# Patient Record
Sex: FEMALE | Race: WHITE | NOT HISPANIC OR LATINO | Employment: FULL TIME | URBAN - METROPOLITAN AREA
[De-identification: names, ages, dates, MRNs, and addresses within clinical notes are randomized per-mention and may not be internally consistent; named-entity substitution may affect disease eponyms.]

---

## 2017-06-09 ENCOUNTER — TELEPHONE (OUTPATIENT)
Dept: OBSTETRICS AND GYNECOLOGY | Facility: CLINIC | Age: 32
End: 2017-06-09

## 2017-06-09 NOTE — TELEPHONE ENCOUNTER
----- Message from Jimmie Trivedi sent at 6/9/2017  1:20 PM CDT -----  Pt delivery on 05/04 having some abn bleeding  [id number]

## 2018-03-27 ENCOUNTER — HOSPITAL ENCOUNTER (EMERGENCY)
Facility: HOSPITAL | Age: 33
Discharge: HOME | End: 2018-03-27
Attending: EMERGENCY MEDICINE
Payer: COMMERCIAL

## 2018-03-27 VITALS
SYSTOLIC BLOOD PRESSURE: 178 MMHG | HEART RATE: 67 BPM | WEIGHT: 236.31 LBS | RESPIRATION RATE: 18 BRPM | OXYGEN SATURATION: 98 % | BODY MASS INDEX: 37.98 KG/M2 | TEMPERATURE: 98 F | HEIGHT: 66 IN | DIASTOLIC BLOOD PRESSURE: 89 MMHG

## 2018-03-27 DIAGNOSIS — I10 ESSENTIAL HYPERTENSION: ICD-10-CM

## 2018-03-27 DIAGNOSIS — Z91.148 NONCOMPLIANCE WITH MEDICATION REGIMEN: ICD-10-CM

## 2018-03-27 DIAGNOSIS — I63.9 STROKE: ICD-10-CM

## 2018-03-27 DIAGNOSIS — R55 NEAR SYNCOPE: Primary | ICD-10-CM

## 2018-03-27 DIAGNOSIS — E03.9 HYPOTHYROIDISM, UNSPECIFIED TYPE: ICD-10-CM

## 2018-03-27 LAB
ALBUMIN SERPL BCP-MCNC: 3.8 G/DL
ALP SERPL-CCNC: 73 U/L
ALT SERPL W/O P-5'-P-CCNC: 21 U/L
ANION GAP SERPL CALC-SCNC: 7 MMOL/L
AST SERPL-CCNC: 17 U/L
B-HCG UR QL: NEGATIVE
BASOPHILS # BLD AUTO: 0.06 K/UL
BASOPHILS NFR BLD: 0.6 %
BILIRUB SERPL-MCNC: 0.8 MG/DL
BILIRUB UR QL STRIP: NEGATIVE
BUN SERPL-MCNC: 8 MG/DL
CALCIUM SERPL-MCNC: 9.1 MG/DL
CHLORIDE SERPL-SCNC: 108 MMOL/L
CHOLEST SERPL-MCNC: 178 MG/DL
CHOLEST/HDLC SERPL: 5.6 {RATIO}
CLARITY UR: CLEAR
CO2 SERPL-SCNC: 24 MMOL/L
COLOR UR: YELLOW
CREAT SERPL-MCNC: 0.8 MG/DL
DIFFERENTIAL METHOD: ABNORMAL
EOSINOPHIL # BLD AUTO: 0.3 K/UL
EOSINOPHIL NFR BLD: 2.9 %
ERYTHROCYTE [DISTWIDTH] IN BLOOD BY AUTOMATED COUNT: 13 %
EST. GFR  (AFRICAN AMERICAN): >60 ML/MIN/1.73 M^2
EST. GFR  (NON AFRICAN AMERICAN): >60 ML/MIN/1.73 M^2
GLUCOSE SERPL-MCNC: 86 MG/DL
GLUCOSE UR QL STRIP: NEGATIVE
HCT VFR BLD AUTO: 45.2 %
HDLC SERPL-MCNC: 32 MG/DL
HDLC SERPL: 18 %
HGB BLD-MCNC: 15.9 G/DL
HGB UR QL STRIP: ABNORMAL
INR PPP: 1
KETONES UR QL STRIP: NEGATIVE
LDLC SERPL CALC-MCNC: 105.8 MG/DL
LEUKOCYTE ESTERASE UR QL STRIP: NEGATIVE
LYMPHOCYTES # BLD AUTO: 2.7 K/UL
LYMPHOCYTES NFR BLD: 28.8 %
MCH RBC QN AUTO: 31.9 PG
MCHC RBC AUTO-ENTMCNC: 35.2 G/DL
MCV RBC AUTO: 91 FL
MICROSCOPIC COMMENT: NORMAL
MONOCYTES # BLD AUTO: 0.6 K/UL
MONOCYTES NFR BLD: 6 %
NEUTROPHILS # BLD AUTO: 5.8 K/UL
NEUTROPHILS NFR BLD: 61.7 %
NITRITE UR QL STRIP: NEGATIVE
NONHDLC SERPL-MCNC: 146 MG/DL
PH UR STRIP: 6 [PH] (ref 5–8)
PLATELET # BLD AUTO: 298 K/UL
PMV BLD AUTO: 10.6 FL
POCT GLUCOSE: 88 MG/DL (ref 70–110)
POTASSIUM SERPL-SCNC: 3.7 MMOL/L
PROT SERPL-MCNC: 6.9 G/DL
PROT UR QL STRIP: NEGATIVE
PROTHROMBIN TIME: 10 SEC
RBC # BLD AUTO: 4.98 M/UL
RBC #/AREA URNS HPF: 1 /HPF (ref 0–4)
SODIUM SERPL-SCNC: 139 MMOL/L
SP GR UR STRIP: 1.01 (ref 1–1.03)
SQUAMOUS #/AREA URNS HPF: 4 /HPF
T4 FREE SERPL-MCNC: 0.68 NG/DL
TRIGL SERPL-MCNC: 201 MG/DL
TSH SERPL DL<=0.005 MIU/L-ACNC: 9.62 UIU/ML
URN SPEC COLLECT METH UR: ABNORMAL
UROBILINOGEN UR STRIP-ACNC: NEGATIVE EU/DL
WBC # BLD AUTO: 9.39 K/UL
WBC #/AREA URNS HPF: 1 /HPF (ref 0–5)

## 2018-03-27 PROCEDURE — 96360 HYDRATION IV INFUSION INIT: CPT

## 2018-03-27 PROCEDURE — 25000003 PHARM REV CODE 250: Performed by: EMERGENCY MEDICINE

## 2018-03-27 PROCEDURE — 82962 GLUCOSE BLOOD TEST: CPT

## 2018-03-27 PROCEDURE — 93010 ELECTROCARDIOGRAM REPORT: CPT | Mod: ,,, | Performed by: INTERNAL MEDICINE

## 2018-03-27 PROCEDURE — 80053 COMPREHEN METABOLIC PANEL: CPT

## 2018-03-27 PROCEDURE — 93005 ELECTROCARDIOGRAM TRACING: CPT

## 2018-03-27 PROCEDURE — 81000 URINALYSIS NONAUTO W/SCOPE: CPT

## 2018-03-27 PROCEDURE — 85025 COMPLETE CBC W/AUTO DIFF WBC: CPT

## 2018-03-27 PROCEDURE — 84443 ASSAY THYROID STIM HORMONE: CPT

## 2018-03-27 PROCEDURE — 84439 ASSAY OF FREE THYROXINE: CPT

## 2018-03-27 PROCEDURE — 99285 EMERGENCY DEPT VISIT HI MDM: CPT | Mod: 25

## 2018-03-27 PROCEDURE — 81025 URINE PREGNANCY TEST: CPT

## 2018-03-27 PROCEDURE — 85610 PROTHROMBIN TIME: CPT

## 2018-03-27 PROCEDURE — 80061 LIPID PANEL: CPT

## 2018-03-27 RX ORDER — LABETALOL HYDROCHLORIDE 5 MG/ML
20 INJECTION, SOLUTION INTRAVENOUS
Status: DISCONTINUED | OUTPATIENT
Start: 2018-03-27 | End: 2018-03-27 | Stop reason: HOSPADM

## 2018-03-27 RX ORDER — CLONIDINE HYDROCHLORIDE 0.1 MG/1
0.1 TABLET ORAL 2 TIMES DAILY
Qty: 60 TABLET | Refills: 0 | Status: SHIPPED | OUTPATIENT
Start: 2018-03-27 | End: 2018-04-02 | Stop reason: ALTCHOICE

## 2018-03-27 RX ORDER — LEVOTHYROXINE SODIUM 50 UG/1
50 TABLET ORAL
Qty: 30 TABLET | Refills: 0 | Status: SHIPPED | OUTPATIENT
Start: 2018-03-27 | End: 2018-04-16 | Stop reason: SDUPTHER

## 2018-03-27 RX ADMIN — SODIUM CHLORIDE 500 ML: 0.9 INJECTION, SOLUTION INTRAVENOUS at 01:03

## 2018-03-27 NOTE — ED PROVIDER NOTES
"SCRIBE #1 NOTE: I, Corinne Mack, am scribing for, and in the presence of, Kun Krause MD. I have scribed the entire note.      History      Chief Complaint   Patient presents with    Dizziness     since this morning with tingling to left side of mouth; pt also reports frequent urination       Review of patient's allergies indicates:   Allergen Reactions    Sulfa (sulfonamide antibiotics)         HPI   HPI    3/27/2018, 12:44 PM   History obtained from the patient      History of Present Illness: Haydee Pennington is a 33 y.o. female patient with PMHx of HTN and thyroid disease who presents to the Emergency Department for dizziness which onset gradually today while the pt was at work. Pt states that her feet and abd were swollen yesterday and states that she had increased urination today. She woke up this morning "feeling bad". While the pt was at work she began to feel worse and dizzy. Symptoms are constant and moderate in severity. No mitigating or exacerbating factors reported. No associated sxs reported. Patient denies any fever, chills, CP, SOB, N/V, back pain, HA, numbness, and all other sxs at this time. No prior Tx reported. Pt states she is supposed to be medicated for her HTN and thyroid disease, but hasn't taken anything in over a year. No further complaints or concerns at this time. Pt's LNMP was a week ago        Arrival mode: Personal vehicle    PCP: Gumaro Del Real MD       Past Medical History:  Past Medical History:   Diagnosis Date    Hypertension     Thyroid disease        Past Surgical History:  History reviewed. No pertinent surgical history.      Family History:  Family History   Problem Relation Age of Onset    Diabetes Mother     Hypertension Father     Dementia Father        Social History:  Social History     Social History Main Topics    Smoking status: Current Every Day Smoker    Smokeless tobacco: Never Used    Alcohol use No    Drug use: No    Sexual activity: Not on file "       ROS   Review of Systems   Constitutional: Negative for chills and fever.        (+) general malaise   Respiratory: Negative for cough and shortness of breath.    Cardiovascular: Negative for chest pain and leg swelling.   Gastrointestinal: Negative for abdominal pain, diarrhea, nausea and vomiting.   Musculoskeletal: Negative for back pain, neck pain and neck stiffness.   Skin: Negative for rash and wound.   Neurological: Positive for dizziness. Negative for light-headedness, numbness and headaches.   All other systems reviewed and are negative.    Physical Exam      Initial Vitals [03/27/18 1220]   BP Pulse Resp Temp SpO2   (!) 209/113 78 18 99.2 °F (37.3 °C) 95 %      MAP       145          Physical Exam  Nursing Notes and Vital Signs Reviewed.  Constitutional: Patient is in no apparent distress. Well-developed and well-nourished.  Head: Atraumatic. Normocephalic.  Eyes: PERRL. EOM intact. Conjunctivae are not pale. No scleral icterus.  ENT: Mucous membranes are moist. Oropharynx is clear and symmetric.    Neck: Supple. Full ROM. No lymphadenopathy.  Cardiovascular: Regular rate. Regular rhythm. No murmurs, rubs, or gallops. Distal pulses are 2+ and symmetric.  Pulmonary/Chest: No respiratory distress. Clear to auscultation bilaterally. No wheezing or rales.  Abdominal: Soft and non-distended.  There is no tenderness.  No rebound, guarding, or rigidity.   Musculoskeletal: Moves all extremities. No obvious deformities. No edema. No calf tenderness.  Skin: Warm and dry.  Neurological:  Alert, awake, and appropriate.  Normal speech.  No acute focal neurological deficits are appreciated.  Psychiatric: Normal affect. Good eye contact. Appropriate in content.    ED Course    Procedures  ED Vital Signs:  Vitals:    03/27/18 1220 03/27/18 1235 03/27/18 1302 03/27/18 1317   BP: (!) 209/113 (!) 197/107 (!) 165/79 (!) 151/70   Pulse: 78 77 78 74   Resp: 18 18 (!) 21 15   Temp: 99.2 °F (37.3 °C) 98.3 °F (36.8 °C)    "  TempSrc: Oral Oral     SpO2: 95% 98% 96% 98%   Weight: 107.2 kg (236 lb 5.3 oz)      Height: 5' 6" (1.676 m)       03/27/18 1319 03/27/18 1322 03/27/18 1501 03/27/18 1529   BP: (!) 177/94 (!) 166/96 (!) 170/105 (!) 178/89   Pulse: 76 82 65 67   Resp: 20 (!) 23  18   Temp:    98.2 °F (36.8 °C)   TempSrc:    Oral   SpO2: 96% 98% 97% 98%   Weight:       Height:           Abnormal Lab Results:  Labs Reviewed   CBC W/ AUTO DIFFERENTIAL - Abnormal; Notable for the following:        Result Value    MCH 31.9 (*)     All other components within normal limits   COMPREHENSIVE METABOLIC PANEL - Abnormal; Notable for the following:     Anion Gap 7 (*)     All other components within normal limits   TSH - Abnormal; Notable for the following:     TSH 9.622 (*)     All other components within normal limits   LIPID PANEL - Abnormal; Notable for the following:     Triglycerides 201 (*)     HDL 32 (*)     HDL/Chol Ratio 18.0 (*)     Total Cholesterol/HDL Ratio 5.6 (*)     All other components within normal limits   URINALYSIS - Abnormal; Notable for the following:     Occult Blood UA 1+ (*)     All other components within normal limits   T4, FREE - Abnormal; Notable for the following:     Free T4 0.68 (*)     All other components within normal limits   PROTIME-INR   PREGNANCY TEST, URINE RAPID   URINALYSIS MICROSCOPIC   POCT GLUCOSE        All Lab Results:  Results for orders placed or performed during the hospital encounter of 03/27/18   CBC W/ AUTO DIFFERENTIAL   Result Value Ref Range    WBC 9.39 3.90 - 12.70 K/uL    RBC 4.98 4.00 - 5.40 M/uL    Hemoglobin 15.9 12.0 - 16.0 g/dL    Hematocrit 45.2 37.0 - 48.5 %    MCV 91 82 - 98 fL    MCH 31.9 (H) 27.0 - 31.0 pg    MCHC 35.2 32.0 - 36.0 g/dL    RDW 13.0 11.5 - 14.5 %    Platelets 298 150 - 350 K/uL    MPV 10.6 9.2 - 12.9 fL    Gran # (ANC) 5.8 1.8 - 7.7 K/uL    Lymph # 2.7 1.0 - 4.8 K/uL    Mono # 0.6 0.3 - 1.0 K/uL    Eos # 0.3 0.0 - 0.5 K/uL    Baso # 0.06 0.00 - 0.20 K/uL    " Gran% 61.7 38.0 - 73.0 %    Lymph% 28.8 18.0 - 48.0 %    Mono% 6.0 4.0 - 15.0 %    Eosinophil% 2.9 0.0 - 8.0 %    Basophil% 0.6 0.0 - 1.9 %    Differential Method Automated    Comprehensive metabolic panel   Result Value Ref Range    Sodium 139 136 - 145 mmol/L    Potassium 3.7 3.5 - 5.1 mmol/L    Chloride 108 95 - 110 mmol/L    CO2 24 23 - 29 mmol/L    Glucose 86 70 - 110 mg/dL    BUN, Bld 8 6 - 20 mg/dL    Creatinine 0.8 0.5 - 1.4 mg/dL    Calcium 9.1 8.7 - 10.5 mg/dL    Total Protein 6.9 6.0 - 8.4 g/dL    Albumin 3.8 3.5 - 5.2 g/dL    Total Bilirubin 0.8 0.1 - 1.0 mg/dL    Alkaline Phosphatase 73 55 - 135 U/L    AST 17 10 - 40 U/L    ALT 21 10 - 44 U/L    Anion Gap 7 (L) 8 - 16 mmol/L    eGFR if African American >60 >60 mL/min/1.73 m^2    eGFR if non African American >60 >60 mL/min/1.73 m^2   Protime-INR   Result Value Ref Range    Prothrombin Time 10.0 9.0 - 12.5 sec    INR 1.0 0.8 - 1.2   TSH   Result Value Ref Range    TSH 9.622 (H) 0.400 - 4.000 uIU/mL   LDL - Lipid Panel   Result Value Ref Range    Cholesterol 178 120 - 199 mg/dL    Triglycerides 201 (H) 30 - 150 mg/dL    HDL 32 (L) 40 - 75 mg/dL    LDL Cholesterol 105.8 63.0 - 159.0 mg/dL    HDL/Chol Ratio 18.0 (L) 20.0 - 50.0 %    Total Cholesterol/HDL Ratio 5.6 (H) 2.0 - 5.0    Non-HDL Cholesterol 146 mg/dL   Urinalysis   Result Value Ref Range    Specimen UA Urine, Clean Catch     Color, UA Yellow Yellow, Straw, Rosio    Appearance, UA Clear Clear    pH, UA 6.0 5.0 - 8.0    Specific Gravity, UA 1.010 1.005 - 1.030    Protein, UA Negative Negative    Glucose, UA Negative Negative    Ketones, UA Negative Negative    Bilirubin (UA) Negative Negative    Occult Blood UA 1+ (A) Negative    Nitrite, UA Negative Negative    Urobilinogen, UA Negative <2.0 EU/dL    Leukocytes, UA Negative Negative   Rapid Pregnancy, Urine   Result Value Ref Range    Preg Test, Ur Negative    Urinalysis Microscopic   Result Value Ref Range    RBC, UA 1 0 - 4 /hpf    WBC, UA 1 0 - 5  /hpf    Squam Epithel, UA 4 /hpf    Microscopic Comment SEE COMMENT    T4, free   Result Value Ref Range    Free T4 0.68 (L) 0.71 - 1.51 ng/dL   POCT glucose   Result Value Ref Range    POCT Glucose 88 70 - 110 mg/dL       Imaging Results:  Imaging Results          CT Head Without Contrast (Final result)  Result time 03/27/18 13:48:14    Final result by Chano Cisneros MD (03/27/18 13:48:14)                 Impression:       No acute abnormality identified.    All CT scans at this facility use dose modulation, iterative reconstruction and/or weight based dosing when appropriate to reduce radiation dose to as low as reasonably achievable.      Electronically signed by: CHANO CISNEROS MD  Date:     03/27/18  Time:    13:48              Narrative:    Indication: Dizziness.    Axial CT images of the head were obtained without  contrast.    Comparison:  None    Findings:    Ventricles, sulci, and cisterns are symmetric without evidence of mass effect.  Brain volume and white matter are normal for age.  No intra or extraaxial masses, hemorrhages, abnormal fluid collections or abnormal calcifications. The cranium and extracranial structures are unremarkable.  Visualized sinuses and mastoid air cells are clear.                             X-Ray Chest AP Portable (Final result)  Result time 03/27/18 13:24:20    Final result by Kuldip Zuñiga III, MD (03/27/18 13:24:20)                 Impression:     Normal portable chest x-ray        Electronically signed by: KULDIP ZUÑIGA MD  Date:     03/27/18  Time:    13:24              Narrative:    XR CHEST AP PORTABLE    Clinical history: Stroke .      Findings: Cardiomediastinal silhouette is within normal limits for AP technique. The lungs appear clear of active disease. No acute appearing infiltrate, pleural effusion, pneumothorax or other acute disease identified.                               The EKG was ordered, reviewed, and independently interpreted by the ED  provider.  Interpretation time: 1323  Rate: 69 BPM  Rhythm: normal sinus rhythm  Interpretation: No STEMI.           The Emergency Provider reviewed the vital signs and test results, which are outlined above.    ED Discussion     2:41 PM: Reassessed pt at this time. Pt is awake, alert, and in no distress. Discussed with pt all pertinent ED information and results. Discussed pt dx and plan of tx. Gave pt all f/u and return to the ED instructions. All questions and concerns were addressed at this time. Pt expresses understanding of information and instructions, and is comfortable with plan to discharge. Pt is stable for discharge.    I discussed with patient and/or family/caretaker that evaluation in the ED does not suggest any emergent or life threatening medical conditions requiring immediate intervention beyond what was provided in the ED, and I believe patient is safe for discharge.  Regardless, an unremarkable evaluation in the ED does not preclude the development or presence of a serious of life threatening condition. As such, patient was instructed to return immediately for any worsening or change in current symptoms.      ED Medication(s):  Medications   sodium chloride 0.9% bolus 500 mL (0 mLs Intravenous Stopped 3/27/18 1420)       Discharge Medication List as of 3/27/2018  3:19 PM      START taking these medications    Details   cloNIDine (CATAPRES) 0.1 MG tablet Take 1 tablet (0.1 mg total) by mouth 2 (two) times daily., Starting Tue 3/27/2018, Until Thu 4/26/2018, Print      levothyroxine (SYNTHROID) 50 MCG tablet Take 1 tablet (50 mcg total) by mouth before breakfast., Starting Tue 3/27/2018, Until Wed 3/27/2019, Print             Follow-up Information     your primary doctor. Schedule an appointment as soon as possible for a visit in 3 days.    Why:  Follow up with your primary doctor in the next 2-3 days for a re-check.  Call [phone] to arrange afollow up with an [facility] provider.  Return to the  emergency department for any worsening signs or symptoms.                   Medical Decision Making    Medical Decision Making:   Clinical Tests:   Lab Tests: Ordered and Reviewed  Radiological Study: Ordered and Reviewed  Medical Tests: Ordered and Reviewed           Scribe Attestation:   Scribe #1: I performed the above scribed service and the documentation accurately describes the services I performed. I attest to the accuracy of the note.    Attending:   Physician Attestation Statement for Scribe #1: I, Kun Krause MD, personally performed the services described in this documentation, as scribed by Corinne Mack, in my presence, and it is both accurate and complete.          Clinical Impression       ICD-10-CM ICD-9-CM   1. Near syncope R55 780.2   2. Stroke I63.9 434.91   3. Essential hypertension I10 401.9   4. Hypothyroidism, unspecified type E03.9 244.9   5. Noncompliance with medication regimen Z91.14 V15.81     (Clarification of #2 above - No evidence of stroke after evaluation here in the emergency department)      Disposition:   Disposition: Discharged  Condition: Stable           Kun Krause MD  03/31/18 1022

## 2018-04-02 ENCOUNTER — OFFICE VISIT (OUTPATIENT)
Dept: INTERNAL MEDICINE | Facility: CLINIC | Age: 33
End: 2018-04-02
Payer: COMMERCIAL

## 2018-04-02 VITALS
HEIGHT: 66 IN | OXYGEN SATURATION: 94 % | HEART RATE: 82 BPM | TEMPERATURE: 99 F | BODY MASS INDEX: 38.23 KG/M2 | SYSTOLIC BLOOD PRESSURE: 138 MMHG | WEIGHT: 237.88 LBS | DIASTOLIC BLOOD PRESSURE: 90 MMHG

## 2018-04-02 DIAGNOSIS — E66.9 OBESITY, UNSPECIFIED CLASSIFICATION, UNSPECIFIED OBESITY TYPE, UNSPECIFIED WHETHER SERIOUS COMORBIDITY PRESENT: ICD-10-CM

## 2018-04-02 DIAGNOSIS — I10 HYPERTENSION, UNSPECIFIED TYPE: Primary | ICD-10-CM

## 2018-04-02 DIAGNOSIS — F17.200 SMOKES: ICD-10-CM

## 2018-04-02 DIAGNOSIS — E03.9 HYPOTHYROIDISM, UNSPECIFIED TYPE: ICD-10-CM

## 2018-04-02 PROCEDURE — 3080F DIAST BP >= 90 MM HG: CPT | Mod: CPTII,S$GLB,, | Performed by: FAMILY MEDICINE

## 2018-04-02 PROCEDURE — 99999 PR PBB SHADOW E&M-EST. PATIENT-LVL III: CPT | Mod: PBBFAC,,, | Performed by: FAMILY MEDICINE

## 2018-04-02 PROCEDURE — 99213 OFFICE O/P EST LOW 20 MIN: CPT | Mod: S$GLB,,, | Performed by: FAMILY MEDICINE

## 2018-04-02 PROCEDURE — 3075F SYST BP GE 130 - 139MM HG: CPT | Mod: CPTII,S$GLB,, | Performed by: FAMILY MEDICINE

## 2018-04-02 RX ORDER — NIFEDIPINE 30 MG/1
30 TABLET, EXTENDED RELEASE ORAL DAILY
Qty: 30 TABLET | Refills: 0 | Status: SHIPPED | OUTPATIENT
Start: 2018-04-02 | End: 2018-04-16 | Stop reason: SDUPTHER

## 2018-04-02 NOTE — PROGRESS NOTES
"Subjective:       Patient ID: Haydee Pennington is a 33 y.o. female.    Chief Complaint: Follow-up (er follow up) and Sinusitis    HPI     34 yo F    ER visit recently  3/27/18    She is feeling better.  She felt off - "not that bad "   Felt like had TV static inside body.  That has resolved.  CT head -   No acute abnormality identified    BP was very high.    Smokes since she was 13  Does not exercise  Alcohol -  Drugs : none  Sexually active - 1 male partner -       TSH was high.  T4 slighly low.  Formerly on Synthroid. Unsure of dose.  ER refilled the synthroid. ER filled 50 mcg      Review of Systems   Constitutional: Negative for chills and fever.   HENT: Negative for congestion, sinus pressure and voice change.    Eyes: Negative for visual disturbance.   Respiratory: Negative for shortness of breath.    Cardiovascular: Negative for chest pain.   Gastrointestinal: Negative for constipation and diarrhea.   Genitourinary: Negative for difficulty urinating.   Musculoskeletal: Negative for gait problem and myalgias.   Skin: Negative for rash.   Neurological: Negative for dizziness and light-headedness.   Psychiatric/Behavioral: Negative for dysphoric mood.       Objective:       Vitals:    04/02/18 1646   BP: (!) 138/90   Pulse: 82   Temp: 98.9 °F (37.2 °C)       Physical Exam   Constitutional: She is oriented to person, place, and time. She appears well-developed and well-nourished. She does not have a sickly appearance. No distress.   HENT:   Head: Normocephalic and atraumatic.   Right Ear: External ear normal.   Left Ear: External ear normal.   Eyes: Conjunctivae, EOM and lids are normal.   Neck: Trachea normal, normal range of motion and full passive range of motion without pain.   Cardiovascular: Normal rate, regular rhythm and normal heart sounds.    Pulmonary/Chest: Effort normal and breath sounds normal. No stridor. No respiratory distress.   Abdominal: Soft. Normal appearance and bowel sounds are normal. " She exhibits no distension. There is no tenderness. There is no guarding. No hernia.   Musculoskeletal: Normal range of motion.   Lymphadenopathy:     She has no cervical adenopathy.   Neurological: She is alert and oriented to person, place, and time. She is not disoriented.   Skin: Skin is warm, dry and intact. No rash noted. She is not diaphoretic.   Psychiatric: She has a normal mood and affect. Her speech is normal and behavior is normal. Thought content normal.       Assessment:       1. Hypertension, unspecified type    2. Hypothyroidism, unspecified type        Plan:   Hypertension, unspecified type    Changing from clonidine 0.1mg  Nifedipine - to be used on off chance she becomes pregnant.  Will monitor  Will follow up 2 weeks    Hypothyroidism, unspecified type    On 50 mcg   Will continue for now  Assess in 6 weeks    15-20 minutes spent discussing healthy lifestyle.  Need to stop smoking  Weight reduction.  Diet improvement  Physical activity requirements  Patient very pleasant - did seem somewhat overwhelmed by recommendations.     No Follow-up on file.

## 2018-04-16 ENCOUNTER — OFFICE VISIT (OUTPATIENT)
Dept: INTERNAL MEDICINE | Facility: CLINIC | Age: 33
End: 2018-04-16
Payer: COMMERCIAL

## 2018-04-16 VITALS
OXYGEN SATURATION: 99 % | DIASTOLIC BLOOD PRESSURE: 88 MMHG | HEART RATE: 70 BPM | BODY MASS INDEX: 37.69 KG/M2 | WEIGHT: 234.56 LBS | HEIGHT: 66 IN | TEMPERATURE: 98 F | SYSTOLIC BLOOD PRESSURE: 146 MMHG

## 2018-04-16 DIAGNOSIS — J06.9 UPPER RESPIRATORY TRACT INFECTION, UNSPECIFIED TYPE: ICD-10-CM

## 2018-04-16 DIAGNOSIS — I10 HYPERTENSION, UNSPECIFIED TYPE: Primary | ICD-10-CM

## 2018-04-16 DIAGNOSIS — R51.9 NONINTRACTABLE HEADACHE, UNSPECIFIED CHRONICITY PATTERN, UNSPECIFIED HEADACHE TYPE: ICD-10-CM

## 2018-04-16 PROCEDURE — 99213 OFFICE O/P EST LOW 20 MIN: CPT | Mod: S$GLB,,, | Performed by: FAMILY MEDICINE

## 2018-04-16 PROCEDURE — 3079F DIAST BP 80-89 MM HG: CPT | Mod: CPTII,S$GLB,, | Performed by: FAMILY MEDICINE

## 2018-04-16 PROCEDURE — 99999 PR PBB SHADOW E&M-EST. PATIENT-LVL III: CPT | Mod: PBBFAC,,, | Performed by: FAMILY MEDICINE

## 2018-04-16 PROCEDURE — 3077F SYST BP >= 140 MM HG: CPT | Mod: CPTII,S$GLB,, | Performed by: FAMILY MEDICINE

## 2018-04-16 RX ORDER — LEVOTHYROXINE SODIUM 50 UG/1
50 TABLET ORAL
Qty: 30 TABLET | Refills: 0 | Status: SHIPPED | OUTPATIENT
Start: 2018-04-16 | End: 2018-04-30 | Stop reason: SDUPTHER

## 2018-04-16 RX ORDER — NIFEDIPINE 30 MG/1
60 TABLET, EXTENDED RELEASE ORAL DAILY
Qty: 45 TABLET | Refills: 0 | Status: SHIPPED | OUTPATIENT
Start: 2018-04-16 | End: 2018-04-30 | Stop reason: SDUPTHER

## 2018-04-16 NOTE — PROGRESS NOTES
"Subjective:       Patient ID: Haydee Pennington is a 33 y.o. female.    Chief Complaint: No chief complaint on file.    HPI     34 yo F    Presents as follow up    "feels like crap today'    Reports she has a headache.  Has been logging BP    Reports face hurts/ head hurts.  No cough  No sob  Taken 8 ibuprofen.     Consistent running high.     No fevers.  No chills        Review of Systems   Constitutional: Negative for chills and fever.   HENT: Negative for sinus pain, sore throat and trouble swallowing.    Respiratory: Negative for shortness of breath.    Cardiovascular: Positive for palpitations. Negative for chest pain.   Gastrointestinal: Positive for constipation. Negative for diarrhea.   Musculoskeletal: Positive for neck pain.   Neurological: Positive for headaches.       Objective:       Vitals:    04/16/18 1618   BP: (!) 146/88   Pulse: 70   Temp: 98.3 °F (36.8 °C)       Physical Exam   Constitutional: She is oriented to person, place, and time. She appears well-developed and well-nourished. She does not have a sickly appearance. No distress.   HENT:   Head: Normocephalic and atraumatic.   Right Ear: External ear normal.   Left Ear: External ear normal.   Eyes: Conjunctivae, EOM and lids are normal.   Neck: Trachea normal, normal range of motion and full passive range of motion without pain.   Cardiovascular: Normal rate, regular rhythm and normal heart sounds.    Pulmonary/Chest: Effort normal and breath sounds normal. No stridor. No respiratory distress.   Abdominal: Soft. Normal appearance and bowel sounds are normal. She exhibits no distension. There is no tenderness. There is no guarding. No hernia.   Musculoskeletal: Normal range of motion.   Lymphadenopathy:     She has no cervical adenopathy.   Neurological: She is alert and oriented to person, place, and time. She is not disoriented.   Skin: Skin is warm, dry and intact. No rash noted. She is not diaphoretic.   Psychiatric: She has a normal mood " and affect. Her speech is normal and behavior is normal. Thought content normal.       Assessment:       1. Hypertension, unspecified type    2. Nonintractable headache, unspecified chronicity pattern, unspecified headache type    3. Upper respiratory tract infection, unspecified type        Plan:   Hypertension, unspecified type  30 mg - nifedipine -  Plan on increase to 60 mg.  Continue to monitor     Nonintractable headache, unspecified chronicity pattern, unspecified headache type  Suspect 2'2 to URTI.    Viral infection  Suspect URTI  No abx  Monitor  Symptomatic mngt  F/u if fails to improve      Constipation  Stool softner advised. If fails to improve must consider switching from CCB.       No Follow-up on file.

## 2018-04-16 NOTE — LETTER
April 16, 2018      [facility]  [id number] [place] [id number]  Phone: [phone]  Fax: [phone]       Patient: Haydee Pennington   YOB: 1985  Date of Visit: 04/16/2018    To Whom It May Concern:    Carlota Pennington  was at [facility] on 04/16/2018. Please excuse from work today. If you have any questions or concerns, or if I can be of further assistance, please do not hesitate to contact me.    Sincerely,    Gumaro Del Real MD

## 2018-04-30 ENCOUNTER — OFFICE VISIT (OUTPATIENT)
Dept: INTERNAL MEDICINE | Facility: CLINIC | Age: 33
End: 2018-04-30
Payer: COMMERCIAL

## 2018-04-30 VITALS
WEIGHT: 239.19 LBS | DIASTOLIC BLOOD PRESSURE: 96 MMHG | TEMPERATURE: 98 F | SYSTOLIC BLOOD PRESSURE: 132 MMHG | BODY MASS INDEX: 38.61 KG/M2

## 2018-04-30 DIAGNOSIS — I10 HYPERTENSION, UNSPECIFIED TYPE: Primary | ICD-10-CM

## 2018-04-30 DIAGNOSIS — E03.9 HYPOTHYROIDISM, UNSPECIFIED TYPE: ICD-10-CM

## 2018-04-30 PROCEDURE — 3075F SYST BP GE 130 - 139MM HG: CPT | Mod: CPTII,S$GLB,, | Performed by: FAMILY MEDICINE

## 2018-04-30 PROCEDURE — 99999 PR PBB SHADOW E&M-EST. PATIENT-LVL II: CPT | Mod: PBBFAC,,, | Performed by: FAMILY MEDICINE

## 2018-04-30 PROCEDURE — 99213 OFFICE O/P EST LOW 20 MIN: CPT | Mod: S$GLB,,, | Performed by: FAMILY MEDICINE

## 2018-04-30 PROCEDURE — 3080F DIAST BP >= 90 MM HG: CPT | Mod: CPTII,S$GLB,, | Performed by: FAMILY MEDICINE

## 2018-04-30 RX ORDER — NIFEDIPINE 30 MG/1
90 TABLET, EXTENDED RELEASE ORAL DAILY
Qty: 30 TABLET | Refills: 0 | Status: SHIPPED | OUTPATIENT
Start: 2018-04-30 | End: 2018-05-04 | Stop reason: SINTOL

## 2018-04-30 RX ORDER — LEVOTHYROXINE SODIUM 50 UG/1
50 TABLET ORAL
Qty: 30 TABLET | Refills: 0 | Status: SHIPPED | OUTPATIENT
Start: 2018-04-30 | End: 2018-05-31 | Stop reason: SDUPTHER

## 2018-04-30 NOTE — PROGRESS NOTES
Subjective:       Patient ID: Haydee Pennington is a 33 y.o. female.    Chief Complaint: No chief complaint on file.    HPI     34 yo F    At last visit 146/88.    Reports headache lasted for some time..  Improved from last visit.  Report went away after period.    Has been logging BP.   Not yet controlled.    Worries anxiety is a problem - but declines medication.      Review of Systems   Constitutional: Negative for chills and fever.   HENT: Negative for trouble swallowing.    Respiratory: Negative for shortness of breath.    Cardiovascular: Negative for chest pain and palpitations.   Musculoskeletal: Positive for neck pain.   Neurological: Positive for headaches.       Objective:     Vitals:    04/30/18 1649   BP: (!) 132/96   Temp: 98.2 °F (36.8 °C)     108.5 kg.  Physical Exam   Constitutional: She is oriented to person, place, and time. She appears well-developed and well-nourished. She does not have a sickly appearance. No distress.   HENT:   Head: Normocephalic and atraumatic.   Right Ear: External ear normal.   Left Ear: External ear normal.   Eyes: Conjunctivae, EOM and lids are normal.   Neck: Trachea normal, normal range of motion and full passive range of motion without pain.   Cardiovascular: Normal rate, regular rhythm and normal heart sounds.    Pulmonary/Chest: Effort normal and breath sounds normal. No stridor. No respiratory distress.   Abdominal: Soft. Normal appearance and bowel sounds are normal. She exhibits no distension. There is no tenderness. There is no guarding. No hernia.   Musculoskeletal: Normal range of motion.   Lymphadenopathy:     She has no cervical adenopathy.   Neurological: She is alert and oriented to person, place, and time. She is not disoriented.   Skin: Skin is warm, dry and intact. No rash noted. She is not diaphoretic.   Psychiatric: She has a normal mood and affect. Her speech is normal and behavior is normal. Thought content normal.       Assessment:       1.  Hypertension, unspecified type    2. Hypothyroidism, unspecified type        Plan:   Hypertension, unspecified type    Not yet controlled  Will increase dose  Encouraged exercise, weight loss, diet  Nursing visit in 2 weeks.     Hypothyroidism, unspecified type  Refill synthroid  TSH in 2 weeks.    -     NIFEdipine (PROCARDIA-XL) 30 MG (OSM) 24 hr tablet; Take 3 tablets (90 mg total) by mouth once daily.  Dispense: 30 tablet; Refill: 0  -     levothyroxine (SYNTHROID) 50 MCG tablet; Take 1 tablet (50 mcg total) by mouth before breakfast.  Dispense: 30 tablet; Refill: 0        No Follow-up on file.

## 2018-05-04 ENCOUNTER — TELEPHONE (OUTPATIENT)
Dept: INTERNAL MEDICINE | Facility: CLINIC | Age: 33
End: 2018-05-04

## 2018-05-04 RX ORDER — LISINOPRIL 10 MG/1
10 TABLET ORAL DAILY
Qty: 30 TABLET | Refills: 0 | Status: SHIPPED | OUTPATIENT
Start: 2018-05-04 | End: 2018-05-31 | Stop reason: SDUPTHER

## 2018-05-04 NOTE — TELEPHONE ENCOUNTER
Patient saw you on 4/30/18 to f/u on hypertension. Nifedipine was increased to 30mg. Patient states that Wednesday her feet started swelling. Has been elevating her feet and resting since. Woke up with facial swelling and hand swelling which seems better at this time, but feet are still swollen. Her BP today was 127/82. Patient states that she doesn't feel bad but has noticed a palpitation occasionally. Please advise?

## 2018-05-04 NOTE — TELEPHONE ENCOUNTER
----- Message from Kristi Garcia sent at 5/4/2018 10:40 AM CDT -----  Pt is requesting a call from nurse to discuss the swelling in her feet.        Please call pt back at [phone]

## 2018-05-14 ENCOUNTER — LAB VISIT (OUTPATIENT)
Dept: LAB | Facility: HOSPITAL | Age: 33
End: 2018-05-14
Attending: FAMILY MEDICINE
Payer: COMMERCIAL

## 2018-05-14 ENCOUNTER — OFFICE VISIT (OUTPATIENT)
Dept: INTERNAL MEDICINE | Facility: CLINIC | Age: 33
End: 2018-05-14
Payer: COMMERCIAL

## 2018-05-14 VITALS
BODY MASS INDEX: 38.16 KG/M2 | OXYGEN SATURATION: 96 % | HEIGHT: 66 IN | WEIGHT: 237.44 LBS | DIASTOLIC BLOOD PRESSURE: 70 MMHG | HEART RATE: 100 BPM | SYSTOLIC BLOOD PRESSURE: 128 MMHG | TEMPERATURE: 99 F

## 2018-05-14 DIAGNOSIS — R39.198 SUBJECTIVE CHANGE IN URINATION: Primary | ICD-10-CM

## 2018-05-14 DIAGNOSIS — J06.9 UPPER RESPIRATORY TRACT INFECTION, UNSPECIFIED TYPE: ICD-10-CM

## 2018-05-14 DIAGNOSIS — R10.9 FLANK PAIN: ICD-10-CM

## 2018-05-14 LAB
ALBUMIN SERPL BCP-MCNC: 3.6 G/DL
ALP SERPL-CCNC: 82 U/L
ALT SERPL W/O P-5'-P-CCNC: 17 U/L
ANION GAP SERPL CALC-SCNC: 8 MMOL/L
AST SERPL-CCNC: 17 U/L
BASOPHILS # BLD AUTO: 0.04 K/UL
BASOPHILS NFR BLD: 0.4 %
BILIRUB SERPL-MCNC: 0.7 MG/DL
BILIRUB UR QL STRIP: NEGATIVE
BUN SERPL-MCNC: 10 MG/DL
CALCIUM SERPL-MCNC: 9 MG/DL
CHLORIDE SERPL-SCNC: 104 MMOL/L
CLARITY UR: CLEAR
CO2 SERPL-SCNC: 26 MMOL/L
COLOR UR: YELLOW
CREAT SERPL-MCNC: 0.8 MG/DL
DIFFERENTIAL METHOD: ABNORMAL
EOSINOPHIL # BLD AUTO: 0.2 K/UL
EOSINOPHIL NFR BLD: 2.1 %
ERYTHROCYTE [DISTWIDTH] IN BLOOD BY AUTOMATED COUNT: 12.7 %
EST. GFR  (AFRICAN AMERICAN): >60 ML/MIN/1.73 M^2
EST. GFR  (NON AFRICAN AMERICAN): >60 ML/MIN/1.73 M^2
GLUCOSE SERPL-MCNC: 87 MG/DL
GLUCOSE UR QL STRIP: NEGATIVE
HCT VFR BLD AUTO: 43 %
HGB BLD-MCNC: 15.5 G/DL
HGB UR QL STRIP: ABNORMAL
KETONES UR QL STRIP: NEGATIVE
LEUKOCYTE ESTERASE UR QL STRIP: NEGATIVE
LYMPHOCYTES # BLD AUTO: 2.1 K/UL
LYMPHOCYTES NFR BLD: 20.5 %
MCH RBC QN AUTO: 32.8 PG
MCHC RBC AUTO-ENTMCNC: 36 G/DL
MCV RBC AUTO: 91 FL
MICROSCOPIC COMMENT: NORMAL
MICROSCOPIC COMMENT: NORMAL
MONOCYTES # BLD AUTO: 1.1 K/UL
MONOCYTES NFR BLD: 10.3 %
NEUTROPHILS # BLD AUTO: 6.9 K/UL
NEUTROPHILS NFR BLD: 66.7 %
NITRITE UR QL STRIP: NEGATIVE
PH UR STRIP: 6 [PH] (ref 5–8)
PLATELET # BLD AUTO: 275 K/UL
PMV BLD AUTO: 10.5 FL
POTASSIUM SERPL-SCNC: 3.9 MMOL/L
PROCALCITONIN SERPL IA-MCNC: 0.03 NG/ML
PROT SERPL-MCNC: 6.7 G/DL
PROT UR QL STRIP: NEGATIVE
RBC # BLD AUTO: 4.73 M/UL
RBC #/AREA URNS HPF: 1 /HPF (ref 0–4)
RBC #/AREA URNS HPF: 1 /HPF (ref 0–4)
SODIUM SERPL-SCNC: 138 MMOL/L
SP GR UR STRIP: 1.01 (ref 1–1.03)
SQUAMOUS #/AREA URNS HPF: 2 /HPF
SQUAMOUS #/AREA URNS HPF: 2 /HPF
URN SPEC COLLECT METH UR: ABNORMAL
WBC # BLD AUTO: 10.29 K/UL
WBC #/AREA URNS HPF: 1 /HPF (ref 0–5)
WBC #/AREA URNS HPF: 1 /HPF (ref 0–5)
WBC CLUMPS URNS QL MICRO: NORMAL

## 2018-05-14 PROCEDURE — 80053 COMPREHEN METABOLIC PANEL: CPT

## 2018-05-14 PROCEDURE — 85025 COMPLETE CBC W/AUTO DIFF WBC: CPT

## 2018-05-14 PROCEDURE — 3074F SYST BP LT 130 MM HG: CPT | Mod: CPTII,S$GLB,, | Performed by: FAMILY MEDICINE

## 2018-05-14 PROCEDURE — 84145 PROCALCITONIN (PCT): CPT

## 2018-05-14 PROCEDURE — 3078F DIAST BP <80 MM HG: CPT | Mod: CPTII,S$GLB,, | Performed by: FAMILY MEDICINE

## 2018-05-14 PROCEDURE — 99999 PR PBB SHADOW E&M-EST. PATIENT-LVL III: CPT | Mod: PBBFAC,,, | Performed by: FAMILY MEDICINE

## 2018-05-14 PROCEDURE — 3008F BODY MASS INDEX DOCD: CPT | Mod: CPTII,S$GLB,, | Performed by: FAMILY MEDICINE

## 2018-05-14 PROCEDURE — 36415 COLL VENOUS BLD VENIPUNCTURE: CPT

## 2018-05-14 PROCEDURE — 81000 URINALYSIS NONAUTO W/SCOPE: CPT

## 2018-05-14 PROCEDURE — 87086 URINE CULTURE/COLONY COUNT: CPT

## 2018-05-14 PROCEDURE — 99213 OFFICE O/P EST LOW 20 MIN: CPT | Mod: S$GLB,,, | Performed by: FAMILY MEDICINE

## 2018-05-14 NOTE — PROGRESS NOTES
"Subjective:       Patient ID: Haydee Pennington is a 33 y.o. female.    Chief Complaint: Sinus Problem    HPI     34 yo F    Starting feeling poorly with a sore throat Friday evening.  Saturday felt congested.  Sunday and Monday felt unwell.     Reports low grade fever.  Sinus pressure  Pressure in chest.  Chest tightness/ coughing    Reports feeling " huffy/puffy".    Reports change in urination.  Tried to pee earlier.    Reports being sweaty.  No dysuria.  Earlier today had trouble urinating  Did go earlier today.  Feeling urgency.    Is having some flank pain.    Review of Systems   Constitutional: Positive for fever. Negative for chills.   HENT: Positive for sinus pressure. Negative for trouble swallowing.    Respiratory: Positive for cough and chest tightness.    Gastrointestinal: Negative for diarrhea and nausea.   Genitourinary: Positive for difficulty urinating. Negative for dysuria.   Musculoskeletal: Positive for back pain.   Skin: Negative for color change.   Neurological: Positive for dizziness.       Objective:       Vitals:    05/14/18 1445   BP: 128/70   Pulse: 100   Temp: 98.7 °F (37.1 °C)       Physical Exam   Constitutional: She is oriented to person, place, and time. She appears well-developed and well-nourished. She does not have a sickly appearance. No distress.   HENT:   Head: Normocephalic and atraumatic.   Right Ear: External ear normal.   Left Ear: External ear normal.   Eyes: Conjunctivae, EOM and lids are normal.   Neck: Trachea normal, normal range of motion and full passive range of motion without pain.   Cardiovascular: Normal rate, regular rhythm and normal heart sounds.    Pulmonary/Chest: Effort normal and breath sounds normal. No stridor. No respiratory distress.   Abdominal: Soft. Normal appearance and bowel sounds are normal. She exhibits no distension. There is no tenderness. There is no guarding. No hernia.   Musculoskeletal: Normal range of motion.   Lymphadenopathy:     She " has no cervical adenopathy.   Neurological: She is alert and oriented to person, place, and time. She is not disoriented.   Skin: Skin is warm, dry and intact. No rash noted. She is not diaphoretic.   Psychiatric: She has a normal mood and affect. Her speech is normal and behavior is normal. Thought content normal.       Assessment:       Vitals:    05/14/18 1445   BP: 128/70   Pulse: 100   Temp: 98.7 °F (37.1 °C)       1. Subjective change in urination    2. Upper respiratory tract infection, unspecified type    3. Flank pain        Plan:   Subjective change in urination  -     URINALYSIS  -     Urinalysis Microscopic  -     CULTURE, URINE    Upper respiratory tract infection, unspecified type    Flank pain  -     CBC auto differential; Future; Expected date: 05/14/2018  -     Comprehensive metabolic panel; Future; Expected date: 05/14/2018    Plan on stat UA to evaluate for UTI given change in urination and flank pain  CBC / CMP checking WBC and renal function.    If UA is clean will await pro calcitonin - on off chance urti bacterial  Holding antibiotics for now.    Patient does not do well with steroids.        Blood pressure seems to be doing very on lisinopril  If labs ok f/u in 6 months.    Doing TSH and T4 in 2 weeks due to current illness I want most appropriate testing done.       No Follow-up on file.

## 2018-05-14 NOTE — LETTER
May 14, 2018      [facility]  [id number] [place] [address]  Phone: [phone]  Fax: [phone]       Patient: Haydee Pennington   YOB: 1985  Date of Visit: 05/14/2018    To Whom It May Concern:    Carlota Pennington  was at [facility] on 05/14/2018. Please excuse patient from work today and likely tomorrow. If you have any questions or concerns, or if I can be of further assistance, please do not hesitate to contact me.    Sincerely,    Gumaro Del Real MD

## 2018-05-16 LAB — BACTERIA UR CULT: NORMAL

## 2018-05-22 ENCOUNTER — PATIENT MESSAGE (OUTPATIENT)
Dept: INTERNAL MEDICINE | Facility: CLINIC | Age: 33
End: 2018-05-22

## 2018-05-30 ENCOUNTER — PATIENT MESSAGE (OUTPATIENT)
Dept: INTERNAL MEDICINE | Facility: CLINIC | Age: 33
End: 2018-05-30

## 2018-05-31 ENCOUNTER — LAB VISIT (OUTPATIENT)
Dept: LAB | Facility: HOSPITAL | Age: 33
End: 2018-05-31
Attending: FAMILY MEDICINE
Payer: COMMERCIAL

## 2018-05-31 DIAGNOSIS — E03.9 HYPOTHYROIDISM, UNSPECIFIED TYPE: ICD-10-CM

## 2018-05-31 LAB
T4 FREE SERPL-MCNC: 0.94 NG/DL
T4 SERPL-MCNC: 7.3 UG/DL
TSH SERPL DL<=0.005 MIU/L-ACNC: 4.03 UIU/ML

## 2018-05-31 PROCEDURE — 84436 ASSAY OF TOTAL THYROXINE: CPT

## 2018-05-31 PROCEDURE — 84439 ASSAY OF FREE THYROXINE: CPT

## 2018-05-31 PROCEDURE — 36415 COLL VENOUS BLD VENIPUNCTURE: CPT

## 2018-05-31 PROCEDURE — 84443 ASSAY THYROID STIM HORMONE: CPT

## 2018-05-31 RX ORDER — LISINOPRIL 10 MG/1
10 TABLET ORAL DAILY
Qty: 90 TABLET | Refills: 1 | Status: SHIPPED | OUTPATIENT
Start: 2018-05-31 | End: 2019-01-17 | Stop reason: SDUPTHER

## 2018-05-31 RX ORDER — LEVOTHYROXINE SODIUM 50 UG/1
50 TABLET ORAL
Qty: 30 TABLET | Refills: 0 | Status: SHIPPED | OUTPATIENT
Start: 2018-05-31 | End: 2018-06-05 | Stop reason: SDUPTHER

## 2018-06-01 ENCOUNTER — TELEPHONE (OUTPATIENT)
Dept: FAMILY MEDICINE | Facility: CLINIC | Age: 33
End: 2018-06-01

## 2018-06-01 NOTE — TELEPHONE ENCOUNTER
Spoke with pt and gave info regarding labs.  Asked pt to call and schedule a recheck in about 4weeks  This MA couldn't schedule, no  Future order was placed

## 2018-06-01 NOTE — TELEPHONE ENCOUNTER
----- Message from Gumaro Del Real MD sent at 6/1/2018  2:41 PM CDT -----  Please call the patient regarding her labs.  I tried to call and discuss with her myself - no answer.  TSH looks good. Very slightly elevated - but I am comfortable with this dose - and would like a 2nd TSH check prior to any dosage change. Improved as compare to a few months ago.   Please ask her to message me thru [facility] as she has done in past if has questions.  I would like recheck in 4-6 weeks.

## 2018-06-04 ENCOUNTER — TELEPHONE (OUTPATIENT)
Dept: INTERNAL MEDICINE | Facility: CLINIC | Age: 33
End: 2018-06-04

## 2018-06-04 NOTE — TELEPHONE ENCOUNTER
----- Message from Kyra Matthews sent at 6/1/2018  4:54 PM CDT -----  Contact: PT  Calling in regards to lab results and please advise [phone] (home)

## 2018-06-05 RX ORDER — LEVOTHYROXINE SODIUM 50 UG/1
50 TABLET ORAL
Qty: 30 TABLET | Refills: 0 | Status: SHIPPED | OUTPATIENT
Start: 2018-06-05 | End: 2018-06-07 | Stop reason: SDUPTHER

## 2018-06-05 NOTE — TELEPHONE ENCOUNTER
Last office visit 5/14/2018.  Pt is requesting a refill of levothyroxine 50mcg.  Sent to [facility].  VB

## 2018-06-06 ENCOUNTER — PATIENT MESSAGE (OUTPATIENT)
Dept: INTERNAL MEDICINE | Facility: CLINIC | Age: 33
End: 2018-06-06

## 2018-06-06 RX ORDER — LEVOTHYROXINE SODIUM 50 UG/1
50 TABLET ORAL
Qty: 30 TABLET | Refills: 0 | Status: CANCELLED | OUTPATIENT
Start: 2018-06-06 | End: 2019-06-06

## 2018-06-06 NOTE — TELEPHONE ENCOUNTER
The synthroid prescription printed and did not go to the pharmacy. I don't see it anywhere. Would you mind trying to send it again and change from print to normal. Thank you.

## 2018-06-07 RX ORDER — LEVOTHYROXINE SODIUM 50 UG/1
50 TABLET ORAL
Qty: 90 TABLET | Refills: 1 | Status: SHIPPED | OUTPATIENT
Start: 2018-06-07 | End: 2018-09-20 | Stop reason: DRUGHIGH

## 2018-08-21 ENCOUNTER — OFFICE VISIT (OUTPATIENT)
Dept: INTERNAL MEDICINE | Facility: CLINIC | Age: 33
End: 2018-08-21
Payer: COMMERCIAL

## 2018-08-21 VITALS
DIASTOLIC BLOOD PRESSURE: 92 MMHG | HEIGHT: 66 IN | HEART RATE: 60 BPM | TEMPERATURE: 99 F | RESPIRATION RATE: 20 BRPM | BODY MASS INDEX: 38.3 KG/M2 | OXYGEN SATURATION: 98 % | SYSTOLIC BLOOD PRESSURE: 128 MMHG | WEIGHT: 238.31 LBS

## 2018-08-21 DIAGNOSIS — M54.41 LOW BACK PAIN WITH RIGHT-SIDED SCIATICA, UNSPECIFIED BACK PAIN LATERALITY, UNSPECIFIED CHRONICITY: Primary | ICD-10-CM

## 2018-08-21 DIAGNOSIS — G89.29 CHRONIC RIGHT-SIDED LOW BACK PAIN WITH RIGHT-SIDED SCIATICA: ICD-10-CM

## 2018-08-21 DIAGNOSIS — G25.81 RESTLESS LEG: ICD-10-CM

## 2018-08-21 DIAGNOSIS — M54.41 CHRONIC RIGHT-SIDED LOW BACK PAIN WITH RIGHT-SIDED SCIATICA: ICD-10-CM

## 2018-08-21 PROCEDURE — 3008F BODY MASS INDEX DOCD: CPT | Mod: CPTII,S$GLB,, | Performed by: FAMILY MEDICINE

## 2018-08-21 PROCEDURE — 99999 PR PBB SHADOW E&M-EST. PATIENT-LVL IV: CPT | Mod: PBBFAC,,, | Performed by: FAMILY MEDICINE

## 2018-08-21 PROCEDURE — 99213 OFFICE O/P EST LOW 20 MIN: CPT | Mod: S$GLB,,, | Performed by: FAMILY MEDICINE

## 2018-08-21 PROCEDURE — 3080F DIAST BP >= 90 MM HG: CPT | Mod: CPTII,S$GLB,, | Performed by: FAMILY MEDICINE

## 2018-08-21 PROCEDURE — 3074F SYST BP LT 130 MM HG: CPT | Mod: CPTII,S$GLB,, | Performed by: FAMILY MEDICINE

## 2018-08-21 RX ORDER — PREDNISONE 10 MG/1
10 TABLET ORAL DAILY
Qty: 5 TABLET | Refills: 0 | Status: SHIPPED | OUTPATIENT
Start: 2018-08-21 | End: 2018-08-26

## 2018-08-21 RX ORDER — GABAPENTIN 300 MG/1
300 CAPSULE ORAL 3 TIMES DAILY
Qty: 90 CAPSULE | Refills: 0 | Status: SHIPPED | OUTPATIENT
Start: 2018-08-21 | End: 2019-02-04 | Stop reason: SDUPTHER

## 2018-08-21 NOTE — PROGRESS NOTES
Subjective:       Patient ID: Haydee Pennington is a 33 y.o. female.    Chief Complaint: Leg Pain (and groin pain, started yesterday afternoon)    HPI     34 yo M    Presents for some concern of sciatica  Butt pain    Patient reports restless leg syndrome - keeping her up.  Needs to get up  Walk around stretch.  Feels may have hurt herself stretching    Pain in buttock and going down and hurting into her R labia  Hurts/ stings/ burns.    Has noted numbness/ tingling    No back pain  Some tenderness to her touch on back.  Pain in her hip.  Some muscle tightness.    No incontinence of bowel or bladder.  Reports some numbness or tingling.         Review of Systems   Constitutional: Negative for chills and fever.   HENT: Negative for congestion, sinus pressure and voice change.    Eyes: Negative for pain and visual disturbance.   Respiratory: Negative for shortness of breath and wheezing.    Cardiovascular: Negative for chest pain and palpitations.   Gastrointestinal: Negative for constipation and diarrhea.   Genitourinary: Negative for difficulty urinating and dysuria.   Musculoskeletal: Negative for gait problem and myalgias.   Skin: Negative for pallor and rash.   Neurological: Negative for dizziness and light-headedness.   Psychiatric/Behavioral: Negative for confusion and dysphoric mood.       Objective:       Vitals:    08/21/18 1622   BP: (!) 128/92   Pulse: 60   Resp: 20   Temp: 98.7 °F (37.1 °C)       Physical Exam   Constitutional: She is oriented to person, place, and time. She appears well-developed and well-nourished. She does not have a sickly appearance. No distress.   HENT:   Head: Normocephalic and atraumatic.   Right Ear: External ear normal.   Left Ear: External ear normal.   Eyes: Conjunctivae, EOM and lids are normal.   Neck: Trachea normal, normal range of motion and full passive range of motion without pain.   Cardiovascular: Normal rate, regular rhythm and normal heart sounds.    Pulmonary/Chest: Effort normal and breath sounds normal. No stridor. No respiratory distress.   Abdominal: Soft. Normal appearance and bowel sounds are normal. She exhibits no distension. There is no tenderness. There is no guarding. No hernia.   Musculoskeletal: Normal range of motion.   Some tenderness to low back  Able to walk on toes and heels.    Lymphadenopathy:     She has no cervical adenopathy.   Neurological: She is alert and oriented to person, place, and time. She is not disoriented.   Skin: Skin is warm, dry and intact. No rash noted. She is not diaphoretic.   Psychiatric: She has a normal mood and affect. Her speech is normal and behavior is normal. Thought content normal.       Assessment:       1. Low back pain with right-sided sciatica, unspecified back pain laterality, unspecified chronicity    2. Chronic right-sided low back pain with right-sided sciatica    3. Restless leg        Plan:   Low back pain with right-sided sciatica, unspecified back pain laterality, unspecified chronicity    Pain to touch of lower back for 6 weeks.  Plan on lumbar spine x-ray.  Low dose steroid - 10 mg for 5 days.  Fail to improve will consult pain mngt.   Declined PT   Discussed red flag symptoms and when to present .    Restless Leg syndrome  Gabapentin   Plan to use 300 mg  TID - start at once daily.        No Follow-up on file.

## 2018-08-22 ENCOUNTER — HOSPITAL ENCOUNTER (OUTPATIENT)
Dept: RADIOLOGY | Facility: HOSPITAL | Age: 33
Discharge: HOME | End: 2018-08-22
Attending: FAMILY MEDICINE
Payer: COMMERCIAL

## 2018-08-22 DIAGNOSIS — M54.41 CHRONIC RIGHT-SIDED LOW BACK PAIN WITH RIGHT-SIDED SCIATICA: ICD-10-CM

## 2018-08-22 DIAGNOSIS — G89.29 CHRONIC RIGHT-SIDED LOW BACK PAIN WITH RIGHT-SIDED SCIATICA: ICD-10-CM

## 2018-08-22 PROCEDURE — 72110 X-RAY EXAM L-2 SPINE 4/>VWS: CPT | Mod: TC

## 2018-08-22 PROCEDURE — 72110 X-RAY EXAM L-2 SPINE 4/>VWS: CPT | Mod: 26,,, | Performed by: RADIOLOGY

## 2018-08-23 ENCOUNTER — PATIENT MESSAGE (OUTPATIENT)
Dept: INTERNAL MEDICINE | Facility: CLINIC | Age: 33
End: 2018-08-23

## 2018-08-24 ENCOUNTER — TELEPHONE (OUTPATIENT)
Dept: INTERNAL MEDICINE | Facility: CLINIC | Age: 33
End: 2018-08-24

## 2018-08-24 NOTE — TELEPHONE ENCOUNTER
----- Message from Gumaro Del Real MD sent at 8/24/2018  8:23 AM CDT -----  Please call the patient regarding her x-ray  No acute findings  Will discuss further at our f/u appt.

## 2018-09-17 ENCOUNTER — OFFICE VISIT (OUTPATIENT)
Dept: INTERNAL MEDICINE | Facility: CLINIC | Age: 33
End: 2018-09-17
Payer: COMMERCIAL

## 2018-09-17 VITALS
RESPIRATION RATE: 20 BRPM | HEART RATE: 80 BPM | OXYGEN SATURATION: 98 % | DIASTOLIC BLOOD PRESSURE: 80 MMHG | SYSTOLIC BLOOD PRESSURE: 124 MMHG | TEMPERATURE: 98 F | WEIGHT: 242.06 LBS | HEIGHT: 66 IN | BODY MASS INDEX: 38.9 KG/M2

## 2018-09-17 DIAGNOSIS — M25.551 RIGHT HIP PAIN: ICD-10-CM

## 2018-09-17 DIAGNOSIS — E03.9 HYPOTHYROIDISM, UNSPECIFIED TYPE: ICD-10-CM

## 2018-09-17 DIAGNOSIS — M54.50 RIGHT-SIDED LOW BACK PAIN WITHOUT SCIATICA, UNSPECIFIED CHRONICITY: Primary | ICD-10-CM

## 2018-09-17 PROCEDURE — 3074F SYST BP LT 130 MM HG: CPT | Mod: CPTII,S$GLB,, | Performed by: FAMILY MEDICINE

## 2018-09-17 PROCEDURE — 99999 PR PBB SHADOW E&M-EST. PATIENT-LVL IV: CPT | Mod: PBBFAC,,, | Performed by: FAMILY MEDICINE

## 2018-09-17 PROCEDURE — 3079F DIAST BP 80-89 MM HG: CPT | Mod: CPTII,S$GLB,, | Performed by: FAMILY MEDICINE

## 2018-09-17 PROCEDURE — 3008F BODY MASS INDEX DOCD: CPT | Mod: CPTII,S$GLB,, | Performed by: FAMILY MEDICINE

## 2018-09-17 PROCEDURE — 99214 OFFICE O/P EST MOD 30 MIN: CPT | Mod: S$GLB,,, | Performed by: FAMILY MEDICINE

## 2018-09-17 NOTE — PROGRESS NOTES
Subjective:       Patient ID: Haydee Pennington is a 33 y.o. female.    Chief Complaint: Medication Refill and Follow-up    HPI     34 yo F    HTN    Ongoing low back pain.  Pain is still ongoing in buttock  No longer hurting in groin - no pain in labia.    Sitting maker he legs puffy.  Standing up - pains her.    Gaining weight.    Concern gabapentin is making her sad.  Has helped anxiety - but makes her sad / non productive.      Review of Systems   Constitutional: Negative for activity change and unexpected weight change.   HENT: Negative for hearing loss, rhinorrhea and trouble swallowing.    Eyes: Positive for discharge and visual disturbance.   Respiratory: Negative for chest tightness and wheezing.    Cardiovascular: Negative for chest pain and palpitations.   Gastrointestinal: Negative for blood in stool, constipation, diarrhea and vomiting.   Endocrine: Negative for polydipsia and polyuria.   Genitourinary: Negative for difficulty urinating, dysuria, hematuria and menstrual problem.   Musculoskeletal: Negative for arthralgias, joint swelling and neck pain.   Neurological: Positive for headaches. Negative for weakness.   Psychiatric/Behavioral: Positive for dysphoric mood. Negative for confusion.       Objective:       Vitals:    09/17/18 1655   BP: 124/80   Pulse: 80   Resp: 20   Temp: 98.2 °F (36.8 °C)       Physical Exam   Constitutional: She is oriented to person, place, and time. She appears well-developed and well-nourished. She does not have a sickly appearance. No distress.   HENT:   Head: Normocephalic and atraumatic.   Right Ear: External ear normal.   Left Ear: External ear normal.   Eyes: Conjunctivae, EOM and lids are normal.   Neck: Trachea normal, normal range of motion and full passive range of motion without pain.   Cardiovascular: Normal rate, regular rhythm and normal heart sounds.   Pulmonary/Chest: Effort normal and breath sounds normal. No stridor. No respiratory distress.    Abdominal: Soft. Normal appearance and bowel sounds are normal. She exhibits no distension. There is no tenderness. There is no guarding. No hernia.   Musculoskeletal: Normal range of motion.   Lymphadenopathy:     She has no cervical adenopathy.   Neurological: She is alert and oriented to person, place, and time. She is not disoriented.   Skin: Skin is warm, dry and intact. No rash noted. She is not diaphoretic.   Psychiatric: She has a normal mood and affect. Her speech is normal and behavior is normal. Thought content normal.       Assessment:       1. Right-sided low back pain without sciatica, unspecified chronicity    2. Right hip pain    3. Hypothyroidism, unspecified type        Plan:   Right-sided low back pain without sciatica, unspecified chronicity  Right hip pain  Sciatica symptoms improved. Has ongoing pain in low back/ upper buttock/ Right hip area  PT  Pain Mngt Evaluation  No medications to be added    Stop gabapentin if she feels it is cause of low mood    X-ray of hip given lateral and some anterior pain.      HTN  Well controlled  Discussed pregnancy      Hypothyroidism  Check TSH       F/U   PRN   or 3 months.      No Follow-up on file.

## 2018-09-18 ENCOUNTER — TELEPHONE (OUTPATIENT)
Dept: INTERNAL MEDICINE | Facility: CLINIC | Age: 33
End: 2018-09-18

## 2018-09-18 ENCOUNTER — HOSPITAL ENCOUNTER (OUTPATIENT)
Dept: RADIOLOGY | Facility: HOSPITAL | Age: 33
Discharge: HOME | End: 2018-09-18
Attending: FAMILY MEDICINE
Payer: COMMERCIAL

## 2018-09-18 DIAGNOSIS — M25.551 RIGHT HIP PAIN: ICD-10-CM

## 2018-09-18 PROCEDURE — 73502 X-RAY EXAM HIP UNI 2-3 VIEWS: CPT | Mod: 26,RT,, | Performed by: RADIOLOGY

## 2018-09-18 PROCEDURE — 73502 X-RAY EXAM HIP UNI 2-3 VIEWS: CPT | Mod: TC,RT

## 2018-09-18 NOTE — TELEPHONE ENCOUNTER
Called patient to schedule pain management appointment.  Patient phone does not ring.  Will try again later. /sl/

## 2018-09-19 ENCOUNTER — TELEPHONE (OUTPATIENT)
Dept: PAIN MEDICINE | Facility: CLINIC | Age: 33
End: 2018-09-19

## 2018-09-19 NOTE — TELEPHONE ENCOUNTER
Contacted patient to schedule appointment from referral. No answer left message to return call to schedule appointment.

## 2018-09-20 ENCOUNTER — TELEPHONE (OUTPATIENT)
Dept: PAIN MEDICINE | Facility: CLINIC | Age: 33
End: 2018-09-20

## 2018-09-20 ENCOUNTER — TELEPHONE (OUTPATIENT)
Dept: INTERNAL MEDICINE | Facility: CLINIC | Age: 33
End: 2018-09-20

## 2018-09-20 DIAGNOSIS — M25.559 ARTHRALGIA OF HIP, UNSPECIFIED LATERALITY: Primary | ICD-10-CM

## 2018-09-20 RX ORDER — LEVOTHYROXINE SODIUM 75 UG/1
75 TABLET ORAL DAILY
Qty: 30 TABLET | Refills: 11 | Status: SHIPPED | OUTPATIENT
Start: 2018-09-20 | End: 2019-10-16 | Stop reason: SDUPTHER

## 2018-09-20 NOTE — TELEPHONE ENCOUNTER
----- Message from Gumaro Del Real MD sent at 9/20/2018  4:34 PM CDT -----  Please call and set up ortho appt. We can put pain mgnt appt on hold

## 2018-09-20 NOTE — TELEPHONE ENCOUNTER
No answer. Left message to return call.       ----- Message from Tamia Mancini sent at 9/19/2018  4:05 PM CDT -----  Contact: Pt   .Pt is returning missed call ..[phone]

## 2018-09-21 ENCOUNTER — TELEPHONE (OUTPATIENT)
Dept: ORTHOPEDICS | Facility: CLINIC | Age: 33
End: 2018-09-21

## 2018-09-21 NOTE — TELEPHONE ENCOUNTER
Left a message for the patient to give the office a call back.FP        ----- Message from Esperanza Sierra sent at 9/21/2018 10:54 AM CDT -----  Contact: Pt  Pt called and stated she was returning a call to the nurse. She can be reached after 3:30 pm at [phone] (home)     Thanks,  TF

## 2018-09-25 ENCOUNTER — OFFICE VISIT (OUTPATIENT)
Dept: INTERNAL MEDICINE | Facility: CLINIC | Age: 33
End: 2018-09-25
Payer: COMMERCIAL

## 2018-09-25 VITALS
OXYGEN SATURATION: 98 % | TEMPERATURE: 99 F | HEART RATE: 72 BPM | SYSTOLIC BLOOD PRESSURE: 128 MMHG | HEIGHT: 66 IN | DIASTOLIC BLOOD PRESSURE: 86 MMHG | RESPIRATION RATE: 20 BRPM | WEIGHT: 237.19 LBS | BODY MASS INDEX: 38.12 KG/M2

## 2018-09-25 DIAGNOSIS — J06.9 UPPER RESPIRATORY TRACT INFECTION, UNSPECIFIED TYPE: Primary | ICD-10-CM

## 2018-09-25 PROCEDURE — 3079F DIAST BP 80-89 MM HG: CPT | Mod: CPTII,S$GLB,, | Performed by: FAMILY MEDICINE

## 2018-09-25 PROCEDURE — 99999 PR PBB SHADOW E&M-EST. PATIENT-LVL III: CPT | Mod: PBBFAC,,, | Performed by: FAMILY MEDICINE

## 2018-09-25 PROCEDURE — 3074F SYST BP LT 130 MM HG: CPT | Mod: CPTII,S$GLB,, | Performed by: FAMILY MEDICINE

## 2018-09-25 PROCEDURE — 99213 OFFICE O/P EST LOW 20 MIN: CPT | Mod: S$GLB,,, | Performed by: FAMILY MEDICINE

## 2018-09-25 PROCEDURE — 3008F BODY MASS INDEX DOCD: CPT | Mod: CPTII,S$GLB,, | Performed by: FAMILY MEDICINE

## 2018-09-25 NOTE — PROGRESS NOTES
"Subjective:       Patient ID: Haydee Pennington is a 33 y.o. female.    Chief Complaint: Sinusitis (congestion and headache)    HPI       33 -    F     Nasal congestion  Can't blow nose  Some pressure in sinuses  "teeth feel weird"    Some shortness of breath.  Whenever she coughs " taste" off.    Productive cough  No blood    Feels like she has to pee - but only a little pee comes out  No Burning  No stinky cloudy urine.  No fevers  No chills  No nausea  No vomiting          Review of Systems   Constitutional: Negative for chills and fever.   HENT: Positive for postnasal drip and rhinorrhea. Negative for ear pain and sore throat.    Respiratory: Positive for cough. Negative for shortness of breath and wheezing.    Cardiovascular: Negative for chest pain.   Gastrointestinal: Negative for constipation and diarrhea.   Genitourinary: Negative for difficulty urinating and dysuria.   Musculoskeletal: Negative for myalgias.   Skin: Negative for rash.   Neurological: Negative for headaches.       Objective:       Vitals:    09/25/18 1313   BP: 128/86   Pulse: 72   Resp: 20   Temp: 98.5 °F (36.9 °C)       Physical Exam   Constitutional: She is oriented to person, place, and time. She appears well-developed and well-nourished. She does not have a sickly appearance. No distress.   HENT:   Head: Normocephalic and atraumatic.   Right Ear: External ear normal.   Left Ear: External ear normal.   Eyes: Conjunctivae, EOM and lids are normal.   Neck: Trachea normal, normal range of motion and full passive range of motion without pain.   Cardiovascular: Normal rate, regular rhythm and normal heart sounds.   Pulmonary/Chest: Effort normal and breath sounds normal. No stridor. No respiratory distress.   Abdominal: Soft. Normal appearance and bowel sounds are normal. She exhibits no distension. There is no tenderness. There is no guarding. No hernia.   Musculoskeletal: Normal range of motion.   Lymphadenopathy:     She has no cervical " adenopathy.   Neurological: She is alert and oriented to person, place, and time. She is not disoriented.   Skin: Skin is warm, dry and intact. No rash noted. She is not diaphoretic.   Psychiatric: She has a normal mood and affect. Her speech is normal and behavior is normal. Thought content normal.       Assessment:       1. Upper respiratory tract infection, unspecified type        Plan:   Upper respiratory tract infection, unspecified type  Viral  No abx  Work excuse given  Rest hydrate  Declines nasal spray      No pain appt set up yet  Will arrange for appt.  Re-doing consult.    No Follow-up on file.

## 2018-09-25 NOTE — LETTER
September 25, 2018      [facility]  [id number] [place] [address]  Phone: [phone]  Fax: [phone]       Patient: Haydee Pennington   YOB: 1985  Date of Visit: 09/25/2018    To Whom It May Concern:    Carlota Pennington  was at [facility] on 09/25/2018. Please excuse patient from work today and tomorrow. If you have any questions or concerns, or if I can be of further assistance, please do not hesitate to contact me.    Sincerely,    Gumaro Del Real MD

## 2018-09-29 ENCOUNTER — OFFICE VISIT (OUTPATIENT)
Dept: URGENT CARE | Facility: CLINIC | Age: 33
End: 2018-09-29
Payer: COMMERCIAL

## 2018-09-29 ENCOUNTER — NURSE TRIAGE (OUTPATIENT)
Dept: ADMINISTRATIVE | Facility: CLINIC | Age: 33
End: 2018-09-29

## 2018-09-29 VITALS
TEMPERATURE: 98 F | WEIGHT: 235.88 LBS | OXYGEN SATURATION: 98 % | BODY MASS INDEX: 37.91 KG/M2 | DIASTOLIC BLOOD PRESSURE: 80 MMHG | HEIGHT: 66 IN | HEART RATE: 91 BPM | SYSTOLIC BLOOD PRESSURE: 126 MMHG

## 2018-09-29 DIAGNOSIS — J06.9 URTI (ACUTE UPPER RESPIRATORY INFECTION): Primary | ICD-10-CM

## 2018-09-29 PROCEDURE — 3079F DIAST BP 80-89 MM HG: CPT | Mod: CPTII,S$GLB,, | Performed by: FAMILY MEDICINE

## 2018-09-29 PROCEDURE — 3074F SYST BP LT 130 MM HG: CPT | Mod: CPTII,S$GLB,, | Performed by: FAMILY MEDICINE

## 2018-09-29 PROCEDURE — 99213 OFFICE O/P EST LOW 20 MIN: CPT | Mod: S$GLB,,, | Performed by: FAMILY MEDICINE

## 2018-09-29 PROCEDURE — 3008F BODY MASS INDEX DOCD: CPT | Mod: CPTII,S$GLB,, | Performed by: FAMILY MEDICINE

## 2018-09-29 PROCEDURE — 99999 PR PBB SHADOW E&M-EST. PATIENT-LVL III: CPT | Mod: PBBFAC,,, | Performed by: FAMILY MEDICINE

## 2018-09-29 RX ORDER — PREDNISONE 10 MG/1
10 TABLET ORAL DAILY
Qty: 5 TABLET | Refills: 0 | Status: SHIPPED | OUTPATIENT
Start: 2018-09-29 | End: 2018-10-04

## 2018-09-29 RX ORDER — AZITHROMYCIN 250 MG/1
TABLET, FILM COATED ORAL
Qty: 6 TABLET | Refills: 0 | Status: SHIPPED | OUTPATIENT
Start: 2018-09-29 | End: 2018-10-04

## 2018-09-29 NOTE — PROGRESS NOTES
"Subjective:       Patient ID: Haydee Pennington is a 33 y.o. female.    Chief Complaint: Cough    HPI       34 yo    Seen by myself in clinic this week.  4 days ago  Suspect Viral infection.    Woke this Morning  Coughing increased.  Shortness of breath  Noted some blood in coughing.    Doesn't necessarily feel gross or "ichy"  Slight headache  No sinus pressure  Congestion improved.      Swallowing problematic for 2 days.  No issues with Fluids.      Review of Systems   HENT: Positive for trouble swallowing and voice change.    Respiratory: Positive for cough. Negative for shortness of breath.    Cardiovascular: Negative for chest pain and palpitations.   Gastrointestinal: Negative for diarrhea and nausea.       Objective:       Vitals:    09/29/18 0830   BP: 126/80   Pulse: 91   Temp: 97.6 °F (36.4 °C)       Physical Exam   Constitutional: She is oriented to person, place, and time. She appears well-developed and well-nourished. She does not have a sickly appearance. No distress.   HENT:   Head: Normocephalic and atraumatic.   Right Ear: External ear normal.   Left Ear: External ear normal.   Eyes: Conjunctivae, EOM and lids are normal.   Neck: Trachea normal, normal range of motion and full passive range of motion without pain.   Cardiovascular: Normal rate, regular rhythm and normal heart sounds.   Pulmonary/Chest: Effort normal and breath sounds normal. No stridor. No respiratory distress.   Abdominal: Soft. Normal appearance and bowel sounds are normal. She exhibits no distension. There is no tenderness. There is no guarding. No hernia.   Musculoskeletal: Normal range of motion.   Lymphadenopathy:     She has no cervical adenopathy.   Neurological: She is alert and oriented to person, place, and time. She is not disoriented.   Skin: Skin is warm, dry and intact. No rash noted. She is not diaphoretic.   Psychiatric: She has a normal mood and affect. Her speech is normal and behavior is normal. Thought content " normal.       Assessment:       1. URTI (acute upper respiratory infection)        Plan:   URTI (acute upper respiratory infection)    Other orders  -     azithromycin (Z-RADHA) 250 MG tablet; Take 2 tablets by mouth on day 1; Take 1 tablet by mouth on days 2-5  Dispense: 6 tablet; Refill: 0  -     predniSONE (DELTASONE) 10 MG tablet; Take 1 tablet (10 mg total) by mouth once daily. for 5 days  Dispense: 5 tablet; Refill: 0        URTI:    Seen earlier this week.    Suspected viral infection initially - improved but declined again.  Given that will prescribe - antibiotics  Also short course of low dose steroid 10 mg - for relief.    Fails to improve I want her to follow up  Monitor for any futher blood in cough.  suspect this 2'2 to coughing.  Especially given report of dysphagia symptoms.  If need be will consult GI for scope.     No Follow-up on file.

## 2018-09-29 NOTE — TELEPHONE ENCOUNTER
"    Reason for Disposition   [1] Coughed up blood AND [2] > 1 tablespoon (15 ml) (Exception: blood-tinged sputum)    Answer Assessment - Initial Assessment Questions  1. ONSET: "When did you start coughing up blood?"      This am  2. SEVERITY: "How many times?" "How much blood?" (e.g., flecks, streaks, tablespoons, etc)      Several teaspoon  3. COUGHING SPASMS: "Did the blood appear after a coughing spell?"        no  4. RESPIRATORY DISTRESS: "Describe your breathing."       wheezing  5. FEVER: "Do you have a fever?" If so, ask: "What is your temperature, how was it measured, and when did it start?"      no  6. SPUTUM: "Describe the color of your sputum" (clear, white, yellow, green), "Has there been any change recently?"      White   7. CARDIAC HISTORY: "Do you have any history of heart disease?" (e.g., heart attack, congestive heart failure)       htn  8. LUNG HISTORY: "Do you have any history of lung disease?"  (e.g., pulmonary embolus, asthma, emphysema)      no  9. PE RISK FACTORS: "Do you have a history of blood clots?" (or: recent major surgery, recent prolonged travel, bedridden )      no  10. OTHER SYMPTOMS: "Do you have any other symptoms?" (e.g., nosebleed, chest pain, abdominal pain, vomiting)        no  11. PREGNANCY: "Is there any chance you are pregnant?" "When was your last menstrual period?"        No,8/28  12. TRAVEL: "Have you traveled out of the country in the last month?" (e.g., travel history, exposures)        no    Protocols used: ST COUGHING UP BLOOD-A-AH      "

## 2018-10-07 ENCOUNTER — PATIENT MESSAGE (OUTPATIENT)
Dept: INTERNAL MEDICINE | Facility: CLINIC | Age: 33
End: 2018-10-07

## 2018-10-09 ENCOUNTER — OFFICE VISIT (OUTPATIENT)
Dept: ORTHOPEDICS | Facility: CLINIC | Age: 33
End: 2018-10-09
Payer: COMMERCIAL

## 2018-10-09 VITALS
BODY MASS INDEX: 37.91 KG/M2 | HEART RATE: 73 BPM | SYSTOLIC BLOOD PRESSURE: 123 MMHG | WEIGHT: 235.88 LBS | DIASTOLIC BLOOD PRESSURE: 82 MMHG | HEIGHT: 66 IN

## 2018-10-09 DIAGNOSIS — M54.16 LUMBAR RADICULOPATHY, RIGHT: ICD-10-CM

## 2018-10-09 DIAGNOSIS — M25.551 CHRONIC RIGHT HIP PAIN: Primary | ICD-10-CM

## 2018-10-09 DIAGNOSIS — G89.29 CHRONIC RIGHT HIP PAIN: Primary | ICD-10-CM

## 2018-10-09 PROCEDURE — 3074F SYST BP LT 130 MM HG: CPT | Mod: CPTII,S$GLB,, | Performed by: PHYSICIAN ASSISTANT

## 2018-10-09 PROCEDURE — 99204 OFFICE O/P NEW MOD 45 MIN: CPT | Mod: S$GLB,,, | Performed by: PHYSICIAN ASSISTANT

## 2018-10-09 PROCEDURE — 99999 PR PBB SHADOW E&M-EST. PATIENT-LVL III: CPT | Mod: PBBFAC,,, | Performed by: PHYSICIAN ASSISTANT

## 2018-10-09 PROCEDURE — 3008F BODY MASS INDEX DOCD: CPT | Mod: CPTII,S$GLB,, | Performed by: PHYSICIAN ASSISTANT

## 2018-10-09 PROCEDURE — 3079F DIAST BP 80-89 MM HG: CPT | Mod: CPTII,S$GLB,, | Performed by: PHYSICIAN ASSISTANT

## 2018-10-09 NOTE — LETTER
October 10, 2018      Gumaro Del Real MD  [address]  [place] [address]           [facility]  [address]  [place] [address]  Phone: [phone]  Fax: [phone]          Patient: Haydee Pennington   MR Number: [id number]   YOB: 1985   Date of Visit: 10/9/2018       Dear Dr. Gumaro Del Real:    Thank you for referring Haydee Pennington to me for evaluation. Attached you will find relevant portions of my assessment and plan of care.    If you have questions, please do not hesitate to call me. I look forward to following Haydee Pennington along with you.    Sincerely,    Felix Hernández PA-C    Enclosure  CC:  No Recipients    If you would like to receive this communication electronically, please contact [email or web address] or [phone] to request more information on [employer] access.    For providers and/or their staff who would like to refer a patient to [facility], please contact us through our one-stop-shop provider referral line, Rehan Liang, at [phone].    If you feel you have received this communication in error or would no longer like to receive these types of communications, please e-mail [email or web address]

## 2018-10-10 NOTE — PROGRESS NOTES
CC:  33-year-old female right hip pain    HPI:  Right hip pain for greater than 3 years off and on.  Pain increases with bending and certain motions.  Denies any trauma to the area. Pain radiates from her lower lumbar to the lateral right hip.  Does not go into the groin.  Pain level today is a 3 on a 10 scale.    PMH:    Past Medical History:   Diagnosis Date    Hypertension     Thyroid disease        PSH:  History reviewed. No pertinent surgical history.    Family Hx:    Family History   Problem Relation Age of Onset    Diabetes Mother     Hypertension Father     Dementia Father        Allergy:    Review of patient's allergies indicates:   Allergen Reactions    Sulfa (sulfonamide antibiotics)        Medication:    Current Outpatient Medications:     levothyroxine (SYNTHROID) 75 MCG tablet, Take 1 tablet (75 mcg total) by mouth once daily., Disp: 30 tablet, Rfl: 11    lisinopril 10 MG tablet, Take 1 tablet (10 mg total) by mouth once daily., Disp: 90 tablet, Rfl: 1    gabapentin (NEURONTIN) 300 MG capsule, Take 1 capsule (300 mg total) by mouth 3 (three) times daily., Disp: 90 capsule, Rfl: 0    Social History:    Social History     Socioeconomic History    Marital status: Single     Spouse name: Not on file    Number of children: Not on file    Years of education: Not on file    Highest education level: Not on file   Social Needs    Financial resource strain: Not on file    Food insecurity - worry: Not on file    Food insecurity - inability: Not on file    Transportation needs - medical: Not on file    Transportation needs - non-medical: Not on file   Occupational History    Not on file   Tobacco Use    Smoking status: Current Every Day Smoker     Packs/day: 0.50    Smokeless tobacco: Never Used   Substance and Sexual Activity    Alcohol use: No    Drug use: No    Sexual activity: Not on file   Other Topics Concern    Not on file   Social History Narrative    Not on file       Vitals:    "/82 (BP Location: Left arm, Patient Position: Sitting, BP Method: Large (Automatic))   Pulse 73   Ht 5' 5.5" (1.664 m)   Wt 107 kg (235 lb 14.3 oz)   BMI 38.66 kg/m²      ROS:  GENERAL: No fever, chills, fatigability or weight loss.  SKIN: No rashes, itching or changes in color or texture of skin.  HEAD: No headaches or recent head trauma.  EYES: Visual acuity fine. No photophobia, ocular pain or diplopia.  EARS: Denies ear pain, discharge or vertigo.  NOSE: No loss of smell, no epistaxis or postnasal drip.  MOUTH & THROAT: No hoarseness or change in voice. No excessive gum bleeding.  NODES: Denies swollen glands.  CHEST: Denies GIBBONS, cyanosis, wheezing, cough and sputum production.  CARDIOVASCULAR: Denies chest pain, PND, orthopnea or reduced exercise tolerance.  ABDOMEN: Appetite fine. No weight loss. Denies diarrhea, abdominal pain, hematemesis or blood in stool.  URINARY: No flank pain, dysuria or hematuria.  PERIPHERAL VASCULAR: No claudication or cyanosis.  NEUROLOGIC: No history of seizures, paralysis, alteration of gait or coordination.  MUSCULOSKELETAL: See HPI    PE:  APPEARANCE: Well nourished, well developed, in no acute distress.   HEAD: Normocephalic, atraumatic.  NEUROLOGIC: Cranial Nerves: II-XII grossly intact, also see MUSCULOSKELETAL  MUSCULOSKELETAL:   Right lower lumbar and right hip, 2 plus distal pulses, light touch intact Right  lower lumbar and right hip extremity.  All digits are warm. No erythema, no warmth, no drainage, No swelling, very mild tenderness to the right greater trochanter area. Pain radiates from the low bar to the right hip. There is no increased pain with internal external rotation of the hip.    Assessment:           Diagnosis:              1.  Right hip pain               2.  Right lumbar radiculopathy    Diagnostic Studies  MRI-yes to evaluate lumbar radiculopathy  X-Ray-Yes  EMG/NCV-No  Arthrogram-No  Bone Scan-No  CT Scan-No  Doppler-No  ESR-No  CRP-No  CBC " with Diff-No   Rheumatoid/Arthritis Panel-No      Plan:                                                 1. PT-no                                                 2.OT-no                                          3.NSAID-no                                        4. Narcotics-no                                     5. Wound care-N/A                                 6. Rest-no                                           7. Surgery-no                                         8. VERO Hose-no                                    9. Anticoagulation therapy-no               10. Elevation-no                                     11. Crutches-no                                    12. Walker-no             13. Cane no                        14. Referral-no                                     15.Injection-no                            16. Splint   /    Cast   /   Cast Shoe-No              17. RICE            18. Follow up-  after MRI

## 2018-10-16 ENCOUNTER — TELEPHONE (OUTPATIENT)
Dept: RADIOLOGY | Facility: HOSPITAL | Age: 33
End: 2018-10-16

## 2018-11-14 ENCOUNTER — PATIENT OUTREACH (OUTPATIENT)
Dept: ADMINISTRATIVE | Facility: HOSPITAL | Age: 33
End: 2018-11-14

## 2019-01-17 RX ORDER — LISINOPRIL 10 MG/1
10 TABLET ORAL DAILY
Qty: 90 TABLET | Refills: 1 | Status: SHIPPED | OUTPATIENT
Start: 2019-01-17 | End: 2019-10-16 | Stop reason: SDUPTHER

## 2019-02-03 ENCOUNTER — PATIENT MESSAGE (OUTPATIENT)
Dept: INTERNAL MEDICINE | Facility: CLINIC | Age: 34
End: 2019-02-03

## 2019-02-04 ENCOUNTER — PATIENT MESSAGE (OUTPATIENT)
Dept: INTERNAL MEDICINE | Facility: CLINIC | Age: 34
End: 2019-02-04

## 2019-02-04 RX ORDER — GABAPENTIN 300 MG/1
300 CAPSULE ORAL 3 TIMES DAILY
Qty: 90 CAPSULE | Refills: 0 | Status: SHIPPED | OUTPATIENT
Start: 2019-02-04 | End: 2020-05-14

## 2019-05-10 ENCOUNTER — PATIENT MESSAGE (OUTPATIENT)
Dept: ADMINISTRATIVE | Facility: HOSPITAL | Age: 34
End: 2019-05-10

## 2019-05-23 ENCOUNTER — OFFICE VISIT (OUTPATIENT)
Dept: OBSTETRICS AND GYNECOLOGY | Facility: CLINIC | Age: 34
End: 2019-05-23
Payer: COMMERCIAL

## 2019-05-23 VITALS
DIASTOLIC BLOOD PRESSURE: 88 MMHG | BODY MASS INDEX: 37.95 KG/M2 | SYSTOLIC BLOOD PRESSURE: 138 MMHG | WEIGHT: 236.13 LBS | HEIGHT: 66 IN

## 2019-05-23 DIAGNOSIS — N94.3 PMS (PREMENSTRUAL SYNDROME): ICD-10-CM

## 2019-05-23 DIAGNOSIS — Z01.419 WELL WOMAN EXAM WITH ROUTINE GYNECOLOGICAL EXAM: Primary | ICD-10-CM

## 2019-05-23 PROCEDURE — 3075F SYST BP GE 130 - 139MM HG: CPT | Mod: CPTII,S$GLB,, | Performed by: OBSTETRICS & GYNECOLOGY

## 2019-05-23 PROCEDURE — 99999 PR PBB SHADOW E&M-EST. PATIENT-LVL III: CPT | Mod: PBBFAC,,, | Performed by: OBSTETRICS & GYNECOLOGY

## 2019-05-23 PROCEDURE — 88175 CYTOPATH C/V AUTO FLUID REDO: CPT

## 2019-05-23 PROCEDURE — 99385 PR PREVENTIVE VISIT,NEW,18-39: ICD-10-PCS | Mod: S$GLB,,, | Performed by: OBSTETRICS & GYNECOLOGY

## 2019-05-23 PROCEDURE — 3075F PR MOST RECENT SYSTOLIC BLOOD PRESS GE 130-139MM HG: ICD-10-PCS | Mod: CPTII,S$GLB,, | Performed by: OBSTETRICS & GYNECOLOGY

## 2019-05-23 PROCEDURE — 99999 PR PBB SHADOW E&M-EST. PATIENT-LVL III: ICD-10-PCS | Mod: PBBFAC,,, | Performed by: OBSTETRICS & GYNECOLOGY

## 2019-05-23 PROCEDURE — 3079F PR MOST RECENT DIASTOLIC BLOOD PRESSURE 80-89 MM HG: ICD-10-PCS | Mod: CPTII,S$GLB,, | Performed by: OBSTETRICS & GYNECOLOGY

## 2019-05-23 PROCEDURE — 3079F DIAST BP 80-89 MM HG: CPT | Mod: CPTII,S$GLB,, | Performed by: OBSTETRICS & GYNECOLOGY

## 2019-05-23 PROCEDURE — 99385 PREV VISIT NEW AGE 18-39: CPT | Mod: S$GLB,,, | Performed by: OBSTETRICS & GYNECOLOGY

## 2019-05-23 RX ORDER — ACETAMINOPHEN AND CODEINE PHOSPHATE 120; 12 MG/5ML; MG/5ML
1 SOLUTION ORAL DAILY
Qty: 28 TABLET | Refills: 11 | Status: SHIPPED | OUTPATIENT
Start: 2019-05-23 | End: 2020-05-12

## 2019-05-23 NOTE — PROGRESS NOTES
Subjective:       Patient ID: Haydee Pennington is a 34 y.o. female.    Chief Complaint:  Annual Exam      History of Present Illness  HPI  Annual Exam-Premenopausal  Patient presents for annual exam. The patient complains of excessive moodiness and headaches within 1 weeks of each menses.  Menses are regular (24 d) and with periods lasting 4-5 days.  Denies excessive bleeding or cramping. The patient is sexually active (coitus interruptus).  GYN screening history: last pap: approximate date  and was normal. The patient wears seatbelts: yes. The patient participates in regular exercise: no. Has the patient ever been transfused or tattooed?: yes. The patient reports that there is not domestic violence in her life.  History of LEEP in .  Normal paps since.      GYN & OB History  Patient's last menstrual period was 2019 (within weeks).   Date of Last Pap: No result found    OB History    Para Term  AB Living   4 2 2   2 2   SAB TAB Ectopic Multiple Live Births     2     2      # Outcome Date GA Lbr Junior/2nd Weight Sex Delivery Anes PTL Lv   4 Term 2006    M Vag-Spont   DEBBY   3 TAB 2004           2 TAB 2003           1 Term 2001    F Vag-Spont   DEBBY       Review of Systems  Review of Systems   Constitutional: Negative for activity change, appetite change, chills, fatigue, fever and unexpected weight change.   Respiratory: Negative for shortness of breath.    Cardiovascular: Negative for chest pain, palpitations and leg swelling.   Gastrointestinal: Positive for constipation. Negative for abdominal pain, bloating, blood in stool, diarrhea, nausea and vomiting.   Genitourinary: Positive for dysmenorrhea and menstrual problem. Negative for dyspareunia, dysuria, flank pain, frequency, genital sores, hematuria, menorrhagia, pelvic pain, urgency, vaginal bleeding, vaginal discharge, vaginal pain, urinary incontinence, postcoital bleeding, vaginal dryness and vaginal odor.   Musculoskeletal:  Negative for back pain.   Integumentary:  Negative for breast mass, nipple discharge, breast skin changes and breast tenderness.   Neurological: Positive for headaches (with menses). Negative for syncope.   Breast: Negative for asymmetry, lump, mass, mastodynia, nipple discharge, skin changes and tenderness          Objective:    Physical Exam:   Constitutional: She is oriented to person, place, and time. She appears well-developed and well-nourished. No distress.    HENT:   Head: Normocephalic and atraumatic.    Eyes: Pupils are equal, round, and reactive to light. EOM are normal.    Neck: Normal range of motion.    Cardiovascular: Normal rate, regular rhythm and normal heart sounds.     Pulmonary/Chest: Effort normal and breath sounds normal. Right breast exhibits no inverted nipple, no mass, no nipple discharge, no skin change, no tenderness, no bleeding and no swelling. Left breast exhibits no inverted nipple, no mass, no nipple discharge, no skin change, no tenderness, no bleeding and no swelling. Breasts are symmetrical.        Abdominal: Soft. Bowel sounds are normal. She exhibits no distension. There is no tenderness.     Genitourinary: Vagina normal and uterus normal. Pelvic exam was performed with patient supine. There is no rash, tenderness, lesion or injury on the right labia. There is no rash, tenderness, lesion or injury on the left labia. Uterus is not deviated, not enlarged and not tender. Cervix is normal. Right adnexum displays no mass, no tenderness and no fullness. Left adnexum displays no mass, no tenderness and no fullness. No erythema, tenderness or bleeding in the vagina. No foreign body in the vagina. No signs of injury around the vagina. No vaginal discharge found. Cervix exhibits no motion tenderness, no discharge and no friability. Additional cervical findings: pap smear done          Musculoskeletal: Normal range of motion and moves all extremeties. She exhibits no edema or tenderness.        Neurological: She is alert and oriented to person, place, and time.    Skin: Skin is warm and dry.    Psychiatric: She has a normal mood and affect. Her behavior is normal. Thought content normal.          Assessment:        1. Well woman exam with routine gynecological exam    2. PMS (premenstrual syndrome)                Plan:      Well woman exam with routine gynecological exam  -     Liquid-based pap smear, screening  -     Pt was counseled on cervical/vaginal screening guidelines and recommendations.  Last pap NILM on 2016.  If today's pap smear result is negative, next pap smear will be due in 2022.  -     Pt was advised on current breast cancer screening recommendations.  Pt desires to proceed with breast exam today.  -     Follow up with PCP for routine health maintenance needs.    PMS (premenstrual syndrome)  -     norethindrone (ORTHO MICRONOR) 0.35 mg tablet; Take 1 tablet (0.35 mg total) by mouth once daily.  Dispense: 28 tablet; Refill: 11  -     Pt was counseled on contraception options, including associated risks and benefits of each.  Pt voiced understanding and desires to proceed with Micronor.  Medication dosing, side-effects, risks, benefits, and alternatives were discussed.  Medical history was reviewed and pt is a candidate for Micronor use.      Follow up in about 1 year (around 5/23/2020).

## 2019-10-16 RX ORDER — LISINOPRIL 10 MG/1
10 TABLET ORAL DAILY
Qty: 30 TABLET | Refills: 1 | Status: SHIPPED | OUTPATIENT
Start: 2019-10-16 | End: 2020-05-14 | Stop reason: ALTCHOICE

## 2019-10-16 RX ORDER — LEVOTHYROXINE SODIUM 75 UG/1
75 TABLET ORAL DAILY
Qty: 30 TABLET | Refills: 11 | Status: SHIPPED | OUTPATIENT
Start: 2019-10-16 | End: 2019-11-13 | Stop reason: DRUGHIGH

## 2019-11-08 ENCOUNTER — OFFICE VISIT (OUTPATIENT)
Dept: INTERNAL MEDICINE | Facility: CLINIC | Age: 34
End: 2019-11-08
Payer: COMMERCIAL

## 2019-11-08 ENCOUNTER — LAB VISIT (OUTPATIENT)
Dept: LAB | Facility: HOSPITAL | Age: 34
End: 2019-11-08
Attending: FAMILY MEDICINE
Payer: COMMERCIAL

## 2019-11-08 VITALS
WEIGHT: 235 LBS | BODY MASS INDEX: 38.51 KG/M2 | RESPIRATION RATE: 18 BRPM | TEMPERATURE: 98 F | DIASTOLIC BLOOD PRESSURE: 84 MMHG | OXYGEN SATURATION: 97 % | SYSTOLIC BLOOD PRESSURE: 112 MMHG | HEART RATE: 83 BPM

## 2019-11-08 DIAGNOSIS — Z00.00 ANNUAL PHYSICAL EXAM: ICD-10-CM

## 2019-11-08 DIAGNOSIS — Z00.00 ANNUAL PHYSICAL EXAM: Primary | ICD-10-CM

## 2019-11-08 LAB
25(OH)D3+25(OH)D2 SERPL-MCNC: 23 NG/ML (ref 30–96)
ALBUMIN SERPL BCP-MCNC: 3.8 G/DL (ref 3.5–5.2)
ALP SERPL-CCNC: 69 U/L (ref 55–135)
ALT SERPL W/O P-5'-P-CCNC: 18 U/L (ref 10–44)
ANION GAP SERPL CALC-SCNC: 7 MMOL/L (ref 8–16)
AST SERPL-CCNC: 18 U/L (ref 10–40)
BASOPHILS # BLD AUTO: 0.06 K/UL (ref 0–0.2)
BASOPHILS NFR BLD: 0.7 % (ref 0–1.9)
BILIRUB SERPL-MCNC: 0.8 MG/DL (ref 0.1–1)
BUN SERPL-MCNC: 14 MG/DL (ref 6–20)
CALCIUM SERPL-MCNC: 8.9 MG/DL (ref 8.7–10.5)
CHLORIDE SERPL-SCNC: 106 MMOL/L (ref 95–110)
CHOLEST SERPL-MCNC: 198 MG/DL (ref 120–199)
CHOLEST/HDLC SERPL: 5.7 {RATIO} (ref 2–5)
CO2 SERPL-SCNC: 26 MMOL/L (ref 23–29)
CREAT SERPL-MCNC: 0.9 MG/DL (ref 0.5–1.4)
DIFFERENTIAL METHOD: ABNORMAL
EOSINOPHIL # BLD AUTO: 0.3 K/UL (ref 0–0.5)
EOSINOPHIL NFR BLD: 3 % (ref 0–8)
ERYTHROCYTE [DISTWIDTH] IN BLOOD BY AUTOMATED COUNT: 12.9 % (ref 11.5–14.5)
EST. GFR  (AFRICAN AMERICAN): >60 ML/MIN/1.73 M^2
EST. GFR  (NON AFRICAN AMERICAN): >60 ML/MIN/1.73 M^2
ESTIMATED AVG GLUCOSE: 94 MG/DL (ref 68–131)
GLUCOSE SERPL-MCNC: 78 MG/DL (ref 70–110)
HBA1C MFR BLD HPLC: 4.9 % (ref 4–5.6)
HCT VFR BLD AUTO: 47.8 % (ref 37–48.5)
HDLC SERPL-MCNC: 35 MG/DL (ref 40–75)
HDLC SERPL: 17.7 % (ref 20–50)
HGB BLD-MCNC: 15.9 G/DL (ref 12–16)
IMM GRANULOCYTES # BLD AUTO: 0.02 K/UL (ref 0–0.04)
IMM GRANULOCYTES NFR BLD AUTO: 0.2 % (ref 0–0.5)
LDLC SERPL CALC-MCNC: 132.6 MG/DL (ref 63–159)
LYMPHOCYTES # BLD AUTO: 2.3 K/UL (ref 1–4.8)
LYMPHOCYTES NFR BLD: 26.9 % (ref 18–48)
MCH RBC QN AUTO: 31.4 PG (ref 27–31)
MCHC RBC AUTO-ENTMCNC: 33.3 G/DL (ref 32–36)
MCV RBC AUTO: 95 FL (ref 82–98)
MONOCYTES # BLD AUTO: 0.7 K/UL (ref 0.3–1)
MONOCYTES NFR BLD: 8.3 % (ref 4–15)
NEUTROPHILS # BLD AUTO: 5.2 K/UL (ref 1.8–7.7)
NEUTROPHILS NFR BLD: 60.9 % (ref 38–73)
NONHDLC SERPL-MCNC: 163 MG/DL
NRBC BLD-RTO: 0 /100 WBC
PLATELET # BLD AUTO: 321 K/UL (ref 150–350)
PMV BLD AUTO: 11.3 FL (ref 9.2–12.9)
POTASSIUM SERPL-SCNC: 4.1 MMOL/L (ref 3.5–5.1)
PROT SERPL-MCNC: 6.7 G/DL (ref 6–8.4)
RBC # BLD AUTO: 5.06 M/UL (ref 4–5.4)
SODIUM SERPL-SCNC: 139 MMOL/L (ref 136–145)
T4 FREE SERPL-MCNC: 0.78 NG/DL (ref 0.71–1.51)
TRIGL SERPL-MCNC: 152 MG/DL (ref 30–150)
TSH SERPL DL<=0.005 MIU/L-ACNC: 9.18 UIU/ML (ref 0.4–4)
WBC # BLD AUTO: 8.58 K/UL (ref 3.9–12.7)

## 2019-11-08 PROCEDURE — 3074F PR MOST RECENT SYSTOLIC BLOOD PRESSURE < 130 MM HG: ICD-10-PCS | Mod: CPTII,S$GLB,, | Performed by: FAMILY MEDICINE

## 2019-11-08 PROCEDURE — 36415 COLL VENOUS BLD VENIPUNCTURE: CPT

## 2019-11-08 PROCEDURE — 80061 LIPID PANEL: CPT

## 2019-11-08 PROCEDURE — 80053 COMPREHEN METABOLIC PANEL: CPT

## 2019-11-08 PROCEDURE — 84439 ASSAY OF FREE THYROXINE: CPT

## 2019-11-08 PROCEDURE — 99999 PR PBB SHADOW E&M-EST. PATIENT-LVL III: CPT | Mod: PBBFAC,,, | Performed by: FAMILY MEDICINE

## 2019-11-08 PROCEDURE — 82306 VITAMIN D 25 HYDROXY: CPT

## 2019-11-08 PROCEDURE — 3079F PR MOST RECENT DIASTOLIC BLOOD PRESSURE 80-89 MM HG: ICD-10-PCS | Mod: CPTII,S$GLB,, | Performed by: FAMILY MEDICINE

## 2019-11-08 PROCEDURE — 3074F SYST BP LT 130 MM HG: CPT | Mod: CPTII,S$GLB,, | Performed by: FAMILY MEDICINE

## 2019-11-08 PROCEDURE — 84443 ASSAY THYROID STIM HORMONE: CPT

## 2019-11-08 PROCEDURE — 3079F DIAST BP 80-89 MM HG: CPT | Mod: CPTII,S$GLB,, | Performed by: FAMILY MEDICINE

## 2019-11-08 PROCEDURE — 99395 PR PREVENTIVE VISIT,EST,18-39: ICD-10-PCS | Mod: S$GLB,,, | Performed by: FAMILY MEDICINE

## 2019-11-08 PROCEDURE — 99395 PREV VISIT EST AGE 18-39: CPT | Mod: S$GLB,,, | Performed by: FAMILY MEDICINE

## 2019-11-08 PROCEDURE — 99999 PR PBB SHADOW E&M-EST. PATIENT-LVL III: ICD-10-PCS | Mod: PBBFAC,,, | Performed by: FAMILY MEDICINE

## 2019-11-08 PROCEDURE — 85025 COMPLETE CBC W/AUTO DIFF WBC: CPT

## 2019-11-08 PROCEDURE — 83036 HEMOGLOBIN GLYCOSYLATED A1C: CPT

## 2019-11-08 NOTE — PROGRESS NOTES
Subjective:       Patient ID: Haydee Pennington is a 34 y.o. female.    Chief Complaint: Annual Exam     There are no active problems to display for this patient.    HPI     35 yo F    PMH  HTN  Hypothyroidism  Chronic pain - back / sciatic nerve  Restless leg syndrome    Family History   Problem Relation Age of Onset    Diabetes Mother     Hypertension Father     Dementia Father        Social History     Tobacco Use   Smoking Status Current Every Day Smoker    Packs/day: 0.50   Smokeless Tobacco Never Used     Has been considering quitting  Reducing intake  Bought a house and smoking less - avoid smoking inside.      Having issues with urination - sometimes has hard time starting.  Chronic issue - but feels worsening.    No numbness or tingling in saddle region - anus / genitalia    Some palpitations  Feels 2'2 anxiety   No chest pain    Feels anxiety ongoing issue -   Review of Systems   Constitutional: Negative for activity change and unexpected weight change.   HENT: Negative for hearing loss, rhinorrhea and trouble swallowing.    Eyes: Positive for visual disturbance. Negative for discharge.   Respiratory: Negative for chest tightness and wheezing.    Cardiovascular: Positive for palpitations. Negative for chest pain.   Gastrointestinal: Negative for blood in stool, constipation, diarrhea and vomiting.   Endocrine: Negative for polydipsia and polyuria.   Genitourinary: Positive for difficulty urinating. Negative for dysuria, hematuria and menstrual problem.   Musculoskeletal: Negative for arthralgias, joint swelling and neck pain.   Neurological: Negative for weakness and headaches.   Psychiatric/Behavioral: Negative for confusion and dysphoric mood.       Objective:     Vitals:    11/08/19 0929   BP: 112/84   Pulse: 83   Resp: 18   Temp: 97.5 °F (36.4 °C)        Physical Exam   Constitutional: She is oriented to person, place, and time. She appears well-developed.   HENT:   Head: Normocephalic.   Eyes:  Conjunctivae are normal.   Neck: Normal range of motion.   Cardiovascular: Normal rate.   Pulmonary/Chest: Effort normal and breath sounds normal.   Abdominal: Soft. Bowel sounds are normal.   Musculoskeletal: She exhibits no edema.   Neurological: She is alert and oriented to person, place, and time.   Skin: No pallor.   Psychiatric: She has a normal mood and affect. Her behavior is normal.       Assessment:       1. Annual physical exam        Plan:       Annual physical exam    Hypertension  Lisinopril 10 mg    She is aware of my concern of potential pregnancy and knows risk of ACE-I , and pregnancy - confident she will not become pregnant. Encouraged birth control if she decides to become sexually active.      Consider d/c as it is a low dose -   Taking a 7 day break  She will log  She will update me with said log  If pressures consistently controlled - less than 140/90 - we can take a break / D/C Med    Smokes  Emphasized cessation    Chronic back pain /sciatica  Has gabapentin but not taking it    Hypothyroidism  synthroid    Obesity -  Has not lost weight    Declines immunizations    6 months

## 2019-11-13 DIAGNOSIS — E03.9 HYPOTHYROIDISM, UNSPECIFIED TYPE: Primary | ICD-10-CM

## 2019-11-13 RX ORDER — LEVOTHYROXINE SODIUM 88 UG/1
88 TABLET ORAL DAILY
Qty: 90 TABLET | Refills: 1 | Status: SHIPPED | OUTPATIENT
Start: 2019-11-13 | End: 2020-01-20 | Stop reason: SDUPTHER

## 2019-11-15 ENCOUNTER — TELEPHONE (OUTPATIENT)
Dept: INTERNAL MEDICINE | Facility: CLINIC | Age: 34
End: 2019-11-15

## 2019-11-15 NOTE — TELEPHONE ENCOUNTER
----- Message from Gumaro Del Real MD sent at 11/13/2019 12:21 PM CST -----  Please call the patient regarding her labs  Stable findings  TSH elevated  I am refilling synthroid at a higher dose  Repeat in 6 weeks  Will continue to monitor  Otherwise no acute or concerning labs  Advise daily vit D use

## 2020-01-12 ENCOUNTER — HOSPITAL ENCOUNTER (EMERGENCY)
Facility: HOSPITAL | Age: 35
Discharge: HOME | End: 2020-01-12
Attending: EMERGENCY MEDICINE
Payer: COMMERCIAL

## 2020-01-12 VITALS
DIASTOLIC BLOOD PRESSURE: 90 MMHG | HEART RATE: 83 BPM | TEMPERATURE: 99 F | WEIGHT: 240.31 LBS | RESPIRATION RATE: 16 BRPM | SYSTOLIC BLOOD PRESSURE: 170 MMHG | BODY MASS INDEX: 40.04 KG/M2 | HEIGHT: 65 IN | OXYGEN SATURATION: 96 %

## 2020-01-12 DIAGNOSIS — S93.401A MILD ANKLE SPRAIN, RIGHT, INITIAL ENCOUNTER: ICD-10-CM

## 2020-01-12 DIAGNOSIS — T14.8XXA INFECTED WOUND: ICD-10-CM

## 2020-01-12 DIAGNOSIS — L03.115 CELLULITIS OF FOOT WITHOUT TOES, RIGHT: Primary | ICD-10-CM

## 2020-01-12 DIAGNOSIS — L08.9 INFECTED WOUND: ICD-10-CM
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
## 2020-01-12 DIAGNOSIS — M25.571 ACUTE RIGHT ANKLE PAIN: ICD-10-CM

## 2020-01-12 PROBLEM — F41.9 ANXIETY: Status: ACTIVE | Noted: 2020-01-12

## 2020-01-12 PROBLEM — F32.A DEPRESSION: Status: ACTIVE | Noted: 2020-01-12

## 2020-01-12 PROCEDURE — 12002 RPR S/N/AX/GEN/TRNK2.6-7.5CM: CPT

## 2020-01-12 PROCEDURE — 99284 EMERGENCY DEPT VISIT MOD MDM: CPT | Mod: 25

## 2020-01-12 PROCEDURE — 25000003 PHARM REV CODE 250: Performed by: NURSE PRACTITIONER

## 2020-01-12 RX ORDER — NITROFURANTOIN 25; 75 MG/1; MG/1
100 CAPSULE ORAL
COMMUNITY
End: 2020-05-12 | Stop reason: ALTCHOICE

## 2020-01-12 RX ORDER — MUPIROCIN 20 MG/G
OINTMENT TOPICAL 2 TIMES DAILY
Qty: 22 G | Refills: 0 | Status: SHIPPED | OUTPATIENT
Start: 2020-01-12 | End: 2020-01-19

## 2020-01-12 RX ORDER — PHENAZOPYRIDINE HYDROCHLORIDE 200 MG/1
200 TABLET, FILM COATED ORAL 3 TIMES DAILY PRN
COMMUNITY
End: 2020-05-12 | Stop reason: ALTCHOICE

## 2020-01-12 RX ORDER — NAPROXEN 500 MG/1
500 TABLET ORAL 2 TIMES DAILY WITH MEALS
Qty: 60 TABLET | Refills: 0 | Status: SHIPPED | OUTPATIENT
Start: 2020-01-12 | End: 2020-05-14

## 2020-01-12 RX ORDER — IBUPROFEN 600 MG/1
600 TABLET ORAL
Status: COMPLETED | OUTPATIENT
Start: 2020-01-12 | End: 2020-01-12

## 2020-01-12 RX ORDER — CLINDAMYCIN HYDROCHLORIDE 300 MG/1
300 CAPSULE ORAL EVERY 6 HOURS
Qty: 40 CAPSULE | Refills: 0 | Status: SHIPPED | OUTPATIENT
Start: 2020-01-12 | End: 2020-01-22

## 2020-01-12 RX ADMIN — BACITRACIN ZINC, POLYMYXIN B SULFATE, NEOMYCIN SULFATE 1 EACH: 400; 5000; 3.5 OINTMENT TOPICAL at 07:01

## 2020-01-12 RX ADMIN — IBUPROFEN 600 MG: 600 TABLET, FILM COATED ORAL at 07:01

## 2020-01-13 NOTE — ED PROVIDER NOTES
SCRIBE #1 NOTE: I, Priya Christian, am scribing for, and in the presence of, Belkis Covarrubias NP. I have scribed the entire note.      History      Chief Complaint   Patient presents with    Foot Injury     slipped and fell on muddy sidewalk; abrasion, pain and swelling to right foot       Review of patient's allergies indicates:   Allergen Reactions    Sulfa (sulfonamide antibiotics)         HPI   HPI    1/12/2020, 7:41 PM   History obtained from the patient      History of Present Illness: Haydee Pennington is a 34 y.o. female patient, with a history of anxiety, hypothyroidism, and  hypertension,  who presents to the Emergency Department for an evaluation of right foot pain which onset gradually two days ago when the patient states that she slipped and fell while walking down a sidewalk. The patient states that during the fall she twisted her right ankle in a puddle of green, slimy mud. She presents now to the ED complaining of diffuse, achy right foot pain. Symptoms are constant and moderate in severity. The pain is exacerbated with weightbearing and ambulation. Associated sxs include an abrasion to the top of the right foot,  and a one day history of foot and ankle swelling and subjective fever since last night. Patient denies any chills, numbness, HA, head trauma, dizziness, LOC, back pain, neck pain, knee pain, hip pain, abd pain, CP, SOB and all other sxs at this time. Prior Tx includes Ibuprofen and neosporin. The patient states that she is currently taking Macrobid and Pyridium for a bladder infection. No further complaints or concerns at this time.     LMP: Dec. 31, 2019      Arrival mode: Personal vehicle      PCP: Gumaro Del Real MD       Past Medical History:  Past Medical History:   Diagnosis Date    Hypertension     Thyroid disease        Past Surgical History:  Past Surgical History:   Procedure Laterality Date    CERVICAL BIOPSY  W/ LOOP ELECTRODE EXCISION  2005         Family  History:  Family History   Problem Relation Age of Onset    Diabetes Mother     Hypertension Father     Dementia Father        Social History:  Social History     Tobacco Use    Smoking status: Current Every Day Smoker     Packs/day: 0.50     Types: Cigarettes    Smokeless tobacco: Never Used   Substance and Sexual Activity    Alcohol use: No     Frequency: Never     Drinks per session: Patient refused     Binge frequency: Patient refused    Drug use: Never    Sexual activity: Yes     Partners: Male     Birth control/protection: None       ROS   Review of Systems   Constitutional: Positive for fever (subjective). Negative for chills.        Negative for head trauma.  Negative for LOC.   HENT: Negative for sore throat.    Respiratory: Negative for shortness of breath.    Cardiovascular: Negative for chest pain.   Gastrointestinal: Negative for abdominal pain and nausea.   Genitourinary: Negative for dysuria.   Musculoskeletal: Positive for arthralgias (right foot and ankle) and joint swelling. Negative for back pain and neck pain.        Negative for knee pain.  Negative for hip pain.   Skin: Positive for wound (right foot). Negative for rash.   Neurological: Negative for dizziness, weakness, numbness and headaches.   Hematological: Does not bruise/bleed easily.   All other systems reviewed and are negative.      Physical Exam      Initial Vitals [01/12/20 1856]   BP Pulse Resp Temp SpO2   (!) 191/91 90 17 99.3 °F (37.4 °C) 96 %      MAP       --          Physical Exam  Nursing Notes and Vital Signs Reviewed.  Constitutional: Patient is in no acute distress. Well-developed and well-nourished.  Head: Atraumatic. Normocephalic.  Eyes: PERRL. EOM intact. Conjunctivae are not pale. No scleral icterus.  ENT: Mucous membranes are moist. Oropharynx is clear and symmetric.    Neck: Supple. Full ROM. No lymphadenopathy.  Cardiovascular: Regular rate. Regular rhythm. No murmurs, rubs, or gallops. Distal pulses are 2+  and symmetric.  Pulmonary/Chest: No respiratory distress. Clear to auscultation bilaterally. No wheezing or rales.  Abdominal: Soft and non-distended.  There is no tenderness.  No rebound, guarding, or rigidity. Good bowel sounds.  Genitourinary: No CVA tenderness  Musculoskeletal: No joint laxity in RLE. No bony tenderness in RLE or foot. Moves all extremities. No obvious deformities. No edema. No calf tenderness.  Skin: 2x3 cm wound to the dorsum of the right foot. Yellowish eschar with 10 x 8 cm area of surrounding erythema. Mild tenderness to palpitation. Warm.  Neurological:  Alert, awake, and appropriate.  Normal speech.  No acute focal neurological deficits are appreciated.  Psychiatric: Normal affect. Good eye contact. Appropriate in content.    ED Course    Lac Repair  Date/Time: 2020 8:07 PM  Performed by: Belkis Covarrubias NP  Authorized by: Belkis Covarrubias NP   Consent Done: Yes  Consent: Verbal consent obtained.  Risks and benefits: risks, benefits and alternatives were discussed  Consent given by: patient  Patient understanding: patient states understanding of the procedure being performed  Patient identity confirmed: , name and verbally with patient  Body area: lower extremity  Location details: right foot  Laceration length: 3 cm  Foreign bodies: no foreign bodies  Tendon involvement: none  Nerve involvement: none  Vascular damage: no  Patient sedated: no  Preparation: Patient was prepped and draped in the usual sterile fashion.  Irrigation solution: saline (betadine)  Irrigation method: tap  Amount of cleaning: standard  Debridement: minimal  Degree of undermining: none  Dressing: antibiotic ointment and non-stick sterile dressing (ace wrap)  Patient tolerance: Patient tolerated the procedure well with no immediate complications        ED Vital Signs:  Vitals:    20 1856 20   BP: (!) 191/91 (!) 170/90   Pulse: 90 83   Resp: 17 16   Temp: 99.3 °F (37.4 °C) 98.6 °F (37 °C)  "  TempSrc: Oral Oral   SpO2: 96% 96%   Weight: 109 kg (240 lb 4.8 oz)    Height: 5' 5" (1.651 m)        Abnormal Lab Results:  Labs Reviewed - No data to display     All Lab Results: None    Imaging Results:  Imaging Results          X-Ray Ankle Complete Right (Final result)  Result time 01/12/20 20:23:08    Final result by Morris Tuttle MD (01/12/20 20:23:08)                 Impression:      Negative for acute fracture dislocation.  Mild lateral soft tissue swelling.      Electronically signed by: Morris Tuttle  Date:    01/12/2020  Time:    20:23             Narrative:    EXAMINATION:  XR ANKLE COMPLETE 3 VIEW RIGHT    CLINICAL HISTORY:  Unspecified fall, initial encounter. Right ankle pain.    COMPARISON:  None    FINDINGS:  No fracture or dislocation is identified.  Small bony excrescence (0.3 cm) or osteophyte extends off of the anterior aspect of the tibial plafond.  Joint spaces are all well preserved.  Mild lateral soft tissue swelling.                                        The Emergency Provider reviewed the vital signs and test results, which are outlined above.    ED Discussion     8:07 PM: Provider is at the bedside to preform the debridement.  Answered all questions. Pt expresses understanding at this time.      8:36 PM: Reassessed pt at this time. Discussed with pt all pertinent ED information and results. Discussed pt dx and plan of tx. Gave pt all f/u and return to the ED instructions. All questions and concerns were addressed at this time. Pt expresses understanding of information and instructions, and is comfortable with plan to discharge. Pt is stable for discharge.    I discussed wound care precautions with patient and/or family/caretaker; specifically that all wounds have risk of infection despite efforts to cleanse and debride the wound; and there is a risk of an occult foreign body (and thus increased risk of infection) despite a negative examination.  I discussed with patient " need to return for any signs of infection, specifically redness, increased pain, fever, drainage of pus, or any concern, immediately.    I discussed with patient and/or family/caretaker that negative X-ray does not rule out occult fracture or other soft tissue injury.  Persistent pain greater than 7-10 days or increased pain requires follow up, specifically with orthopedics.     I discussed with patient and/or family/caretaker that evaluation in the ED does not suggest any emergent or life threatening medical conditions requiring immediate intervention beyond what was provided in the ED, and I believe patient is safe for discharge.  Regardless, an unremarkable evaluation in the ED does not preclude the development or presence of a serious of life threatening condition. As such, patient was instructed to return immediately for any worsening or change in current symptoms.           ED Medication(s):  Medications   neomycin-bacitracnZn-polymyxnB packet 1 each (1 each Topical (Top) Given 1/12/20 1958)   ibuprofen tablet 600 mg (600 mg Oral Given 1/12/20 1958)     New Prescriptions    CLINDAMYCIN (CLEOCIN) 300 MG CAPSULE    Take 1 capsule (300 mg total) by mouth every 6 (six) hours. for 10 days    MUPIROCIN (BACTROBAN) 2 % OINTMENT    Apply topically 2 (two) times daily. for 7 days    NAPROXEN (NAPROSYN) 500 MG TABLET    Take 1 tablet (500 mg total) by mouth 2 (two) times daily with meals.         Follow-up Information     Gumaro Del Real MD In 3 days.    Specialty:  Family Medicine  Why:  If symptoms worsen  Contact information:  [address] [facility] [address] [phone]                     Medical Decision Making    Medical Decision Making:   Clinical Tests:   Radiological Study: Ordered and Reviewed           Scribe Attestation:   Scribe #1: I performed the above scribed service and the documentation accurately describes the services I performed. I attest to the accuracy of the  note.    Attending:   Physician Attestation Statement for Scribe #1: I, Belkis Covarrubias, personally performed the services described in this documentation, as scribed by Priya Christian, in my presence, and it is both accurate and complete.          Clinical Impression       ICD-10-CM ICD-9-CM   1. Cellulitis of foot without toes, right L03.115 682.7   2. Fall W19.XXXA E888.9   3. Acute right ankle pain M25.571 719.47     338.19   4. Infected wound T14.8XXA 958.3    L08.9    5. Mild ankle sprain, right, initial encounter S93.401A 845.00       Disposition:   Disposition: Discharged  Condition: Stable         Belkis Covarrubias, MARJAN  01/12/20 2041

## 2020-01-16 DIAGNOSIS — E03.9 HYPOTHYROIDISM, UNSPECIFIED TYPE: ICD-10-CM

## 2020-01-16 RX ORDER — LEVOTHYROXINE SODIUM 88 UG/1
88 TABLET ORAL DAILY
Qty: 90 TABLET | Refills: 1 | Status: CANCELLED | OUTPATIENT
Start: 2020-01-16 | End: 2021-01-15

## 2020-01-20 DIAGNOSIS — E03.9 HYPOTHYROIDISM, UNSPECIFIED TYPE: ICD-10-CM

## 2020-01-20 RX ORDER — LEVOTHYROXINE SODIUM 88 UG/1
88 TABLET ORAL DAILY
Qty: 90 TABLET | Refills: 1 | Status: SHIPPED | OUTPATIENT
Start: 2020-01-20 | End: 2020-05-17

## 2020-02-27 ENCOUNTER — PATIENT MESSAGE (OUTPATIENT)
Dept: INTERNAL MEDICINE | Facility: CLINIC | Age: 35
End: 2020-02-27

## 2020-04-30 ENCOUNTER — TELEPHONE (OUTPATIENT)
Dept: INTERNAL MEDICINE | Facility: CLINIC | Age: 35
End: 2020-04-30

## 2020-05-12 ENCOUNTER — OFFICE VISIT (OUTPATIENT)
Dept: INTERNAL MEDICINE | Facility: CLINIC | Age: 35
End: 2020-05-12
Payer: COMMERCIAL

## 2020-05-12 ENCOUNTER — HOSPITAL ENCOUNTER (OUTPATIENT)
Dept: CARDIOLOGY | Facility: HOSPITAL | Age: 35
Discharge: HOME | End: 2020-05-12
Payer: COMMERCIAL

## 2020-05-12 ENCOUNTER — PATIENT MESSAGE (OUTPATIENT)
Dept: INTERNAL MEDICINE | Facility: CLINIC | Age: 35
End: 2020-05-12

## 2020-05-12 ENCOUNTER — LAB VISIT (OUTPATIENT)
Dept: INTERNAL MEDICINE | Facility: CLINIC | Age: 35
End: 2020-05-12
Payer: COMMERCIAL

## 2020-05-12 DIAGNOSIS — Z86.79 HISTORY OF HYPERTENSION: ICD-10-CM

## 2020-05-12 DIAGNOSIS — E03.9 HYPOTHYROIDISM, UNSPECIFIED TYPE: ICD-10-CM

## 2020-05-12 DIAGNOSIS — R20.2 NUMBNESS AND TINGLING IN LEFT ARM: ICD-10-CM

## 2020-05-12 DIAGNOSIS — R53.83 FATIGUE, UNSPECIFIED TYPE: ICD-10-CM

## 2020-05-12 DIAGNOSIS — R20.0 NUMBNESS AND TINGLING IN LEFT ARM: ICD-10-CM

## 2020-05-12 DIAGNOSIS — R53.83 FATIGUE, UNSPECIFIED TYPE: Primary | ICD-10-CM

## 2020-05-12 DIAGNOSIS — F41.9 ANXIETY: ICD-10-CM

## 2020-05-12 PROCEDURE — U0002 COVID-19 LAB TEST NON-CDC: HCPCS

## 2020-05-12 PROCEDURE — 99214 OFFICE O/P EST MOD 30 MIN: CPT | Mod: 95,,, | Performed by: NURSE PRACTITIONER

## 2020-05-12 PROCEDURE — 99214 PR OFFICE/OUTPT VISIT, EST, LEVL IV, 30-39 MIN: ICD-10-PCS | Mod: 95,,, | Performed by: NURSE PRACTITIONER

## 2020-05-12 PROCEDURE — 93010 ELECTROCARDIOGRAM REPORT: CPT | Mod: 95,,, | Performed by: INTERNAL MEDICINE

## 2020-05-12 PROCEDURE — 93010 EKG 12-LEAD: ICD-10-PCS | Mod: 95,,, | Performed by: INTERNAL MEDICINE

## 2020-05-12 PROCEDURE — 93005 ELECTROCARDIOGRAM TRACING: CPT | Mod: S$GLB,,, | Performed by: NURSE PRACTITIONER

## 2020-05-12 PROCEDURE — 93005 EKG 12-LEAD: ICD-10-PCS | Mod: S$GLB,,, | Performed by: NURSE PRACTITIONER

## 2020-05-12 NOTE — PROGRESS NOTES
Haydee Pennington 35 y.o. female     History of Present Illness:  The patient location is: home, [place]   The chief complaint leading to consultation is: dizzy, sweats, nausea   Visit type: Virtual visit with synchronous audio and video  Total time spent with patient: 15 min   Each patient to whom he or she provides medical services by telemedicine is:  (1) informed of the relationship between the physician and patient and the respective role of any other health care provider with respect to management of the patient; and (2) notified that he or she may decline to receive medical services by telemedicine and may withdraw from such care at any time.    Pt is new to provider, but established in practice and c/o:      dizziness, sweats, fatigue & nausea   Started yesterday   Sudden onset    Since feels OK, but requiring extra focus to speak   Fo fever, no vomiting   No sudden loss of taste/smell   No cough//CP/SOB   No sick contacts     No urinary complaints   On cycle - heavier than usual   HA for a few days (normal prior to cycle)   Does not have a way to check BP   Stopped OCP - did take full month   Episode last week of left arm tnigling, improved with movement   Hx anxiety   Hx HTN - recently no longer on meds   Does not have BP cuff  - will get and message reading     Exam:  Review of Systems   Constitutional: Positive for diaphoresis and fatigue. Negative for activity change, chills, fever and unexpected weight change.   HENT: Negative for hearing loss, rhinorrhea and trouble swallowing.    Eyes: Negative for discharge.   Respiratory: Negative for cough, chest tightness, shortness of breath and wheezing.    Cardiovascular: Positive for palpitations (chronic ). Negative for chest pain.   Gastrointestinal: Positive for nausea. Negative for abdominal pain, blood in stool, constipation, diarrhea and vomiting.   Endocrine: Negative for polydipsia.   Genitourinary: Negative for difficulty urinating, dysuria and  hematuria.   Musculoskeletal: Negative for arthralgias and joint swelling.   Neurological: Positive for dizziness, numbness and headaches. Negative for facial asymmetry and weakness (left arm, positional ).   Psychiatric/Behavioral: Negative for confusion and dysphoric mood. The patient is nervous/anxious.      Physical Exam   Constitutional: She is oriented to person, place, and time. She appears well-developed and well-nourished.  Non-toxic appearance. She does not have a sickly appearance. She does not appear ill. No distress.   HENT:   Head: Normocephalic and atraumatic.   Right Ear: External ear normal.   Left Ear: External ear normal.   Eyes: Pupils are equal, round, and reactive to light. Conjunctivae, EOM and lids are normal. Right eye exhibits no discharge. Left eye exhibits no discharge. No scleral icterus.   Neck: Normal range of motion. No tracheal deviation present.   Pulmonary/Chest: Effort normal. No respiratory distress.   Speaks in full sentences without distress    Neurological: She is alert and oriented to person, place, and time.   Skin: She is not diaphoretic.   Psychiatric: Her speech is normal and behavior is normal. Judgment and thought content normal. Her mood appears anxious. Cognition and memory are normal.       Most Recent Laboratory Results Reviewed ({Yes)  Lab Results   Component Value Date    WBC 8.58 11/08/2019    HGB 15.9 11/08/2019    HCT 47.8 11/08/2019     11/08/2019    CHOL 198 11/08/2019    TRIG 152 (H) 11/08/2019    HDL 35 (L) 11/08/2019    ALT 18 11/08/2019    AST 18 11/08/2019     11/08/2019    K 4.1 11/08/2019     11/08/2019    CREATININE 0.9 11/08/2019    BUN 14 11/08/2019    CO2 26 11/08/2019    TSH 9.177 (H) 11/08/2019    INR 1.0 03/27/2018    HGBA1C 4.9 11/08/2019       Assessment     ICD-10-CM ICD-9-CM   1. Fatigue, unspecified type R53.83 780.79   2. Numbness and tingling in left arm R20.0 782.0    R20.2    3. History of hypertension Z86.79 V12.59    4. Anxiety F41.9 300.00   5. Hypothyroidism, unspecified type E03.9 244.9        Plan   Fatigue, unspecified type  -     COVID-19 Routine Screening- negative     Numbness and tingling in left arm  -     IN OFFICE EKG 12-LEAD (to Muse) - NSR   - states positional and worse with panic attacks     History of hypertension  - instructed to check BP and contact provider   - previously on lisinopril     Anxiety  Noted, Contributing to chronic conditions      Hypothyroidism, unspecified type  Needs TSH recheck, TSH >9 in November   Will address at next apt     Follow up for COVID testing at [facility].  Future Appointments     Date Provider Specialty Appt Notes    5/14/2020 Eboni Calhoun NP Internal Medicine Er fu    7/9/2020 Gumaro Del Real MD Internal Medicine 6 month f/u

## 2020-05-12 NOTE — LETTER
[address] ? [place], [address] ? Phone [phone] ? Fax [phone] ? [email or web address]          Return to Work/School    Patient: Haydee Pennington  YOB: 1985   Date: 05/12/2020      To Whom It May Concern:     Haydee Pennington was in contact with/seen in my office on 05/12/2020. COVID-19 is present in our communities across the state. There is limited testing for COVID at this time, so not all patients can be tested. In this situation, your employee meets the following criteria:     Haydee Pennington has met the criteria for COVID-19 testing based upon symptoms, travel, and/or potential exposure. The test has been completed and is pending results at this time. During this time the employee is not able to work and should be quarantined per the Centers for Disease Control timelines.      If you have any questions or concerns, or if I can be of further assistance, please do not hesitate to contact me.     Sincerely,    Eboni Calhoun NP

## 2020-05-13 ENCOUNTER — NURSE TRIAGE (OUTPATIENT)
Dept: ADMINISTRATIVE | Facility: CLINIC | Age: 35
End: 2020-05-13

## 2020-05-13 ENCOUNTER — PATIENT MESSAGE (OUTPATIENT)
Dept: INTERNAL MEDICINE | Facility: CLINIC | Age: 35
End: 2020-05-13

## 2020-05-13 ENCOUNTER — HOSPITAL ENCOUNTER (EMERGENCY)
Facility: HOSPITAL | Age: 35
Discharge: HOME | End: 2020-05-13
Attending: EMERGENCY MEDICINE
Payer: COMMERCIAL

## 2020-05-13 VITALS
WEIGHT: 238.63 LBS | TEMPERATURE: 99 F | HEART RATE: 68 BPM | OXYGEN SATURATION: 97 % | DIASTOLIC BLOOD PRESSURE: 90 MMHG | RESPIRATION RATE: 20 BRPM | BODY MASS INDEX: 39.71 KG/M2 | SYSTOLIC BLOOD PRESSURE: 142 MMHG

## 2020-05-13 DIAGNOSIS — R51.9 NONINTRACTABLE HEADACHE, UNSPECIFIED CHRONICITY PATTERN, UNSPECIFIED HEADACHE TYPE: Primary | ICD-10-CM

## 2020-05-13 LAB
B-HCG UR QL: NEGATIVE
SARS-COV-2 RNA RESP QL NAA+PROBE: NOT DETECTED

## 2020-05-13 PROCEDURE — 63600175 PHARM REV CODE 636 W HCPCS: Performed by: EMERGENCY MEDICINE

## 2020-05-13 PROCEDURE — 81025 URINE PREGNANCY TEST: CPT

## 2020-05-13 PROCEDURE — 99284 EMERGENCY DEPT VISIT MOD MDM: CPT | Mod: 25

## 2020-05-13 PROCEDURE — 96361 HYDRATE IV INFUSION ADD-ON: CPT

## 2020-05-13 PROCEDURE — 25000003 PHARM REV CODE 250: Performed by: EMERGENCY MEDICINE

## 2020-05-13 PROCEDURE — 96375 TX/PRO/DX INJ NEW DRUG ADDON: CPT

## 2020-05-13 PROCEDURE — 96374 THER/PROPH/DIAG INJ IV PUSH: CPT

## 2020-05-13 RX ORDER — DEXAMETHASONE SODIUM PHOSPHATE 4 MG/ML
10 INJECTION, SOLUTION INTRA-ARTICULAR; INTRALESIONAL; INTRAMUSCULAR; INTRAVENOUS; SOFT TISSUE
Status: COMPLETED | OUTPATIENT
Start: 2020-05-13 | End: 2020-05-13

## 2020-05-13 RX ORDER — METOCLOPRAMIDE HYDROCHLORIDE 5 MG/ML
10 INJECTION INTRAMUSCULAR; INTRAVENOUS
Status: COMPLETED | OUTPATIENT
Start: 2020-05-13 | End: 2020-05-13

## 2020-05-13 RX ORDER — DIPHENHYDRAMINE HYDROCHLORIDE 50 MG/ML
12.5 INJECTION INTRAMUSCULAR; INTRAVENOUS
Status: COMPLETED | OUTPATIENT
Start: 2020-05-13 | End: 2020-05-13

## 2020-05-13 RX ADMIN — METOCLOPRAMIDE 10 MG: 5 INJECTION, SOLUTION INTRAMUSCULAR; INTRAVENOUS at 07:05

## 2020-05-13 RX ADMIN — SODIUM CHLORIDE 1000 ML: 0.9 INJECTION, SOLUTION INTRAVENOUS at 07:05

## 2020-05-13 RX ADMIN — DEXAMETHASONE SODIUM PHOSPHATE 10 MG: 4 INJECTION, SOLUTION INTRAMUSCULAR; INTRAVENOUS at 09:05

## 2020-05-13 RX ADMIN — DIPHENHYDRAMINE HYDROCHLORIDE 12.5 MG: 50 INJECTION INTRAMUSCULAR; INTRAVENOUS at 07:05

## 2020-05-13 NOTE — TELEPHONE ENCOUNTER
Patient states she has had elevated B/P all day and a headache with tingling in her fingers. States she has taken Tylenol, BC Powder and Ibuprofen with no relief. Patient also restarted Lisinopril today at 2:30 pm B/P at 157/107 @ 5pm. Patient advised per protocol, and encouraged to seek ER services if B/P > 160/100 with associated symptoms. Understanding verbalized.    Reason for Disposition   Systolic BP  >= 160 OR Diastolic >= 100    Additional Information   Negative: [1] Systolic BP  >= 200 OR Diastolic >= 120  AND [2] having NO cardiac or neurologic symptoms   Negative: [1] Postpartum < 6 weeks AND [2] BP Systolic BP  >= 140 OR Diastolic >= 90   Negative: [1] Systolic BP  >= 180 OR Diastolic >= 110 AND [2] missed most recent dose of blood pressure medication   Negative: Systolic BP  >= 180 OR Diastolic >= 110   Negative: Ran out of BP medications   Negative: [1] Systolic BP  >= 160 OR Diastolic >= 100 AND [2] cardiac or neurologic symptoms (e.g., chest pain, difficulty breathing, unsteady gait, blurred vision)   Negative: [1] Pregnant > 20 weeks AND [2] new hand or face swelling   Negative: [1] Pregnant > 20 weeks AND [2] BP Systolic BP  >= 140 OR Diastolic >= 90    Protocols used: HIGH BLOOD PRESSURE-A-AH

## 2020-05-13 NOTE — PROVIDER PROGRESS NOTES - EMERGENCY DEPT.
" Emergency Department TeleTRIAGE Encounter Note      CHIEF COMPLAINT    Chief Complaint   Patient presents with    Headache     Pt states HA with nausea and "high blood pressure." since Tuesday morning.  Not currently on BP meds.       VITAL SIGNS   Initial Vitals [05/13/20 1841]   BP Pulse Resp Temp SpO2   (!) 141/97 79 16 97.9 °F (36.6 °C) 97 %      MAP       --            ALLERGIES    Review of patient's allergies indicates:   Allergen Reactions    Sulfa (sulfonamide antibiotics)        PROVIDER TRIAGE NOTE  Patient with past medical history hypertension and thyroid disease presents for evaluation of headache and high blood pressure.  Patient states she woke up with a headache yesterday morning.  She has had some nausea but denies vomiting.  She reports photophobia.  Reports it feels similar to previous headaches however it has not been resolved with Tylenol, ibuprofen, or BC Powder.  Denies thunderclap.  She denies neck pain, fever, numbness, tingling, weakness.      ORDERS  Labs Reviewed   HIV 1 / 2 ANTIBODY       ED Orders (720h ago, onward)    Start Ordered     Status Ordering Provider    05/13/20 1849 05/13/20 1849  Pregnancy, urine rapid  STAT      Ordered TOMASZ HARGROVE    05/13/20 1842 05/13/20 1841  HIV 1/2 Ag/Ab (4th Gen)  STAT  Collect    Ordered NATHALIE LUTZ            Virtual Visit Note: The provider triage portion of this emergency department evaluation and documentation was performed via [email or web address], a HIPAA-compliant telemedicine application, in concert with a tele-presenter in the room. A face to face patient evaluation with one of my colleagues will occur once the patient is placed in an emergency department room.      DISCLAIMER: This note was prepared with [employer] voice recognition transcription software. Garbled syntax, mangled pronouns, and other bizarre constructions may be attributed to that software system.  "

## 2020-05-14 ENCOUNTER — OFFICE VISIT (OUTPATIENT)
Dept: INTERNAL MEDICINE | Facility: CLINIC | Age: 35
End: 2020-05-14
Payer: COMMERCIAL

## 2020-05-14 ENCOUNTER — TELEPHONE (OUTPATIENT)
Dept: INTERNAL MEDICINE | Facility: CLINIC | Age: 35
End: 2020-05-14

## 2020-05-14 VITALS — DIASTOLIC BLOOD PRESSURE: 96 MMHG | SYSTOLIC BLOOD PRESSURE: 150 MMHG

## 2020-05-14 DIAGNOSIS — I10 ESSENTIAL HYPERTENSION: ICD-10-CM

## 2020-05-14 DIAGNOSIS — E66.01 SEVERE OBESITY (BMI 35.0-39.9) WITH COMORBIDITY: ICD-10-CM

## 2020-05-14 DIAGNOSIS — E03.9 HYPOTHYROIDISM, UNSPECIFIED TYPE: ICD-10-CM

## 2020-05-14 DIAGNOSIS — R51.9 ACUTE NONINTRACTABLE HEADACHE, UNSPECIFIED HEADACHE TYPE: ICD-10-CM

## 2020-05-14 DIAGNOSIS — Z09 HOSPITAL DISCHARGE FOLLOW-UP: Primary | ICD-10-CM

## 2020-05-14 DIAGNOSIS — Z11.4 SCREENING FOR HIV (HUMAN IMMUNODEFICIENCY VIRUS): ICD-10-CM

## 2020-05-14 PROBLEM — Z86.79 HISTORY OF HYPERTENSION: Status: ACTIVE | Noted: 2020-05-14

## 2020-05-14 PROCEDURE — 99211 OFF/OP EST MAY X REQ PHY/QHP: CPT | Mod: 95,,, | Performed by: NURSE PRACTITIONER

## 2020-05-14 PROCEDURE — 99211 PR OFFICE/OUTPT VISIT, EST, LEVL I: ICD-10-PCS | Mod: 95,,, | Performed by: NURSE PRACTITIONER

## 2020-05-14 RX ORDER — AMLODIPINE BESYLATE 5 MG/1
5 TABLET ORAL DAILY
Qty: 30 TABLET | Refills: 0 | Status: SHIPPED | OUTPATIENT
Start: 2020-05-14 | End: 2020-05-22

## 2020-05-14 RX ORDER — IBUPROFEN 200 MG
200 TABLET ORAL EVERY 6 HOURS PRN
COMMUNITY
End: 2022-03-15

## 2020-05-14 NOTE — PATIENT INSTRUCTIONS
STOP lisinopril   START norvasc (amlodipine)     Check your BP daily and keep a log   Bring it (and your cuff) to your next appointment for provider to review

## 2020-05-14 NOTE — ED PROVIDER NOTES
"SCRIBE #1 NOTE: I, Mary Alanis, am scribing for, and in the presence of, Wenceslao Guerin MD. I have scribed the entire note.       History     Chief Complaint   Patient presents with    Headache     Pt states HA with nausea and "high blood pressure." since Tuesday morning.  Not currently on BP meds.     Review of patient's allergies indicates:   Allergen Reactions    Sulfa (sulfonamide antibiotics)          History of Present Illness     HPI    5/13/2020, 7:00 PM  History obtained from the patient      History of Present Illness: Haydee Pennington is a 35 y.o. female patient with a PMHx of HTN and thyroid disease who presents to the Emergency Department for evaluation of frontal HA which onset gradually yesterday. Pain was initially R sided but moved to the front. Symptoms are constant and moderate in severity. No mitigating or exacerbating factors reported. Associated sxs include tingling sensation in both hands. States her BP was slightly elevated earlier today. Patient denies any fever, chills, neck pain/stiffness, visual disturbance/photophobia, dizziness, head injury/trauma, and all other sxs at this time. Prior Tx includes Tylenol, ibuprofen, and BC powder with no improvement of pain. Patient smokes 0.5 ppd. No further complaints or concerns at this time.       Arrival mode: Personal vehicle    PCP: Gumaro Del Real MD        Past Medical History:  Past Medical History:   Diagnosis Date    Hypertension     Thyroid disease        Past Surgical History:  Past Surgical History:   Procedure Laterality Date    CERVICAL BIOPSY  W/ LOOP ELECTRODE EXCISION  2005         Family History:  Family History   Problem Relation Age of Onset    Diabetes Mother     Hypertension Father     Dementia Father        Social History:  Social History     Tobacco Use    Smoking status: Current Every Day Smoker     Packs/day: 0.50     Types: Cigarettes    Smokeless tobacco: Never Used   Substance and Sexual Activity    " Alcohol use: No     Frequency: Never     Drinks per session: Patient refused     Binge frequency: Patient refused    Drug use: Never    Sexual activity: Yes     Partners: Male     Birth control/protection: None        Review of Systems     Review of Systems   Constitutional: Negative for activity change, appetite change, chills, diaphoresis, fatigue and fever.   HENT: Negative for congestion, ear pain, nosebleeds, rhinorrhea, sinus pain, sore throat and trouble swallowing.    Eyes: Negative for photophobia, pain, discharge and visual disturbance.   Respiratory: Negative for cough, chest tightness, shortness of breath, wheezing and stridor.    Cardiovascular: Negative for chest pain, palpitations and leg swelling.   Gastrointestinal: Negative for abdominal distention, abdominal pain, blood in stool, constipation, diarrhea, nausea and vomiting.   Genitourinary: Negative for difficulty urinating, dysuria, flank pain, frequency, hematuria and urgency.   Musculoskeletal: Negative for arthralgias, back pain, myalgias, neck pain and neck stiffness.   Skin: Negative for pallor, rash and wound.   Neurological: Positive for headaches. Negative for dizziness, syncope, weakness, light-headedness and numbness.        (+) tingling sensation in hands  (-) head injury/trauma   Hematological: Does not bruise/bleed easily.   Psychiatric/Behavioral: Negative for confusion and self-injury.   All other systems reviewed and are negative.     Physical Exam     Initial Vitals [05/13/20 1841]   BP Pulse Resp Temp SpO2   (!) 141/97 79 16 97.9 °F (36.6 °C) 97 %      MAP       --          Physical Exam  Nursing Notes and Vital Signs Reviewed.  Constitutional: Patient is in no acute distress. Well-developed and well-nourished.  Head: Atraumatic. Normocephalic.  Eyes: PERRL. EOM intact. Conjunctivae are not pale. No scleral icterus.  ENT: Mucous membranes are moist. Oropharynx is clear and symmetric.    Neck: Supple. Full ROM. No  lymphadenopathy.  Cardiovascular: Regular rate. Regular rhythm. No murmurs, rubs, or gallops. Distal pulses are 2+ and symmetric.  Pulmonary/Chest: No respiratory distress. Clear to auscultation bilaterally. No wheezing or rales.  Abdominal: Soft and non-distended. There is no tenderness.  No rebound, guarding, or rigidity. Good bowel sounds.  Genitourinary: No CVA tenderness  Musculoskeletal: Moves all extremities. No obvious deformities. No edema. No calf tenderness.  Skin: Warm and dry.  Neurological:  Alert, awake, and appropriate. Normal speech. No acute focal neurological deficits are appreciated.  Psychiatric: Normal affect. Good eye contact. Appropriate in content.     ED Course   Procedures  ED Vital Signs:  Vitals:    05/13/20 1841 05/13/20 2041 05/13/20 2112   BP: (!) 141/97 129/75 (!) 142/90   Pulse: 79 73 68   Resp: 16 18 20   Temp: 97.9 °F (36.6 °C)  98.6 °F (37 °C)   TempSrc: Oral  Oral   SpO2: 97% 98% 97%   Weight: 108.3 kg (238 lb 10.4 oz)         Abnormal Lab Results:  Labs Reviewed   PREGNANCY TEST, URINE RAPID        All Lab Results:  Results for orders placed or performed during the hospital encounter of 05/13/20   Pregnancy, urine rapid   Result Value Ref Range    Preg Test, Ur Negative          Imaging Results:  Imaging Results          CT Head Without Contrast (Final result)  Result time 05/13/20 20:54:34    Final result by William Hope MD (05/13/20 20:54:34)                 Impression:      Normal head CT.    All CT scans at this facility are performed  using dose modulation techniques as appropriate to performed exam including the following:  automated exposure control; adjustment of mA and/or kV according to the patients size (this includes techniques or standardized protocols for targeted exams where dose is matched to indication/reason for exam: i.e. extremities or head);  iterative reconstruction technique.      Electronically signed by: William Hope  MD  Date:    05/13/2020  Time:    20:54             Narrative:    EXAMINATION:  CT HEAD WITHOUT CONTRAST    CLINICAL HISTORY:  Headache, acute, norm neuro exam;    TECHNIQUE:  Routine noncontrast head CT.    COMPARISON:  03/27/2018.    FINDINGS:  There is no acute intracranial hemorrhage or abnormal extra-axial fluid collection.  There is no abnormal increased or decreased density within the brain parenchyma.  Gray-white differentiation preserved.  Normal ventricles.  There is no intracranial mass or mass effect.  The calvarium is intact.  Visualized paranasal sinuses and mastoids are well aerated.  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                                        The Emergency Provider reviewed the vital signs and test results, which are outlined above.     ED Discussion     8:59 PM: Reassessed pt at this time.  Pt states her condition has improved at this time. Discussed with pt all pertinent ED information and results. Discussed pt dx and plan of tx. Gave pt all f/u and return to the ED instructions. All questions and concerns were addressed at this time. Pt expresses understanding of information and instructions, and is comfortable with plan to discharge. Pt is stable for discharge.    I discussed with patient that evaluation in the ED does not suggest any emergent or life threatening medical conditions requiring immediate intervention beyond what was provided in the ED, and I believe patient is safe for discharge.  Regardless, an unremarkable evaluation in the ED does not preclude the development or presence of a serious of life threatening condition. As such, patient was instructed to return immediately for any worsening or change in current symptoms.        ED Course as of May 13 2254   Wed May 13, 2020   1856 COVID negative 1 day ago    [BA]   2254 Neuro intact on discharge.     [BA]      ED Course User Index  [BA] Wenceslao Guerin MD     Medical Decision  Making:   Clinical Tests:   Lab Tests: Ordered and Reviewed  Radiological Study: Ordered and Reviewed           ED Medication(s):  Medications   metoclopramide HCl injection 10 mg (10 mg Intravenous Given 5/13/20 1947)   diphenhydrAMINE injection 12.5 mg (12.5 mg Intravenous Given 5/13/20 1947)   sodium chloride 0.9% bolus 1,000 mL (0 mLs Intravenous Stopped 5/13/20 2114)   dexamethasone injection 10 mg (10 mg Intravenous Given 5/13/20 2105)       New Prescriptions    No medications       Follow-up Information     Gumaro Del Real MD. Call in 2 days.    Specialty:  Family Medicine  Why:  For re-evaluation and further treatment  Contact information:  [address] [facility] [address] [phone]             [facility] - . Go today.    Specialty:  Emergency Medicine  Why:  If symptoms worsen, For re-evaluation and further treatment, As needed  Contact information:  [address] [facility] [address] [phone]                     Scribe Attestation:   Scribe #1: I performed the above scribed service and the documentation accurately describes the services I performed. I attest to the accuracy of the note.     Attending:   Physician Attestation Statement for Scribe #1: I, Wenceslao Guerin MD, personally performed the services described in this documentation, as scribed by Mary Alanis, in my presence, and it is both accurate and complete.           Clinical Impression       ICD-10-CM ICD-9-CM   1. Nonintractable headache, unspecified chronicity pattern, unspecified headache type R51 784.0       Disposition:   Disposition: Discharged  Condition: Stable         Wenceslao Guerin MD  05/13/20 9518

## 2020-05-14 NOTE — PROGRESS NOTES
Haydee Pennington 35 y.o. female     History of Present Illness:  The patient location is: home, [place]   The chief complaint leading to consultation is: ER f/u   Visit type: Virtual visit with synchronous audio and video  Total time spent with patient: 12 minutes   Each patient to whom he or she provides medical services by telemedicine is:  (1) informed of the relationship between the physician and patient and the respective role of any other health care provider with respect to management of the patient; and (2) notified that he or she may decline to receive medical services by telemedicine and may withdraw from such care at any time.      Pt present for hospital f/u   Seen in ED yesterday due to continued HA & associated HTN   Self resumed her lisinopril (off several months) when noted readings high after buying cuff     BP today 180/108, 151/108 150/96   Has taken her lisinopril   No more nausea/dizziness   HA persisting     Has taken propanolol in the past     Transitional Care Note    Family and/or Caretaker present at visit?  No.  Diagnostic tests reviewed/disposition: I have reviewed all completed as well as pending diagnostic tests at the time of discharge.  Disease/illness education: HTN, hypothyroidism   Home health/community services discussion/referrals: Patient does not have home health established from hospital visit.  They do not need home health.  If needed, we will set up home health for the patient.   Establishment or re-establishment of referral orders for community resources: No other necessary community resources.   Discussion with other health care providers: No discussion with other health care providers necessary.     Exam:  Review of Systems   Constitutional: Negative for activity change and unexpected weight change.   HENT: Negative for hearing loss, rhinorrhea and trouble swallowing.    Eyes: Negative for discharge.   Respiratory: Negative for chest tightness and wheezing.     Cardiovascular: Negative for chest pain and leg swelling.   Gastrointestinal: Negative for blood in stool, constipation, diarrhea, nausea and vomiting.   Endocrine: Negative for polydipsia and polyuria.   Genitourinary: Negative for difficulty urinating, dysuria, hematuria and menstrual problem.   Musculoskeletal: Negative for arthralgias, joint swelling and neck pain.   Neurological: Positive for headaches. Negative for dizziness and weakness.   Psychiatric/Behavioral: Negative for confusion and dysphoric mood.     Physical Exam   Constitutional: She is oriented to person, place, and time. She appears well-developed and well-nourished.  Non-toxic appearance. She does not have a sickly appearance. She does not appear ill. No distress.   HENT:   Head: Normocephalic and atraumatic.   Right Ear: External ear normal.   Left Ear: External ear normal.   Eyes: Pupils are equal, round, and reactive to light. Conjunctivae, EOM and lids are normal. Right eye exhibits no discharge. Left eye exhibits no discharge. No scleral icterus.   Neck: Normal range of motion. No tracheal deviation present.   Pulmonary/Chest: Effort normal. No respiratory distress.   Speaks in full sentences without distress    Neurological: She is alert and oriented to person, place, and time.   Skin: She is not diaphoretic.   Psychiatric: She has a normal mood and affect. Her speech is normal and behavior is normal. Judgment and thought content normal. Cognition and memory are normal.       Most Recent Laboratory Results Reviewed ({Yes)  Lab Results   Component Value Date    WBC 8.58 11/08/2019    HGB 15.9 11/08/2019    HCT 47.8 11/08/2019     11/08/2019    CHOL 198 11/08/2019    TRIG 152 (H) 11/08/2019    HDL 35 (L) 11/08/2019    ALT 18 11/08/2019    AST 18 11/08/2019     11/08/2019    K 4.1 11/08/2019     11/08/2019    CREATININE 0.9 11/08/2019    BUN 14 11/08/2019    CO2 26 11/08/2019    TSH 9.177 (H) 11/08/2019    INR 1.0  03/27/2018    HGBA1C 4.9 11/08/2019       Assessment     ICD-10-CM ICD-9-CM   1. Hospital discharge follow-up Z09 V67.59   2. Essential hypertension I10 401.9   3. Acute nonintractable headache, unspecified headache type R51 784.0   4. Hypothyroidism, unspecified type E03.9 244.9   5. Screening for HIV (human immunodeficiency virus) Z11.4 V73.89   6. Severe obesity (BMI 35.0-39.9) with comorbidity E66.01 278.01        Plan   Hospital discharge follow-up    Essential hypertension  - STOP lisinopril   -Check BP daily and keep a log   - Bring log and BP cuff to next apt for comparison   -     amLODIPine (NORVASC) 5 MG tablet; Take 1 tablet (5 mg total) by mouth once daily.  Dispense: 30 tablet; Refill: 0  -     Comprehensive metabolic panel; Future; Expected date: 05/14/2020    Acute nonintractable headache, unspecified headache type  - prefer tylenol over NSAIDs due to HTN   -     Magnesium; Future; Expected date: 05/14/2020    Hypothyroidism, unspecified type  -     T4, free; Future; Expected date: 05/14/2020  -     TSH; Future; Expected date: 05/14/2020    Screening for HIV (human immunodeficiency virus)  -     HIV 1/2 Ag/Ab (4th Gen); Future; Expected date: 05/14/2020    BMI > 39   Contributing to chronic conditions        Follow up in about 1 week (around 5/21/2020), or if symptoms worsen or fail to improve, for BP recheck, in person visit for BP cuff comparison .  Future Appointments     Date Provider Specialty Appt Notes    7/9/2020 Gumaro Del Real MD Internal Medicine 6 month f/u

## 2020-05-15 ENCOUNTER — LAB VISIT (OUTPATIENT)
Dept: LAB | Facility: HOSPITAL | Age: 35
End: 2020-05-15
Attending: FAMILY MEDICINE
Payer: COMMERCIAL

## 2020-05-15 DIAGNOSIS — Z11.4 SCREENING FOR HIV (HUMAN IMMUNODEFICIENCY VIRUS): ICD-10-CM

## 2020-05-15 DIAGNOSIS — I10 ESSENTIAL HYPERTENSION: ICD-10-CM

## 2020-05-15 DIAGNOSIS — E03.9 HYPOTHYROIDISM, UNSPECIFIED TYPE: ICD-10-CM

## 2020-05-15 LAB
ALBUMIN SERPL BCP-MCNC: 3.9 G/DL (ref 3.5–5.2)
ALP SERPL-CCNC: 68 U/L (ref 55–135)
ALT SERPL W/O P-5'-P-CCNC: 20 U/L (ref 10–44)
ANION GAP SERPL CALC-SCNC: 10 MMOL/L (ref 8–16)
AST SERPL-CCNC: 17 U/L (ref 10–40)
BILIRUB SERPL-MCNC: 0.4 MG/DL (ref 0.1–1)
BUN SERPL-MCNC: 14 MG/DL (ref 6–20)
CALCIUM SERPL-MCNC: 8.6 MG/DL (ref 8.7–10.5)
CHLORIDE SERPL-SCNC: 105 MMOL/L (ref 95–110)
CO2 SERPL-SCNC: 24 MMOL/L (ref 23–29)
CREAT SERPL-MCNC: 0.9 MG/DL (ref 0.5–1.4)
EST. GFR  (AFRICAN AMERICAN): >60 ML/MIN/1.73 M^2
EST. GFR  (NON AFRICAN AMERICAN): >60 ML/MIN/1.73 M^2
GLUCOSE SERPL-MCNC: 76 MG/DL (ref 70–110)
MAGNESIUM SERPL-MCNC: 2 MG/DL (ref 1.6–2.6)
POTASSIUM SERPL-SCNC: 3.8 MMOL/L (ref 3.5–5.1)
PROT SERPL-MCNC: 7 G/DL (ref 6–8.4)
SODIUM SERPL-SCNC: 139 MMOL/L (ref 136–145)
T4 FREE SERPL-MCNC: 0.87 NG/DL (ref 0.71–1.51)
TSH SERPL DL<=0.005 MIU/L-ACNC: 13.8 UIU/ML (ref 0.4–4)
TSH SERPL DL<=0.005 MIU/L-ACNC: 13.8 UIU/ML (ref 0.4–4)

## 2020-05-15 PROCEDURE — 83735 ASSAY OF MAGNESIUM: CPT

## 2020-05-15 PROCEDURE — 86703 HIV-1/HIV-2 1 RESULT ANTBDY: CPT

## 2020-05-15 PROCEDURE — 80053 COMPREHEN METABOLIC PANEL: CPT

## 2020-05-15 PROCEDURE — 84443 ASSAY THYROID STIM HORMONE: CPT

## 2020-05-15 PROCEDURE — 36415 COLL VENOUS BLD VENIPUNCTURE: CPT

## 2020-05-15 PROCEDURE — 84439 ASSAY OF FREE THYROXINE: CPT

## 2020-05-17 DIAGNOSIS — E03.9 HYPOTHYROIDISM, UNSPECIFIED TYPE: ICD-10-CM

## 2020-05-17 RX ORDER — LEVOTHYROXINE SODIUM 100 UG/1
100 TABLET ORAL DAILY
Qty: 60 TABLET | Refills: 0 | Status: SHIPPED | OUTPATIENT
Start: 2020-05-17 | End: 2020-07-28 | Stop reason: SDUPTHER

## 2020-05-18 LAB — HIV 1+2 AB+HIV1 P24 AG SERPL QL IA: NEGATIVE

## 2020-05-20 ENCOUNTER — PATIENT MESSAGE (OUTPATIENT)
Dept: INTERNAL MEDICINE | Facility: CLINIC | Age: 35
End: 2020-05-20

## 2020-05-22 ENCOUNTER — OFFICE VISIT (OUTPATIENT)
Dept: INTERNAL MEDICINE | Facility: CLINIC | Age: 35
End: 2020-05-22
Payer: COMMERCIAL

## 2020-05-22 VITALS
DIASTOLIC BLOOD PRESSURE: 92 MMHG | TEMPERATURE: 100 F | WEIGHT: 238.31 LBS | RESPIRATION RATE: 16 BRPM | SYSTOLIC BLOOD PRESSURE: 138 MMHG | OXYGEN SATURATION: 97 % | HEART RATE: 105 BPM | BODY MASS INDEX: 39.71 KG/M2 | HEIGHT: 65 IN

## 2020-05-22 DIAGNOSIS — E55.9 VITAMIN D DEFICIENCY: ICD-10-CM

## 2020-05-22 DIAGNOSIS — I10 ESSENTIAL HYPERTENSION: Primary | ICD-10-CM

## 2020-05-22 DIAGNOSIS — E03.9 HYPOTHYROIDISM, UNSPECIFIED TYPE: ICD-10-CM

## 2020-05-22 DIAGNOSIS — E66.01 SEVERE OBESITY (BMI 35.0-39.9) WITH COMORBIDITY: ICD-10-CM

## 2020-05-22 DIAGNOSIS — F41.9 ANXIETY: ICD-10-CM

## 2020-05-22 DIAGNOSIS — F45.41 STRESS HEADACHES: ICD-10-CM

## 2020-05-22 PROCEDURE — 3008F PR BODY MASS INDEX (BMI) DOCUMENTED: ICD-10-PCS | Mod: CPTII,S$GLB,, | Performed by: NURSE PRACTITIONER

## 2020-05-22 PROCEDURE — 99999 PR PBB SHADOW E&M-EST. PATIENT-LVL IV: CPT | Mod: PBBFAC,,, | Performed by: NURSE PRACTITIONER

## 2020-05-22 PROCEDURE — 3075F PR MOST RECENT SYSTOLIC BLOOD PRESS GE 130-139MM HG: ICD-10-PCS | Mod: CPTII,S$GLB,, | Performed by: NURSE PRACTITIONER

## 2020-05-22 PROCEDURE — 3075F SYST BP GE 130 - 139MM HG: CPT | Mod: CPTII,S$GLB,, | Performed by: NURSE PRACTITIONER

## 2020-05-22 PROCEDURE — 99214 OFFICE O/P EST MOD 30 MIN: CPT | Mod: S$GLB,,, | Performed by: NURSE PRACTITIONER

## 2020-05-22 PROCEDURE — 3080F DIAST BP >= 90 MM HG: CPT | Mod: CPTII,S$GLB,, | Performed by: NURSE PRACTITIONER

## 2020-05-22 PROCEDURE — 3080F PR MOST RECENT DIASTOLIC BLOOD PRESSURE >= 90 MM HG: ICD-10-PCS | Mod: CPTII,S$GLB,, | Performed by: NURSE PRACTITIONER

## 2020-05-22 PROCEDURE — 99214 PR OFFICE/OUTPT VISIT, EST, LEVL IV, 30-39 MIN: ICD-10-PCS | Mod: S$GLB,,, | Performed by: NURSE PRACTITIONER

## 2020-05-22 PROCEDURE — 99999 PR PBB SHADOW E&M-EST. PATIENT-LVL IV: ICD-10-PCS | Mod: PBBFAC,,, | Performed by: NURSE PRACTITIONER

## 2020-05-22 PROCEDURE — 3008F BODY MASS INDEX DOCD: CPT | Mod: CPTII,S$GLB,, | Performed by: NURSE PRACTITIONER

## 2020-05-22 RX ORDER — BUSPIRONE HYDROCHLORIDE 5 MG/1
5 TABLET ORAL 2 TIMES DAILY
Qty: 60 TABLET | Refills: 0 | Status: SHIPPED | OUTPATIENT
Start: 2020-05-22 | End: 2020-07-02

## 2020-05-22 RX ORDER — AMLODIPINE BESYLATE 10 MG/1
10 TABLET ORAL DAILY
Qty: 30 TABLET | Refills: 0 | Status: SHIPPED | OUTPATIENT
Start: 2020-05-22 | End: 2020-07-09 | Stop reason: SDUPTHER

## 2020-05-22 NOTE — PATIENT INSTRUCTIONS
""It Starts with Food" by Terri & Rikki Valenzuela      Look into a magnesuim supplement 200-400mg 1-2x daily   Should help with sleep, bowels and anxiety   decrease if you get diarrhea     Start a Vit D3 supplement 1000 units daily     I have sent an anxiety med to your pharmacy as well, you can hold off on this if you would like, but it it there if you decide to give it a try   "

## 2020-05-22 NOTE — PROGRESS NOTES
Haydee Pennington 35 y.o. female     Chief Complaint:  Chief Complaint   Patient presents with    Hypertension       History of Present Illness:    Pt is known to provider and presents for:       BP recheck  LOV 5/14 (virtual)   stopped lisinopril, started norvasc   SEs- none   Home BPS - 150-160/100s (wrist cuff) needs new cuff   Today in office - 138/92      persisitent HA  Constant daily - not improving, but not debilitating     Hypothyroid   Recent lasbs showed elevated TSH >13   Increased synthroid from 88 to 100mcg   palpitaions stable, no worse     Anxiety   bitting nails   Has tried zoloft, Celexa, cymbalta, effexor, vistaril, wellbutrin & others   None worked   Has done counseling in the past without much help   Will to try new provider     Increased Exam:  Review of Systems   Constitutional: Negative for unexpected weight change.   HENT: Negative for trouble swallowing.    Eyes: Negative for discharge and visual disturbance.   Respiratory: Negative for shortness of breath.    Cardiovascular: Positive for palpitations (chronic and stable ). Negative for chest pain and leg swelling.   Gastrointestinal: Negative for diarrhea, nausea and vomiting.   Genitourinary: Negative for difficulty urinating.   Musculoskeletal: Positive for neck pain. Negative for gait problem.   Skin: Negative for wound.   Neurological: Positive for headaches (chronic ). Negative for speech difficulty.   Psychiatric/Behavioral: Negative for confusion.     Physical Exam   Constitutional: She is oriented to person, place, and time. She appears well-developed and well-nourished. She is cooperative.  Non-toxic appearance. She does not have a sickly appearance. She does not appear ill. No distress.   HENT:   Head: Normocephalic and atraumatic.   Right Ear: External ear normal.   Left Ear: External ear normal.   Eyes: Pupils are equal, round, and reactive to light. Conjunctivae and EOM are normal. Right eye exhibits no discharge. Left eye  exhibits no discharge. No scleral icterus.   Neck: Normal range of motion. No tracheal deviation present.   Cardiovascular: Normal rate and regular rhythm.   Pulmonary/Chest: Effort normal and breath sounds normal. No stridor. No respiratory distress. She has no wheezes. She has no rales. She exhibits no tenderness.   Abdominal: Soft. Bowel sounds are normal. She exhibits no distension. There is no tenderness.   Musculoskeletal: Normal range of motion. She exhibits no edema.   Neurological: She is alert and oriented to person, place, and time.   Skin: Skin is warm, dry and intact. No rash noted. She is not diaphoretic. No erythema. No pallor.   Psychiatric: She has a normal mood and affect. Her speech is normal and behavior is normal. Judgment and thought content normal. Cognition and memory are normal.   Nursing note and vitals reviewed.      Most Recent Laboratory Results Reviewed ({Yes)  Lab Results   Component Value Date    WBC 8.58 11/08/2019    HGB 15.9 11/08/2019    HCT 47.8 11/08/2019     11/08/2019    CHOL 198 11/08/2019    TRIG 152 (H) 11/08/2019    HDL 35 (L) 11/08/2019    ALT 20 05/15/2020    AST 17 05/15/2020     05/15/2020    K 3.8 05/15/2020     05/15/2020    CREATININE 0.9 05/15/2020    BUN 14 05/15/2020    CO2 24 05/15/2020    TSH 13.802 (H) 05/15/2020    TSH 13.802 (H) 05/15/2020    INR 1.0 03/27/2018    HGBA1C 4.9 11/08/2019       Assessment     ICD-10-CM ICD-9-CM   1. Essential hypertension I10 401.9   2. Hypothyroidism, unspecified type E03.9 244.9   3. Anxiety F41.9 300.00   4. Vitamin D deficiency E55.9 268.9   5. Stress headaches F45.41 307.81   6. Severe obesity (BMI 35.0-39.9) with comorbidity E66.01 278.01        Plan   Essential hypertension  -     amLODIPine (NORVASC) 10 MG tablet; Take 1 tablet (10 mg total) by mouth once daily.  Dispense: 30 tablet; Refill: 0    Hypothyroidism, unspecified type  Recently increased dose   Instructed on reportable hyperthyroid symptoms  "     Anxiety  -     busPIRone (BUSPAR) 5 MG Tab; Take 1 tablet (5 mg total) by mouth 2 (two) times daily.  Dispense: 60 tablet; Refill: 0  -     Ambulatory referral/consult to Psychiatry; Future; Expected date: 05/29/2020    Stress headaches  Dicussed magnesium supplements  HA diary encouraged   diet/envirnmental  trigger identification     Vitamin D deficiency   instructed on supplementation 1000 units daily     Severe obesity (BMI 35.0-39.9) with comorbidity  Contributing to chronic conditions    Suggested reading "It Starts with Food" by Jesica Valenzuela         Follow up in about 1 month (around 6/22/2020), or if symptoms worsen or fail to improve, for BP recheck.  Future Appointments     Date Provider Specialty Appt Notes    7/1/2020  Lab Hypothyroidism, unspecified type    7/2/2020 Gumaro Del Real MD Internal Medicine Follow up        "

## 2020-06-01 PROBLEM — E55.9 VITAMIN D DEFICIENCY: Status: ACTIVE | Noted: 2020-06-01

## 2020-07-01 ENCOUNTER — LAB VISIT (OUTPATIENT)
Dept: LAB | Facility: HOSPITAL | Age: 35
End: 2020-07-01
Attending: FAMILY MEDICINE
Payer: COMMERCIAL

## 2020-07-01 DIAGNOSIS — E03.9 HYPOTHYROIDISM, UNSPECIFIED TYPE: ICD-10-CM

## 2020-07-01 LAB — TSH SERPL DL<=0.005 MIU/L-ACNC: 3.23 UIU/ML (ref 0.4–4)

## 2020-07-01 PROCEDURE — 84443 ASSAY THYROID STIM HORMONE: CPT

## 2020-07-01 PROCEDURE — 36415 COLL VENOUS BLD VENIPUNCTURE: CPT

## 2020-07-02 ENCOUNTER — OFFICE VISIT (OUTPATIENT)
Dept: INTERNAL MEDICINE | Facility: CLINIC | Age: 35
End: 2020-07-02
Payer: COMMERCIAL

## 2020-07-02 VITALS
SYSTOLIC BLOOD PRESSURE: 132 MMHG | TEMPERATURE: 99 F | WEIGHT: 241.88 LBS | BODY MASS INDEX: 40.25 KG/M2 | RESPIRATION RATE: 18 BRPM | HEART RATE: 86 BPM | OXYGEN SATURATION: 98 % | DIASTOLIC BLOOD PRESSURE: 88 MMHG

## 2020-07-02 DIAGNOSIS — I10 ESSENTIAL HYPERTENSION: ICD-10-CM

## 2020-07-02 DIAGNOSIS — E03.9 HYPOTHYROIDISM, UNSPECIFIED TYPE: ICD-10-CM

## 2020-07-02 DIAGNOSIS — F41.9 ANXIETY: ICD-10-CM

## 2020-07-02 DIAGNOSIS — E66.01 MORBID OBESITY: Primary | ICD-10-CM

## 2020-07-02 PROCEDURE — 3008F BODY MASS INDEX DOCD: CPT | Mod: CPTII,S$GLB,, | Performed by: FAMILY MEDICINE

## 2020-07-02 PROCEDURE — 99999 PR PBB SHADOW E&M-EST. PATIENT-LVL III: ICD-10-PCS | Mod: PBBFAC,,, | Performed by: FAMILY MEDICINE

## 2020-07-02 PROCEDURE — 99214 OFFICE O/P EST MOD 30 MIN: CPT | Mod: S$GLB,,, | Performed by: FAMILY MEDICINE

## 2020-07-02 PROCEDURE — 3075F SYST BP GE 130 - 139MM HG: CPT | Mod: CPTII,S$GLB,, | Performed by: FAMILY MEDICINE

## 2020-07-02 PROCEDURE — 3079F PR MOST RECENT DIASTOLIC BLOOD PRESSURE 80-89 MM HG: ICD-10-PCS | Mod: CPTII,S$GLB,, | Performed by: FAMILY MEDICINE

## 2020-07-02 PROCEDURE — 3075F PR MOST RECENT SYSTOLIC BLOOD PRESS GE 130-139MM HG: ICD-10-PCS | Mod: CPTII,S$GLB,, | Performed by: FAMILY MEDICINE

## 2020-07-02 PROCEDURE — 99999 PR PBB SHADOW E&M-EST. PATIENT-LVL III: CPT | Mod: PBBFAC,,, | Performed by: FAMILY MEDICINE

## 2020-07-02 PROCEDURE — 99214 PR OFFICE/OUTPT VISIT, EST, LEVL IV, 30-39 MIN: ICD-10-PCS | Mod: S$GLB,,, | Performed by: FAMILY MEDICINE

## 2020-07-02 PROCEDURE — 3079F DIAST BP 80-89 MM HG: CPT | Mod: CPTII,S$GLB,, | Performed by: FAMILY MEDICINE

## 2020-07-02 PROCEDURE — 3008F PR BODY MASS INDEX (BMI) DOCUMENTED: ICD-10-PCS | Mod: CPTII,S$GLB,, | Performed by: FAMILY MEDICINE

## 2020-07-02 RX ORDER — METOPROLOL SUCCINATE 25 MG/1
25 TABLET, EXTENDED RELEASE ORAL DAILY
Qty: 90 TABLET | Refills: 1 | Status: SHIPPED | OUTPATIENT
Start: 2020-07-02 | End: 2020-09-30 | Stop reason: SDUPTHER

## 2020-07-02 NOTE — PROGRESS NOTES
Subjective:       Patient ID: Haydee Pennington is a 35 y.o. female.    Chief Complaint: Follow-up    HPI     34 yo     Past Medical History:   Diagnosis Date    Hypertension     Thyroid disease      Past Surgical History:   Procedure Laterality Date    CERVICAL BIOPSY  W/ LOOP ELECTRODE EXCISION  2005     Social History     Tobacco Use   Smoking Status Current Every Day Smoker    Packs/day: 0.50    Types: Cigarettes   Smokeless Tobacco Never Used     Tried Buspar.  Did not help  Kept her awake    Started on amlodipine  Not checking at home.      Stress headaches -   Saw NP  Recommended mg    Working - very hard.    Concerned has R. Ear infection.  Ongoing few week  Sometimes - feels a change / sensory.     Having palpitations  Daily  Getting dizzy with them.  Feels daily  Feels stress related.    Wt Readings from Last 10 Encounters:   07/02/20 109.7 kg (241 lb 13.5 oz)   05/22/20 108.1 kg (238 lb 5.1 oz)   05/13/20 108.3 kg (238 lb 10.4 oz)   01/12/20 109 kg (240 lb 4.8 oz)   11/08/19 106.6 kg (235 lb 0.2 oz)   05/23/19 107.1 kg (236 lb 1.8 oz)   10/09/18 107 kg (235 lb 14.3 oz)   09/29/18 107 kg (235 lb 14.3 oz)   09/25/18 107.6 kg (237 lb 3.4 oz)   09/17/18 109.8 kg (242 lb 1 oz)           Review of Systems   Constitutional: Negative for fever.   HENT: Negative for trouble swallowing.    Respiratory: Negative for shortness of breath.    Cardiovascular: Positive for palpitations.   Gastrointestinal: Negative for constipation.   Genitourinary: Negative for difficulty urinating.   Musculoskeletal: Positive for neck pain.   Skin: Negative for color change.   Neurological: Positive for headaches.   Psychiatric/Behavioral: Negative for confusion. The patient is nervous/anxious.        Objective:       Vitals:    07/02/20 1038   BP: 132/88   Pulse:    Resp:    Temp:      Vitals:    07/02/20 1038   BP: 132/88   Pulse:    Resp:    Temp:            Physical Exam  Constitutional:       General: She is not in acute  distress.     Appearance: Normal appearance. She is well-developed. She is obese.   HENT:      Head: Normocephalic and atraumatic.   Eyes:      General: Lids are normal.      Conjunctiva/sclera: Conjunctivae normal.   Neck:      Musculoskeletal: Full passive range of motion without pain and normal range of motion.   Cardiovascular:      Rate and Rhythm: Normal rate and regular rhythm.      Heart sounds: Normal heart sounds.   Pulmonary:      Effort: Pulmonary effort is normal. No respiratory distress.      Breath sounds: Normal breath sounds.   Abdominal:      General: There is no distension.      Palpations: Abdomen is soft.   Musculoskeletal: Normal range of motion.   Lymphadenopathy:      Cervical: No cervical adenopathy.   Skin:     Coloration: Skin is not pale.   Neurological:      Mental Status: She is alert and oriented to person, place, and time. She is not disoriented.   Psychiatric:         Speech: Speech normal.         Behavior: Behavior normal.         Thought Content: Thought content normal.         Assessment:       1. Morbid obesity    2. Essential hypertension    3. Anxiety    4. Hypothyroidism, unspecified type        Plan:   Morbid obesity  Discussed weight loss    Essential hypertension  Palpitations  Trial BB    Anxiety  Has tried benzos, ssris, gabapentin, hydroxzyine, buspar, SNRI, seroquel  Psychology consult  Patient wants to avoid medications as she has tried MANY and has had no succuss/ benefit  Exercise.    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

## 2020-07-16 ENCOUNTER — OFFICE VISIT (OUTPATIENT)
Dept: PSYCHIATRY | Facility: CLINIC | Age: 35
End: 2020-07-16
Payer: COMMERCIAL

## 2020-07-16 DIAGNOSIS — F43.10 PTSD (POST-TRAUMATIC STRESS DISORDER): ICD-10-CM

## 2020-07-16 DIAGNOSIS — F32.A CHRONIC DEPRESSION: Primary | ICD-10-CM

## 2020-07-16 PROCEDURE — 90791 PSYCH DIAGNOSTIC EVALUATION: CPT | Mod: S$GLB,,, | Performed by: SOCIAL WORKER

## 2020-07-16 PROCEDURE — 90791 PR PSYCHIATRIC DIAGNOSTIC EVALUATION: ICD-10-PCS | Mod: S$GLB,,, | Performed by: SOCIAL WORKER

## 2020-07-16 PROCEDURE — 99999 PR PBB SHADOW E&M-EST. PATIENT-LVL II: ICD-10-PCS | Mod: PBBFAC,,, | Performed by: SOCIAL WORKER

## 2020-07-16 PROCEDURE — 99999 PR PBB SHADOW E&M-EST. PATIENT-LVL II: CPT | Mod: PBBFAC,,, | Performed by: SOCIAL WORKER

## 2020-07-28 DIAGNOSIS — E03.9 HYPOTHYROIDISM, UNSPECIFIED TYPE: ICD-10-CM

## 2020-07-28 RX ORDER — LEVOTHYROXINE SODIUM 100 UG/1
100 TABLET ORAL DAILY
Qty: 90 TABLET | Refills: 1 | Status: SHIPPED | OUTPATIENT
Start: 2020-07-28 | End: 2020-08-19 | Stop reason: SDUPTHER

## 2020-07-28 NOTE — TELEPHONE ENCOUNTER
No new care gaps identified.  Powered by [employer]. Reference number: [id number]. 7/28/2020 10:45:33 AM   URIT

## 2020-08-19 DIAGNOSIS — E03.9 HYPOTHYROIDISM, UNSPECIFIED TYPE: ICD-10-CM

## 2020-08-19 RX ORDER — LEVOTHYROXINE SODIUM 100 UG/1
100 TABLET ORAL DAILY
Qty: 90 TABLET | Refills: 1 | Status: SHIPPED | OUTPATIENT
Start: 2020-08-19 | End: 2020-09-30 | Stop reason: SDUPTHER

## 2020-09-30 ENCOUNTER — PATIENT MESSAGE (OUTPATIENT)
Dept: INTERNAL MEDICINE | Facility: CLINIC | Age: 35
End: 2020-09-30

## 2020-09-30 DIAGNOSIS — E03.9 HYPOTHYROIDISM, UNSPECIFIED TYPE: ICD-10-CM

## 2020-09-30 DIAGNOSIS — I10 ESSENTIAL HYPERTENSION: ICD-10-CM

## 2020-09-30 RX ORDER — LEVOTHYROXINE SODIUM 100 UG/1
100 TABLET ORAL DAILY
Qty: 90 TABLET | Refills: 1 | Status: SHIPPED | OUTPATIENT
Start: 2020-09-30 | End: 2020-10-27 | Stop reason: DRUGHIGH

## 2020-09-30 RX ORDER — METOPROLOL SUCCINATE 25 MG/1
25 TABLET, EXTENDED RELEASE ORAL DAILY
Qty: 90 TABLET | Refills: 1 | Status: SHIPPED | OUTPATIENT
Start: 2020-09-30 | End: 2020-10-26 | Stop reason: SDUPTHER

## 2020-09-30 RX ORDER — AMLODIPINE BESYLATE 10 MG/1
10 TABLET ORAL DAILY
Qty: 90 TABLET | Refills: 0 | Status: SHIPPED | OUTPATIENT
Start: 2020-09-30 | End: 2020-10-26 | Stop reason: SDUPTHER

## 2020-09-30 NOTE — TELEPHONE ENCOUNTER
No new care gaps identified.  Powered by [employer]. Reference number: [id number]. 9/30/2020 1:58:54 PM CDT

## 2020-10-16 NOTE — PROGRESS NOTES
Psychiatry Initial Visit (PhD/LCSW)  Diagnostic Interview - CPT 23072    Date: 7/16/2020    Site: [place]    Referral source:  Gumaro Del Real MD    Clinical status of patient: Outpatient    Haydee Pennington, a 35 y.o. female, for initial evaluation visit.  Met with patient.    Chief complaint/reason for encounter: depression and anxiety    History of present illness:   35 year old  female presented referred from primary [facility]  Reported chief complaint of anxiety and post-traumatic stress reactions, as well as chronic depression.  Reported history of past counseling around 2010 most recently.  First treatment back to age 13, and a couple of inpatient admissions.  Endorsed generalized anxiety, some mild aversion to social situations, and some obsessive compulsive traights.  Said she feels stressed and irritated by challenges surrounding the Covid-19 pandemic.  Work has been online sales; she is a  for a [employer] company.  Handles customer complaints.  Patient reported feeling anxious at ringing telephones.  Works from home and struggles with  work from home life, which puts stress on her long-term relationship.  Patient stated she loves and cares about her boyfriend but is finding it increasingly difficult to respect him intellectually, seeing him as simple-minded and not very perceptive.  She is also mother to two teens, a daughter 18 years old and son 15 years old.  Reports issues of stubborn, sarcastic, oppositional traits with her son that is exhausting for her. Patient denied any si/hi, psychosis, mood swings, cognitive deficit, or substance abuse.  Identified therapeutic goals of reducing anxiety and depression, improving personal relationship dynamics and enhancing coping skills.      Pain: noncontributory    Symptoms:   · Mood: depressed mood, fatigue and social isolation  · Anxiety: excessive anxiety/worry, restlessness/keyed up, irritability, social phobia and  obsessions  · Substance abuse: denied  · Cognitive functioning: denied  · Health behaviors: noncontributory    Psychiatric history: prior inpatient treatment, psychotropic management by PCP and has participated in counseling/psychotherapy on an outpatient basis in the past    Medical history: noncontributory    Family history of psychiatric illness: none    Social history (marriage, employment, etc.):  Never , mother of 2 teens, a girl and a boy.  Patient education: obtained her GED at age 15.  Independent, has worked all her life.  In a 17 year domestic relationship with 45 year old boyfriend.  Patient smokes a half a pack of cigarettes daily; smoker since age 13.  No alcohol.  Loves coffee and drinks about 4 cups a day.     Substance use:   Alcohol: none   Drugs: none   Tobacco: half ppd smoker, 11 pack year history.   Caffeine: yes, 4 cups of coffee daily pattern    Current medications and drug reactions (include OTC, herbal): see medication list      Strengths and liabilities: Strength: Patient accepts guidance/feedback, Strength: Patient is expressive/articulate., Strength: Patient is motivated for change., Strength: Patient is physically healthy., Strength: Patient has reasonable judgment.    Current Evaluation:     Mental Status Exam:  General Appearance:  unremarkable, age appropriate, normal weight, casually dressed   Speech: normal tone, normal rate, normal pitch, normal volume      Level of Cooperation: cooperative      Thought Processes: normal and logical, goal-directed   Mood: anxious, depressed, irritable      Thought Content: normal, no suicidality, no homicidality, delusions, or paranoia   Affect: congruent and appropriate   Orientation: Oriented x3   Memory: recent and remote memory intact   Attention Span & Concentration: intact   Fund of General Knowledge: intact and appropriate to age and level of education   Abstract Reasoning: not formally assessed   Judgment & Insight: fair      Language  intact     Diagnostic Impression - Plan:       ICD-10-CM ICD-9-CM   1. Chronic depression  F32.9 311   2. PTSD (post-traumatic stress disorder)  F43.10 309.81       Plan:individual psychotherapy and medication management by physician     Return to Clinic: scheduled for 8/7/20. (has since cancelled)    Length of Service (minutes): 45

## 2020-10-26 ENCOUNTER — LAB VISIT (OUTPATIENT)
Dept: LAB | Facility: HOSPITAL | Age: 35
End: 2020-10-26
Attending: FAMILY MEDICINE
Payer: COMMERCIAL

## 2020-10-26 ENCOUNTER — OFFICE VISIT (OUTPATIENT)
Dept: INTERNAL MEDICINE | Facility: CLINIC | Age: 35
End: 2020-10-26
Payer: COMMERCIAL

## 2020-10-26 VITALS
OXYGEN SATURATION: 98 % | SYSTOLIC BLOOD PRESSURE: 134 MMHG | RESPIRATION RATE: 18 BRPM | TEMPERATURE: 98 F | HEART RATE: 84 BPM | WEIGHT: 246.5 LBS | DIASTOLIC BLOOD PRESSURE: 88 MMHG | BODY MASS INDEX: 41.02 KG/M2

## 2020-10-26 DIAGNOSIS — R53.83 FATIGUE, UNSPECIFIED TYPE: ICD-10-CM

## 2020-10-26 DIAGNOSIS — E66.9 OBESITY, UNSPECIFIED CLASSIFICATION, UNSPECIFIED OBESITY TYPE, UNSPECIFIED WHETHER SERIOUS COMORBIDITY PRESENT: ICD-10-CM

## 2020-10-26 DIAGNOSIS — Z79.899 ENCOUNTER FOR LONG-TERM CURRENT USE OF MEDICATION: ICD-10-CM

## 2020-10-26 DIAGNOSIS — E03.9 HYPOTHYROIDISM, UNSPECIFIED TYPE: Primary | ICD-10-CM

## 2020-10-26 DIAGNOSIS — Z11.59 NEED FOR HEPATITIS C SCREENING TEST: ICD-10-CM

## 2020-10-26 DIAGNOSIS — I10 ESSENTIAL HYPERTENSION: ICD-10-CM

## 2020-10-26 DIAGNOSIS — F41.9 ANXIETY: ICD-10-CM

## 2020-10-26 DIAGNOSIS — L29.9 ITCHING: ICD-10-CM

## 2020-10-26 DIAGNOSIS — R39.198 SUBJECTIVE CHANGE IN URINATION: ICD-10-CM

## 2020-10-26 LAB
BASOPHILS # BLD AUTO: 0.09 K/UL (ref 0–0.2)
BASOPHILS NFR BLD: 0.8 % (ref 0–1.9)
DIFFERENTIAL METHOD: ABNORMAL
EOSINOPHIL # BLD AUTO: 0.2 K/UL (ref 0–0.5)
EOSINOPHIL NFR BLD: 2.1 % (ref 0–8)
ERYTHROCYTE [DISTWIDTH] IN BLOOD BY AUTOMATED COUNT: 12.9 % (ref 11.5–14.5)
HCT VFR BLD AUTO: 47.8 % (ref 37–48.5)
HGB BLD-MCNC: 15.9 G/DL (ref 12–16)
IMM GRANULOCYTES # BLD AUTO: 0.02 K/UL (ref 0–0.04)
IMM GRANULOCYTES NFR BLD AUTO: 0.2 % (ref 0–0.5)
LYMPHOCYTES # BLD AUTO: 2.5 K/UL (ref 1–4.8)
LYMPHOCYTES NFR BLD: 22.7 % (ref 18–48)
MCH RBC QN AUTO: 31.7 PG (ref 27–31)
MCHC RBC AUTO-ENTMCNC: 33.3 G/DL (ref 32–36)
MCV RBC AUTO: 95 FL (ref 82–98)
MONOCYTES # BLD AUTO: 0.8 K/UL (ref 0.3–1)
MONOCYTES NFR BLD: 7.5 % (ref 4–15)
NEUTROPHILS # BLD AUTO: 7.3 K/UL (ref 1.8–7.7)
NEUTROPHILS NFR BLD: 66.7 % (ref 38–73)
NRBC BLD-RTO: 0 /100 WBC
PLATELET # BLD AUTO: 307 K/UL (ref 150–350)
PMV BLD AUTO: 11.5 FL (ref 9.2–12.9)
RBC # BLD AUTO: 5.02 M/UL (ref 4–5.4)
WBC # BLD AUTO: 10.9 K/UL (ref 3.9–12.7)

## 2020-10-26 PROCEDURE — 86803 HEPATITIS C AB TEST: CPT

## 2020-10-26 PROCEDURE — 3079F DIAST BP 80-89 MM HG: CPT | Mod: CPTII,S$GLB,, | Performed by: FAMILY MEDICINE

## 2020-10-26 PROCEDURE — 99999 PR PBB SHADOW E&M-EST. PATIENT-LVL III: ICD-10-PCS | Mod: PBBFAC,,, | Performed by: FAMILY MEDICINE

## 2020-10-26 PROCEDURE — 3079F PR MOST RECENT DIASTOLIC BLOOD PRESSURE 80-89 MM HG: ICD-10-PCS | Mod: CPTII,S$GLB,, | Performed by: FAMILY MEDICINE

## 2020-10-26 PROCEDURE — 36415 COLL VENOUS BLD VENIPUNCTURE: CPT

## 2020-10-26 PROCEDURE — 80053 COMPREHEN METABOLIC PANEL: CPT

## 2020-10-26 PROCEDURE — 3008F BODY MASS INDEX DOCD: CPT | Mod: CPTII,S$GLB,, | Performed by: FAMILY MEDICINE

## 2020-10-26 PROCEDURE — 85025 COMPLETE CBC W/AUTO DIFF WBC: CPT

## 2020-10-26 PROCEDURE — 84439 ASSAY OF FREE THYROXINE: CPT

## 2020-10-26 PROCEDURE — 80061 LIPID PANEL: CPT

## 2020-10-26 PROCEDURE — 99214 PR OFFICE/OUTPT VISIT, EST, LEVL IV, 30-39 MIN: ICD-10-PCS | Mod: S$GLB,,, | Performed by: FAMILY MEDICINE

## 2020-10-26 PROCEDURE — 3008F PR BODY MASS INDEX (BMI) DOCUMENTED: ICD-10-PCS | Mod: CPTII,S$GLB,, | Performed by: FAMILY MEDICINE

## 2020-10-26 PROCEDURE — 99999 PR PBB SHADOW E&M-EST. PATIENT-LVL III: CPT | Mod: PBBFAC,,, | Performed by: FAMILY MEDICINE

## 2020-10-26 PROCEDURE — 3075F PR MOST RECENT SYSTOLIC BLOOD PRESS GE 130-139MM HG: ICD-10-PCS | Mod: CPTII,S$GLB,, | Performed by: FAMILY MEDICINE

## 2020-10-26 PROCEDURE — 3075F SYST BP GE 130 - 139MM HG: CPT | Mod: CPTII,S$GLB,, | Performed by: FAMILY MEDICINE

## 2020-10-26 PROCEDURE — 83036 HEMOGLOBIN GLYCOSYLATED A1C: CPT

## 2020-10-26 PROCEDURE — 99214 OFFICE O/P EST MOD 30 MIN: CPT | Mod: S$GLB,,, | Performed by: FAMILY MEDICINE

## 2020-10-26 PROCEDURE — 84443 ASSAY THYROID STIM HORMONE: CPT

## 2020-10-26 RX ORDER — AMLODIPINE BESYLATE 10 MG/1
10 TABLET ORAL DAILY
Qty: 90 TABLET | Refills: 2 | Status: SHIPPED | OUTPATIENT
Start: 2020-10-26 | End: 2021-04-05 | Stop reason: SDUPTHER

## 2020-10-26 RX ORDER — HYDROXYZINE HYDROCHLORIDE 25 MG/1
25 TABLET, FILM COATED ORAL 3 TIMES DAILY
Qty: 90 TABLET | Refills: 0 | Status: SHIPPED | OUTPATIENT
Start: 2020-10-26 | End: 2021-04-26

## 2020-10-26 RX ORDER — METOPROLOL SUCCINATE 25 MG/1
25 TABLET, EXTENDED RELEASE ORAL DAILY
Qty: 90 TABLET | Refills: 2 | Status: SHIPPED | OUTPATIENT
Start: 2020-10-26 | End: 2020-12-17 | Stop reason: DRUGHIGH

## 2020-10-26 NOTE — PROGRESS NOTES
Subjective:       Patient ID: Haydee Pennington is a 35 y.o. female.    Chief Complaint: Fatigue    HPI     35    Past Medical History:   Diagnosis Date    Hypertension     Thyroid disease      Past Surgical History:   Procedure Laterality Date    CERVICAL BIOPSY  W/ LOOP ELECTRODE EXCISION  2005     Social History     Tobacco Use   Smoking Status Current Every Day Smoker    Packs/day: 0.50    Types: Cigarettes   Smokeless Tobacco Never Used     Family History   Problem Relation Age of Onset    Diabetes Mother     Hypertension Father     Dementia Father      Wt Readings from Last 5 Encounters:   10/26/20 111.8 kg (246 lb 7.6 oz)   07/02/20 109.7 kg (241 lb 13.5 oz)   05/22/20 108.1 kg (238 lb 5.1 oz)   05/13/20 108.3 kg (238 lb 10.4 oz)   01/12/20 109 kg (240 lb 4.8 oz)       Doing somewhat better since our last visit  She is working less    Some back pain    Some anxiety  Does not want meds.    Diffuse body itching. Recent development. Uncertain of cause.    She is concerned about UTI  Increased frequency  No color, no odor.  No pain with urination  But sometimes hard to urinate    Review of Systems   Constitutional: Positive for activity change. Negative for unexpected weight change.   HENT: Negative for hearing loss, rhinorrhea and trouble swallowing.    Eyes: Negative for discharge and visual disturbance.   Respiratory: Negative for chest tightness and wheezing.    Cardiovascular: Negative for chest pain and palpitations.   Gastrointestinal: Negative for blood in stool, constipation, diarrhea and vomiting.   Endocrine: Negative for polydipsia and polyuria.   Genitourinary: Negative for difficulty urinating, dysuria, hematuria and menstrual problem.   Musculoskeletal: Positive for neck pain. Negative for arthralgias and joint swelling.   Neurological: Positive for headaches. Negative for weakness.   Psychiatric/Behavioral: Negative for confusion and dysphoric mood.       Objective:       Vitals:     10/26/20 0708   BP: 134/88   Pulse: 84   Resp: 18   Temp: 98.2 °F (36.8 °C)       Physical Exam  Constitutional:       General: She is not in acute distress.     Appearance: Normal appearance. She is well-developed.   HENT:      Head: Normocephalic and atraumatic.   Eyes:      General: Lids are normal.      Conjunctiva/sclera: Conjunctivae normal.   Neck:      Musculoskeletal: Full passive range of motion without pain and normal range of motion.   Cardiovascular:      Rate and Rhythm: Normal rate and regular rhythm.      Heart sounds: Normal heart sounds.   Pulmonary:      Effort: Pulmonary effort is normal. No respiratory distress.      Breath sounds: Normal breath sounds.   Abdominal:      General: There is no distension.      Palpations: Abdomen is soft.   Musculoskeletal: Normal range of motion.   Lymphadenopathy:      Cervical: No cervical adenopathy.   Skin:     Coloration: Skin is not pale.   Neurological:      Mental Status: She is alert and oriented to person, place, and time. She is not disoriented.   Psychiatric:         Speech: Speech normal.         Behavior: Behavior normal.         Thought Content: Thought content normal.         Assessment:       1. Hypothyroidism, unspecified type    2. Essential hypertension    3. Subjective change in urination    4. Encounter for long-term current use of medication    5. Need for hepatitis C screening test    6. Fatigue, unspecified type    7. Anxiety    8. Itching    9. Obesity, unspecified classification, unspecified obesity type, unspecified whether serious comorbidity present        Plan:       HTN  Controlled  BB  CCB    Hypothyroidism  TSH normal  3 month ago  Continue current labs    UTI?  Urine studies    Anxiety  Itching  Trial cathy  Does not want to try other medications at present. Someone she knows has been dealing with med changes and she is not interested in this.    Obesity  Lifestyle conversation held again  Smoking cessation  discussed.    Screen for hepatitis C    6 mo f/u

## 2020-10-27 LAB
ALBUMIN SERPL BCP-MCNC: 3.9 G/DL (ref 3.5–5.2)
ALP SERPL-CCNC: 73 U/L (ref 55–135)
ALT SERPL W/O P-5'-P-CCNC: 24 U/L (ref 10–44)
ANION GAP SERPL CALC-SCNC: 8 MMOL/L (ref 8–16)
AST SERPL-CCNC: 21 U/L (ref 10–40)
BILIRUB SERPL-MCNC: 0.8 MG/DL (ref 0.1–1)
BUN SERPL-MCNC: 12 MG/DL (ref 6–20)
CALCIUM SERPL-MCNC: 9.2 MG/DL (ref 8.7–10.5)
CHLORIDE SERPL-SCNC: 103 MMOL/L (ref 95–110)
CHOLEST SERPL-MCNC: 196 MG/DL (ref 120–199)
CHOLEST/HDLC SERPL: 5.3 {RATIO} (ref 2–5)
CO2 SERPL-SCNC: 27 MMOL/L (ref 23–29)
CREAT SERPL-MCNC: 0.9 MG/DL (ref 0.5–1.4)
EST. GFR  (AFRICAN AMERICAN): >60 ML/MIN/1.73 M^2
EST. GFR  (NON AFRICAN AMERICAN): >60 ML/MIN/1.73 M^2
ESTIMATED AVG GLUCOSE: 94 MG/DL (ref 68–131)
GLUCOSE SERPL-MCNC: 77 MG/DL (ref 70–110)
HBA1C MFR BLD HPLC: 4.9 % (ref 4–5.6)
HCV AB SERPL QL IA: NEGATIVE
HDLC SERPL-MCNC: 37 MG/DL (ref 40–75)
HDLC SERPL: 18.9 % (ref 20–50)
LDLC SERPL CALC-MCNC: 122.6 MG/DL (ref 63–159)
NONHDLC SERPL-MCNC: 159 MG/DL
POTASSIUM SERPL-SCNC: 4 MMOL/L (ref 3.5–5.1)
PROT SERPL-MCNC: 6.8 G/DL (ref 6–8.4)
SODIUM SERPL-SCNC: 138 MMOL/L (ref 136–145)
T4 FREE SERPL-MCNC: 0.85 NG/DL (ref 0.71–1.51)
TRIGL SERPL-MCNC: 182 MG/DL (ref 30–150)
TSH SERPL DL<=0.005 MIU/L-ACNC: 6.68 UIU/ML (ref 0.4–4)

## 2020-10-27 RX ORDER — LEVOTHYROXINE SODIUM 112 UG/1
112 TABLET ORAL
Qty: 90 TABLET | Refills: 1 | Status: SHIPPED | OUTPATIENT
Start: 2020-10-27 | End: 2021-06-09 | Stop reason: SDUPTHER

## 2020-12-10 ENCOUNTER — PATIENT MESSAGE (OUTPATIENT)
Dept: INTERNAL MEDICINE | Facility: CLINIC | Age: 35
End: 2020-12-10

## 2020-12-10 ENCOUNTER — HOSPITAL ENCOUNTER (EMERGENCY)
Facility: HOSPITAL | Age: 35
Discharge: HOME | End: 2020-12-10
Attending: EMERGENCY MEDICINE
Payer: COMMERCIAL

## 2020-12-10 ENCOUNTER — OFFICE VISIT (OUTPATIENT)
Dept: INTERNAL MEDICINE | Facility: CLINIC | Age: 35
End: 2020-12-10
Payer: COMMERCIAL

## 2020-12-10 VITALS
SYSTOLIC BLOOD PRESSURE: 185 MMHG | OXYGEN SATURATION: 96 % | TEMPERATURE: 99 F | RESPIRATION RATE: 20 BRPM | WEIGHT: 244.19 LBS | BODY MASS INDEX: 40.63 KG/M2 | HEART RATE: 87 BPM | DIASTOLIC BLOOD PRESSURE: 107 MMHG

## 2020-12-10 DIAGNOSIS — R42 DIZZY: Primary | ICD-10-CM

## 2020-12-10 DIAGNOSIS — R00.2 PALPITATIONS: ICD-10-CM

## 2020-12-10 DIAGNOSIS — R07.9 CHEST PAIN: ICD-10-CM

## 2020-12-10 DIAGNOSIS — I10 ESSENTIAL HYPERTENSION: ICD-10-CM

## 2020-12-10 DIAGNOSIS — R20.2 TINGLING SENSATION: ICD-10-CM

## 2020-12-10 DIAGNOSIS — L74.9 SWEATING ABNORMALITY: ICD-10-CM

## 2020-12-10 DIAGNOSIS — R07.9 CHEST PAIN, UNSPECIFIED TYPE: Primary | ICD-10-CM

## 2020-12-10 LAB
ALBUMIN SERPL BCP-MCNC: 3.8 G/DL (ref 3.5–5.2)
ALP SERPL-CCNC: 76 U/L (ref 55–135)
ALT SERPL W/O P-5'-P-CCNC: 24 U/L (ref 10–44)
ANION GAP SERPL CALC-SCNC: 13 MMOL/L (ref 8–16)
AST SERPL-CCNC: 16 U/L (ref 10–40)
BASOPHILS # BLD AUTO: 0.11 K/UL (ref 0–0.2)
BASOPHILS NFR BLD: 1.2 % (ref 0–1.9)
BILIRUB SERPL-MCNC: 0.5 MG/DL (ref 0.1–1)
BUN SERPL-MCNC: 11 MG/DL (ref 6–20)
CALCIUM SERPL-MCNC: 9.2 MG/DL (ref 8.7–10.5)
CHLORIDE SERPL-SCNC: 104 MMOL/L (ref 95–110)
CK SERPL-CCNC: 74 U/L (ref 20–180)
CO2 SERPL-SCNC: 22 MMOL/L (ref 23–29)
CREAT SERPL-MCNC: 0.8 MG/DL (ref 0.5–1.4)
DIFFERENTIAL METHOD: ABNORMAL
EOSINOPHIL # BLD AUTO: 0.3 K/UL (ref 0–0.5)
EOSINOPHIL NFR BLD: 3.1 % (ref 0–8)
ERYTHROCYTE [DISTWIDTH] IN BLOOD BY AUTOMATED COUNT: 12 % (ref 11.5–14.5)
EST. GFR  (AFRICAN AMERICAN): >60 ML/MIN/1.73 M^2
EST. GFR  (NON AFRICAN AMERICAN): >60 ML/MIN/1.73 M^2
GLUCOSE SERPL-MCNC: 97 MG/DL (ref 70–110)
HCT VFR BLD AUTO: 49.3 % (ref 37–48.5)
HGB BLD-MCNC: 16.5 G/DL (ref 12–16)
IMM GRANULOCYTES # BLD AUTO: 0.03 K/UL (ref 0–0.04)
IMM GRANULOCYTES NFR BLD AUTO: 0.3 % (ref 0–0.5)
LYMPHOCYTES # BLD AUTO: 2.5 K/UL (ref 1–4.8)
LYMPHOCYTES NFR BLD: 27.1 % (ref 18–48)
MCH RBC QN AUTO: 30.3 PG (ref 27–31)
MCHC RBC AUTO-ENTMCNC: 33.5 G/DL (ref 32–36)
MCV RBC AUTO: 91 FL (ref 82–98)
MONOCYTES # BLD AUTO: 0.7 K/UL (ref 0.3–1)
MONOCYTES NFR BLD: 7.3 % (ref 4–15)
NEUTROPHILS # BLD AUTO: 5.5 K/UL (ref 1.8–7.7)
NEUTROPHILS NFR BLD: 61 % (ref 38–73)
NRBC BLD-RTO: 0 /100 WBC
PLATELET # BLD AUTO: 374 K/UL (ref 150–350)
PMV BLD AUTO: 10.8 FL (ref 9.2–12.9)
POTASSIUM SERPL-SCNC: 3.9 MMOL/L (ref 3.5–5.1)
PROT SERPL-MCNC: 7.3 G/DL (ref 6–8.4)
RBC # BLD AUTO: 5.44 M/UL (ref 4–5.4)
SODIUM SERPL-SCNC: 139 MMOL/L (ref 136–145)
TROPONIN I SERPL DL<=0.01 NG/ML-MCNC: <0.006 NG/ML (ref 0–0.03)
TSH SERPL DL<=0.005 MIU/L-ACNC: 1.51 UIU/ML (ref 0.4–4)
WBC # BLD AUTO: 9.05 K/UL (ref 3.9–12.7)

## 2020-12-10 PROCEDURE — 93010 EKG 12-LEAD: ICD-10-PCS | Mod: ,,, | Performed by: INTERNAL MEDICINE

## 2020-12-10 PROCEDURE — 99285 EMERGENCY DEPT VISIT HI MDM: CPT | Mod: 25

## 2020-12-10 PROCEDURE — 82550 ASSAY OF CK (CPK): CPT

## 2020-12-10 PROCEDURE — 84443 ASSAY THYROID STIM HORMONE: CPT

## 2020-12-10 PROCEDURE — 85025 COMPLETE CBC W/AUTO DIFF WBC: CPT

## 2020-12-10 PROCEDURE — 36415 COLL VENOUS BLD VENIPUNCTURE: CPT

## 2020-12-10 PROCEDURE — 93005 ELECTROCARDIOGRAM TRACING: CPT

## 2020-12-10 PROCEDURE — 80053 COMPREHEN METABOLIC PANEL: CPT

## 2020-12-10 PROCEDURE — 99213 OFFICE O/P EST LOW 20 MIN: CPT | Mod: 95,,, | Performed by: FAMILY MEDICINE

## 2020-12-10 PROCEDURE — 93010 ELECTROCARDIOGRAM REPORT: CPT | Mod: ,,, | Performed by: INTERNAL MEDICINE

## 2020-12-10 PROCEDURE — 84484 ASSAY OF TROPONIN QUANT: CPT

## 2020-12-10 PROCEDURE — 99213 PR OFFICE/OUTPT VISIT, EST, LEVL III, 20-29 MIN: ICD-10-PCS | Mod: 95,,, | Performed by: FAMILY MEDICINE

## 2020-12-10 NOTE — PROGRESS NOTES
"Subjective:       Patient ID: Haydee Pennington is a 35 y.o. female.    Chief Complaint: No chief complaint on file.    HPI     The patient location is: home  The chief complaint leading to consultation is: dizzy/ chest sensation    Visit type: audiovisual    Face to Face time with patient: 10   minutes of total time spent on the encounter, which includes face to face time and non-face to face time preparing to see the patient (eg, review of tests), Obtaining and/or reviewing separately obtained history, Documenting clinical information in the electronic or other health record, Independently interpreting results (not separately reported) and communicating results to the patient/family/caregiver, or Care coordination (not separately reported).         Each patient to whom he or she provides medical services by telemedicine is:  (1) informed of the relationship between the physician and patient and the respective role of any other health care provider with respect to management of the patient; and (2) notified that he or she may decline to receive medical services by telemedicine and may withdraw from such care at any time.    Notes:       35 F    Past Medical History:   Diagnosis Date    Hypertension     Thyroid disease      Past Surgical History:   Procedure Laterality Date    CERVICAL BIOPSY  W/ LOOP ELECTRODE EXCISION  2005     Social History     Tobacco Use   Smoking Status Current Every Day Smoker    Packs/day: 0.50    Types: Cigarettes   Smokeless Tobacco Never Used     Family History   Problem Relation Age of Onset    Diabetes Mother     Hypertension Father     Dementia Father            Estimates 1 week of new symptoms    Didn't work yesterday  Has been having what she calls " incidents"  1st incident was Wednesday  Thought it was due to her period.  Incident described as :  Woke up - had coffee , had cigarette - normal day  And then got dizzy.  She feels got a clammy weird feeling  Nauseated  No " "vomiting  Passed after a few minutes    Occurred again Sat.  Vomited then    She started her period that day and thought was related.    Then on Tuesday another incident  Reports feels like anxiety  Had palpitations  Chest felt weird  Took a vistaril thinking it was anxiety  Symptoms continued for hours -  Currently still feeling the same.    Reports icy / tingling in her chest and shoulder  Face and chest feel " tingling weird"  No pain.  She is sweating more than usual  She is not having nausea    Only thing she noted that has changed is gluten free diet.      Heart feels racing but pulse feels normal when palpated.  She checked her HR with cuff and HR 80s    Symptoms do not seem to change with exertion - but she doesn't feel she has been exerting herself.        Review of Systems   Constitutional: Negative for activity change and unexpected weight change.   HENT: Negative for hearing loss, rhinorrhea and trouble swallowing.    Eyes: Negative for discharge and visual disturbance.   Respiratory: Negative for chest tightness and wheezing.    Cardiovascular: Negative for chest pain and palpitations.   Gastrointestinal: Negative for blood in stool, constipation, diarrhea and vomiting.   Endocrine: Negative for polydipsia and polyuria.   Genitourinary: Negative for difficulty urinating, dysuria, hematuria and menstrual problem.   Musculoskeletal: Negative for arthralgias, joint swelling and neck pain.   Neurological: Negative for weakness and headaches.   Psychiatric/Behavioral: Negative for confusion and dysphoric mood.       Objective:      Physical Exam  Pulmonary:      Effort: No respiratory distress.   Skin:     Coloration: Skin is not pale.   Neurological:      General: No focal deficit present.      Mental Status: She is alert and oriented to person, place, and time.   Psychiatric:         Mood and Affect: Mood normal.      Comments: Seemed more nervous than usual self         Assessment:       1. Dizzy    2. " Tingling sensation    3. Sweating abnormality        Plan:   Dizzy    Tingling sensation    Sweating abnormality    uncertain of cause.  Given chest/ shoulder sensation, abnomral sweating, nausea, dizziness symptoms present now I feel most prudent to be evaluated emergently.  Advised present to ER.    Unlikely CAD/ MI - but must be considered - risk factors - weight and smoking    Potentially recent dose adjustment of tSH is causing hyperthyroid symptoms but would not expect such given TSH elevated recently and dose change was recent weeks as well.    History of anxiety and I know patient has had a rough year - potential anxiety attack.    Considered trying outpatient cardiology consult - however as symptoms currently present I feel ER trip is warranted for EKG and stat lab work.    Patient agreed/ understood and will present to ER [facility]

## 2020-12-10 NOTE — ED PROVIDER NOTES
Encounter Date: 12/10/2020       History     Chief Complaint   Patient presents with    Palpitations     reports intermittent episodes of palpitation, chest heaviness, near syncope, and N/V x 1 week     35 year old female with complaint of chest heaviness and fatigue X one week.  Also reports episodes of palpitations.  No fever or chills. No cough. No SOB.  No other complaints.         Review of patient's allergies indicates:   Allergen Reactions    Sulfa (sulfonamide antibiotics)      Past Medical History:   Diagnosis Date    Hypertension     Thyroid disease      Past Surgical History:   Procedure Laterality Date    CERVICAL BIOPSY  W/ LOOP ELECTRODE EXCISION  2005     Family History   Problem Relation Age of Onset    Diabetes Mother     Hypertension Father     Dementia Father      Social History     Tobacco Use    Smoking status: Current Every Day Smoker     Packs/day: 0.50     Types: Cigarettes    Smokeless tobacco: Never Used   Substance Use Topics    Alcohol use: No     Frequency: Never     Drinks per session: Patient refused     Binge frequency: Patient refused    Drug use: Never     Review of Systems   Constitutional: Negative for fever.   HENT: Negative for sore throat.    Respiratory: Negative for shortness of breath.    Cardiovascular: Positive for chest pain.   Gastrointestinal: Negative for nausea.   Genitourinary: Negative for dysuria.   Musculoskeletal: Negative for back pain.   Skin: Negative for rash.   Neurological: Negative for weakness.   Hematological: Does not bruise/bleed easily.       Physical Exam     Initial Vitals [12/10/20 1036]   BP Pulse Resp Temp SpO2   (!) 185/107 87 20 99 °F (37.2 °C) 96 %      MAP       --         Physical Exam    Nursing note and vitals reviewed.  Constitutional: She appears well-developed and well-nourished.   HENT:   Head: Normocephalic and atraumatic.   Eyes: Conjunctivae and EOM are normal. Pupils are equal, round, and reactive to light.   Neck:  Normal range of motion. Neck supple.   Cardiovascular: Normal rate, regular rhythm, normal heart sounds and intact distal pulses.   Pulmonary/Chest: Breath sounds normal.   Abdominal: Soft. There is no abdominal tenderness. There is no rebound and no guarding.   Musculoskeletal: Normal range of motion.   Neurological: She is alert and oriented to person, place, and time. She has normal strength and normal reflexes.   Skin: Skin is warm and dry.   Psychiatric: She has a normal mood and affect. Her behavior is normal. Thought content normal.         ED Course   Procedures  Labs Reviewed   CBC W/ AUTO DIFFERENTIAL - Abnormal; Notable for the following components:       Result Value    RBC 5.44 (*)     Hemoglobin 16.5 (*)     Hematocrit 49.3 (*)     Platelets 374 (*)     All other components within normal limits   COMPREHENSIVE METABOLIC PANEL - Abnormal; Notable for the following components:    CO2 22 (*)     All other components within normal limits   CK   TROPONIN I   TSH     EKG Readings: (Independently Interpreted)   Other EKG Interpretations: EKG: NSR with rate of 77, no st or t wave abnormalities       Imaging Results          X-Ray Chest 1 View (Final result)  Result time 12/10/20 12:08:31    Final result by Robles Marie MD (12/10/20 12:08:31)                 Impression:      1.  Negative for acute process involving the chest.  Negative for interval change.      Electronically signed by: Rolbes Marie MD  Date:    12/10/2020  Time:    12:08             Narrative:    EXAMINATION:  XR CHEST 1 VIEW    CLINICAL HISTORY:  Chest pain, unspecified    COMPARISON:  March 27, 2018    FINDINGS:  The lungs are clear. The cardiac silhouette size is normal. The trachea is midline and the mediastinal width is normal. Negative for focal infiltrate, effusion or pneumothorax. Pulmonary vasculature is normal. Negative for osseous abnormalities.                                     Labs Reviewed   CBC W/ AUTO DIFFERENTIAL -  Abnormal; Notable for the following components:       Result Value    RBC 5.44 (*)     Hemoglobin 16.5 (*)     Hematocrit 49.3 (*)     Platelets 374 (*)     All other components within normal limits   COMPREHENSIVE METABOLIC PANEL - Abnormal; Notable for the following components:    CO2 22 (*)     All other components within normal limits   CK   TROPONIN I   TSH     1:21 PM  No complaints of chest pain at present, recommended to recheck BP but pt declined, pt reports she is ready for d/c                           Clinical Impression:       ICD-10-CM ICD-9-CM   1. Chest pain, unspecified type  R07.9 786.50   2. Palpitations  R00.2 785.1   3. Chest pain  R07.9 786.50                          ED Disposition Condition    Discharge Stable        ED Prescriptions     None        Follow-up Information     Follow up With Specialties Details Why Contact Info    Gumaro Del Real MD Family Medicine Schedule an appointment as soon as possible for a visit in 2 days  [address] [facility] [address] [phone]                                         Derek Rodriguez NP  12/10/20 1467

## 2020-12-10 NOTE — TELEPHONE ENCOUNTER
No new care gaps identified.  Powered by [employer]. Reference number: [id number]. 12/10/2020 1:44:13 PM   CST

## 2020-12-11 ENCOUNTER — TELEPHONE (OUTPATIENT)
Dept: INTERNAL MEDICINE | Facility: CLINIC | Age: 35
End: 2020-12-11

## 2020-12-11 RX ORDER — AMLODIPINE BESYLATE 10 MG/1
10 TABLET ORAL DAILY
Qty: 90 TABLET | Refills: 3 | OUTPATIENT
Start: 2020-12-11 | End: 2021-01-10

## 2020-12-11 NOTE — TELEPHONE ENCOUNTER
Pt want to know what's the next step after the office visit and  er visit on yesterday. Pt still not feeling well.

## 2020-12-11 NOTE — TELEPHONE ENCOUNTER
Refill Routing Note   Medication(s) are not appropriate for processing by [facility] for the following reason(s):     - Patient has been seen in the Emergency Room/Department since the last PCP visit  [facility] action(s):  Defer     Medication Therapy Plan: [facility]  Medication reconciliation completed: No   Automatic Epic Generated Protocol Data:        Requested Prescriptions   Pending Prescriptions Disp Refills    amLODIPine (NORVASC) 10 MG tablet 90 tablet 3     Sig: Take 1 tablet (10 mg total) by mouth once daily.       Cardiovascular:  Calcium Channel Blockers Failed - 12/10/2020  1:43 PM        Failed - Last BP in normal range within 360 days.     BP Readings from Last 3 Encounters:   12/10/20 (!) 185/107   10/26/20 134/88   07/02/20 132/88              Passed - Patient is at least 18 years old        Passed - Negative Pregnancy Status Check        Passed - Office visit in past 12 months or future 90 days     Recent Outpatient Visits            Yesterday Dizzy    [facility] Gumaro Del Real MD    1 month ago Hypothyroidism, unspecified type    [facility] Gumaro Del Real MD    4 months ago Chronic depression    [facility] Oziel Munroe LCSW    5 months ago Morbid obesity    [facility] Gumaro Del Real MD    6 months ago Essential hypertension    [facility] Eboni Calhoun, NP          Future Appointments              In 4 months MD [facility]  Internal Medicine,  [facility]                      Appointments  past 12m or future 3m with PCP    Date Provider   Last Visit   12/10/2020 Gumaro Del Real MD   Next Visit   4/26/2021 Gumaro Del Real MD           Note composed:11:09 AM 12/11/2020

## 2020-12-14 NOTE — TELEPHONE ENCOUNTER
Provider Staff:     Action required for this patient.    Please note denial of medication.   Refused by:  Gumaro Del Real MD  Refusal reason: Patient has requested refill too soon     Thanks!  [facility]   Note composed: 12/14/2020 9:47 AM

## 2020-12-17 ENCOUNTER — OFFICE VISIT (OUTPATIENT)
Dept: INTERNAL MEDICINE | Facility: CLINIC | Age: 35
End: 2020-12-17
Payer: COMMERCIAL

## 2020-12-17 ENCOUNTER — OFFICE VISIT (OUTPATIENT)
Dept: CARDIOLOGY | Facility: CLINIC | Age: 35
End: 2020-12-17
Payer: COMMERCIAL

## 2020-12-17 VITALS
HEIGHT: 65 IN | SYSTOLIC BLOOD PRESSURE: 140 MMHG | DIASTOLIC BLOOD PRESSURE: 100 MMHG | BODY MASS INDEX: 41.14 KG/M2 | WEIGHT: 246.94 LBS | OXYGEN SATURATION: 98 % | HEART RATE: 78 BPM

## 2020-12-17 DIAGNOSIS — I10 HYPERTENSION, UNSPECIFIED TYPE: ICD-10-CM

## 2020-12-17 DIAGNOSIS — R00.2 PALPITATIONS: ICD-10-CM

## 2020-12-17 DIAGNOSIS — R29.818 SUSPECTED SLEEP APNEA: Primary | ICD-10-CM

## 2020-12-17 DIAGNOSIS — R42 DIZZINESS: ICD-10-CM

## 2020-12-17 DIAGNOSIS — F41.9 ANXIETY: ICD-10-CM

## 2020-12-17 DIAGNOSIS — R42 DIZZINESS: Primary | ICD-10-CM

## 2020-12-17 PROCEDURE — 3077F PR MOST RECENT SYSTOLIC BLOOD PRESSURE >= 140 MM HG: ICD-10-PCS | Mod: CPTII,S$GLB,, | Performed by: INTERNAL MEDICINE

## 2020-12-17 PROCEDURE — 3008F BODY MASS INDEX DOCD: CPT | Mod: CPTII,S$GLB,, | Performed by: INTERNAL MEDICINE

## 2020-12-17 PROCEDURE — 99999 PR PBB SHADOW E&M-EST. PATIENT-LVL IV: ICD-10-PCS | Mod: PBBFAC,,, | Performed by: INTERNAL MEDICINE

## 2020-12-17 PROCEDURE — 99999 PR PBB SHADOW E&M-EST. PATIENT-LVL IV: CPT | Mod: PBBFAC,,, | Performed by: INTERNAL MEDICINE

## 2020-12-17 PROCEDURE — 1126F AMNT PAIN NOTED NONE PRSNT: CPT | Mod: S$GLB,,, | Performed by: INTERNAL MEDICINE

## 2020-12-17 PROCEDURE — 3080F DIAST BP >= 90 MM HG: CPT | Mod: CPTII,S$GLB,, | Performed by: INTERNAL MEDICINE

## 2020-12-17 PROCEDURE — 1126F PR PAIN SEVERITY QUANTIFIED, NO PAIN PRESENT: ICD-10-PCS | Mod: S$GLB,,, | Performed by: INTERNAL MEDICINE

## 2020-12-17 PROCEDURE — 99213 PR OFFICE/OUTPT VISIT, EST, LEVL III, 20-29 MIN: ICD-10-PCS | Mod: 95,,, | Performed by: FAMILY MEDICINE

## 2020-12-17 PROCEDURE — 99204 OFFICE O/P NEW MOD 45 MIN: CPT | Mod: S$GLB,,, | Performed by: INTERNAL MEDICINE

## 2020-12-17 PROCEDURE — 99204 PR OFFICE/OUTPT VISIT, NEW, LEVL IV, 45-59 MIN: ICD-10-PCS | Mod: S$GLB,,, | Performed by: INTERNAL MEDICINE

## 2020-12-17 PROCEDURE — 99213 OFFICE O/P EST LOW 20 MIN: CPT | Mod: 95,,, | Performed by: FAMILY MEDICINE

## 2020-12-17 PROCEDURE — 3080F PR MOST RECENT DIASTOLIC BLOOD PRESSURE >= 90 MM HG: ICD-10-PCS | Mod: CPTII,S$GLB,, | Performed by: INTERNAL MEDICINE

## 2020-12-17 PROCEDURE — 3077F SYST BP >= 140 MM HG: CPT | Mod: CPTII,S$GLB,, | Performed by: INTERNAL MEDICINE

## 2020-12-17 PROCEDURE — 3008F PR BODY MASS INDEX (BMI) DOCUMENTED: ICD-10-PCS | Mod: CPTII,S$GLB,, | Performed by: INTERNAL MEDICINE

## 2020-12-17 RX ORDER — CARVEDILOL 3.12 MG/1
3.12 TABLET ORAL 2 TIMES DAILY WITH MEALS
Qty: 60 TABLET | Refills: 11 | Status: SHIPPED | OUTPATIENT
Start: 2020-12-17 | End: 2021-07-27 | Stop reason: DRUGHIGH

## 2020-12-17 RX ORDER — HYDROCHLOROTHIAZIDE 25 MG/1
25 TABLET ORAL DAILY
Qty: 30 TABLET | Refills: 11 | Status: SHIPPED | OUTPATIENT
Start: 2020-12-17 | End: 2021-10-14 | Stop reason: SDUPTHER

## 2020-12-17 NOTE — PROGRESS NOTES
"Subjective:       Patient ID: Haydee Pennington is a 35 y.o. female.    Chief Complaint: No chief complaint on file.    HPI     The patient location is: home  The chief complaint leading to consultation is: dizzy    Visit type: audiovisual    Face to Face time with patient: 15-20   minutes of total time spent on the encounter, which includes face to face time and non-face to face time preparing to see the patient (eg, review of tests), Obtaining and/or reviewing separately obtained history, Documenting clinical information in the electronic or other health record, Independently interpreting results (not separately reported) and communicating results to the patient/family/caregiver, or Care coordination (not separately reported).         Each patient to whom he or she provides medical services by telemedicine is:  (1) informed of the relationship between the physician and patient and the respective role of any other health care provider with respect to management of the patient; and (2) notified that he or she may decline to receive medical services by telemedicine and may withdraw from such care at any time.    Notes:       35     Past Medical History:   Diagnosis Date    Hypertension     Thyroid disease      Past Surgical History:   Procedure Laterality Date    CERVICAL BIOPSY  W/ LOOP ELECTRODE EXCISION  2005     Social History     Tobacco Use   Smoking Status Current Every Day Smoker    Packs/day: 0.50    Types: Cigarettes   Smokeless Tobacco Never Used     Family History   Problem Relation Age of Onset    Diabetes Mother     Hypertension Father     Dementia Father     Transient ischemic attack Father     Heart failure Father      ER f/u    Still feels bad    Heart doesn't feel like its racing.    But it feels like its "flipping" or Flapping    She gets dizzy for a second    This occurs at least once/ day.    She still finds talking is exhausting.    She is not having hard time breathing - but feels " "she is constantly taking deep breath    She feels " off "    Feels maybe it is anxiety - but is not able to stop it.        Review of Systems   Constitutional: Negative for activity change and unexpected weight change.   HENT: Negative for hearing loss, rhinorrhea and trouble swallowing.    Eyes: Negative for discharge and visual disturbance.   Respiratory: Negative for chest tightness and wheezing.    Cardiovascular: Negative for chest pain and palpitations.   Gastrointestinal: Negative for blood in stool, constipation, diarrhea and vomiting.   Endocrine: Negative for polydipsia and polyuria.   Genitourinary: Negative for difficulty urinating, dysuria, hematuria and menstrual problem.   Musculoskeletal: Negative for arthralgias, joint swelling and neck pain.   Neurological: Negative for weakness and headaches.   Psychiatric/Behavioral: Negative for confusion and dysphoric mood.       Objective:     There were no vitals filed for this visit.    Physical Exam  Constitutional:       General: She is not in acute distress.     Appearance: Normal appearance. She is well-developed.   HENT:      Head: Normocephalic and atraumatic.   Eyes:      General: Lids are normal.      Conjunctiva/sclera: Conjunctivae normal.   Neck:      Musculoskeletal: Full passive range of motion without pain and normal range of motion.   Pulmonary:      Effort: Pulmonary effort is normal. No respiratory distress.      Breath sounds: Normal breath sounds.   Musculoskeletal: Normal range of motion.   Skin:     Coloration: Skin is not pale.   Neurological:      Mental Status: She is alert and oriented to person, place, and time. She is not disoriented.   Psychiatric:         Speech: Speech normal.         Behavior: Behavior normal.         Thought Content: Thought content normal.         Assessment:       1. Dizziness    2. Hypertension, unspecified type    3. Anxiety        Plan:     Palpitations  Shortness of breath    Cardiology " consult      Anxiety:    Very hesitant to try medication  She has tried celexa and zoloft  She finds it doesn't help.  We have planned to get cardiology consult - if no clear answer must consider this further and leave door open to discussing medications    Hypertension  BB  CCB    Consider LAKEISHA  She doesn't believe so at present.

## 2020-12-17 NOTE — PROGRESS NOTES
Subjective:   Patient ID:  Haydee Pennington is a 35 y.o. female who presents for evaluation of No chief complaint on file.      34 yo female ,smokes tobacco 1/2 ppd, marijuana 1-2 /months   with h/o obesity, hypothyroidism, HTN, anxiety  Comes in for evaluation of HTN and dizziness with palpitations   She stated that her palpitations started more than a year ago,   And recently started to have dizziness, can happen any moment during the day, lasts for 30 seconds each episode, multiple times a day, not brought up or relieved by any factors   Her bp can be elevated up to 160 sometimes   She reports peripheral vertigo as well with walls spinning around   She has a very stressful job as per patient   Palpitations every day, few seconds each time and feels dizzy afterwards   Denies any exertional chest pain   1-2 cups of coffee a day          STOP - BANG Questionnaire:     1. Snoring : Do you snore loudly ?    Yes    2. Tired : Do you often feel tired, fatigued, or sleepy during daytime? No    3. Observed: Has anyone observed you stop breathing during your sleep?   No     4. Blood pressure : Do you have or are you being treated for high blood pressure?   Yes    5. BMI :BMI more than 35 kg/m2?   Yes    6. Age : Age over 50 yr old?   No    7. Neck circumference:   For male, is your shirt collar 17 inches / 43cm or larger?  For female, is your shirt collar 16 inches / 41cm or larger?    Yes    8. Gender: Gender male?   No    STOP BANG SCORE 4    High risk of LAKEISHA: Yes 5 - 8  Intermediate risk of LAKEISHA: Yes 3 - 4  Low risk of LAKEISHA: Yes 0 - 2      References:   STOP Questionnaire   A Tool to Screen Patients for Obstructive Sleep Apnea: HANANE Acuña.R.C.P.C., JOSE Durham.B.B.S., Kamila Aguirre M.D.,Henna Goff, Ph.D., JOSE Alicea.B.B.S.,_ June Khan.,_ Melissa Castellanos M.D., Isidoro Saldivar F.R.C.P.C.; Anesthesiology 2008; 108:812-21 Copyright © 2008, the American Society of  Anesthesiologists, Inc. [employer]    Past Medical History:   Diagnosis Date    Hypertension     Thyroid disease        Past Surgical History:   Procedure Laterality Date    CERVICAL BIOPSY  W/ LOOP ELECTRODE EXCISION  2005       Social History     Tobacco Use    Smoking status: Current Every Day Smoker     Packs/day: 0.50     Types: Cigarettes    Smokeless tobacco: Never Used   Substance Use Topics    Alcohol use: No     Frequency: Never     Drinks per session: Patient refused     Binge frequency: Patient refused    Drug use: Never       Family History   Problem Relation Age of Onset    Diabetes Mother     Hypertension Father     Dementia Father     Transient ischemic attack Father     Heart failure Father        Review of Systems   Constitution: Negative for fever and malaise/fatigue.   HENT: Negative for sore throat.    Eyes: Negative for blurred vision.   Cardiovascular: Positive for irregular heartbeat and near-syncope. Negative for chest pain, claudication, cyanosis, dyspnea on exertion, leg swelling, orthopnea, palpitations, paroxysmal nocturnal dyspnea and syncope.   Respiratory: Negative for cough, hemoptysis and shortness of breath.    Hematologic/Lymphatic: Negative for bleeding problem.   Skin: Negative for poor wound healing and rash.   Musculoskeletal: Negative for back pain and falls.   Gastrointestinal: Negative for abdominal pain.   Genitourinary: Negative for nocturia.   Neurological: Positive for dizziness and light-headedness. Negative for focal weakness, headaches and loss of balance.   Psychiatric/Behavioral: Negative for altered mental status and substance abuse.       Current Outpatient Medications on File Prior to Visit   Medication Sig    amLODIPine (NORVASC) 10 MG tablet Take 1 tablet (10 mg total) by mouth once daily.    levothyroxine (SYNTHROID) 112 MCG tablet Take 1 tablet (112 mcg total) by mouth before breakfast.    [DISCONTINUED] metoprolol  succinate (TOPROL-XL) 25 MG 24 hr tablet Take 1 tablet (25 mg total) by mouth once daily.    hydrOXYzine HCL (ATARAX) 25 MG tablet Take 1 tablet (25 mg total) by mouth 3 (three) times daily. (Patient not taking: Reported on 12/17/2020)    ibuprofen (ADVIL,MOTRIN) 200 MG tablet Take 200 mg by mouth every 6 (six) hours as needed.     No current facility-administered medications on file prior to visit.        Objective:   Objective:  Wt Readings from Last 3 Encounters:   12/17/20 112 kg (246 lb 14.6 oz)   12/10/20 110.7 kg (244 lb 2.6 oz)   10/26/20 111.8 kg (246 lb 7.6 oz)     Temp Readings from Last 3 Encounters:   12/10/20 99 °F (37.2 °C) (Oral)   10/26/20 98.2 °F (36.8 °C)   07/02/20 99.4 °F (37.4 °C)     BP Readings from Last 3 Encounters:   12/17/20 (!) 140/100   12/10/20 (!) 185/107   10/26/20 134/88     Pulse Readings from Last 3 Encounters:   12/17/20 78   12/10/20 87   10/26/20 84       Physical Exam   Constitutional: She is oriented to person, place, and time. She appears well-developed and well-nourished.   HENT:   Head: Normocephalic and atraumatic.   Eyes: Conjunctivae are normal. No scleral icterus.   Neck: Normal range of motion. Neck supple.   Cardiovascular: Normal rate, regular rhythm, normal heart sounds and intact distal pulses.   No murmur heard.  Pulmonary/Chest: No respiratory distress. She has no wheezes. She has no rales. She exhibits no tenderness.   Abdominal: Soft. Bowel sounds are normal. She exhibits no distension. There is no guarding.   Musculoskeletal: Normal range of motion.   Neurological: She is alert and oriented to person, place, and time.   Skin: Skin is warm.   Psychiatric: She has a normal mood and affect.   Vitals reviewed.      Lab Results   Component Value Date    CHOL 196 10/26/2020    CHOL 198 11/08/2019    CHOL 178 03/27/2018     Lab Results   Component Value Date    HDL 37 (L) 10/26/2020    HDL 35 (L) 11/08/2019    HDL 32 (L) 03/27/2018     Lab Results   Component  Value Date    LDLCALC 122.6 10/26/2020    LDLCALC 132.6 11/08/2019    LDLCALC 105.8 03/27/2018     Lab Results   Component Value Date    TRIG 182 (H) 10/26/2020    TRIG 152 (H) 11/08/2019    TRIG 201 (H) 03/27/2018     Lab Results   Component Value Date    CHOLHDL 18.9 (L) 10/26/2020    CHOLHDL 17.7 (L) 11/08/2019    CHOLHDL 18.0 (L) 03/27/2018       Chemistry        Component Value Date/Time     12/10/2020 1110    K 3.9 12/10/2020 1110     12/10/2020 1110    CO2 22 (L) 12/10/2020 1110    BUN 11 12/10/2020 1110    CREATININE 0.8 12/10/2020 1110    GLU 97 12/10/2020 1110        Component Value Date/Time    CALCIUM 9.2 12/10/2020 1110    ALKPHOS 76 12/10/2020 1110    AST 16 12/10/2020 1110    ALT 24 12/10/2020 1110    BILITOT 0.5 12/10/2020 1110    ESTGFRAFRICA >60 12/10/2020 1110    EGFRNONAA >60 12/10/2020 1110          Lab Results   Component Value Date    TSH 1.509 12/10/2020     Lab Results   Component Value Date    INR 1.0 03/27/2018     Lab Results   Component Value Date    WBC 9.05 12/10/2020    HGB 16.5 (H) 12/10/2020    HCT 49.3 (H) 12/10/2020    MCV 91 12/10/2020     (H) 12/10/2020     BNP  @LABRCNTIP(BNP,BNPTRIAGEBLO)@  CrCl cannot be calculated (Patient's most recent lab result is older than the maximum 7 days allowed.).     Imaging:  ======  No results found for this or any previous visit.  No results found for this or any previous visit.  No results found for this or any previous visit.  No results found for this or any previous visit.  No results found for this or any previous visit.  No procedure found.    Diagnostic Results:  ECG: Reviewed    The ASCVD Risk score ([place] LEAHY Jr., et al., 2013) failed to calculate for the following reasons:    The 2013 ASCVD risk score is only valid for ages 40 to 79    Assessment and Plan:   Suspected sleep apnea  -     Ambulatory referral/consult to Pulmonology; Future; Expected date: 12/24/2020    Dizziness  Comments:  Peripheral vertigo with  sometimes walls spinning around and tinnitus   Orders:  --     Ambulatory referral/consult to ENT; Future; Expected date: 12/24/2020    Palpitations  -     Holter monitor - 24 hour; Future    Hypertension, unspecified type  -     hydroCHLOROthiazide (HYDRODIURIL) 25 MG tablet; Take 1 tablet (25 mg total) by mouth once daily.  Dispense: 30 tablet; Refill: 11  -     carvediloL (COREG) 3.125 MG tablet; Take 1 tablet (3.125 mg total) by mouth 2 (two) times daily with meals.  Dispense: 60 tablet; Refill: 11        Follow up in 6 months

## 2020-12-17 NOTE — LETTER
December 17, 2020      Gumaro Del Real MD  [id number] [place] [id number]           [facility]  [address] [place] [id number]  Phone: [phone]  Fax: [phone]          Patient: Haydee Pennington   MR Number: [id number]   YOB: 1985   Date of Visit: 12/17/2020       Dear Dr. Gumaro Del Real:    Thank you for referring Haydee Pennington to me for evaluation. Attached you will find relevant portions of my assessment and plan of care.    If you have questions, please do not hesitate to call me. I look forward to following Haydee Pennington along with you.    Sincerely,    Yann Thompson MD    Enclosure  CC:  No Recipients    If you would like to receive this communication electronically, please contact [email or web address] or [phone] to request more information on [facility] access.    For providers and/or their staff who would like to refer a patient to [facility], please contact us through our one-stop-shop provider referral line, [facility], [place], at [phone].    If you feel you have received this communication in error or would no longer like to receive these types of communications, please e-mail [email or web address]

## 2020-12-18 ENCOUNTER — PATIENT MESSAGE (OUTPATIENT)
Dept: INTERNAL MEDICINE | Facility: CLINIC | Age: 35
End: 2020-12-18

## 2020-12-22 ENCOUNTER — PATIENT OUTREACH (OUTPATIENT)
Dept: ADMINISTRATIVE | Facility: OTHER | Age: 35
End: 2020-12-22

## 2020-12-28 ENCOUNTER — HOSPITAL ENCOUNTER (OUTPATIENT)
Dept: CARDIOLOGY | Facility: HOSPITAL | Age: 35
Discharge: HOME | End: 2020-12-28
Attending: INTERNAL MEDICINE
Payer: COMMERCIAL

## 2020-12-28 DIAGNOSIS — R00.2 PALPITATIONS: ICD-10-CM

## 2020-12-28 PROCEDURE — 93225 XTRNL ECG REC<48 HRS REC: CPT

## 2020-12-28 PROCEDURE — 93227 HOLTER MONITOR - 24 HOUR (CUPID ONLY): ICD-10-PCS | Mod: ,,, | Performed by: INTERNAL MEDICINE

## 2020-12-28 PROCEDURE — 93227 XTRNL ECG REC<48 HR R&I: CPT | Mod: ,,, | Performed by: INTERNAL MEDICINE

## 2020-12-29 LAB
OHS CV EVENT MONITOR DAY: 1
OHS CV HOLTER LENGTH DECIMAL HOURS: 48
OHS CV HOLTER LENGTH HOURS: 24
OHS CV HOLTER LENGTH MINUTES: 0

## 2021-01-04 ENCOUNTER — OFFICE VISIT (OUTPATIENT)
Dept: OTOLARYNGOLOGY | Facility: CLINIC | Age: 36
End: 2021-01-04
Payer: COMMERCIAL

## 2021-01-04 ENCOUNTER — CLINICAL SUPPORT (OUTPATIENT)
Dept: AUDIOLOGY | Facility: CLINIC | Age: 36
End: 2021-01-04
Payer: COMMERCIAL

## 2021-01-04 ENCOUNTER — OFFICE VISIT (OUTPATIENT)
Dept: PULMONOLOGY | Facility: CLINIC | Age: 36
End: 2021-01-04
Payer: COMMERCIAL

## 2021-01-04 VITALS
SYSTOLIC BLOOD PRESSURE: 134 MMHG | BODY MASS INDEX: 40.02 KG/M2 | TEMPERATURE: 98 F | HEART RATE: 81 BPM | DIASTOLIC BLOOD PRESSURE: 99 MMHG | WEIGHT: 240.5 LBS

## 2021-01-04 VITALS
HEART RATE: 94 BPM | SYSTOLIC BLOOD PRESSURE: 122 MMHG | WEIGHT: 241.06 LBS | DIASTOLIC BLOOD PRESSURE: 90 MMHG | RESPIRATION RATE: 18 BRPM | OXYGEN SATURATION: 98 % | BODY MASS INDEX: 38.74 KG/M2 | HEIGHT: 66 IN

## 2021-01-04 DIAGNOSIS — I10 ESSENTIAL HYPERTENSION: ICD-10-CM

## 2021-01-04 DIAGNOSIS — F45.41 STRESS HEADACHES: ICD-10-CM

## 2021-01-04 DIAGNOSIS — R00.2 PALPITATIONS: ICD-10-CM

## 2021-01-04 DIAGNOSIS — F32.A DEPRESSION, UNSPECIFIED DEPRESSION TYPE: ICD-10-CM

## 2021-01-04 DIAGNOSIS — R42 DIZZINESS: Primary | ICD-10-CM

## 2021-01-04 DIAGNOSIS — F41.9 ANXIETY: ICD-10-CM

## 2021-01-04 DIAGNOSIS — R42 DIZZINESS: ICD-10-CM

## 2021-01-04 DIAGNOSIS — R06.83 SNORING: Primary | ICD-10-CM

## 2021-01-04 DIAGNOSIS — F17.210 TOBACCO DEPENDENCE DUE TO CIGARETTES: ICD-10-CM

## 2021-01-04 DIAGNOSIS — E66.01 SEVERE OBESITY (BMI 35.0-39.9) WITH COMORBIDITY: ICD-10-CM

## 2021-01-04 PROCEDURE — 3008F BODY MASS INDEX DOCD: CPT | Mod: CPTII,S$GLB,, | Performed by: ORTHOPAEDIC SURGERY

## 2021-01-04 PROCEDURE — 99999 PR PBB SHADOW E&M-EST. PATIENT-LVL I: ICD-10-PCS | Mod: PBBFAC,,, | Performed by: AUDIOLOGIST-HEARING AID FITTER

## 2021-01-04 PROCEDURE — 3074F PR MOST RECENT SYSTOLIC BLOOD PRESSURE < 130 MM HG: ICD-10-PCS | Mod: CPTII,S$GLB,, | Performed by: NURSE PRACTITIONER

## 2021-01-04 PROCEDURE — 99999 PR PBB SHADOW E&M-EST. PATIENT-LVL I: CPT | Mod: PBBFAC,,, | Performed by: AUDIOLOGIST-HEARING AID FITTER

## 2021-01-04 PROCEDURE — 3074F SYST BP LT 130 MM HG: CPT | Mod: CPTII,S$GLB,, | Performed by: NURSE PRACTITIONER

## 2021-01-04 PROCEDURE — 3080F DIAST BP >= 90 MM HG: CPT | Mod: CPTII,S$GLB,, | Performed by: NURSE PRACTITIONER

## 2021-01-04 PROCEDURE — 99204 OFFICE O/P NEW MOD 45 MIN: CPT | Mod: S$GLB,,, | Performed by: NURSE PRACTITIONER

## 2021-01-04 PROCEDURE — 99999 PR PBB SHADOW E&M-EST. PATIENT-LVL V: CPT | Mod: PBBFAC,,, | Performed by: NURSE PRACTITIONER

## 2021-01-04 PROCEDURE — 92550 PR TYMPANOMETRY AND REFLEX THRESHOLD MEASUREMENTS: ICD-10-PCS | Mod: S$GLB,,, | Performed by: AUDIOLOGIST-HEARING AID FITTER

## 2021-01-04 PROCEDURE — 3080F DIAST BP >= 90 MM HG: CPT | Mod: CPTII,S$GLB,, | Performed by: ORTHOPAEDIC SURGERY

## 2021-01-04 PROCEDURE — 92557 COMPREHENSIVE HEARING TEST: CPT | Mod: S$GLB,,, | Performed by: AUDIOLOGIST-HEARING AID FITTER

## 2021-01-04 PROCEDURE — 1126F AMNT PAIN NOTED NONE PRSNT: CPT | Mod: S$GLB,,, | Performed by: ORTHOPAEDIC SURGERY

## 2021-01-04 PROCEDURE — 92557 PR COMPREHENSIVE HEARING TEST: ICD-10-PCS | Mod: S$GLB,,, | Performed by: AUDIOLOGIST-HEARING AID FITTER

## 2021-01-04 PROCEDURE — 3080F PR MOST RECENT DIASTOLIC BLOOD PRESSURE >= 90 MM HG: ICD-10-PCS | Mod: CPTII,S$GLB,, | Performed by: NURSE PRACTITIONER

## 2021-01-04 PROCEDURE — 3008F PR BODY MASS INDEX (BMI) DOCUMENTED: ICD-10-PCS | Mod: CPTII,S$GLB,, | Performed by: ORTHOPAEDIC SURGERY

## 2021-01-04 PROCEDURE — 1126F PR PAIN SEVERITY QUANTIFIED, NO PAIN PRESENT: ICD-10-PCS | Mod: S$GLB,,, | Performed by: ORTHOPAEDIC SURGERY

## 2021-01-04 PROCEDURE — 99999 PR PBB SHADOW E&M-EST. PATIENT-LVL V: ICD-10-PCS | Mod: PBBFAC,,, | Performed by: NURSE PRACTITIONER

## 2021-01-04 PROCEDURE — 3008F BODY MASS INDEX DOCD: CPT | Mod: CPTII,S$GLB,, | Performed by: NURSE PRACTITIONER

## 2021-01-04 PROCEDURE — 99204 OFFICE O/P NEW MOD 45 MIN: CPT | Mod: S$GLB,,, | Performed by: ORTHOPAEDIC SURGERY

## 2021-01-04 PROCEDURE — 3075F PR MOST RECENT SYSTOLIC BLOOD PRESS GE 130-139MM HG: ICD-10-PCS | Mod: CPTII,S$GLB,, | Performed by: ORTHOPAEDIC SURGERY

## 2021-01-04 PROCEDURE — 99204 PR OFFICE/OUTPT VISIT, NEW, LEVL IV, 45-59 MIN: ICD-10-PCS | Mod: S$GLB,,, | Performed by: ORTHOPAEDIC SURGERY

## 2021-01-04 PROCEDURE — 99999 PR PBB SHADOW E&M-EST. PATIENT-LVL IV: ICD-10-PCS | Mod: PBBFAC,,, | Performed by: ORTHOPAEDIC SURGERY

## 2021-01-04 PROCEDURE — 92550 TYMPANOMETRY & REFLEX THRESH: CPT | Mod: S$GLB,,, | Performed by: AUDIOLOGIST-HEARING AID FITTER

## 2021-01-04 PROCEDURE — 3008F PR BODY MASS INDEX (BMI) DOCUMENTED: ICD-10-PCS | Mod: CPTII,S$GLB,, | Performed by: NURSE PRACTITIONER

## 2021-01-04 PROCEDURE — 3075F SYST BP GE 130 - 139MM HG: CPT | Mod: CPTII,S$GLB,, | Performed by: ORTHOPAEDIC SURGERY

## 2021-01-04 PROCEDURE — 99204 PR OFFICE/OUTPT VISIT, NEW, LEVL IV, 45-59 MIN: ICD-10-PCS | Mod: S$GLB,,, | Performed by: NURSE PRACTITIONER

## 2021-01-04 PROCEDURE — 99999 PR PBB SHADOW E&M-EST. PATIENT-LVL IV: CPT | Mod: PBBFAC,,, | Performed by: ORTHOPAEDIC SURGERY

## 2021-01-04 PROCEDURE — 3080F PR MOST RECENT DIASTOLIC BLOOD PRESSURE >= 90 MM HG: ICD-10-PCS | Mod: CPTII,S$GLB,, | Performed by: ORTHOPAEDIC SURGERY

## 2021-01-06 ENCOUNTER — CLINICAL SUPPORT (OUTPATIENT)
Dept: AUDIOLOGY | Facility: CLINIC | Age: 36
End: 2021-01-06
Payer: COMMERCIAL

## 2021-01-06 DIAGNOSIS — R42 DIZZINESS: Primary | ICD-10-CM

## 2021-01-06 PROCEDURE — 92537 PR CALORIC VSTBLR TEST W/REC BITHERMAL: ICD-10-PCS | Mod: S$GLB,,, | Performed by: AUDIOLOGIST-HEARING AID FITTER

## 2021-01-06 PROCEDURE — 92537 CALORIC VSTBLR TEST W/REC: CPT | Mod: S$GLB,,, | Performed by: AUDIOLOGIST-HEARING AID FITTER

## 2021-01-06 PROCEDURE — 92540 BASIC VESTIBULAR EVALUATION: CPT | Mod: S$GLB,,, | Performed by: AUDIOLOGIST-HEARING AID FITTER

## 2021-01-06 PROCEDURE — 92540 PR VESTIBULAR EVAL NYSTAG FOVL&PERPH STIM OSCIL TRACKING: ICD-10-PCS | Mod: S$GLB,,, | Performed by: AUDIOLOGIST-HEARING AID FITTER

## 2021-01-08 PROCEDURE — 95800 PR SLEEP STUDY, UNATTENDED, RECORD HEART RATE/O2 SAT/RESP ANAL/SLEEP TIME: ICD-10-PCS | Mod: 26,,, | Performed by: INTERNAL MEDICINE

## 2021-01-08 PROCEDURE — 95800 SLP STDY UNATTENDED: CPT | Mod: 26,,, | Performed by: INTERNAL MEDICINE

## 2021-01-11 ENCOUNTER — PROCEDURE VISIT (OUTPATIENT)
Dept: SLEEP MEDICINE | Facility: CLINIC | Age: 36
End: 2021-01-11
Payer: COMMERCIAL

## 2021-01-11 DIAGNOSIS — F41.9 ANXIETY: ICD-10-CM

## 2021-01-11 DIAGNOSIS — E66.01 SEVERE OBESITY (BMI 35.0-39.9) WITH COMORBIDITY: ICD-10-CM

## 2021-01-11 DIAGNOSIS — F45.41 STRESS HEADACHES: ICD-10-CM

## 2021-01-11 DIAGNOSIS — F32.A DEPRESSION, UNSPECIFIED DEPRESSION TYPE: ICD-10-CM

## 2021-01-11 DIAGNOSIS — R00.2 PALPITATIONS: ICD-10-CM

## 2021-01-11 DIAGNOSIS — R06.83 SNORING: Primary | ICD-10-CM

## 2021-01-11 DIAGNOSIS — I10 ESSENTIAL HYPERTENSION: ICD-10-CM

## 2021-01-11 PROCEDURE — 95800 SLP STDY UNATTENDED: CPT

## 2021-01-12 ENCOUNTER — PATIENT OUTREACH (OUTPATIENT)
Dept: ADMINISTRATIVE | Facility: HOSPITAL | Age: 36
End: 2021-01-12

## 2021-01-13 ENCOUNTER — TELEPHONE (OUTPATIENT)
Dept: INTERNAL MEDICINE | Facility: CLINIC | Age: 36
End: 2021-01-13

## 2021-01-13 ENCOUNTER — CLINICAL SUPPORT (OUTPATIENT)
Dept: INTERNAL MEDICINE | Facility: CLINIC | Age: 36
End: 2021-01-13
Payer: COMMERCIAL

## 2021-01-13 VITALS — DIASTOLIC BLOOD PRESSURE: 80 MMHG | SYSTOLIC BLOOD PRESSURE: 122 MMHG

## 2021-01-21 ENCOUNTER — OFFICE VISIT (OUTPATIENT)
Dept: CARDIOLOGY | Facility: CLINIC | Age: 36
End: 2021-01-21
Payer: COMMERCIAL

## 2021-01-21 ENCOUNTER — PATIENT MESSAGE (OUTPATIENT)
Dept: PULMONOLOGY | Facility: CLINIC | Age: 36
End: 2021-01-21

## 2021-01-21 VITALS
SYSTOLIC BLOOD PRESSURE: 110 MMHG | BODY MASS INDEX: 39.03 KG/M2 | HEART RATE: 79 BPM | WEIGHT: 241.88 LBS | OXYGEN SATURATION: 97 % | DIASTOLIC BLOOD PRESSURE: 72 MMHG

## 2021-01-21 DIAGNOSIS — E66.01 SEVERE OBESITY (BMI 35.0-39.9) WITH COMORBIDITY: Primary | ICD-10-CM

## 2021-01-21 PROCEDURE — 3008F BODY MASS INDEX DOCD: CPT | Mod: CPTII,S$GLB,, | Performed by: INTERNAL MEDICINE

## 2021-01-21 PROCEDURE — 99999 PR PBB SHADOW E&M-EST. PATIENT-LVL III: ICD-10-PCS | Mod: PBBFAC,,, | Performed by: INTERNAL MEDICINE

## 2021-01-21 PROCEDURE — 99214 PR OFFICE/OUTPT VISIT, EST, LEVL IV, 30-39 MIN: ICD-10-PCS | Mod: S$GLB,,, | Performed by: INTERNAL MEDICINE

## 2021-01-21 PROCEDURE — 99999 PR PBB SHADOW E&M-EST. PATIENT-LVL III: CPT | Mod: PBBFAC,,, | Performed by: INTERNAL MEDICINE

## 2021-01-21 PROCEDURE — 3074F PR MOST RECENT SYSTOLIC BLOOD PRESSURE < 130 MM HG: ICD-10-PCS | Mod: CPTII,S$GLB,, | Performed by: INTERNAL MEDICINE

## 2021-01-21 PROCEDURE — 1126F PR PAIN SEVERITY QUANTIFIED, NO PAIN PRESENT: ICD-10-PCS | Mod: S$GLB,,, | Performed by: INTERNAL MEDICINE

## 2021-01-21 PROCEDURE — 3078F PR MOST RECENT DIASTOLIC BLOOD PRESSURE < 80 MM HG: ICD-10-PCS | Mod: CPTII,S$GLB,, | Performed by: INTERNAL MEDICINE

## 2021-01-21 PROCEDURE — 99214 OFFICE O/P EST MOD 30 MIN: CPT | Mod: S$GLB,,, | Performed by: INTERNAL MEDICINE

## 2021-01-21 PROCEDURE — 1126F AMNT PAIN NOTED NONE PRSNT: CPT | Mod: S$GLB,,, | Performed by: INTERNAL MEDICINE

## 2021-01-21 PROCEDURE — 3008F PR BODY MASS INDEX (BMI) DOCUMENTED: ICD-10-PCS | Mod: CPTII,S$GLB,, | Performed by: INTERNAL MEDICINE

## 2021-01-21 PROCEDURE — 3074F SYST BP LT 130 MM HG: CPT | Mod: CPTII,S$GLB,, | Performed by: INTERNAL MEDICINE

## 2021-01-21 PROCEDURE — 3078F DIAST BP <80 MM HG: CPT | Mod: CPTII,S$GLB,, | Performed by: INTERNAL MEDICINE

## 2021-01-21 RX ORDER — METOPROLOL SUCCINATE 25 MG/1
25 TABLET, EXTENDED RELEASE ORAL DAILY
COMMUNITY
Start: 2020-12-24 | End: 2021-01-21 | Stop reason: DRUGHIGH

## 2021-02-25 ENCOUNTER — PATIENT MESSAGE (OUTPATIENT)
Dept: INTERNAL MEDICINE | Facility: CLINIC | Age: 36
End: 2021-02-25

## 2021-03-20 ENCOUNTER — HOSPITAL ENCOUNTER (EMERGENCY)
Facility: HOSPITAL | Age: 36
Discharge: HOME | End: 2021-03-21
Attending: FAMILY MEDICINE
Payer: COMMERCIAL

## 2021-03-20 DIAGNOSIS — M79.604 RIGHT LEG PAIN: ICD-10-CM

## 2021-03-20 DIAGNOSIS — M79.671 RIGHT FOOT PAIN: ICD-10-CM
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
## 2021-03-20 DIAGNOSIS — M25.571 RIGHT ANKLE PAIN: ICD-10-CM

## 2021-03-20 LAB — B-HCG UR QL: NEGATIVE

## 2021-03-20 PROCEDURE — 99283 EMERGENCY DEPT VISIT LOW MDM: CPT | Mod: 25

## 2021-03-20 PROCEDURE — 81025 URINE PREGNANCY TEST: CPT | Performed by: NURSE PRACTITIONER

## 2021-03-21 VITALS
WEIGHT: 241.19 LBS | TEMPERATURE: 98 F | SYSTOLIC BLOOD PRESSURE: 127 MMHG | HEIGHT: 66 IN | DIASTOLIC BLOOD PRESSURE: 65 MMHG | RESPIRATION RATE: 16 BRPM | OXYGEN SATURATION: 97 % | BODY MASS INDEX: 38.76 KG/M2 | HEART RATE: 87 BPM

## 2021-03-21 RX ORDER — HYDROCODONE BITARTRATE AND ACETAMINOPHEN 5; 325 MG/1; MG/1
1 TABLET ORAL EVERY 6 HOURS PRN
Qty: 12 TABLET | Refills: 0 | Status: SHIPPED | OUTPATIENT
Start: 2021-03-21 | End: 2021-04-26

## 2021-03-29 ENCOUNTER — PATIENT MESSAGE (OUTPATIENT)
Dept: INTERNAL MEDICINE | Facility: CLINIC | Age: 36
End: 2021-03-29

## 2021-03-31 ENCOUNTER — OFFICE VISIT (OUTPATIENT)
Dept: INTERNAL MEDICINE | Facility: CLINIC | Age: 36
End: 2021-03-31
Payer: COMMERCIAL

## 2021-03-31 DIAGNOSIS — S89.91XD INJURY OF RIGHT LOWER EXTREMITY, SUBSEQUENT ENCOUNTER: Primary | ICD-10-CM
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
## 2021-03-31 PROCEDURE — 99213 PR OFFICE/OUTPT VISIT, EST, LEVL III, 20-29 MIN: ICD-10-PCS | Mod: 95,,, | Performed by: FAMILY MEDICINE

## 2021-03-31 PROCEDURE — 99213 OFFICE O/P EST LOW 20 MIN: CPT | Mod: 95,,, | Performed by: FAMILY MEDICINE

## 2021-03-31 RX ORDER — MELOXICAM 15 MG/1
15 TABLET ORAL DAILY
Qty: 30 TABLET | Refills: 0 | Status: SHIPPED | OUTPATIENT
Start: 2021-03-31 | End: 2021-04-26

## 2021-04-03 ENCOUNTER — PATIENT MESSAGE (OUTPATIENT)
Dept: INTERNAL MEDICINE | Facility: CLINIC | Age: 36
End: 2021-04-03

## 2021-04-03 DIAGNOSIS — I10 ESSENTIAL HYPERTENSION: ICD-10-CM

## 2021-04-05 RX ORDER — AMLODIPINE BESYLATE 10 MG/1
10 TABLET ORAL DAILY
Qty: 90 TABLET | Refills: 2 | Status: SHIPPED | OUTPATIENT
Start: 2021-04-05 | End: 2021-10-14 | Stop reason: SDUPTHER

## 2021-04-06 ENCOUNTER — PATIENT OUTREACH (OUTPATIENT)
Dept: ADMINISTRATIVE | Facility: OTHER | Age: 36
End: 2021-04-06

## 2021-04-07 ENCOUNTER — OFFICE VISIT (OUTPATIENT)
Dept: ORTHOPEDICS | Facility: CLINIC | Age: 36
End: 2021-04-07
Payer: COMMERCIAL

## 2021-04-07 VITALS
HEART RATE: 76 BPM | DIASTOLIC BLOOD PRESSURE: 95 MMHG | WEIGHT: 241 LBS | BODY MASS INDEX: 38.73 KG/M2 | HEIGHT: 66 IN | SYSTOLIC BLOOD PRESSURE: 142 MMHG

## 2021-04-07 DIAGNOSIS — M25.561 ACUTE PAIN OF RIGHT KNEE: ICD-10-CM

## 2021-04-07 DIAGNOSIS — M79.661 PAIN IN RIGHT SHIN: ICD-10-CM

## 2021-04-07 DIAGNOSIS — M23.91 INTERNAL DERANGEMENT OF RIGHT KNEE: ICD-10-CM
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
## 2021-04-07 DIAGNOSIS — S93.491A HIGH ANKLE SPRAIN, RIGHT, INITIAL ENCOUNTER: Primary | ICD-10-CM

## 2021-04-07 PROCEDURE — 1125F AMNT PAIN NOTED PAIN PRSNT: CPT | Mod: S$GLB,,, | Performed by: PHYSICIAN ASSISTANT

## 2021-04-07 PROCEDURE — 99999 PR PBB SHADOW E&M-EST. PATIENT-LVL IV: ICD-10-PCS | Mod: PBBFAC,,, | Performed by: PHYSICIAN ASSISTANT

## 2021-04-07 PROCEDURE — 3008F PR BODY MASS INDEX (BMI) DOCUMENTED: ICD-10-PCS | Mod: CPTII,S$GLB,, | Performed by: PHYSICIAN ASSISTANT

## 2021-04-07 PROCEDURE — 99999 PR PBB SHADOW E&M-EST. PATIENT-LVL IV: CPT | Mod: PBBFAC,,, | Performed by: PHYSICIAN ASSISTANT

## 2021-04-07 PROCEDURE — 99214 PR OFFICE/OUTPT VISIT, EST, LEVL IV, 30-39 MIN: ICD-10-PCS | Mod: S$GLB,,, | Performed by: PHYSICIAN ASSISTANT

## 2021-04-07 PROCEDURE — 3008F BODY MASS INDEX DOCD: CPT | Mod: CPTII,S$GLB,, | Performed by: PHYSICIAN ASSISTANT

## 2021-04-07 PROCEDURE — 99214 OFFICE O/P EST MOD 30 MIN: CPT | Mod: S$GLB,,, | Performed by: PHYSICIAN ASSISTANT

## 2021-04-07 PROCEDURE — 1125F PR PAIN SEVERITY QUANTIFIED, PAIN PRESENT: ICD-10-PCS | Mod: S$GLB,,, | Performed by: PHYSICIAN ASSISTANT

## 2021-04-07 RX ORDER — METHYLPREDNISOLONE 4 MG/1
TABLET ORAL
Qty: 1 PACKAGE | Refills: 0 | Status: SHIPPED | OUTPATIENT
Start: 2021-04-07 | End: 2021-04-26

## 2021-04-19 ENCOUNTER — TELEPHONE (OUTPATIENT)
Dept: ORTHOPEDICS | Facility: CLINIC | Age: 36
End: 2021-04-19

## 2021-04-26 ENCOUNTER — OFFICE VISIT (OUTPATIENT)
Dept: INTERNAL MEDICINE | Facility: CLINIC | Age: 36
End: 2021-04-26
Payer: COMMERCIAL

## 2021-04-26 VITALS
WEIGHT: 244.69 LBS | HEART RATE: 99 BPM | TEMPERATURE: 98 F | SYSTOLIC BLOOD PRESSURE: 130 MMHG | BODY MASS INDEX: 39.32 KG/M2 | OXYGEN SATURATION: 98 % | DIASTOLIC BLOOD PRESSURE: 80 MMHG | HEIGHT: 66 IN

## 2021-04-26 DIAGNOSIS — I10 HYPERTENSION, UNSPECIFIED TYPE: ICD-10-CM

## 2021-04-26 DIAGNOSIS — R42 DIZZINESS: ICD-10-CM
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
## 2021-04-26 DIAGNOSIS — F32.A DEPRESSION, UNSPECIFIED DEPRESSION TYPE: ICD-10-CM

## 2021-04-26 DIAGNOSIS — E03.9 HYPOTHYROIDISM, UNSPECIFIED TYPE: Primary | ICD-10-CM

## 2021-04-26 PROCEDURE — 3008F PR BODY MASS INDEX (BMI) DOCUMENTED: ICD-10-PCS | Mod: CPTII,S$GLB,, | Performed by: FAMILY MEDICINE

## 2021-04-26 PROCEDURE — 99999 PR PBB SHADOW E&M-EST. PATIENT-LVL III: ICD-10-PCS | Mod: PBBFAC,,, | Performed by: FAMILY MEDICINE

## 2021-04-26 PROCEDURE — 99214 PR OFFICE/OUTPT VISIT, EST, LEVL IV, 30-39 MIN: ICD-10-PCS | Mod: S$GLB,,, | Performed by: FAMILY MEDICINE

## 2021-04-26 PROCEDURE — 99999 PR PBB SHADOW E&M-EST. PATIENT-LVL III: CPT | Mod: PBBFAC,,, | Performed by: FAMILY MEDICINE

## 2021-04-26 PROCEDURE — 99214 OFFICE O/P EST MOD 30 MIN: CPT | Mod: S$GLB,,, | Performed by: FAMILY MEDICINE

## 2021-04-26 PROCEDURE — 1126F PR PAIN SEVERITY QUANTIFIED, NO PAIN PRESENT: ICD-10-PCS | Mod: S$GLB,,, | Performed by: FAMILY MEDICINE

## 2021-04-26 PROCEDURE — 3008F BODY MASS INDEX DOCD: CPT | Mod: CPTII,S$GLB,, | Performed by: FAMILY MEDICINE

## 2021-04-26 PROCEDURE — 1126F AMNT PAIN NOTED NONE PRSNT: CPT | Mod: S$GLB,,, | Performed by: FAMILY MEDICINE

## 2021-04-29 ENCOUNTER — PATIENT MESSAGE (OUTPATIENT)
Dept: RESEARCH | Facility: HOSPITAL | Age: 36
End: 2021-04-29

## 2021-05-27 ENCOUNTER — PATIENT OUTREACH (OUTPATIENT)
Dept: ADMINISTRATIVE | Facility: OTHER | Age: 36
End: 2021-05-27

## 2021-05-28 ENCOUNTER — OFFICE VISIT (OUTPATIENT)
Dept: ORTHOPEDICS | Facility: CLINIC | Age: 36
End: 2021-05-28
Payer: COMMERCIAL

## 2021-05-28 ENCOUNTER — PATIENT MESSAGE (OUTPATIENT)
Dept: ORTHOPEDICS | Facility: CLINIC | Age: 36
End: 2021-05-28

## 2021-05-28 VITALS
BODY MASS INDEX: 39.32 KG/M2 | HEIGHT: 66 IN | SYSTOLIC BLOOD PRESSURE: 122 MMHG | HEART RATE: 70 BPM | DIASTOLIC BLOOD PRESSURE: 79 MMHG | WEIGHT: 244.69 LBS

## 2021-05-28 DIAGNOSIS — S93.491A HIGH ANKLE SPRAIN, RIGHT, INITIAL ENCOUNTER: Primary | ICD-10-CM

## 2021-05-28 PROCEDURE — 99999 PR PBB SHADOW E&M-EST. PATIENT-LVL III: CPT | Mod: PBBFAC,,, | Performed by: PHYSICIAN ASSISTANT

## 2021-05-28 PROCEDURE — 99999 PR PBB SHADOW E&M-EST. PATIENT-LVL III: ICD-10-PCS | Mod: PBBFAC,,, | Performed by: PHYSICIAN ASSISTANT

## 2021-05-28 PROCEDURE — 99213 PR OFFICE/OUTPT VISIT, EST, LEVL III, 20-29 MIN: ICD-10-PCS | Mod: S$GLB,,, | Performed by: PHYSICIAN ASSISTANT

## 2021-05-28 PROCEDURE — 99213 OFFICE O/P EST LOW 20 MIN: CPT | Mod: S$GLB,,, | Performed by: PHYSICIAN ASSISTANT

## 2021-05-28 PROCEDURE — 3008F PR BODY MASS INDEX (BMI) DOCUMENTED: ICD-10-PCS | Mod: CPTII,S$GLB,, | Performed by: PHYSICIAN ASSISTANT

## 2021-05-28 PROCEDURE — 3008F BODY MASS INDEX DOCD: CPT | Mod: CPTII,S$GLB,, | Performed by: PHYSICIAN ASSISTANT

## 2021-05-28 PROCEDURE — 1125F AMNT PAIN NOTED PAIN PRSNT: CPT | Mod: S$GLB,,, | Performed by: PHYSICIAN ASSISTANT

## 2021-05-28 PROCEDURE — 1125F PR PAIN SEVERITY QUANTIFIED, PAIN PRESENT: ICD-10-PCS | Mod: S$GLB,,, | Performed by: PHYSICIAN ASSISTANT

## 2021-06-09 ENCOUNTER — PATIENT MESSAGE (OUTPATIENT)
Dept: INTERNAL MEDICINE | Facility: CLINIC | Age: 36
End: 2021-06-09

## 2021-06-10 RX ORDER — LEVOTHYROXINE SODIUM 112 UG/1
112 TABLET ORAL
Qty: 90 TABLET | Refills: 1 | Status: SHIPPED | OUTPATIENT
Start: 2021-06-10 | End: 2022-01-07 | Stop reason: SDUPTHER

## 2021-06-18 ENCOUNTER — OFFICE VISIT (OUTPATIENT)
Dept: INTERNAL MEDICINE | Facility: CLINIC | Age: 36
End: 2021-06-18
Payer: COMMERCIAL

## 2021-06-18 DIAGNOSIS — F41.9 ANXIETY: Primary | ICD-10-CM

## 2021-06-18 PROCEDURE — 99213 PR OFFICE/OUTPT VISIT, EST, LEVL III, 20-29 MIN: ICD-10-PCS | Mod: 95,,, | Performed by: FAMILY MEDICINE

## 2021-06-18 PROCEDURE — 99213 OFFICE O/P EST LOW 20 MIN: CPT | Mod: 95,,, | Performed by: FAMILY MEDICINE

## 2021-06-18 RX ORDER — CLONAZEPAM 0.5 MG/1
0.5 TABLET ORAL 2 TIMES DAILY PRN
Qty: 30 TABLET | Refills: 0 | Status: SHIPPED | OUTPATIENT
Start: 2021-06-18 | End: 2021-10-14 | Stop reason: SDUPTHER

## 2021-06-18 RX ORDER — ESCITALOPRAM OXALATE 5 MG/1
5 TABLET ORAL DAILY
Qty: 30 TABLET | Refills: 3 | Status: SHIPPED | OUTPATIENT
Start: 2021-06-18 | End: 2021-10-14 | Stop reason: DRUGHIGH

## 2021-07-07 ENCOUNTER — OFFICE VISIT (OUTPATIENT)
Dept: INTERNAL MEDICINE | Facility: CLINIC | Age: 36
End: 2021-07-07
Payer: COMMERCIAL

## 2021-07-07 VITALS
SYSTOLIC BLOOD PRESSURE: 142 MMHG | BODY MASS INDEX: 39.5 KG/M2 | HEART RATE: 84 BPM | HEIGHT: 66 IN | DIASTOLIC BLOOD PRESSURE: 109 MMHG

## 2021-07-07 DIAGNOSIS — I10 HYPERTENSION, UNSPECIFIED TYPE: ICD-10-CM

## 2021-07-07 DIAGNOSIS — F41.9 ANXIETY: Primary | ICD-10-CM

## 2021-07-07 PROCEDURE — 3008F PR BODY MASS INDEX (BMI) DOCUMENTED: ICD-10-PCS | Mod: CPTII,95,, | Performed by: FAMILY MEDICINE

## 2021-07-07 PROCEDURE — 99214 PR OFFICE/OUTPT VISIT, EST, LEVL IV, 30-39 MIN: ICD-10-PCS | Mod: 95,,, | Performed by: FAMILY MEDICINE

## 2021-07-07 PROCEDURE — 3008F BODY MASS INDEX DOCD: CPT | Mod: CPTII,95,, | Performed by: FAMILY MEDICINE

## 2021-07-07 PROCEDURE — 99214 OFFICE O/P EST MOD 30 MIN: CPT | Mod: 95,,, | Performed by: FAMILY MEDICINE

## 2021-07-08 ENCOUNTER — PATIENT OUTREACH (OUTPATIENT)
Dept: ADMINISTRATIVE | Facility: OTHER | Age: 36
End: 2021-07-08

## 2021-07-27 ENCOUNTER — OFFICE VISIT (OUTPATIENT)
Dept: INTERNAL MEDICINE | Facility: CLINIC | Age: 36
End: 2021-07-27
Payer: COMMERCIAL

## 2021-07-27 VITALS
RESPIRATION RATE: 18 BRPM | BODY MASS INDEX: 39.4 KG/M2 | HEIGHT: 66 IN | DIASTOLIC BLOOD PRESSURE: 74 MMHG | SYSTOLIC BLOOD PRESSURE: 120 MMHG | TEMPERATURE: 99 F | OXYGEN SATURATION: 95 % | HEART RATE: 77 BPM | WEIGHT: 245.13 LBS

## 2021-07-27 DIAGNOSIS — I10 HYPERTENSION, UNSPECIFIED TYPE: ICD-10-CM

## 2021-07-27 DIAGNOSIS — F17.200 SMOKING: ICD-10-CM

## 2021-07-27 DIAGNOSIS — Z00.00 ANNUAL PHYSICAL EXAM: Primary | ICD-10-CM

## 2021-07-27 PROCEDURE — 1126F AMNT PAIN NOTED NONE PRSNT: CPT | Mod: CPTII,S$GLB,, | Performed by: FAMILY MEDICINE

## 2021-07-27 PROCEDURE — 1126F PR PAIN SEVERITY QUANTIFIED, NO PAIN PRESENT: ICD-10-PCS | Mod: CPTII,S$GLB,, | Performed by: FAMILY MEDICINE

## 2021-07-27 PROCEDURE — 3074F PR MOST RECENT SYSTOLIC BLOOD PRESSURE < 130 MM HG: ICD-10-PCS | Mod: CPTII,S$GLB,, | Performed by: FAMILY MEDICINE

## 2021-07-27 PROCEDURE — 3074F SYST BP LT 130 MM HG: CPT | Mod: CPTII,S$GLB,, | Performed by: FAMILY MEDICINE

## 2021-07-27 PROCEDURE — 3008F BODY MASS INDEX DOCD: CPT | Mod: CPTII,S$GLB,, | Performed by: FAMILY MEDICINE

## 2021-07-27 PROCEDURE — 99395 PREV VISIT EST AGE 18-39: CPT | Mod: S$GLB,,, | Performed by: FAMILY MEDICINE

## 2021-07-27 PROCEDURE — 99395 PR PREVENTIVE VISIT,EST,18-39: ICD-10-PCS | Mod: S$GLB,,, | Performed by: FAMILY MEDICINE

## 2021-07-27 PROCEDURE — 3008F PR BODY MASS INDEX (BMI) DOCUMENTED: ICD-10-PCS | Mod: CPTII,S$GLB,, | Performed by: FAMILY MEDICINE

## 2021-07-27 PROCEDURE — 3078F DIAST BP <80 MM HG: CPT | Mod: CPTII,S$GLB,, | Performed by: FAMILY MEDICINE

## 2021-07-27 PROCEDURE — 99999 PR PBB SHADOW E&M-EST. PATIENT-LVL IV: ICD-10-PCS | Mod: PBBFAC,,, | Performed by: FAMILY MEDICINE

## 2021-07-27 PROCEDURE — 99999 PR PBB SHADOW E&M-EST. PATIENT-LVL IV: CPT | Mod: PBBFAC,,, | Performed by: FAMILY MEDICINE

## 2021-07-27 PROCEDURE — 3078F PR MOST RECENT DIASTOLIC BLOOD PRESSURE < 80 MM HG: ICD-10-PCS | Mod: CPTII,S$GLB,, | Performed by: FAMILY MEDICINE

## 2021-07-27 RX ORDER — CARVEDILOL 6.25 MG/1
6.25 TABLET ORAL 2 TIMES DAILY WITH MEALS
Qty: 60 TABLET | Refills: 3 | Status: SHIPPED | OUTPATIENT
Start: 2021-07-27 | End: 2021-10-14 | Stop reason: SDUPTHER

## 2021-08-02 ENCOUNTER — LAB VISIT (OUTPATIENT)
Dept: LAB | Facility: HOSPITAL | Age: 36
End: 2021-08-02
Attending: FAMILY MEDICINE
Payer: COMMERCIAL

## 2021-08-02 DIAGNOSIS — Z00.00 ANNUAL PHYSICAL EXAM: ICD-10-CM

## 2021-08-02 LAB
BASOPHILS # BLD AUTO: 0.1 K/UL (ref 0–0.2)
BASOPHILS NFR BLD: 1.1 % (ref 0–1.9)
DIFFERENTIAL METHOD: NORMAL
EOSINOPHIL # BLD AUTO: 0.3 K/UL (ref 0–0.5)
EOSINOPHIL NFR BLD: 2.9 % (ref 0–8)
ERYTHROCYTE [DISTWIDTH] IN BLOOD BY AUTOMATED COUNT: 12.3 % (ref 11.5–14.5)
HCT VFR BLD AUTO: 45.3 % (ref 37–48.5)
HGB BLD-MCNC: 15.6 G/DL (ref 12–16)
IMM GRANULOCYTES # BLD AUTO: 0.02 K/UL (ref 0–0.04)
IMM GRANULOCYTES NFR BLD AUTO: 0.2 % (ref 0–0.5)
LYMPHOCYTES # BLD AUTO: 3 K/UL (ref 1–4.8)
LYMPHOCYTES NFR BLD: 32.7 % (ref 18–48)
MCH RBC QN AUTO: 31 PG (ref 27–31)
MCHC RBC AUTO-ENTMCNC: 34.4 G/DL (ref 32–36)
MCV RBC AUTO: 90 FL (ref 82–98)
MONOCYTES # BLD AUTO: 0.7 K/UL (ref 0.3–1)
MONOCYTES NFR BLD: 7.5 % (ref 4–15)
NEUTROPHILS # BLD AUTO: 5.1 K/UL (ref 1.8–7.7)
NEUTROPHILS NFR BLD: 55.6 % (ref 38–73)
NRBC BLD-RTO: 0 /100 WBC
PLATELET # BLD AUTO: 361 K/UL (ref 150–450)
PMV BLD AUTO: 11.4 FL (ref 9.2–12.9)
RBC # BLD AUTO: 5.04 M/UL (ref 4–5.4)
WBC # BLD AUTO: 9.11 K/UL (ref 3.9–12.7)

## 2021-08-02 PROCEDURE — 85025 COMPLETE CBC W/AUTO DIFF WBC: CPT | Performed by: FAMILY MEDICINE

## 2021-08-02 PROCEDURE — 80061 LIPID PANEL: CPT | Performed by: FAMILY MEDICINE

## 2021-08-02 PROCEDURE — 36415 COLL VENOUS BLD VENIPUNCTURE: CPT | Performed by: FAMILY MEDICINE

## 2021-08-02 PROCEDURE — 83036 HEMOGLOBIN GLYCOSYLATED A1C: CPT | Performed by: FAMILY MEDICINE

## 2021-08-02 PROCEDURE — 80053 COMPREHEN METABOLIC PANEL: CPT | Performed by: FAMILY MEDICINE

## 2021-08-03 LAB
ALBUMIN SERPL BCP-MCNC: 3.5 G/DL (ref 3.5–5.2)
ALP SERPL-CCNC: 73 U/L (ref 55–135)
ALT SERPL W/O P-5'-P-CCNC: 15 U/L (ref 10–44)
ANION GAP SERPL CALC-SCNC: 14 MMOL/L (ref 8–16)
AST SERPL-CCNC: 14 U/L (ref 10–40)
BILIRUB SERPL-MCNC: 0.5 MG/DL (ref 0.1–1)
BUN SERPL-MCNC: 10 MG/DL (ref 6–20)
CALCIUM SERPL-MCNC: 9.1 MG/DL (ref 8.7–10.5)
CHLORIDE SERPL-SCNC: 105 MMOL/L (ref 95–110)
CHOLEST SERPL-MCNC: 190 MG/DL (ref 120–199)
CHOLEST/HDLC SERPL: 5.9 {RATIO} (ref 2–5)
CO2 SERPL-SCNC: 20 MMOL/L (ref 23–29)
CREAT SERPL-MCNC: 0.8 MG/DL (ref 0.5–1.4)
EST. GFR  (AFRICAN AMERICAN): >60 ML/MIN/1.73 M^2
EST. GFR  (NON AFRICAN AMERICAN): >60 ML/MIN/1.73 M^2
ESTIMATED AVG GLUCOSE: 94 MG/DL (ref 68–131)
GLUCOSE SERPL-MCNC: 70 MG/DL (ref 70–110)
HBA1C MFR BLD: 4.9 % (ref 4–5.6)
HDLC SERPL-MCNC: 32 MG/DL (ref 40–75)
HDLC SERPL: 16.8 % (ref 20–50)
LDLC SERPL CALC-MCNC: 120.4 MG/DL (ref 63–159)
NONHDLC SERPL-MCNC: 158 MG/DL
POTASSIUM SERPL-SCNC: 3.5 MMOL/L (ref 3.5–5.1)
PROT SERPL-MCNC: 6.6 G/DL (ref 6–8.4)
SODIUM SERPL-SCNC: 139 MMOL/L (ref 136–145)
TRIGL SERPL-MCNC: 188 MG/DL (ref 30–150)

## 2021-08-09 ENCOUNTER — PATIENT MESSAGE (OUTPATIENT)
Dept: INTERNAL MEDICINE | Facility: CLINIC | Age: 36
End: 2021-08-09

## 2021-09-28 ENCOUNTER — PATIENT MESSAGE (OUTPATIENT)
Dept: INTERNAL MEDICINE | Facility: CLINIC | Age: 36
End: 2021-09-28

## 2021-10-14 ENCOUNTER — OFFICE VISIT (OUTPATIENT)
Dept: INTERNAL MEDICINE | Facility: CLINIC | Age: 36
End: 2021-10-14
Payer: COMMERCIAL

## 2021-10-14 VITALS
HEIGHT: 66 IN | SYSTOLIC BLOOD PRESSURE: 124 MMHG | OXYGEN SATURATION: 97 % | BODY MASS INDEX: 39.4 KG/M2 | TEMPERATURE: 97 F | DIASTOLIC BLOOD PRESSURE: 88 MMHG | HEART RATE: 96 BPM | WEIGHT: 245.13 LBS

## 2021-10-14 DIAGNOSIS — I10 ESSENTIAL HYPERTENSION: ICD-10-CM

## 2021-10-14 DIAGNOSIS — I10 HYPERTENSION, UNSPECIFIED TYPE: ICD-10-CM

## 2021-10-14 DIAGNOSIS — F41.9 ANXIETY: ICD-10-CM

## 2021-10-14 DIAGNOSIS — H92.09 DISCOMFORT OF EAR, UNSPECIFIED LATERALITY: Primary | ICD-10-CM

## 2021-10-14 PROCEDURE — 1159F MED LIST DOCD IN RCRD: CPT | Mod: CPTII,S$GLB,, | Performed by: FAMILY MEDICINE

## 2021-10-14 PROCEDURE — 3044F PR MOST RECENT HEMOGLOBIN A1C LEVEL <7.0%: ICD-10-PCS | Mod: CPTII,S$GLB,, | Performed by: FAMILY MEDICINE

## 2021-10-14 PROCEDURE — 3079F DIAST BP 80-89 MM HG: CPT | Mod: CPTII,S$GLB,, | Performed by: FAMILY MEDICINE

## 2021-10-14 PROCEDURE — 3074F SYST BP LT 130 MM HG: CPT | Mod: CPTII,S$GLB,, | Performed by: FAMILY MEDICINE

## 2021-10-14 PROCEDURE — 3008F BODY MASS INDEX DOCD: CPT | Mod: CPTII,S$GLB,, | Performed by: FAMILY MEDICINE

## 2021-10-14 PROCEDURE — 99999 PR PBB SHADOW E&M-EST. PATIENT-LVL III: ICD-10-PCS | Mod: PBBFAC,,, | Performed by: FAMILY MEDICINE

## 2021-10-14 PROCEDURE — 3008F PR BODY MASS INDEX (BMI) DOCUMENTED: ICD-10-PCS | Mod: CPTII,S$GLB,, | Performed by: FAMILY MEDICINE

## 2021-10-14 PROCEDURE — 99214 OFFICE O/P EST MOD 30 MIN: CPT | Mod: S$GLB,,, | Performed by: FAMILY MEDICINE

## 2021-10-14 PROCEDURE — 99214 PR OFFICE/OUTPT VISIT, EST, LEVL IV, 30-39 MIN: ICD-10-PCS | Mod: S$GLB,,, | Performed by: FAMILY MEDICINE

## 2021-10-14 PROCEDURE — 3079F PR MOST RECENT DIASTOLIC BLOOD PRESSURE 80-89 MM HG: ICD-10-PCS | Mod: CPTII,S$GLB,, | Performed by: FAMILY MEDICINE

## 2021-10-14 PROCEDURE — 1159F PR MEDICATION LIST DOCUMENTED IN MEDICAL RECORD: ICD-10-PCS | Mod: CPTII,S$GLB,, | Performed by: FAMILY MEDICINE

## 2021-10-14 PROCEDURE — 3074F PR MOST RECENT SYSTOLIC BLOOD PRESSURE < 130 MM HG: ICD-10-PCS | Mod: CPTII,S$GLB,, | Performed by: FAMILY MEDICINE

## 2021-10-14 PROCEDURE — 99999 PR PBB SHADOW E&M-EST. PATIENT-LVL III: CPT | Mod: PBBFAC,,, | Performed by: FAMILY MEDICINE

## 2021-10-14 PROCEDURE — 3044F HG A1C LEVEL LT 7.0%: CPT | Mod: CPTII,S$GLB,, | Performed by: FAMILY MEDICINE

## 2021-10-14 RX ORDER — HYDROCHLOROTHIAZIDE 25 MG/1
25 TABLET ORAL DAILY
Qty: 90 TABLET | Refills: 1 | Status: SHIPPED | OUTPATIENT
Start: 2021-10-14 | End: 2021-12-23 | Stop reason: SDUPTHER

## 2021-10-14 RX ORDER — ESCITALOPRAM OXALATE 10 MG/1
10 TABLET ORAL DAILY
Qty: 90 TABLET | Refills: 1 | Status: SHIPPED | OUTPATIENT
Start: 2021-10-14 | End: 2022-03-15 | Stop reason: DRUGHIGH

## 2021-10-14 RX ORDER — CLONAZEPAM 0.5 MG/1
0.5 TABLET ORAL 2 TIMES DAILY PRN
Qty: 30 TABLET | Refills: 0 | Status: SHIPPED | OUTPATIENT
Start: 2021-10-14 | End: 2023-01-03 | Stop reason: SDUPTHER

## 2021-10-14 RX ORDER — AMLODIPINE BESYLATE 10 MG/1
10 TABLET ORAL DAILY
Qty: 90 TABLET | Refills: 2 | Status: SHIPPED | OUTPATIENT
Start: 2021-10-14 | End: 2022-06-21 | Stop reason: SDUPTHER

## 2021-10-14 RX ORDER — CARVEDILOL 6.25 MG/1
6.25 TABLET ORAL 2 TIMES DAILY WITH MEALS
Qty: 180 TABLET | Refills: 1 | Status: SHIPPED | OUTPATIENT
Start: 2021-10-14 | End: 2022-06-21

## 2021-10-27 ENCOUNTER — OFFICE VISIT (OUTPATIENT)
Dept: INTERNAL MEDICINE | Facility: CLINIC | Age: 36
End: 2021-10-27
Payer: COMMERCIAL

## 2021-10-27 DIAGNOSIS — J06.9 URTI (ACUTE UPPER RESPIRATORY INFECTION): Primary | ICD-10-CM

## 2021-10-27 DIAGNOSIS — F41.9 ANXIETY: ICD-10-CM

## 2021-10-27 PROCEDURE — 99214 OFFICE O/P EST MOD 30 MIN: CPT | Mod: 95,,, | Performed by: FAMILY MEDICINE

## 2021-10-27 PROCEDURE — 3044F PR MOST RECENT HEMOGLOBIN A1C LEVEL <7.0%: ICD-10-PCS | Mod: CPTII,95,, | Performed by: FAMILY MEDICINE

## 2021-10-27 PROCEDURE — 99214 PR OFFICE/OUTPT VISIT, EST, LEVL IV, 30-39 MIN: ICD-10-PCS | Mod: 95,,, | Performed by: FAMILY MEDICINE

## 2021-10-27 PROCEDURE — 3044F HG A1C LEVEL LT 7.0%: CPT | Mod: CPTII,95,, | Performed by: FAMILY MEDICINE

## 2021-10-27 RX ORDER — PREDNISONE 20 MG/1
20 TABLET ORAL DAILY
Qty: 5 TABLET | Refills: 0 | Status: SHIPPED | OUTPATIENT
Start: 2021-10-27 | End: 2022-03-15

## 2021-10-27 RX ORDER — AMOXICILLIN AND CLAVULANATE POTASSIUM 875; 125 MG/1; MG/1
1 TABLET, FILM COATED ORAL EVERY 12 HOURS
Qty: 14 TABLET | Refills: 0 | Status: SHIPPED | OUTPATIENT
Start: 2021-10-27 | End: 2022-03-15 | Stop reason: ALTCHOICE

## 2021-10-27 RX ORDER — PROMETHAZINE HYDROCHLORIDE AND DEXTROMETHORPHAN HYDROBROMIDE 6.25; 15 MG/5ML; MG/5ML
5 SYRUP ORAL EVERY 4 HOURS PRN
Qty: 180 ML | Refills: 0 | Status: SHIPPED | OUTPATIENT
Start: 2021-10-27 | End: 2021-11-06

## 2021-10-27 RX ORDER — BENZONATATE 200 MG/1
200 CAPSULE ORAL 3 TIMES DAILY PRN
Qty: 30 CAPSULE | Refills: 0 | Status: SHIPPED | OUTPATIENT
Start: 2021-10-27 | End: 2021-11-06

## 2021-12-23 ENCOUNTER — PATIENT MESSAGE (OUTPATIENT)
Dept: CARDIOLOGY | Facility: CLINIC | Age: 36
End: 2021-12-23
Payer: COMMERCIAL

## 2021-12-23 DIAGNOSIS — I10 HYPERTENSION, UNSPECIFIED TYPE: ICD-10-CM

## 2021-12-23 RX ORDER — HYDROCHLOROTHIAZIDE 25 MG/1
25 TABLET ORAL DAILY
Qty: 90 TABLET | Refills: 1 | Status: SHIPPED | OUTPATIENT
Start: 2021-12-23 | End: 2022-05-17

## 2022-01-07 ENCOUNTER — PATIENT MESSAGE (OUTPATIENT)
Dept: INTERNAL MEDICINE | Facility: CLINIC | Age: 37
End: 2022-01-07
Payer: COMMERCIAL

## 2022-01-07 RX ORDER — LEVOTHYROXINE SODIUM 112 UG/1
112 TABLET ORAL
Qty: 90 TABLET | Refills: 1 | Status: SHIPPED | OUTPATIENT
Start: 2022-01-07 | End: 2022-03-16 | Stop reason: DRUGHIGH

## 2022-01-07 NOTE — TELEPHONE ENCOUNTER
No new care gaps identified.  Powered by [employer] Reference number: [id number].   1/07/2022 2:18:06 PM CST

## 2022-03-15 ENCOUNTER — OFFICE VISIT (OUTPATIENT)
Dept: INTERNAL MEDICINE | Facility: CLINIC | Age: 37
End: 2022-03-15
Payer: COMMERCIAL

## 2022-03-15 ENCOUNTER — LAB VISIT (OUTPATIENT)
Dept: LAB | Facility: HOSPITAL | Age: 37
End: 2022-03-15
Attending: FAMILY MEDICINE
Payer: COMMERCIAL

## 2022-03-15 VITALS
WEIGHT: 243.19 LBS | HEART RATE: 92 BPM | HEIGHT: 66 IN | SYSTOLIC BLOOD PRESSURE: 125 MMHG | TEMPERATURE: 97 F | BODY MASS INDEX: 39.08 KG/M2 | OXYGEN SATURATION: 97 % | DIASTOLIC BLOOD PRESSURE: 80 MMHG

## 2022-03-15 DIAGNOSIS — F32.A DEPRESSION, UNSPECIFIED DEPRESSION TYPE: Primary | ICD-10-CM

## 2022-03-15 DIAGNOSIS — F17.200 SMOKING: ICD-10-CM

## 2022-03-15 DIAGNOSIS — E03.9 HYPOTHYROIDISM, UNSPECIFIED TYPE: ICD-10-CM

## 2022-03-15 DIAGNOSIS — F41.9 ANXIETY: ICD-10-CM

## 2022-03-15 DIAGNOSIS — I10 HYPERTENSION, UNSPECIFIED TYPE: ICD-10-CM

## 2022-03-15 LAB
T4 FREE SERPL-MCNC: 0.73 NG/DL (ref 0.71–1.51)
TSH SERPL DL<=0.005 MIU/L-ACNC: 9.25 UIU/ML (ref 0.4–4)

## 2022-03-15 PROCEDURE — 99999 PR PBB SHADOW E&M-EST. PATIENT-LVL IV: ICD-10-PCS | Mod: PBBFAC,,, | Performed by: FAMILY MEDICINE

## 2022-03-15 PROCEDURE — 99214 OFFICE O/P EST MOD 30 MIN: CPT | Mod: S$GLB,,, | Performed by: FAMILY MEDICINE

## 2022-03-15 PROCEDURE — 84443 ASSAY THYROID STIM HORMONE: CPT | Performed by: FAMILY MEDICINE

## 2022-03-15 PROCEDURE — 36415 COLL VENOUS BLD VENIPUNCTURE: CPT | Performed by: FAMILY MEDICINE

## 2022-03-15 PROCEDURE — 99214 PR OFFICE/OUTPT VISIT, EST, LEVL IV, 30-39 MIN: ICD-10-PCS | Mod: S$GLB,,, | Performed by: FAMILY MEDICINE

## 2022-03-15 PROCEDURE — 84439 ASSAY OF FREE THYROXINE: CPT | Performed by: FAMILY MEDICINE

## 2022-03-15 PROCEDURE — 99999 PR PBB SHADOW E&M-EST. PATIENT-LVL IV: CPT | Mod: PBBFAC,,, | Performed by: FAMILY MEDICINE

## 2022-03-15 RX ORDER — ESCITALOPRAM OXALATE 20 MG/1
20 TABLET ORAL DAILY
Qty: 90 TABLET | Refills: 1 | Status: SHIPPED | OUTPATIENT
Start: 2022-03-15 | End: 2022-09-20 | Stop reason: SDUPTHER

## 2022-03-15 NOTE — PROGRESS NOTES
Subjective:       Patient ID: Haydee Pennington is a 37 y.o. female.    Chief Complaint: No chief complaint on file.    HPI    Patient Active Problem List   Diagnosis    Anxiety    Depression    Hypothyroidism    Stress headaches    HTN (hypertension)    Severe obesity (BMI 35.0-39.9) with comorbidity    Vitamin D deficiency    Tobacco dependence due to cigarettes       Past Medical History:   Diagnosis Date    Hypertension     Thyroid disease        Past Surgical History:   Procedure Laterality Date    CERVICAL BIOPSY  W/ LOOP ELECTRODE EXCISION  2005       Family History   Problem Relation Age of Onset    Diabetes Mother     Hypertension Father     Dementia Father     Transient ischemic attack Father     Heart failure Father        Social History     Tobacco Use   Smoking Status Current Every Day Smoker    Packs/day: 0.50    Years: 24.00    Pack years: 12.00    Types: Cigarettes    Start date: 1996   Smokeless Tobacco Never Used       Wt Readings from Last 5 Encounters:   03/15/22 110.3 kg (243 lb 2.7 oz)   10/14/21 111.2 kg (245 lb 2.4 oz)   07/27/21 111.2 kg (245 lb 2.4 oz)   05/28/21 111 kg (244 lb 11.4 oz)   04/26/21 111 kg (244 lb 11.4 oz)       For further HPI details, see assessment and plan.    Review of Systems    Objective:      Vitals:    03/15/22 0907   BP: 125/80   Pulse: 92   Temp: 96.9 °F (36.1 °C)       Physical Exam  Constitutional:       General: She is not in acute distress.     Appearance: She is not ill-appearing.   Pulmonary:      Effort: Pulmonary effort is normal. No respiratory distress.   Neurological:      General: No focal deficit present.      Mental Status: She is alert.   Psychiatric:         Mood and Affect: Mood normal.         Behavior: Behavior normal.         Assessment:       1. Depression, unspecified depression type    2. Anxiety    3. Hypothyroidism, unspecified type    4. Hypertension, unspecified type    5. Smoking        Plan:   Depression,  unspecified depression type  -     Ambulatory referral/consult to Psychiatry; Future; Expected date: 03/22/2022  -     Ambulatory referral/consult to Psychology; Future; Expected date: 03/22/2022    Anxiety  -     Ambulatory referral/consult to Psychology; Future; Expected date: 03/22/2022    Hypothyroidism, unspecified type  -     TSH; Future; Expected date: 03/15/2022    Hypertension, unspecified type    Smoking    Other orders  -     EScitalopram oxalate (LEXAPRO) 20 MG tablet; Take 1 tablet (20 mg total) by mouth once daily.  Dispense: 90 tablet; Refill: 1        Depression and anxiety.  Patient has a psychiatric history.  She tells me she has had a few admissions and inpatient psychiatric facilities.  Additionally she has a history of a suicide attempt with use of beta-blockers.  She did not seek medical attention at that time but did improve.  Currently she does not feel she is doing well at all over past 6 weeks or so.  She is on Lexapro 10 mg but does not feel that is sufficient.  We will increase the dose to 20 mg today.  Additionally she has Klonopin at home for as needed use.  She uses them very rarely and needs no refills.  At present time she is in a good mood and she has no thoughts of suicidal ideation.  But she had noted for the past several weeks darker thoughts of not living anymore.  She has no plans of action.  She has no intention of killing herself.  I am concerned about her as she was sent home from work due to uncontrolled crying episode recently when she is actively seeking a referral to a psychiatrist.  We will place an order for psychiatry evaluation and for a psychologist.  Additionally patient is interested in screening test for attention deficit disorder.    Emphasize I want her to reach out to me if she is having problems prior to that visit.  I would like to see her the next month.      Hypothyroidism  Lab Results   Component Value Date    TSH 1.509 12/10/2020     Need to update  this  On synthroid 112  I want to ensure dose appropriate    HTN  BP Readings from Last 3 Encounters:   03/15/22 125/80   10/14/21 124/88   07/27/21 120/74   hctz  Coreg  Amlodipine  Controlled  Continue current medications      Patient continues to smoke.  Given the fragile state of her depression I am not aggressively pushing for cessation at this time.  She knows on want her to quit but does not feel that this time it would be possible.

## 2022-03-16 DIAGNOSIS — E03.9 HYPOTHYROIDISM, UNSPECIFIED TYPE: Primary | ICD-10-CM

## 2022-03-16 RX ORDER — LEVOTHYROXINE SODIUM 125 UG/1
125 TABLET ORAL
Qty: 90 TABLET | Refills: 1 | Status: SHIPPED | OUTPATIENT
Start: 2022-03-16 | End: 2022-09-20

## 2022-03-20 ENCOUNTER — PATIENT MESSAGE (OUTPATIENT)
Dept: INTERNAL MEDICINE | Facility: CLINIC | Age: 37
End: 2022-03-20
Payer: COMMERCIAL

## 2022-04-12 ENCOUNTER — OFFICE VISIT (OUTPATIENT)
Dept: PSYCHIATRY | Facility: CLINIC | Age: 37
End: 2022-04-12
Payer: COMMERCIAL

## 2022-04-12 DIAGNOSIS — F32.A DEPRESSION, UNSPECIFIED DEPRESSION TYPE: ICD-10-CM

## 2022-04-12 DIAGNOSIS — F41.9 ANXIETY: ICD-10-CM

## 2022-04-12 PROCEDURE — 99999 PR PBB SHADOW E&M-EST. PATIENT-LVL I: ICD-10-PCS | Mod: PBBFAC,,, | Performed by: SOCIAL WORKER

## 2022-04-12 PROCEDURE — 90791 PSYCH DIAGNOSTIC EVALUATION: CPT | Mod: S$GLB,,, | Performed by: SOCIAL WORKER

## 2022-04-12 PROCEDURE — 99999 PR PBB SHADOW E&M-EST. PATIENT-LVL I: CPT | Mod: PBBFAC,,, | Performed by: SOCIAL WORKER

## 2022-04-12 PROCEDURE — 90791 PR PSYCHIATRIC DIAGNOSTIC EVALUATION: ICD-10-PCS | Mod: S$GLB,,, | Performed by: SOCIAL WORKER

## 2022-04-12 NOTE — PROGRESS NOTES
Psychiatry Initial Visit (PhD/LCSW)  Diagnostic Interview - CPT 67697    Date: 4/12/2022    Site: [place]    Referral source: Dr. KOFI Del eRal MD    Clinical status of patient: Outpatient    Haydee Pennington, a 37 y.o. female, for initial evaluation visit.  Met with patient.    Chief complaint/reason for encounter: depression and anxiety    History of present illness:  Met with this patient for her initial counseling evaluation in the clinic.  The patient was on time for her appointment, alert and oriented.  The patient has been referred by her primary care physician for both counseling and for psychiatric care.  The patient stated some of the following history.  She stated that she had been in and out of therapy since age 11 or 12. She stated that she has a traumatic history but did not want to talk about it on this day.  She stated that she is easy to cry, always anxious, bites her nails, sucks her thumb.  She is currently taking Lexapro which was  increased to 20 mg, but indicated that she would like to stop taking the SSRI at some point in the future.  Discussed the reasons pro and con for taking antidepressants and worked to establish the reasoning behind the patient's feelings that she does not want to be on any medication.  The patient also stated that she has anger at times and that it becomes extreme.  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  The patient lives with her partner of 20 years and her son, age 16.  She complained that her partner does not work and has only worked periodically throughout the years that she has known him.  She stated that he was incarcerated as a juvenile from age [age].  He also has issues with his wrist.  The patient has 2 children but mostly discussed her son age 16. she states that since the pandemic he has lost all interest in going to school and is failing constantly.  She states  that he is repeating the 10th grade for the 2nd time currently and will probably repeat it again.  Discussed alternatives for school other than a traditional high school.  She states that he wants to go to high school and they have suggested giving him money for his grades if that will entice him to work harder to study.  Discussed at length the patient's relationships with her partner and her son.  She is frustrated because she feels that they rely on her to earn all the money, but also do all the housework and pay the bills.  Agreed that she has created a situation where they no longer feel any responsibility to participate in these activities because they know that she will step up and do them in the manner that she prefers.  Worked with the patient on identifying 1 activity that she could give up, like laundry.  The patient agreed that she would try to stop doing the laundry of her partner and her son and encourage them to do her own laundry.  She had doubts about whether it would work, but encouraged the patient to not give in and do their laundry when it starts to bother her.  But to hold out until they might run out of clothes and decide to do laundry themselves.  The patient is interested in making a psychiatric evaluation appointment and that be scheduled for her.  The patient stated that she would schedule follow-up appointment herself.      Pain: noncontributory    Symptoms:   · Mood: depressed mood, worthlessness/guilt, tearfulness and social isolation  · Anxiety: excessive anxiety/worry, restlessness/keyed up and irritability  · Substance abuse: denied  · Cognitive functioning: denied  · Health behaviors: noncontributory    Psychiatric history: psychotropic management by PCP    Medical history:   Past Medical History:   Diagnosis Date    Hypertension     Thyroid disease        Family history of psychiatric illness:   Family History   Problem Relation Age of Onset    Diabetes Mother     Hypertension  Father     Dementia Father     Transient ischemic attack Father     Heart failure Father         Social history (marriage, employment, etc.):   Social History     Social History Narrative    Not on file        Substance use:     Social History     Tobacco Use    Smoking status: Current Every Day Smoker     Packs/day: 0.50     Years: 24.00     Pack years: 12.00     Types: Cigarettes     Start date: 1996    Smokeless tobacco: Never Used   Substance Use Topics    Alcohol use: No        Current medications and drug reactions (include OTC, herbal):    Current Outpatient Medications:     amLODIPine (NORVASC) 10 MG tablet, Take 1 tablet (10 mg total) by mouth once daily., Disp: 90 tablet, Rfl: 2    carvediloL (COREG) 6.25 MG tablet, Take 1 tablet (6.25 mg total) by mouth 2 (two) times daily with meals., Disp: 180 tablet, Rfl: 1    clonazePAM (KLONOPIN) 0.5 MG tablet, Take 1 tablet (0.5 mg total) by mouth 2 (two) times daily as needed for Anxiety., Disp: 30 tablet, Rfl: 0    EScitalopram oxalate (LEXAPRO) 20 MG tablet, Take 1 tablet (20 mg total) by mouth once daily., Disp: 90 tablet, Rfl: 1    hydroCHLOROthiazide (HYDRODIURIL) 25 MG tablet, Take 1 tablet (25 mg total) by mouth once daily., Disp: 90 tablet, Rfl: 1    levothyroxine (SYNTHROID) 125 MCG tablet, Take 1 tablet (125 mcg total) by mouth before breakfast., Disp: 90 tablet, Rfl: 1      Strengths and liabilities: Strength: Patient accepts guidance/feedback, Liability: Patient has no suport network., Liability: Patient lacks coping skills.    Current Evaluation:     Mental Status Exam:  General Appearance:  unremarkable, age appropriate   Speech: normal tone, normal rate, normal pitch, normal volume      Level of Cooperation: cooperative      Thought Processes: normal and logical   Mood: steady      Thought Content: normal, no suicidality, no homicidality, delusions, or paranoia   Affect: congruent and appropriate   Orientation: Oriented x3   Memory:  recent >  intact   Attention Span & Concentration: intact   Fund of General Knowledge: intact and appropriate to age and level of education   Abstract Reasoning: interpretation of similarities was abstract   Judgment & Insight: fair     Language  intact     Diagnostic Impression - Plan:       ICD-10-CM ICD-9-CM   1. Depression, unspecified depression type  F32.A 311   2. Anxiety  F41.9 300.00       Plan:individual psychotherapy    Return to Clinic: as scheduled    Length of Service (minutes): 45

## 2022-04-18 ENCOUNTER — OFFICE VISIT (OUTPATIENT)
Dept: INTERNAL MEDICINE | Facility: CLINIC | Age: 37
End: 2022-04-18
Payer: COMMERCIAL

## 2022-04-18 DIAGNOSIS — F41.9 ANXIETY: ICD-10-CM

## 2022-04-18 DIAGNOSIS — G89.29 CHRONIC RIGHT-SIDED LOW BACK PAIN WITH SCIATICA, SCIATICA LATERALITY UNSPECIFIED: Primary | ICD-10-CM

## 2022-04-18 DIAGNOSIS — M54.40 CHRONIC RIGHT-SIDED LOW BACK PAIN WITH SCIATICA, SCIATICA LATERALITY UNSPECIFIED: Primary | ICD-10-CM

## 2022-04-18 DIAGNOSIS — E03.9 HYPOTHYROIDISM, UNSPECIFIED TYPE: ICD-10-CM

## 2022-04-18 DIAGNOSIS — I10 ESSENTIAL HYPERTENSION: ICD-10-CM

## 2022-04-18 DIAGNOSIS — F32.A DEPRESSION, UNSPECIFIED DEPRESSION TYPE: ICD-10-CM

## 2022-04-18 DIAGNOSIS — R35.89 POLYURIA: ICD-10-CM

## 2022-04-18 PROCEDURE — 99214 OFFICE O/P EST MOD 30 MIN: CPT | Mod: 95,,, | Performed by: FAMILY MEDICINE

## 2022-04-18 PROCEDURE — 99214 PR OFFICE/OUTPT VISIT, EST, LEVL IV, 30-39 MIN: ICD-10-PCS | Mod: 95,,, | Performed by: FAMILY MEDICINE

## 2022-04-18 NOTE — PROGRESS NOTES
Subjective:       Patient ID: Haydee Pennington is a 37 y.o. female.    Chief Complaint: No chief complaint on file.    HPI     The patient location is: home  The chief complaint leading to consultation is: chornic issues    Visit type: audiovisual    Face to Face time with patient: 25   minutes of total time spent on the encounter, which includes face to face time and non-face to face time preparing to see the patient (eg, review of tests), Obtaining and/or reviewing separately obtained history, Documenting clinical information in the electronic or other health record, Independently interpreting results (not separately reported) and communicating results to the patient/family/caregiver, or Care coordination (not separately reported).         Each patient to whom he or she provides medical services by telemedicine is:  (1) informed of the relationship between the physician and patient and the respective role of any other health care provider with respect to management of the patient; and (2) notified that he or she may decline to receive medical services by telemedicine and may withdraw from such care at any time.    Notes:       Patient Active Problem List   Diagnosis    Anxiety    Depression    Hypothyroidism    Stress headaches    HTN (hypertension)    Severe obesity (BMI 35.0-39.9) with comorbidity    Vitamin D deficiency    Tobacco dependence due to cigarettes       Past Medical History:   Diagnosis Date    Hypertension     Thyroid disease        Past Surgical History:   Procedure Laterality Date    CERVICAL BIOPSY  W/ LOOP ELECTRODE EXCISION  2005       Family History   Problem Relation Age of Onset    Diabetes Mother     Hypertension Father     Dementia Father     Transient ischemic attack Father     Heart failure Father        Social History     Tobacco Use   Smoking Status Current Every Day Smoker    Packs/day: 0.50    Years: 24.00    Pack years: 12.00    Types: Cigarettes    Start  date: 1996   Smokeless Tobacco Never Used       Wt Readings from Last 5 Encounters:   03/15/22 110.3 kg (243 lb 2.7 oz)   10/14/21 111.2 kg (245 lb 2.4 oz)   07/27/21 111.2 kg (245 lb 2.4 oz)   05/28/21 111 kg (244 lb 11.4 oz)   04/26/21 111 kg (244 lb 11.4 oz)       For further HPI details, see assessment and plan.    Review of Systems    Objective:      BP Readings from Last 3 Encounters:   03/15/22 125/80   10/14/21 124/88   07/27/21 120/74       Physical Exam    Assessment:       1. Chronic right-sided low back pain with sciatica, sciatica laterality unspecified    2. Hypothyroidism, unspecified type    3. Depression, unspecified depression type    4. Anxiety    5. Essential hypertension    6. Polyuria        Plan:   Chronic right-sided low back pain with sciatica, sciatica laterality unspecified  -     Ambulatory referral/consult to Pain Clinic; Future; Expected date: 04/25/2022    Hypothyroidism, unspecified type    Depression, unspecified depression type    Anxiety    Essential hypertension    Polyuria        Anxiety/depression  Doing better  Has had some counseling  The lexapro dose adjustment seemed to help    Hypothyroidism  Recent synthroid dose adjustment  Feeling better with that as well.  Will get her TSH checked soon    Chronic R. Back pain  Was set to do an MRI 2 yo - but cost prohibitive  Will set up for pain mngt evaluation    HTN  Amlodipine 10  hctz 25  Coreg 6.25 mg    Having issues with polyuria  suspect potentially diuretic  discontinuing hctz  monitor her pressures  If rising we will double her coreg.  She admits only using coreg 1x/day  Advise she start taking bid.      Plan f/u in 5-6 months

## 2022-05-02 ENCOUNTER — PATIENT MESSAGE (OUTPATIENT)
Dept: ADMINISTRATIVE | Facility: HOSPITAL | Age: 37
End: 2022-05-02
Payer: COMMERCIAL

## 2022-05-16 ENCOUNTER — TELEPHONE (OUTPATIENT)
Dept: PAIN MEDICINE | Facility: CLINIC | Age: 37
End: 2022-05-16
Payer: COMMERCIAL

## 2022-05-16 ENCOUNTER — PATIENT OUTREACH (OUTPATIENT)
Dept: ADMINISTRATIVE | Facility: OTHER | Age: 37
End: 2022-05-16
Payer: COMMERCIAL

## 2022-05-16 NOTE — H&P (VIEW-ONLY)
New Patient Chronic Pain Note (Initial Visit)    Referring Physician: Gumaro Del Real MD    PCP: Gumaro Del Real MD    Chief Complaint:   Chief Complaint   Patient presents with    Low-back Pain     Right     Leg Pain     Right         SUBJECTIVE:    Haydee Pennington is a 37 y.o. female with past medical history significant for anxiety, depression, hypertension, obesity, hypothyroidism, nicotine who presents to the clinic for the evaluation of lower back and right leg pain.  Patient reports pain began several years prior without inciting accident injury or trauma.  Today patient reports pain is constant which is rated a 6/10.  Pain is described as grinding in nature.  Patient reports pain begins in a bandlike distribution in her lower back and radiates down the lateral aspect of the right lower extremity to the plantar aspect of the foot in L4-S1 distribution.  Pain is exacerbated with prolonged standing, walking, bending, lifting and even with prolonged sitting.  Of note patient works from home for a [employer] center and reports pain is exacerbated with prolonged sitting at the end of the day.  Pain is also exacerbated associated with her menstrual cycle.  Pain has been improved in the past with gabapentin, which patient reports she was prescribed for anxiety.     Patient denies night fever/night sweats, urinary incontinence, bowel incontinence, significant weight loss, significant motor weakness and loss of sensations.    Pain Disability Index Review:     Last 3 PDI Scores 5/17/2022   Pain Disability Index (PDI) 41       Non-Pharmacologic Treatments:  Physical Therapy/Home Exercise: no  Ice/Heat:no  TENS: no  Acupuncture: no  Massage: no  Chiropractic: no    Other: no      Pain Medications:  - Adjuvant Medications: Clonazepam (Klonopin) and Lexapro (Escitalopram)    Pain Procedures:   None    Past Medical History:   Diagnosis Date    Hypertension     Thyroid disease      Past Surgical History:   Procedure  Laterality Date    CERVICAL BIOPSY  W/ LOOP ELECTRODE EXCISION  2005     Review of patient's allergies indicates:   Allergen Reactions    Sulfa (sulfonamide antibiotics)        Current Outpatient Medications   Medication Sig    carvediloL (COREG) 6.25 MG tablet Take 1 tablet (6.25 mg total) by mouth 2 (two) times daily with meals.    clonazePAM (KLONOPIN) 0.5 MG tablet Take 1 tablet (0.5 mg total) by mouth 2 (two) times daily as needed for Anxiety.    EScitalopram oxalate (LEXAPRO) 20 MG tablet Take 1 tablet (20 mg total) by mouth once daily.    levothyroxine (SYNTHROID) 125 MCG tablet Take 1 tablet (125 mcg total) by mouth before breakfast.    amLODIPine (NORVASC) 10 MG tablet Take 1 tablet (10 mg total) by mouth once daily.    gabapentin (NEURONTIN) 300 MG capsule Take 1 capsule (300 mg total) by mouth every evening for 3 days, THEN 1 capsule (300 mg total) 2 (two) times daily for 3 days, THEN 1 capsule (300 mg total) 3 (three) times daily for 3 days, THEN 2 capsules (600 mg total) 2 (two) times daily for 3 days, THEN 2 capsules (600 mg total) 3 (three) times daily for 18 days.     No current facility-administered medications for this visit.       Review of Systems     GENERAL:  No weight loss, malaise or fevers.  HEENT:   No recent changes in vision or hearing  NECK:  Negative for lumps, no difficulty with swallowing.  RESPIRATORY:  Negative for cough, wheezing or shortness of breath, patient denies any recent URI.  CARDIOVASCULAR:  Negative for chest pain, leg swelling or palpitations.  GI:  Negative for abdominal discomfort, blood in stools or black stools or change in bowel habits.  MUSCULOSKELETAL:  See HPI.  SKIN:  Negative for lesions, rash, and itching.  PSYCH:  No mood disorder or recent psychosocial stressors.   HEMATOLOGY/LYMPHOLOGY:  Negative for prolonged bleeding, bruising easily or swollen nodes.    NEURO:   No history of headaches, syncope, paralysis, seizures or tremors.  All other  "reviewed and negative other than HPI.    OBJECTIVE:    /89   Pulse 64   Resp 17   Ht 5' 6" (1.676 m)   Wt 107 kg (235 lb 14.3 oz)   BMI 38.07 kg/m²       Physical Exam    GENERAL: Well appearing, in no acute distress, alert and oriented x3.  PSYCH:  Mood and affect appropriate.  SKIN: Skin color, texture, turgor normal, no rashes or lesions.  HEAD/FACE:  Normocephalic, atraumatic. Cranial nerves grossly intact.    CV: RRR with palpation of the radial artery.  PULM: No evidence of respiratory difficulty, symmetric chest rise.  GI:  Soft and non-tender.    BACK: Straight leg raising in the sitting and supine positions is negative to radicular pain. No pain to palpation over the facet joints of the lumbar spine or spinous processes. Normal range of motion without pain reproduction.  EXTREMITIES: Peripheral joint ROM is full and pain free without obvious instability or laxity in all four extremities. No deformities, edema, or skin discoloration. Good capillary refill.  MUSCULOSKELETAL: Able to stand on heels & toes.    hip, and knee provocative maneuvers are negative.   pain with palpation over the sacroiliac joints on R.  FABERs and Gaenslen test is positive on right.  Facet loading test is negative bilaterally.   Bilateral upper and lower extremity strength is normal and symmetric.  No atrophy or tone abnormalities are noted..  RIGHT Lower extremity: Hip flexion 5/5, Hip Abduction 5/5, Hip Adduction 5/5, Knee extension 5/5, Knee flexion 5/5, Ankle dorsiflexion5/5, Extensor hallucis longus 5/5, Ankle plantarflexion 5/5  LEFT Lower extremity:  Hip flexion 5/5, Hip Abduction 5/5,Hip Adduction 5/5, Knee extension 5/5, Knee flexion 5/5, Ankle dorsiflexion 5/5, Extensor hallucis longus 5/5, Ankle plantarflexion 5/5  -Normal testing knee (patellar) jerk and ankle (achilles) jerk    NEURO: Bilateral upper and lower extremity coordination and muscle stretch reflexes are physiologic and symmetric. No loss of " sensation is noted.  GAIT: normal.    [id number]    X-Ray Lumbar Spine Complete 5 View    FINDINGS:  Vertebral body heights and alignment are within normal limits.  Intervertebral disk spaces are well preserved. Posterior elements appear grossly intact.  No pars defects visualized.  No acute fractures or subluxations are demonstrated.  The remaining visualized osseous and soft tissue structures demonstrate no appreciable abnormality.      ASSESSMENT: 37 y.o. year old female with lower back and leg pain, consistent with     1. Sacroiliitis  Ambulatory referral/consult to Physical/Occupational Therapy    IR SI Joint Injection w/Imaging    Case Request-RAD/Other Procedure Area: right GT bursa injection with RN IV sedation, right Sacroiliac Joint Injection with RN IV sedation   2. Chronic right-sided low back pain with sciatica, sciatica laterality unspecified  Ambulatory referral/consult to Pain Clinic   3. Dorsalgia, unspecified     4. Lumbar radiculopathy  MRI Lumbar Spine Without Contrast    Ambulatory referral/consult to Physical/Occupational Therapy   5. Greater trochanteric bursitis of right hip  Ambulatory referral/consult to Physical/Occupational Therapy    IR Aspiration Injection Large Joint W FL    Case Request-RAD/Other Procedure Area: right GT bursa injection with RN IV sedation, right Sacroiliac Joint Injection with RN IV sedation         PLAN:   - Interventions:  Schedule for right-sided sacroiliac joint and greater trochanteric bursa injection to see if this helps with sacroiliitis and bursitis.  Explained the risks and benefits of the procedure in detail with the patient today in clinic along with alternative treatment options, and the patient elected to pursue the intervention at this time.      - Anticoagulation use: no no anticoagulation    - we have discussed considering a right-sided transforaminal epidural steroid injection to address radicular symptoms.  We will 1st review lumbar  MRI.     report:  Reviewed and consistent with medication use as prescribed.    - Medications:  --  We have discussed restarting gabapentin to a more therapeutic goal.  Patient will increase  medication according to the following algorithm.  We have reviewed potential side effects of this medication including daytime somnolence, weight gain and peripheral edema  Gabapentin Titration:  Step 1: 300mg qHS  Step 2: 300mg AM, 300mg qHS  Step 3: 300mg TID  Step 4: 300mg AM, 300mg afternoon, 600mg qHS  Step 5: 300mg AM, 600mg afternoon, 600mg qHS  Step 6: and after 600mg TID      - Therapy:   We discussed initiating physical therapy to help manage the patient/s painful condition. The patient was counseled that muscle strengthening will improve the long term prognosis in regards to pain and may also help increase range of motion and mobility. They were told that one of the goals of physical therapy is that they learn how to do the exercises so that they can do them independently at home daily upon completion. The patient's questions were answered and they were agreeable to this course. A referral for physical therapy was provided to the patient.      - Imaging: Reviewed available imaging with patient and answered any questions they had regarding study.Order MRI of the Lumbar Spine to help evaluate possible source of pain.     - Follow up visit: return to clinic in 4-6 weeks      The above plan and management options were discussed at length with patient. Patient is in agreement with the above and verbalized understanding.    - I discussed the goals of interventional chronic pain management with the patient on today's visit. We discussed a multimodal and systematic approach to pain.  This includes diagnostic and therapeutic injections, adjuvant pharmacologic treatment, physical therapy, and at times psychiatry.  I emphasized the importance of regular exercise, core strengthening and stretching, diet and weight loss as a  cornerstone of long-term pain management.    - This condition does not require this patient to take time off of work, and the primary goal of our Pain Management services is to improve the patient's functional capacity.  - Patient Questions: Answered all of the patient's questions regarding diagnoses, therapy, treatment and next steps        Rey Monsalve MD  Interventional Pain Management  [facility]    Disclaimer:  This note was prepared using voice recognition system and is likely to have sound alike errors that may have been overlooked even after proof reading.  Please call me with any questions

## 2022-05-16 NOTE — PROGRESS NOTES
Health Maintenance Due   Topic Date Due    COVID-19 Vaccine (1) Never done    Pneumococcal Vaccines (Age 0-64) (1 - PCV) Never done    TETANUS VACCINE  Never done    Cervical Cancer Screening  05/23/2022     Updates were requested from care everywhere.  Chart was reviewed for overdue Proactive [facility] Encounters ([facility]) topics (CRS, Breast Cancer Screening, Eye exam)  Health Maintenance has been updated.  LINKS immunization registry triggered.  Immunizations were reconciled.

## 2022-05-16 NOTE — PROGRESS NOTES
New Patient Chronic Pain Note (Initial Visit)    Referring Physician: Gumaro Del Real MD    PCP: Gumaro Del Real MD    Chief Complaint:   Chief Complaint   Patient presents with    Low-back Pain     Right     Leg Pain     Right         SUBJECTIVE:    Haydee Pennington is a 37 y.o. female with past medical history significant for anxiety, depression, hypertension, obesity, hypothyroidism, nicotine who presents to the clinic for the evaluation of lower back and right leg pain.  Patient reports pain began several years prior without inciting accident injury or trauma.  Today patient reports pain is constant which is rated a 6/10.  Pain is described as grinding in nature.  Patient reports pain begins in a bandlike distribution in her lower back and radiates down the lateral aspect of the right lower extremity to the plantar aspect of the foot in L4-S1 distribution.  Pain is exacerbated with prolonged standing, walking, bending, lifting and even with prolonged sitting.  Of note patient works from home for a [employer] center and reports pain is exacerbated with prolonged sitting at the end of the day.  Pain is also exacerbated associated with her menstrual cycle.  Pain has been improved in the past with gabapentin, which patient reports she was prescribed for anxiety.     Patient denies night fever/night sweats, urinary incontinence, bowel incontinence, significant weight loss, significant motor weakness and loss of sensations.    Pain Disability Index Review:     Last 3 PDI Scores 5/17/2022   Pain Disability Index (PDI) 41       Non-Pharmacologic Treatments:  Physical Therapy/Home Exercise: no  Ice/Heat:no  TENS: no  Acupuncture: no  Massage: no  Chiropractic: no    Other: no      Pain Medications:  - Adjuvant Medications: Clonazepam (Klonopin) and Lexapro (Escitalopram)    Pain Procedures:   None    Past Medical History:   Diagnosis Date    Hypertension     Thyroid disease      Past Surgical History:   Procedure  Laterality Date    CERVICAL BIOPSY  W/ LOOP ELECTRODE EXCISION  2005     Review of patient's allergies indicates:   Allergen Reactions    Sulfa (sulfonamide antibiotics)        Current Outpatient Medications   Medication Sig    carvediloL (COREG) 6.25 MG tablet Take 1 tablet (6.25 mg total) by mouth 2 (two) times daily with meals.    clonazePAM (KLONOPIN) 0.5 MG tablet Take 1 tablet (0.5 mg total) by mouth 2 (two) times daily as needed for Anxiety.    EScitalopram oxalate (LEXAPRO) 20 MG tablet Take 1 tablet (20 mg total) by mouth once daily.    levothyroxine (SYNTHROID) 125 MCG tablet Take 1 tablet (125 mcg total) by mouth before breakfast.    amLODIPine (NORVASC) 10 MG tablet Take 1 tablet (10 mg total) by mouth once daily.    gabapentin (NEURONTIN) 300 MG capsule Take 1 capsule (300 mg total) by mouth every evening for 3 days, THEN 1 capsule (300 mg total) 2 (two) times daily for 3 days, THEN 1 capsule (300 mg total) 3 (three) times daily for 3 days, THEN 2 capsules (600 mg total) 2 (two) times daily for 3 days, THEN 2 capsules (600 mg total) 3 (three) times daily for 18 days.     No current facility-administered medications for this visit.       Review of Systems     GENERAL:  No weight loss, malaise or fevers.  HEENT:   No recent changes in vision or hearing  NECK:  Negative for lumps, no difficulty with swallowing.  RESPIRATORY:  Negative for cough, wheezing or shortness of breath, patient denies any recent URI.  CARDIOVASCULAR:  Negative for chest pain, leg swelling or palpitations.  GI:  Negative for abdominal discomfort, blood in stools or black stools or change in bowel habits.  MUSCULOSKELETAL:  See HPI.  SKIN:  Negative for lesions, rash, and itching.  PSYCH:  No mood disorder or recent psychosocial stressors.   HEMATOLOGY/LYMPHOLOGY:  Negative for prolonged bleeding, bruising easily or swollen nodes.    NEURO:   No history of headaches, syncope, paralysis, seizures or tremors.  All other  "reviewed and negative other than HPI.    OBJECTIVE:    /89   Pulse 64   Resp 17   Ht 5' 6" (1.676 m)   Wt 107 kg (235 lb 14.3 oz)   BMI 38.07 kg/m²       Physical Exam    GENERAL: Well appearing, in no acute distress, alert and oriented x3.  PSYCH:  Mood and affect appropriate.  SKIN: Skin color, texture, turgor normal, no rashes or lesions.  HEAD/FACE:  Normocephalic, atraumatic. Cranial nerves grossly intact.    CV: RRR with palpation of the radial artery.  PULM: No evidence of respiratory difficulty, symmetric chest rise.  GI:  Soft and non-tender.    BACK: Straight leg raising in the sitting and supine positions is negative to radicular pain. No pain to palpation over the facet joints of the lumbar spine or spinous processes. Normal range of motion without pain reproduction.  EXTREMITIES: Peripheral joint ROM is full and pain free without obvious instability or laxity in all four extremities. No deformities, edema, or skin discoloration. Good capillary refill.  MUSCULOSKELETAL: Able to stand on heels & toes.    hip, and knee provocative maneuvers are negative.   pain with palpation over the sacroiliac joints on R.  FABERs and Gaenslen test is positive on right.  Facet loading test is negative bilaterally.   Bilateral upper and lower extremity strength is normal and symmetric.  No atrophy or tone abnormalities are noted..  RIGHT Lower extremity: Hip flexion 5/5, Hip Abduction 5/5, Hip Adduction 5/5, Knee extension 5/5, Knee flexion 5/5, Ankle dorsiflexion5/5, Extensor hallucis longus 5/5, Ankle plantarflexion 5/5  LEFT Lower extremity:  Hip flexion 5/5, Hip Abduction 5/5,Hip Adduction 5/5, Knee extension 5/5, Knee flexion 5/5, Ankle dorsiflexion 5/5, Extensor hallucis longus 5/5, Ankle plantarflexion 5/5  -Normal testing knee (patellar) jerk and ankle (achilles) jerk    NEURO: Bilateral upper and lower extremity coordination and muscle stretch reflexes are physiologic and symmetric. No loss of " sensation is noted.  GAIT: normal.    [id number]    X-Ray Lumbar Spine Complete 5 View    FINDINGS:  Vertebral body heights and alignment are within normal limits.  Intervertebral disk spaces are well preserved. Posterior elements appear grossly intact.  No pars defects visualized.  No acute fractures or subluxations are demonstrated.  The remaining visualized osseous and soft tissue structures demonstrate no appreciable abnormality.      ASSESSMENT: 37 y.o. year old female with lower back and leg pain, consistent with     1. Sacroiliitis  Ambulatory referral/consult to Physical/Occupational Therapy    IR SI Joint Injection w/Imaging    Case Request-RAD/Other Procedure Area: right GT bursa injection with RN IV sedation, right Sacroiliac Joint Injection with RN IV sedation   2. Chronic right-sided low back pain with sciatica, sciatica laterality unspecified  Ambulatory referral/consult to Pain Clinic   3. Dorsalgia, unspecified     4. Lumbar radiculopathy  MRI Lumbar Spine Without Contrast    Ambulatory referral/consult to Physical/Occupational Therapy   5. Greater trochanteric bursitis of right hip  Ambulatory referral/consult to Physical/Occupational Therapy    IR Aspiration Injection Large Joint W FL    Case Request-RAD/Other Procedure Area: right GT bursa injection with RN IV sedation, right Sacroiliac Joint Injection with RN IV sedation         PLAN:   - Interventions:  Schedule for right-sided sacroiliac joint and greater trochanteric bursa injection to see if this helps with sacroiliitis and bursitis.  Explained the risks and benefits of the procedure in detail with the patient today in clinic along with alternative treatment options, and the patient elected to pursue the intervention at this time.      - Anticoagulation use: no no anticoagulation    - we have discussed considering a right-sided transforaminal epidural steroid injection to address radicular symptoms.  We will 1st review lumbar  MRI.     report:  Reviewed and consistent with medication use as prescribed.    - Medications:  --  We have discussed restarting gabapentin to a more therapeutic goal.  Patient will increase  medication according to the following algorithm.  We have reviewed potential side effects of this medication including daytime somnolence, weight gain and peripheral edema  Gabapentin Titration:  Step 1: 300mg qHS  Step 2: 300mg AM, 300mg qHS  Step 3: 300mg TID  Step 4: 300mg AM, 300mg afternoon, 600mg qHS  Step 5: 300mg AM, 600mg afternoon, 600mg qHS  Step 6: and after 600mg TID      - Therapy:   We discussed initiating physical therapy to help manage the patient/s painful condition. The patient was counseled that muscle strengthening will improve the long term prognosis in regards to pain and may also help increase range of motion and mobility. They were told that one of the goals of physical therapy is that they learn how to do the exercises so that they can do them independently at home daily upon completion. The patient's questions were answered and they were agreeable to this course. A referral for physical therapy was provided to the patient.      - Imaging: Reviewed available imaging with patient and answered any questions they had regarding study.Order MRI of the Lumbar Spine to help evaluate possible source of pain.     - Follow up visit: return to clinic in 4-6 weeks      The above plan and management options were discussed at length with patient. Patient is in agreement with the above and verbalized understanding.    - I discussed the goals of interventional chronic pain management with the patient on today's visit. We discussed a multimodal and systematic approach to pain.  This includes diagnostic and therapeutic injections, adjuvant pharmacologic treatment, physical therapy, and at times psychiatry.  I emphasized the importance of regular exercise, core strengthening and stretching, diet and weight loss as a  cornerstone of long-term pain management.    - This condition does not require this patient to take time off of work, and the primary goal of our Pain Management services is to improve the patient's functional capacity.  - Patient Questions: Answered all of the patient's questions regarding diagnoses, therapy, treatment and next steps        Rey Monsalve MD  Interventional Pain Management  [facility]    Disclaimer:  This note was prepared using voice recognition system and is likely to have sound alike errors that may have been overlooked even after proof reading.  Please call me with any questions

## 2022-05-17 ENCOUNTER — OFFICE VISIT (OUTPATIENT)
Dept: PAIN MEDICINE | Facility: CLINIC | Age: 37
End: 2022-05-17
Payer: COMMERCIAL

## 2022-05-17 ENCOUNTER — PATIENT MESSAGE (OUTPATIENT)
Dept: INTERNAL MEDICINE | Facility: CLINIC | Age: 37
End: 2022-05-17
Payer: COMMERCIAL

## 2022-05-17 ENCOUNTER — TELEPHONE (OUTPATIENT)
Dept: PAIN MEDICINE | Facility: CLINIC | Age: 37
End: 2022-05-17
Payer: COMMERCIAL

## 2022-05-17 VITALS
HEART RATE: 64 BPM | SYSTOLIC BLOOD PRESSURE: 133 MMHG | RESPIRATION RATE: 17 BRPM | DIASTOLIC BLOOD PRESSURE: 89 MMHG | BODY MASS INDEX: 37.91 KG/M2 | WEIGHT: 235.88 LBS | HEIGHT: 66 IN

## 2022-05-17 DIAGNOSIS — G89.29 CHRONIC RIGHT-SIDED LOW BACK PAIN WITH SCIATICA, SCIATICA LATERALITY UNSPECIFIED: ICD-10-CM

## 2022-05-17 DIAGNOSIS — M46.1 SACROILIITIS: Primary | ICD-10-CM

## 2022-05-17 DIAGNOSIS — M70.61 GREATER TROCHANTERIC BURSITIS OF RIGHT HIP: ICD-10-CM

## 2022-05-17 DIAGNOSIS — M54.16 LUMBAR RADICULOPATHY: ICD-10-CM

## 2022-05-17 DIAGNOSIS — M54.40 CHRONIC RIGHT-SIDED LOW BACK PAIN WITH SCIATICA, SCIATICA LATERALITY UNSPECIFIED: ICD-10-CM

## 2022-05-17 DIAGNOSIS — M54.9 DORSALGIA, UNSPECIFIED: ICD-10-CM

## 2022-05-17 PROCEDURE — 99999 PR PBB SHADOW E&M-EST. PATIENT-LVL V: ICD-10-PCS | Mod: PBBFAC,,, | Performed by: ANESTHESIOLOGY

## 2022-05-17 PROCEDURE — 99204 PR OFFICE/OUTPT VISIT, NEW, LEVL IV, 45-59 MIN: ICD-10-PCS | Mod: S$GLB,,, | Performed by: ANESTHESIOLOGY

## 2022-05-17 PROCEDURE — 99204 OFFICE O/P NEW MOD 45 MIN: CPT | Mod: S$GLB,,, | Performed by: ANESTHESIOLOGY

## 2022-05-17 PROCEDURE — 99999 PR PBB SHADOW E&M-EST. PATIENT-LVL V: CPT | Mod: PBBFAC,,, | Performed by: ANESTHESIOLOGY

## 2022-05-17 RX ORDER — GABAPENTIN 300 MG/1
CAPSULE ORAL
Qty: 138 CAPSULE | Refills: 0 | Status: SHIPPED | OUTPATIENT
Start: 2022-05-17 | End: 2022-06-21 | Stop reason: SDUPTHER

## 2022-05-17 NOTE — PATIENT INSTRUCTIONS
General Neck and Back Pain    Both neck and back pain are usually caused by injury to the muscles or ligaments of the spine. Sometimes the disks that separate each bone of the spine may cause pain by pressing on a nearby nerve. Back and neck pain may appear after a sudden twisting or bending force (such as in a car accident), or sometimes after a simple awkward movement. In either case, muscle spasm is often present and adds to the pain.  Acute neck and back pain usually gets better in 1 to 2 weeks. Pain related to disk disease, arthritis in the spinal joints or spinal stenosis (narrowing of the spinal canal) can become chronic and last for months or years.  Back and neck pain are common problems. Most people feel better in 1 or 2 weeks, and most of the rest in 1 to 2 months. Most people can remain active.  People experience and describe pain differently.  Pain can be sharp, stabbing, shooting, aching, cramping, or burning  Movement, standing, bending, lifting, sitting, or walking may worsen the pain  Pain can be localized to one spot or area, or it can be more generalized  Pain can spread or radiate upwards, downwards, to the front, or go down your arms  Muscle spasm may occur.  Most of the time mechanical problems with the muscles or spine cause the pain. it is usually caused by an injury, whether known or not, to the muscles or ligaments. While illnesses can cause back pain, it is usually not caused by a serious illness. Pain is usually related to physical activity, whether sports, exercise, work, or normal activity. Sometimes it can occur without an identifiable cause. This can happen simply by stretching or moving wrong, without noting pain at the time. Other causes include:  Overexertion, lifting, pushing, pulling incorrectly or too aggressively.  Sudden twisting, bending or stretching from an accident (car or fall), or accidental movement.  Poor posture  Poor conditioning, lack of regular exercise  Spinal  disc disease or arthritis  Stress  Pregnancy, or illness like appendicitis, bladder or kidney infection, pelvic infections   Home care  For neck pain: Use a comfortable pillow that supports the head and keeps the spine in a neutral position. The position of the head should not be tilted forward or backward.  When in bed, try to find a position of comfort. A firm mattress is best. Try lying flat on your back with pillows under your knees. You can also try lying on your side with your knees bent up towards your chest and a pillow between your knees.  At first, do not try to stretch out the sore spots. If there is a strain, it is not like the good soreness you get after exercising without an injury. In this case, stretching may make it worse.  Avoid prolonged sitting, long car rides or travel. This puts more stress on the lower back than standing or walking.  During the first 24 to 72 hours after an injury, apply an ice pack to the painful area for 20 minutes and then remove it for 20 minutes over a period of 60 to 90 minutes or several times a day.   You can alternate ice and heat therapies. Talk with your healthcare provider about the best treatment for your back or neck pain. As a safety precaution, do not use a heating pad at bedtime. Sleeping with a heating pad can lead to skin burns or tissue damage.  Therapeutic massage can help relax the back and neck muscles without stretching them.  Be aware of safe lifting methods and do not lift anything over 15 pounds until all the pain is gone.  Medications  Talk to your healthcare provider before using medicine, especially if you have other medical problems or are taking other medicines.  You may use over-the-counter medicine to control pain, unless another pain medicine was prescribed. If you have chronic conditions like diabetes, liver or kidney disease, stomach ulcers,  gastrointestinal bleeding, or are taking blood thinner medicines.  Be careful if you are given pain  medicines, narcotics, or medicine for muscle spasm. They can cause drowsiness, and can affect your coordination, reflexes, and judgment. Do not drive or operate heavy machinery.  Follow-up care  Follow up with your healthcare provider, or as advised. Physical therapy or further tests may be needed.  If X-rays were taken, you will be notified of any new findings that may affect your care.  Call 911  Seek emergency medical care if any of the following occur:  Trouble breathing  Confusion  Very drowsy or trouble awakening  Fainting or loss of consciousness  Rapid or very slow heart rate  Loss of bowel or bladder control  When to seek medical advice  Call your healthcare provider right away if any of these occur:  Pain becomes worse or spreads into your arms or legs  Weakness, numbness or pain in one or both arms or legs  Numbness in the groin area  Difficulty walking  Fever of 100.4ºF (38ºC) or higher, or as directed by your healthcare provider  Date Last Reviewed: 7/1/2016  © 3580-0462 [employer]. [address] [place] [address]. All rights reserved. This information is not intended as a substitute for professional medical care. Always follow your healthcare professional's instructions.       Understanding Lumbar Radiculopathy    Lumbar radiculopathy is irritation or inflammation of a nerve root in the low back. It causes symptoms that spread out from the back down one or both legs. To understand this condition, it helps to understand the parts of the spine:  Vertebrae. These are bones that stack to form the spine. The lumbar spine contains the 5 bottom vertebrae.  Disks. These are soft pads of tissue between the vertebrae. They act as shock absorbers for the spine.  Spinal canal. This is a tunnel formed within the stacked vertebrae. In the lumbar spine, nerves run through this canal.  Nerves. These branch off and leave the spinal canal, traveling out to parts of the body. As they leave the  spinal canal, nerves pass through openings between the vertebrae. The nerve root is the part of the nerve that is closest to the spinal canal.  Sciatic nerve. This is a large nerve formed from several nerve roots in the low back. This nerve extends down the back of the leg to the foot.  With lumbar radiculopathy, nerve roots in the low back become irritated. This leads to pain and symptoms. The sciatic nerve is commonly involved, so the condition is often called sciatica.  What causes lumbar radiculopathy?  Aging, injury, poor posture, extra body weight, and other issues can lead to problems in the low back. These problems may then irritate nerve roots. They include:  Damage to a disk in the lumbar spine. The damaged disk may then press on nearby nerve roots.  Degeneration from wear and tear, and aging. This can lead to narrowing (stenosis) of the openings between the vertebrae. The narrowed openings press on nerve roots as they leave the spinal canal.  Unstable spine. This is when a vertebra slips forward. It can then press on a nerve root.  Other, less common things can put pressure on nerves in the low back. These include diabetes, infection, or a tumor.  Symptoms of lumbar radiculopathy  These include:  Pain in the low back  Pain, numbness, tingling, or weakness that travels into the buttocks, hip, groin, or leg  Muscle spasms  Treatment for lumbar radiculopathy  In most cases, your healthcare provider will first try treatments that help relieve symptoms. These may include:  Prescription and over-the-counter pain medicines. These help relieve pain, swelling, and irritation.  Limits on positions and activities that increase pain. But lying in bed or avoiding all movement is only recommended for a short period of time.  Physical therapy, including exercises and stretches. This helps decrease pain and increase movement and function.  Steroid shots into the lower back. This may help relieve symptoms for a  time.  Weight-loss program. If you are overweight, losing extra pounds may help relieve symptoms.  In some cases, you may need surgery to fix the underlying problem. This depends on the cause, the symptoms, and how long the pain has lasted.  Possible complications  Over time, an irritated and inflamed nerve may become damaged. This may lead to long-lasting (permanent) numbness or weakness in your legs and feet. If symptoms change suddenly or get worse, be sure to let your healthcare provider know.  When to call your healthcare provider  Call your healthcare provider right away if you have any of these:  New pain or pain that gets worse  New or increasing weakness, tingling, or numbness in your leg or foot  Problems controlling your bladder or bowel   Date Last Reviewed: 3/10/2016  © 8491-3000 [employer]. [address] [place] [address], [place] [address]. All rights reserved. This information is not intended as a substitute for professional medical care. Always follow your healthcare professional's instructions.       Exercises to Strengthen Your Lower Back  Strong lower back and abdominal muscles work together to support your spine. The exercises below will help strengthen the lower back. It is important that you begin exercising slowly and increase levels gradually.  Always begin any exercise program with stretching. If you feel pain while doing any of these exercises, stop and talk to your doctor about a more specific exercise program that better suits your condition.   Low back stretch  The point of stretching is to make you more flexible and increase your range of motion. Stretch only as much as you are able. Stretch slowly. Do not push your stretch to the limit. If at any point you feel pain while stretching, this is your (temporary) limit.  Lie on your back with your knees bent and both feet on the ground.  Slowly raise your left knee to your chest as you flatten your lower back against the floor. Hold  for 5 seconds.  Relax and repeat the exercise with your right knee.  Do 10 of these exercises for each leg.  Repeat hugging both knees to your chest at the same time.  Building lower back strength  Start your exercise routine with 10 to 30 minutes a day, 1 to 3 times a day.  Initial exercises  Lying on your back:  Ankle pumps: Move your foot up and down, towards your head, and then away. Repeat 10 times with each foot.  Heel slides: Slowly bend your knee, drawing the heel of your foot towards you. Then slide your heel/foot from you, straightening your knee. Do not lift your foot off the floor (this is not a leg lift).  Abdominal contraction: Bend your knees and put your hands on your stomach. Tighten your stomach muscles. Hold for 5 seconds, then relax. Repeat 10 times.  Straight leg raise: Bend one leg at the knee and keep the other leg straight. Tighten your stomach muscles. Slowly lift your straight leg 6 to 12 inches off the floor and hold for up to 5 seconds. Repeat 10 times on each side.  Standing:  Wall squats: Stand with your back against the wall. Move your feet about 12 inches away from the wall. Tighten your stomach muscles, and slowly bend your knees until they are at about a 45 degree angle. Do not go down too far. Hold about 5 seconds. Then slowly return to your starting position. Repeat 10 times.  Heel raises: Stand facing the wall. Slowly raise the heels of your feet up and down, while keeping your toes on the floor. If you have trouble balancing, you can touch the wall with your hands. Repeat 10 times.  More advanced exercises  When you feel comfortable enough, try these exercises.  Kneeling lumbar extension: Begin on your hands and knees. At the same time, raise and straighten your right arm and left leg until they are parallel to the ground. Hold for 2 seconds and come back slowly to a starting position. Repeat with left arm and right leg, alternating 10 times.  Prone lumbar extension: Lie face  down, arms extended overhead, palms on the floor. At the same time, raise your right arm and left leg as high as comfortably possible. Hold for 10 seconds and slowly return to start. Repeat with left arm and right leg, alternating 10 times. Gradually build up to 20 times. (Advanced: Repeat this exercise raising both arms and both legs a few inches off the floor at the same time. Hold for 5 seconds and release.)  Pelvic tilt: Lie on the floor on your back with your knees bent at 90 degrees. Your feet should be flat on the floor. Inhale, exhale, then slowly contract your abdominal muscles bringing your navel toward your spine. Let your pelvis rock back until your lower back is flat on the floor. Hold for 10 seconds while breathing smoothly.  Abdominal crunch: Perform a pelvic tilt (above) flattening your lower back against the floor. Holding the tension in your abdominal muscles, take another breath and raise your shoulder blades off the ground (this is not a full sit-up). Keep your head in line with your body (dont bend your neck forward). Hold for 2 seconds, then slowly lower.  Date Last Reviewed: 6/1/2016  © 5529-0429 [employer]. [address] [place] [address], [place] [address]. All rights reserved. This information is not intended as a substitute for professional medical care. Always follow your healthcare professional's instructions.

## 2022-05-17 NOTE — TELEPHONE ENCOUNTER
Called pt to set up their procedure. Pt answered and procedure has been made. Inform pt on the procedure instruction. Pt  does not need to stop any bloodthiners. Pt understood and all question answered.     Ashwin Fraser   Medical Assistant

## 2022-05-25 NOTE — PRE-PROCEDURE INSTRUCTIONS
Attempted to PAT patient for procedure on 6.3. with Dr. jerez. No answer. LVM with return phone number. No return call at this time.

## 2022-05-31 ENCOUNTER — LAB VISIT (OUTPATIENT)
Dept: LAB | Facility: HOSPITAL | Age: 37
End: 2022-05-31
Attending: FAMILY MEDICINE
Payer: COMMERCIAL

## 2022-05-31 DIAGNOSIS — E03.9 HYPOTHYROIDISM, UNSPECIFIED TYPE: ICD-10-CM

## 2022-05-31 LAB — TSH SERPL DL<=0.005 MIU/L-ACNC: 1.15 UIU/ML (ref 0.4–4)

## 2022-05-31 PROCEDURE — 84443 ASSAY THYROID STIM HORMONE: CPT | Performed by: FAMILY MEDICINE

## 2022-05-31 PROCEDURE — 36415 COLL VENOUS BLD VENIPUNCTURE: CPT | Performed by: FAMILY MEDICINE

## 2022-05-31 NOTE — PRE-PROCEDURE INSTRUCTIONS
Attempted to PAT patient for procedure on 6.3 with Dr. jerez. No answer. LVM with return phone number. No return call at this time.

## 2022-06-02 ENCOUNTER — HOSPITAL ENCOUNTER (OUTPATIENT)
Dept: RADIOLOGY | Facility: HOSPITAL | Age: 37
Discharge: HOME | End: 2022-06-02
Attending: ANESTHESIOLOGY
Payer: COMMERCIAL

## 2022-06-02 ENCOUNTER — TELEPHONE (OUTPATIENT)
Dept: PAIN MEDICINE | Facility: CLINIC | Age: 37
End: 2022-06-02
Payer: COMMERCIAL

## 2022-06-02 DIAGNOSIS — M54.16 LUMBAR RADICULOPATHY: ICD-10-CM

## 2022-06-02 PROCEDURE — 72148 MRI LUMBAR SPINE W/O DYE: CPT | Mod: TC

## 2022-06-02 NOTE — TELEPHONE ENCOUNTER
----- Message from Dayna Cerda sent at 6/2/2022  9:27 AM CDT -----  Contact: pt  The pt request a call to verify the arrival time on his/her 06/03 appt, no additional info given and can be reached at [phone]///[id number]

## 2022-06-02 NOTE — TELEPHONE ENCOUNTER
Inform pt that I do not have the time for the procedure. Inform pt that I will notify the procedure deparment and someone will call today.     Ashwin Fraser   Medical

## 2022-06-02 NOTE — PRE-PROCEDURE INSTRUCTIONS
Spoke with patient regarding procedure scheduled on 6.3    Arrival time 1230    Has patient been sick with fever or on antibiotics within the last 7 days? No    Has patient received a vaccination within the last 7 days? No    Does the patient have any open wounds, sores or rashes? No    Does the patient have any recent fractures? no    Received the COVID vaccination? no    Has the patient stopped all medications as directed? Na    Does patient have a pacemaker and or defibrillator? no    Does the patient have a ride to and from procedure and someone reliable to remain with patient? madhu    Is the patient diabetic? no    Does the patient have sleep apnea? Or use O2 at home? No and no     Is the patient receiving sedation? yes    Is the patient instructed to remain NPO beginning at midnight the night before their procedure? yes    Procedure location confirmed with patient? Yes    Covid- Denies signs/symptoms. Instructed to notify PAT/MD if any changes.

## 2022-06-03 ENCOUNTER — HOSPITAL ENCOUNTER (OUTPATIENT)
Facility: HOSPITAL | Age: 37
Discharge: HOME | End: 2022-06-03
Attending: ANESTHESIOLOGY | Admitting: ANESTHESIOLOGY
Payer: COMMERCIAL

## 2022-06-03 VITALS
HEART RATE: 76 BPM | OXYGEN SATURATION: 98 % | HEIGHT: 66 IN | DIASTOLIC BLOOD PRESSURE: 65 MMHG | BODY MASS INDEX: 38.41 KG/M2 | RESPIRATION RATE: 17 BRPM | SYSTOLIC BLOOD PRESSURE: 133 MMHG | WEIGHT: 239 LBS | TEMPERATURE: 98 F

## 2022-06-03 DIAGNOSIS — M46.1 SACROILIITIS: ICD-10-CM

## 2022-06-03 DIAGNOSIS — M70.61 GREATER TROCHANTERIC BURSITIS OF RIGHT HIP: ICD-10-CM

## 2022-06-03 DIAGNOSIS — M70.60 GREATER TROCHANTERIC BURSITIS: ICD-10-CM

## 2022-06-03 LAB
B-HCG UR QL: NEGATIVE
CTP QC/QA: YES

## 2022-06-03 PROCEDURE — 20610 PR DRAIN/INJECT LARGE JOINT/BURSA: ICD-10-PCS | Mod: RT,,, | Performed by: ANESTHESIOLOGY

## 2022-06-03 PROCEDURE — 20610 DRAIN/INJ JOINT/BURSA W/O US: CPT | Performed by: ANESTHESIOLOGY

## 2022-06-03 PROCEDURE — 63600175 PHARM REV CODE 636 W HCPCS: Performed by: ANESTHESIOLOGY

## 2022-06-03 PROCEDURE — 27096 PR INJECTION,SACROILIAC JOINT: ICD-10-PCS | Mod: 59,RT,, | Performed by: ANESTHESIOLOGY

## 2022-06-03 PROCEDURE — 27096 INJECT SACROILIAC JOINT: CPT | Mod: 59,RT,, | Performed by: ANESTHESIOLOGY

## 2022-06-03 PROCEDURE — 27096 INJECT SACROILIAC JOINT: CPT | Performed by: ANESTHESIOLOGY

## 2022-06-03 PROCEDURE — 25500020 PHARM REV CODE 255: Performed by: ANESTHESIOLOGY

## 2022-06-03 PROCEDURE — 25000003 PHARM REV CODE 250: Performed by: ANESTHESIOLOGY

## 2022-06-03 PROCEDURE — 20610 DRAIN/INJ JOINT/BURSA W/O US: CPT | Mod: RT,,, | Performed by: ANESTHESIOLOGY

## 2022-06-03 PROCEDURE — 81025 URINE PREGNANCY TEST: CPT | Performed by: ANESTHESIOLOGY

## 2022-06-03 RX ORDER — FENTANYL CITRATE 50 UG/ML
INJECTION, SOLUTION INTRAMUSCULAR; INTRAVENOUS
Status: DISCONTINUED | OUTPATIENT
Start: 2022-06-03 | End: 2022-06-03 | Stop reason: HOSPADM

## 2022-06-03 RX ORDER — BUPIVACAINE HYDROCHLORIDE 2.5 MG/ML
INJECTION, SOLUTION EPIDURAL; INFILTRATION; INTRACAUDAL
Status: DISCONTINUED | OUTPATIENT
Start: 2022-06-03 | End: 2022-06-03 | Stop reason: HOSPADM

## 2022-06-03 RX ORDER — MIDAZOLAM HYDROCHLORIDE 1 MG/ML
INJECTION, SOLUTION INTRAMUSCULAR; INTRAVENOUS
Status: DISCONTINUED | OUTPATIENT
Start: 2022-06-03 | End: 2022-06-03 | Stop reason: HOSPADM

## 2022-06-03 RX ORDER — TRIAMCINOLONE ACETONIDE 40 MG/ML
INJECTION, SUSPENSION INTRA-ARTICULAR; INTRAMUSCULAR
Status: DISCONTINUED | OUTPATIENT
Start: 2022-06-03 | End: 2022-06-03 | Stop reason: HOSPADM

## 2022-06-03 RX ORDER — INDOMETHACIN 25 MG/1
CAPSULE ORAL
Status: DISCONTINUED | OUTPATIENT
Start: 2022-06-03 | End: 2022-06-03 | Stop reason: HOSPADM

## 2022-06-03 NOTE — DISCHARGE INSTRUCTIONS

## 2022-06-03 NOTE — OP NOTE
Haydee Pennington  37 y.o. female      Vitals:    06/03/22 1008   BP: 128/76   Pulse: (!) 59   Resp: 18   Temp:        Procedure Date:6/3/22      INFORMED CONSENT: The procedure, risks, benefits and options were discussed with patient. There are no contraindications to the procedure. The patient expressed understanding and agreed to proceed. The personnel performing the procedure was discussed. I verify that I personally obtained consent prior to the start of the procedure and the signed consent can be found on the patient's chart.       Anesthesia:   Conscious sedation provided by M.D    The patient was monitored with continuous pulse oximetry, EKG, and intermittent blood pressure monitors.  The patient was hemodynamically stable throughout the entire process was responsive to voice, and breathing spontaneously.  Supplemental O2 was provided at 2L/min via nasal cannula.  Patient was comfortable for the duration of the procedure. (See nurse documentation and case log for sedation time)    There was a total of 1mg IV Midazolam and 75mcg Fentanyl titrated for the procedure    Pre Procedure diagnosis: Sacroiliitis [M46.1]  Greater trochanteric bursitis of right hip [M70.61]  Post-Procedure diagnosis: SAME      PROCEDURE:  1) Right greater trochanteric bursa injection    2) Right sacroiliac joint injection                            REASON FOR PROCEDURE:   Sacroiliitis [M46.1]  Greater trochanteric bursitis[M70.61]      MEDICATIONS INJECTED: 1mL 40mg/ml Kenalog and 4mL Bupivacaine 0.25% into each site    LOCAL ANESTHETIC USED: Xylocaine 1% 6ml     ESTIMATED BLOOD LOSS: None.   COMPLICATIONS: None.     TECHNIQUE:   Greater trochanteric bursa injection:  The area overlying the greater trochanteric bursa was identified using fluoroscopy, and the area overlying the skin was prepped and draped in usual sterile fashion. Local Xylocaine was injected by raising a wheel and going down to the periosteum using a 27-gauge  hypodermic needle. A 5 inch 22-gauge spinal needle was introduce into the Right greater trochanteric bursa. Negative pressure applied to confirm no intravascular placement. Omnipaque was injected to confirm placement and to confirm that there was no vascular runoff. The medication was then injected slowly.  Displacement of the contrast after injection of the medication confirmed that the medication went into the area of the greater trochanteric bursa    Sacroiliac joint injection:   Laying in the prone position, the patient was prepped and draped in the usual sterile fashion using ChloraPrep and fenestrated drape.  The area was determined under fluoroscopy.  Local Xylocaine was injected by raising a wheel and going down to the periosteum using a 27-gauge hypodermic needle.  The 3.5 inch 22-gauge spinal needle was introduce into the Right sacroiliac joint.  Negative pressure applied to confirm no intravascular placement.  Omnipaque was injected to confirm placement and to confirm that there was no vascular runoff.  The medication was then injected slowly.  The patient tolerated the procedure well.                       The patient was monitored for approximately 30 minutes after the procedure. Patient was given post procedure and discharge instructions to follow at home. We will see the patient back in two weeks or the patient may call to inform of status. The patient was discharged in a stable condition

## 2022-06-03 NOTE — DISCHARGE SUMMARY
[facility] 1st Fl  Discharge Note  Short Stay    Procedure(s) (LRB):  right GT bursa injection with RN IV sedation (Right)  right Sacroiliac Joint Injection with RN IV sedation (Right)    OUTCOME: Patient tolerated treatment/procedure well without complication and is now ready for discharge.    DISPOSITION: Home or Self Care    FINAL DIAGNOSIS:  <principal problem not specified>    FOLLOWUP: In clinic    DISCHARGE INSTRUCTIONS:  No discharge procedures on file.     TIME SPENT ON DISCHARGE: 15 minutes

## 2022-06-13 ENCOUNTER — OFFICE VISIT (OUTPATIENT)
Dept: PSYCHIATRY | Facility: CLINIC | Age: 37
End: 2022-06-13
Payer: COMMERCIAL

## 2022-06-13 DIAGNOSIS — F41.9 ANXIETY: ICD-10-CM

## 2022-06-13 DIAGNOSIS — F32.A DEPRESSION, UNSPECIFIED DEPRESSION TYPE: Primary | ICD-10-CM

## 2022-06-13 DIAGNOSIS — F43.10 PTSD (POST-TRAUMATIC STRESS DISORDER): ICD-10-CM

## 2022-06-13 PROCEDURE — 90834 PR PSYCHOTHERAPY W/PATIENT, 45 MIN: ICD-10-PCS | Mod: 95,,, | Performed by: SOCIAL WORKER

## 2022-06-13 PROCEDURE — 90834 PSYTX W PT 45 MINUTES: CPT | Mod: 95,,, | Performed by: SOCIAL WORKER

## 2022-06-13 NOTE — PROGRESS NOTES
Individual Psychotherapy (PhD/LCSW)    Virtual visit with synchronous audio/video; Location of patient: home in [place].    Each patient provided medical services by telemedicine is: (1) informed of the relationship between the provider and patient and the respective role of any other health care provider with respect to management of the patient; and (2) notified that he or she may decline to receive medical services by telemedicine and may withdraw from such care at any time.Virtual visit with synchronous audio and video.    2022    Site:  Telemed         Therapeutic Intervention: Met with patient.  Outpatient - Insight oriented psychotherapy 45 min - CPT code 54519    Chief complaint/reason for encounter: depression, anxiety and interpersonal     Interval history and content of current session:  Met with this patient for her online virtual follow-up appointment.  The patient was in her home office at the time of the appointment, alert and oriented.  This is the patient's 2nd appointment.  The patient states that the same problems continue to exist that she talked about last time:  She works long hours, more than anyone else in the household, she does most of the housework and cooks most of the meals.  Her 16-year-old son mostly plays video games all day although he occasionally cooks and her significant other, Marcus, does not do much of anything.  The patient also discussed her relationships with her mother and daughter.  The patient stated that she had her daughter when she was 17 years old and lived at home with her mother and dad.  When she decided to move out her mother wanted the granddaughter to stay with them and so the granddaughter grew up living with the grandparents.  The patient's father  a few years ago and the patient's mother now lives in a home that she inherited from her mother.  The patient describes her mother as a hoarder who has let the house go to the degree where she does not have hot  water or air conditioning.  But the house has so much stuff in it, she does not want to call any repair people to come into the house.  The patient's daughter has moved out of her grandmother's house and is living with friends currently and refuses to come back over to the grandmother's house.  The patient and her mother are often having conflicts although the daughter tries to help the mother and recently she took window units to the mother's house and installed them since her air conditioner is no longer working. Discussed setting boundaries with her mother and the patient was not sure she could change her behavior. Suggested that she keep a journal of conflicts and other negative feelings that come up. The patient stated that she would make a follow-up appointment.    Treatment plan:  · Target symptoms: depression, anxiety , adjustment  · Why chosen therapy is appropriate versus another modality: patient responds to this modality  · Outcome monitoring methods: self-report  · Therapeutic intervention type: insight oriented psychotherapy    Risk parameters:  Patient reports no suicidal ideation  Patient reports no homicidal ideation  Patient reports no self-injurious behavior  Patient reports no violent behavior    Verbal deficits: None    Patient's response to intervention:  The patient's response to intervention is accepting.    Progress toward goals and other mental status changes:  The patient's progress toward goals is fair .    Diagnosis:     ICD-10-CM ICD-9-CM   1. Depression, unspecified depression type  F32.A 311   2. Anxiety  F41.9 300.00   3. PTSD (post-traumatic stress disorder)  F43.10 309.81       Plan:  individual psychotherapy    Return to clinic: as scheduled    Length of Service (minutes): 45

## 2022-06-15 ENCOUNTER — PATIENT MESSAGE (OUTPATIENT)
Dept: PSYCHIATRY | Facility: CLINIC | Age: 37
End: 2022-06-15
Payer: COMMERCIAL

## 2022-06-20 LAB
ALBUMIN SERPL BCP-MCNC: 4.2 G/DL (ref 3.5–5.2)
ALP SERPL-CCNC: 88 U/L (ref 55–135)
ALT SERPL W/O P-5'-P-CCNC: 16 U/L (ref 10–44)
ANION GAP SERPL CALC-SCNC: 13 MMOL/L (ref 8–16)
AST SERPL-CCNC: 14 U/L (ref 10–40)
BASOPHILS # BLD AUTO: 0.08 K/UL (ref 0–0.2)
BASOPHILS NFR BLD: 0.4 % (ref 0–1.9)
BILIRUB SERPL-MCNC: 1.1 MG/DL (ref 0.1–1)
BUN SERPL-MCNC: 10 MG/DL (ref 6–20)
CALCIUM SERPL-MCNC: 9.5 MG/DL (ref 8.7–10.5)
CHLORIDE SERPL-SCNC: 104 MMOL/L (ref 95–110)
CO2 SERPL-SCNC: 23 MMOL/L (ref 23–29)
CREAT SERPL-MCNC: 0.9 MG/DL (ref 0.5–1.4)
DIFFERENTIAL METHOD: ABNORMAL
EOSINOPHIL # BLD AUTO: 0 K/UL (ref 0–0.5)
EOSINOPHIL NFR BLD: 0.1 % (ref 0–8)
ERYTHROCYTE [DISTWIDTH] IN BLOOD BY AUTOMATED COUNT: 12.7 % (ref 11.5–14.5)
EST. GFR  (AFRICAN AMERICAN): >60 ML/MIN/1.73 M^2
EST. GFR  (NON AFRICAN AMERICAN): >60 ML/MIN/1.73 M^2
GLUCOSE SERPL-MCNC: 131 MG/DL (ref 70–110)
HCT VFR BLD AUTO: 47.7 % (ref 37–48.5)
HGB BLD-MCNC: 16.2 G/DL (ref 12–16)
IMM GRANULOCYTES # BLD AUTO: 0.08 K/UL (ref 0–0.04)
IMM GRANULOCYTES NFR BLD AUTO: 0.4 % (ref 0–0.5)
LIPASE SERPL-CCNC: 7 U/L (ref 4–60)
LYMPHOCYTES # BLD AUTO: 1.8 K/UL (ref 1–4.8)
LYMPHOCYTES NFR BLD: 8.2 % (ref 18–48)
MCH RBC QN AUTO: 30.8 PG (ref 27–31)
MCHC RBC AUTO-ENTMCNC: 34 G/DL (ref 32–36)
MCV RBC AUTO: 91 FL (ref 82–98)
MONOCYTES # BLD AUTO: 1.1 K/UL (ref 0.3–1)
MONOCYTES NFR BLD: 5.2 % (ref 4–15)
NEUTROPHILS # BLD AUTO: 18.3 K/UL (ref 1.8–7.7)
NEUTROPHILS NFR BLD: 85.7 % (ref 38–73)
NRBC BLD-RTO: 0 /100 WBC
PLATELET # BLD AUTO: 401 K/UL (ref 150–450)
PMV BLD AUTO: 10 FL (ref 9.2–12.9)
POTASSIUM SERPL-SCNC: 3.9 MMOL/L (ref 3.5–5.1)
PROT SERPL-MCNC: 7.4 G/DL (ref 6–8.4)
RBC # BLD AUTO: 5.26 M/UL (ref 4–5.4)
SODIUM SERPL-SCNC: 140 MMOL/L (ref 136–145)
WBC # BLD AUTO: 21.35 K/UL (ref 3.9–12.7)

## 2022-06-20 PROCEDURE — 85025 COMPLETE CBC W/AUTO DIFF WBC: CPT | Performed by: PHYSICIAN ASSISTANT

## 2022-06-20 PROCEDURE — 83690 ASSAY OF LIPASE: CPT | Performed by: PHYSICIAN ASSISTANT

## 2022-06-20 PROCEDURE — 80053 COMPREHEN METABOLIC PANEL: CPT | Performed by: PHYSICIAN ASSISTANT

## 2022-06-20 RX ORDER — ONDANSETRON 2 MG/ML
4 INJECTION INTRAMUSCULAR; INTRAVENOUS
Status: DISPENSED | OUTPATIENT
Start: 2022-06-20 | End: 2022-06-21

## 2022-06-21 ENCOUNTER — HOSPITAL ENCOUNTER (INPATIENT)
Facility: HOSPITAL | Age: 37
LOS: 1 days | Discharge: HOME | DRG: 076 | End: 2022-06-23
Attending: EMERGENCY MEDICINE | Admitting: EMERGENCY MEDICINE
Payer: COMMERCIAL

## 2022-06-21 DIAGNOSIS — G03.0 ASEPTIC MENINGITIS: Primary | ICD-10-CM

## 2022-06-21 DIAGNOSIS — R07.9 CHEST PAIN: ICD-10-CM

## 2022-06-21 DIAGNOSIS — R51.9 ACUTE INTRACTABLE HEADACHE, UNSPECIFIED HEADACHE TYPE: ICD-10-CM

## 2022-06-21 DIAGNOSIS — G03.9 MENINGITIS: ICD-10-CM

## 2022-06-21 DIAGNOSIS — M54.9 ACUTE BILATERAL BACK PAIN, UNSPECIFIED BACK LOCATION: ICD-10-CM

## 2022-06-21 DIAGNOSIS — I10 PRIMARY HYPERTENSION: ICD-10-CM

## 2022-06-21 LAB
B-HCG UR QL: NEGATIVE
BACTERIA #/AREA URNS HPF: ABNORMAL /HPF
BILIRUB UR QL STRIP: NEGATIVE
CLARITY CSF: ABNORMAL
CLARITY UR: CLEAR
COLOR CSF: COLORLESS
COLOR UR: YELLOW
GLUCOSE CSF-MCNC: 56 MG/DL (ref 40–70)
GLUCOSE UR QL STRIP: NEGATIVE
HGB UR QL STRIP: ABNORMAL
KETONES UR QL STRIP: NEGATIVE
LEUKOCYTE ESTERASE UR QL STRIP: NEGATIVE
LYMPHOCYTES NFR CSF MANUAL: 68 % (ref 40–80)
MICROSCOPIC COMMENT: ABNORMAL
MONOS+MACROS NFR CSF MANUAL: 14 % (ref 15–45)
NEUTROPHILS NFR CSF MANUAL: 18 % (ref 0–6)
NITRITE UR QL STRIP: POSITIVE
PH UR STRIP: 6 [PH] (ref 5–8)
PROT CSF-MCNC: 178 MG/DL (ref 15–40)
PROT UR QL STRIP: NEGATIVE
RBC # CSF: 108 /CU MM
RBC #/AREA URNS HPF: 3 /HPF (ref 0–4)
RBC CASTS #/AREA URNS LPF: 7 /LPF
SARS-COV-2 RDRP RESP QL NAA+PROBE: NEGATIVE
SP GR UR STRIP: >=1.03 (ref 1–1.03)
SPECIMEN VOL CSF: 1 ML
URN SPEC COLLECT METH UR: ABNORMAL
UROBILINOGEN UR STRIP-ACNC: NEGATIVE EU/DL
WBC # CSF: 605 /CU MM (ref 0–5)

## 2022-06-21 PROCEDURE — G0378 HOSPITAL OBSERVATION PER HR: HCPCS

## 2022-06-21 PROCEDURE — 63600175 PHARM REV CODE 636 W HCPCS: Performed by: EMERGENCY MEDICINE

## 2022-06-21 PROCEDURE — U0002 COVID-19 LAB TEST NON-CDC: HCPCS | Performed by: EMERGENCY MEDICINE

## 2022-06-21 PROCEDURE — 62270 DX LMBR SPI PNXR: CPT

## 2022-06-21 PROCEDURE — 25000003 PHARM REV CODE 250: Performed by: PHYSICIAN ASSISTANT

## 2022-06-21 PROCEDURE — 25000003 PHARM REV CODE 250: Performed by: EMERGENCY MEDICINE

## 2022-06-21 PROCEDURE — 96365 THER/PROPH/DIAG IV INF INIT: CPT

## 2022-06-21 PROCEDURE — 87070 CULTURE OTHR SPECIMN AEROBIC: CPT | Performed by: EMERGENCY MEDICINE

## 2022-06-21 PROCEDURE — 89051 BODY FLUID CELL COUNT: CPT | Performed by: EMERGENCY MEDICINE

## 2022-06-21 PROCEDURE — 82945 GLUCOSE OTHER FLUID: CPT | Performed by: EMERGENCY MEDICINE

## 2022-06-21 PROCEDURE — 99000 SPECIMEN HANDLING OFFICE-LAB: CPT | Performed by: EMERGENCY MEDICINE

## 2022-06-21 PROCEDURE — 96366 THER/PROPH/DIAG IV INF ADDON: CPT

## 2022-06-21 PROCEDURE — 81000 URINALYSIS NONAUTO W/SCOPE: CPT | Performed by: PHYSICIAN ASSISTANT

## 2022-06-21 PROCEDURE — 84157 ASSAY OF PROTEIN OTHER: CPT | Performed by: EMERGENCY MEDICINE

## 2022-06-21 PROCEDURE — 96375 TX/PRO/DX INJ NEW DRUG ADDON: CPT

## 2022-06-21 PROCEDURE — 81025 URINE PREGNANCY TEST: CPT | Performed by: PHYSICIAN ASSISTANT

## 2022-06-21 PROCEDURE — 99291 CRITICAL CARE FIRST HOUR: CPT | Mod: 25

## 2022-06-21 PROCEDURE — 87205 SMEAR GRAM STAIN: CPT | Performed by: EMERGENCY MEDICINE

## 2022-06-21 PROCEDURE — 96361 HYDRATE IV INFUSION ADD-ON: CPT

## 2022-06-21 PROCEDURE — 96376 TX/PRO/DX INJ SAME DRUG ADON: CPT

## 2022-06-21 RX ORDER — HYDROMORPHONE HYDROCHLORIDE 1 MG/ML
1 INJECTION, SOLUTION INTRAMUSCULAR; INTRAVENOUS; SUBCUTANEOUS
Status: COMPLETED | OUTPATIENT
Start: 2022-06-21 | End: 2022-06-21

## 2022-06-21 RX ORDER — METOCLOPRAMIDE HYDROCHLORIDE 5 MG/ML
10 INJECTION INTRAMUSCULAR; INTRAVENOUS
Status: COMPLETED | OUTPATIENT
Start: 2022-06-21 | End: 2022-06-21

## 2022-06-21 RX ORDER — MORPHINE SULFATE 4 MG/ML
4 INJECTION, SOLUTION INTRAMUSCULAR; INTRAVENOUS
Status: COMPLETED | OUTPATIENT
Start: 2022-06-21 | End: 2022-06-21

## 2022-06-21 RX ORDER — GABAPENTIN 300 MG/1
300 CAPSULE ORAL 3 TIMES DAILY
COMMUNITY
End: 2022-08-16 | Stop reason: SDUPTHER

## 2022-06-21 RX ORDER — ONDANSETRON 2 MG/ML
4 INJECTION INTRAMUSCULAR; INTRAVENOUS
Status: COMPLETED | OUTPATIENT
Start: 2022-06-21 | End: 2022-06-21

## 2022-06-21 RX ORDER — AMLODIPINE BESYLATE 10 MG/1
10 TABLET ORAL DAILY
COMMUNITY
End: 2022-09-06 | Stop reason: SDUPTHER

## 2022-06-21 RX ORDER — DIPHENHYDRAMINE HYDROCHLORIDE 50 MG/ML
25 INJECTION INTRAMUSCULAR; INTRAVENOUS
Status: COMPLETED | OUTPATIENT
Start: 2022-06-21 | End: 2022-06-21

## 2022-06-21 RX ORDER — MORPHINE SULFATE 4 MG/ML
INJECTION, SOLUTION INTRAMUSCULAR; INTRAVENOUS
Status: DISPENSED
Start: 2022-06-21 | End: 2022-06-21

## 2022-06-21 RX ORDER — LIDOCAINE HYDROCHLORIDE 10 MG/ML
10 INJECTION, SOLUTION EPIDURAL; INFILTRATION; INTRACAUDAL; PERINEURAL
Status: COMPLETED | OUTPATIENT
Start: 2022-06-21 | End: 2022-06-21

## 2022-06-21 RX ORDER — KETOROLAC TROMETHAMINE 30 MG/ML
15 INJECTION, SOLUTION INTRAMUSCULAR; INTRAVENOUS
Status: COMPLETED | OUTPATIENT
Start: 2022-06-21 | End: 2022-06-21

## 2022-06-21 RX ADMIN — METOCLOPRAMIDE 10 MG: 5 INJECTION, SOLUTION INTRAMUSCULAR; INTRAVENOUS at 02:06

## 2022-06-21 RX ADMIN — LIDOCAINE HYDROCHLORIDE 100 MG: 10 INJECTION, SOLUTION EPIDURAL; INFILTRATION; INTRACAUDAL at 02:06

## 2022-06-21 RX ADMIN — ONDANSETRON 4 MG: 2 INJECTION INTRAMUSCULAR; INTRAVENOUS at 01:06

## 2022-06-21 RX ADMIN — KETOROLAC TROMETHAMINE 15 MG: 30 INJECTION, SOLUTION INTRAMUSCULAR at 02:06

## 2022-06-21 RX ADMIN — HYDROMORPHONE HYDROCHLORIDE 1 MG: 1 INJECTION, SOLUTION INTRAMUSCULAR; INTRAVENOUS; SUBCUTANEOUS at 04:06

## 2022-06-21 RX ADMIN — SODIUM CHLORIDE 1000 ML: 0.9 INJECTION, SOLUTION INTRAVENOUS at 02:06

## 2022-06-21 RX ADMIN — DIPHENHYDRAMINE HYDROCHLORIDE 25 MG: 50 INJECTION, SOLUTION INTRAMUSCULAR; INTRAVENOUS at 02:06

## 2022-06-21 RX ADMIN — CEFTRIAXONE 2 G: 2 INJECTION, SOLUTION INTRAVENOUS at 06:06

## 2022-06-21 RX ADMIN — SODIUM CHLORIDE 1000 ML: 0.9 INJECTION, SOLUTION INTRAVENOUS at 01:06

## 2022-06-21 RX ADMIN — CEFTRIAXONE 1 G: 1 INJECTION, SOLUTION INTRAVENOUS at 04:06

## 2022-06-21 RX ADMIN — MORPHINE SULFATE 4 MG: 4 INJECTION INTRAVENOUS at 01:06

## 2022-06-21 RX ADMIN — HYDROMORPHONE HYDROCHLORIDE 1 MG: 1 INJECTION, SOLUTION INTRAMUSCULAR; INTRAVENOUS; SUBCUTANEOUS at 02:06

## 2022-06-21 NOTE — FIRST PROVIDER EVALUATION
Medical screening exam completed.  I have conducted a focused provider triage encounter, findings are as follows:    Brief history of present illness:  Bilateral low back pain, headache, vomiting onset today    Vitals:    06/20/22 2145   BP: 136/85   BP Location: Right arm   Patient Position: Sitting   Pulse: 95   Resp: 20   Temp: 99.3 °F (37.4 °C)   TempSrc: Oral   SpO2: 100%   Weight: 105.5 kg (232 lb 9.4 oz)       Pertinent physical exam:  uncomfortable        Preliminary workup initiated; this workup will be continued and followed by the physician or advanced practice provider that is assigned to the patient when roomed.

## 2022-06-21 NOTE — ED PROVIDER NOTES
Encounter Date: 6/20/2022       History     Chief Complaint   Patient presents with    Headache     Patient presents with headache, lumbar back pain, and nausea. X1 day     HPI     29-year-old white female status post nerve block last week presenting with bilateral lower back pain, headache, generalized body aches.  She has no  fever or chills.    Patient notes that she has had multiple episodes of nausea and vomiting.  She denies any diarrhea.  Denies dysuria frequency.  No hematuria.  Denies any history of kidney stones.  Pain is not worse with movement.  She denies any bowel or bladder incontinence or saddle anesthesia.    Review of patient's allergies indicates:   Allergen Reactions    Sulfa (sulfonamide antibiotics) Hives     Past Medical History:   Diagnosis Date    Hypertension     Thyroid disease      Past Surgical History:   Procedure Laterality Date    CERVICAL BIOPSY  W/ LOOP ELECTRODE EXCISION  2005    INJECTION OF ANESTHETIC AGENT INTO SACROILIAC JOINT Right 6/3/2022    Procedure: right Sacroiliac Joint Injection with RN IV sedation;  Surgeon: Rey Monsalve MD;  Location: [facility] PAIN [facility]GT;  Service: Pain Management;  Laterality: Right;    INJECTION OF JOINT Right 6/3/2022    Procedure: right GT bursa injection with RN IV sedation;  Surgeon: Rey Monsalve MD;  Location: [facility] PAIN [facility]GT;  Service: Pain Management;  Laterality: Right;     Family History   Problem Relation Age of Onset    Diabetes Mother     Hypertension Father     Dementia Father     Transient ischemic attack Father     Heart failure Father      Social History     Tobacco Use    Smoking status: Current Every Day Smoker     Packs/day: 0.50     Years: 24.00     Pack years: 12.00     Types: Cigarettes     Start date: 1996    Smokeless tobacco: Never Used   Substance Use Topics    Alcohol use: No    Drug use: Never     Review of Systems   Constitutional: Negative for chills and fever.   HENT: Negative for rhinorrhea and sore  throat.    Respiratory: Negative for cough and shortness of breath.    Cardiovascular: Negative for chest pain and palpitations.   Gastrointestinal: Positive for nausea and vomiting. Negative for abdominal pain, constipation and diarrhea.   Genitourinary: Positive for flank pain. Negative for dysuria, frequency and hematuria.   All other systems reviewed and are negative.      Physical Exam     Initial Vitals [06/20/22 2145]   BP Pulse Resp Temp SpO2   136/85 95 20 99.3 °F (37.4 °C) 100 %      MAP       --         Physical Exam  Nursing Notes and Vital Signs Reviewed.  Constitutional: Patient is in   Moderate distress. Well-developed and well-nourished.  Head: Atraumatic. Normocephalic.  Eyes:  EOM intact.  No scleral icterus.  ENT: Mucous membranes are moist.  Nares clear   Neck:  Full ROM. No JVD.  Cardiovascular: Regular rate. Regular rhythm No murmurs, rubs, or gallops. Distal pulses are 2+ and symmetric  Pulmonary/Chest: No respiratory distress. Clear to auscultation bilaterally. No wheezing or rales.  Equal chest wall rise bilaterally  Abdominal: Soft and non-distended.  There is no tenderness.  No rebound, guarding, or rigidity. Good bowel sounds.  Genitourinary:   Bilateral CVA tenderness.  No suprapubic tenderness  Musculoskeletal: Moves all extremities. No obvious deformities.  5 x 5 strength in all extremities .  There is no point C/ T/L/ S tenderness.  Normal spinal curvature.  Skin: Warm and dry.  Neurological:  Alert, awake, and appropriate.  Normal speech.  No acute focal neurological deficits are appreciated.  Two through 12 intact bilaterally.  Psychiatric: Normal affect. Good eye contact. Appropriate in content.    ED Course   Critical Care    Date/Time: 6/21/2022 11:08 PM  Performed by: Sunny Burks MD  Authorized by: Pravin Piedra MD   Direct patient critical care time: 15 minutes  Additional history critical care time: 5 minutes  Ordering / reviewing critical care time: 5 minutes  Documentation  critical care time: 15 minutes  Consulting other physicians critical care time: 4 minutes  Consult with family critical care time: 4 minutes  Total critical care time (exclusive of procedural time) : 48 minutes  Critical care was necessary to treat or prevent imminent or life-threatening deterioration of the following conditions: meningitis.  Critical care was time spent personally by me on the following activities: blood draw for specimens, development of treatment plan with patient or surrogate, discussions with consultants, evaluation of patient's response to treatment, examination of patient, obtaining history from patient or surrogate, ordering and performing treatments and interventions, ordering and review of laboratory studies, ordering and review of radiographic studies, re-evaluation of patient's condition and review of old charts.    Lumbar Puncture    Date/Time: 6/21/2022 2:30 PM  Location procedure was performed: [facility] EMERGENCY DEPARTMENT  Performed by: Kun Krause MD  Authorized by: Sandip Smiley MD   Consent Done: Yes  Indications: evaluation for infection  Anesthesia: local infiltration    Anesthesia:  Local Anesthetic: lidocaine 1% without epinephrine  Anesthetic total: 5 mL    Patient sedated: no  Preparation: Patient was prepped and draped in the usual sterile fashion.  Lumbar space: L4-L5 interspace  Patient's position: sitting  Needle gauge: 20  Needle type: spinal needle - Quincke tip  Number of attempts: 1  Fluid appearance: clear  Tubes of fluid: 4  Total volume: 5 ml  Post-procedure: site cleaned and adhesive bandage applied  Complications: No  Patient tolerance: Patient tolerated the procedure well with no immediate complications        Labs Reviewed   CBC W/ AUTO DIFFERENTIAL - Abnormal; Notable for the following components:       Result Value    WBC 21.35 (*)     Hemoglobin 16.2 (*)     Gran # (ANC) 18.3 (*)     Immature Grans (Abs) 0.08 (*)     Mono # 1.1 (*)     Gran % 85.7 (*)      Lymph % 8.2 (*)     All other components within normal limits   COMPREHENSIVE METABOLIC PANEL - Abnormal; Notable for the following components:    Glucose 131 (*)     Total Bilirubin 1.1 (*)     All other components within normal limits   URINALYSIS, REFLEX TO URINE CULTURE - Abnormal; Notable for the following components:    Specific Gravity, UA >=1.030 (*)     Occult Blood UA Trace (*)     Nitrite, UA Positive (*)     All other components within normal limits    Narrative:     Specimen Source->Urine   URINALYSIS MICROSCOPIC - Abnormal; Notable for the following components:    Bacteria Moderate (*)     RBC Casts, UA 7 (*)     All other components within normal limits    Narrative:     Specimen Source->Urine   PROTEIN, CSF - Abnormal; Notable for the following components:    Protein,  (*)     All other components within normal limits   PREGNANCY TEST, URINE RAPID    Narrative:     Specimen Source->Urine   LIPASE   GLUCOSE, CSF   SARS-COV-2 RNA AMPLIFICATION, QUAL   FREEZE AND HOLD -      All Lab Results:  Results for orders placed or performed during the hospital encounter of 06/21/22   CSF culture and Gram Stain (Tube 2)    Specimen: CSF Tap, Tube 2; CSF (Spinal Fluid)   Result Value Ref Range    Gram Stain Result Cytospin indicates: No organisms seen Many WBC's    CBC auto differential   Result Value Ref Range    WBC 21.35 (H) 3.90 - 12.70 K/uL    RBC 5.26 4.00 - 5.40 M/uL    Hemoglobin 16.2 (H) 12.0 - 16.0 g/dL    Hematocrit 47.7 37.0 - 48.5 %    MCV 91 82 - 98 fL    MCH 30.8 27.0 - 31.0 pg    MCHC 34.0 32.0 - 36.0 g/dL    RDW 12.7 11.5 - 14.5 %    Platelets 401 150 - 450 K/uL    MPV 10.0 9.2 - 12.9 fL    Immature Granulocytes 0.4 0.0 - 0.5 %    Gran # (ANC) 18.3 (H) 1.8 - 7.7 K/uL    Immature Grans (Abs) 0.08 (H) 0.00 - 0.04 K/uL    Lymph # 1.8 1.0 - 4.8 K/uL    Mono # 1.1 (H) 0.3 - 1.0 K/uL    Eos # 0.0 0.0 - 0.5 K/uL    Baso # 0.08 0.00 - 0.20 K/uL    nRBC 0 0 /100 WBC    Gran % 85.7 (H) 38.0 - 73.0  %    Lymph % 8.2 (L) 18.0 - 48.0 %    Mono % 5.2 4.0 - 15.0 %    Eosinophil % 0.1 0.0 - 8.0 %    Basophil % 0.4 0.0 - 1.9 %    Differential Method Automated    Comprehensive metabolic panel   Result Value Ref Range    Sodium 140 136 - 145 mmol/L    Potassium 3.9 3.5 - 5.1 mmol/L    Chloride 104 95 - 110 mmol/L    CO2 23 23 - 29 mmol/L    Glucose 131 (H) 70 - 110 mg/dL    BUN 10 6 - 20 mg/dL    Creatinine 0.9 0.5 - 1.4 mg/dL    Calcium 9.5 8.7 - 10.5 mg/dL    Total Protein 7.4 6.0 - 8.4 g/dL    Albumin 4.2 3.5 - 5.2 g/dL    Total Bilirubin 1.1 (H) 0.1 - 1.0 mg/dL    Alkaline Phosphatase 88 55 - 135 U/L    AST 14 10 - 40 U/L    ALT 16 10 - 44 U/L    Anion Gap 13 8 - 16 mmol/L    eGFR if African American >60 >60 mL/min/1.73 m^2    eGFR if non African American >60 >60 mL/min/1.73 m^2   Urinalysis, Reflex to Urine Culture Urine, Clean Catch    Specimen: Urine   Result Value Ref Range    Specimen UA Urine, Clean Catch     Color, UA Yellow Yellow, Straw, Rosio    Appearance, UA Clear Clear    pH, UA 6.0 5.0 - 8.0    Specific Gravity, UA >=1.030 (A) 1.005 - 1.030    Protein, UA Negative Negative    Glucose, UA Negative Negative    Ketones, UA Negative Negative    Bilirubin (UA) Negative Negative    Occult Blood UA Trace (A) Negative    Nitrite, UA Positive (A) Negative    Urobilinogen, UA Negative <2.0 EU/dL    Leukocytes, UA Negative Negative   Pregnancy, urine rapid   Result Value Ref Range    Preg Test, Ur Negative    Lipase   Result Value Ref Range    Lipase 7 4 - 60 U/L   Urinalysis Microscopic   Result Value Ref Range    RBC, UA 3 0 - 4 /hpf    Bacteria Moderate (A) None-Occ /hpf    RBC Casts, UA 7 (A) None /lpf    Microscopic Comment SEE COMMENT    Glucose, CSF (Tube 1)   Result Value Ref Range    Glucose, CSF 56 40 - 70 mg/dL   Protein, CSF (Tube 1)   Result Value Ref Range    Protein,  (H) 15 - 40 mg/dL   CSF cell count with differential (Tube 4)   Result Value Ref Range    Heme Aliquot 1.0 mL    Appearance,  CSF Slightly hazy (A) Clear    Color, CSF Colorless Colorless    WBC,  (H) 0 - 5 /cu mm    RBC,  (A) 0 /cu mm    Segmented Neutrophils, CSF 18 (H) 0 - 6 %    Lymphs, CSF 68 40 - 80 %    Mono/Macrophage, CSF 14 (L) 15 - 45 %   COVID-19 Rapid Screening   Result Value Ref Range    SARS-CoV-2 RNA, Amplification, Qual Negative Negative            Imaging Results          CT Head Without Contrast (Final result)  Result time 06/21/22 13:56:45    Final result by Kristen Roth MD (06/21/22 13:56:45)                 Impression:      No acute intracranial abnormality.      Electronically signed by: Kristen Roth  Date:    06/21/2022  Time:    13:56             Narrative:    EXAMINATION:  CT HEAD WITHOUT CONTRAST    CLINICAL HISTORY:  HA - intractable;    TECHNIQUE:  Low dose axial CT images obtained throughout the head without intravenous contrast. Sagittal and coronal reconstructions were performed.  All CT scans at this facility are performed using dose optimization techniques including the following: automated exposure control; adjustment of the mA and/or kV; use of iterative reconstruction technique.    COMPARISON:  CT head without contrast 05/13/2020    FINDINGS:  Intracranial compartment:    Ventricles and sulci are normal in size for age without evidence of hydrocephalus. No extra-axial blood or fluid collections.    The brain parenchyma appears normal. No parenchymal mass, hemorrhage, edema or major vascular distribution infarct.    Skull/extracranial contents (limited evaluation): No fracture. Mastoid air cells and paranasal sinuses are essentially clear.                               CT Renal Stone Study ABD Pelvis WO (Final result)  Result time 06/21/22 07:13:17    Final result by Kristen Roth MD (06/21/22 07:13:17)                 Impression:      No nephrolithiasis or obstructive uropathy.    Hyperdensity within the gallbladder suggests biliary sludge without calcified stone or  significant inflammatory change.    Hepatomegaly.      Electronically signed by: Kristen Ira  Date:    06/21/2022  Time:    07:13             Narrative:    EXAMINATION:  CT RENAL STONE STUDY ABD PELVIS WO    CLINICAL HISTORY:  Flank pain, kidney stone suspected;    TECHNIQUE:  Low dose axial images, sagittal and coronal reformations were obtained from the lung bases to the pubic symphysis, Oral contrast was not administered.  All CT scans at this facility are performed using dose optimization techniques including the following: automated exposure control; adjustment of the mA and/or kV; use of iterative reconstruction technique.    COMPARISON:  None    FINDINGS:  Heart: Normal in size. No pericardial effusion.    Lung Bases: Mild bandlike density at the lung bases suggest subsegmental atelectasis or fibrosis.    Liver: Liver is enlarged measuring 18 cm in craniocaudal dimension.  Hepatic parenchyma is normal in attenuation without focal hepatic abnormality evident on this noncontrast exam.    Gallbladder: Layering hyperdensity within the gallbladder is suggestive of biliary sludge.  No calcified stones or significant inflammatory change.    Bile Ducts: No evidence of dilated ducts.    Pancreas: No mass or peripancreatic fat stranding.    Spleen: Unremarkable.    Adrenals: Unremarkable.    Kidneys/ Ureters: Unremarkable.    Bladder: No evidence of wall thickening.    Reproductive organs: Unremarkable.    GI Tract/Mesentery: No evidence of bowel obstruction or inflammation.  The appendix is normal.    Peritoneal Space: No ascites. No free air.    Retroperitoneum: No significant adenopathy.    Abdominal wall: Unremarkable.    Vasculature: No significant atherosclerosis or aneurysm.    Bones: No acute fracture.  There is minimal degenerative change of the spine.                              5:59 AM: Dr. Arvizu transfers care of patient to Dr. Krause pending imaging results.           Medications   ondansetron  injection 4 mg (4 mg Intravenous Not Given 6/20/22 2200)   sodium chloride 0.9% bolus 1,000 mL (0 mLs Intravenous Stopped 6/21/22 0231)   ondansetron injection 4 mg (4 mg Intravenous Given 6/21/22 0131)   morphine injection 4 mg (4 mg Intravenous Given 6/21/22 0133)   cefTRIAXone (ROCEPHIN) 1 g/50 mL D5W IVPB (0 g Intravenous Stopped 6/21/22 0506)   HYDROmorphone injection 1 mg (1 mg Intravenous Given 6/21/22 0433)   ketorolac injection 15 mg (15 mg Intravenous Given 6/21/22 1401)   metoclopramide HCl injection 10 mg (10 mg Intravenous Given 6/21/22 1400)   diphenhydrAMINE injection 25 mg (25 mg Intravenous Given 6/21/22 1401)   HYDROmorphone injection 1 mg (1 mg Intravenous Given 6/21/22 1401)   sodium chloride 0.9% bolus 1,000 mL (0 mLs Intravenous Stopped 6/21/22 1502)   LIDOcaine (PF) 10 mg/ml (1%) injection 100 mg (100 mg Infiltration Given by Other 6/21/22 1428)   cefTRIAXone (ROCEPHIN) 2 g/50 mL D5W IVPB (0 g Intravenous Stopped 6/21/22 1934)     Medical Decision Making:   Clinical Tests:   Lab Tests: Ordered and Reviewed  Radiological Study: Ordered and Reviewed    ED Discussion:  1:02 PM patient still not amenable to going home and pain is not well controlled for her headache.  She now appears to have a little bit more meningeal signs so we discussed lumbar puncture and she is amenable to lumbar puncture for further testing at this time.               6:16 PM: Discussed case with JAMES Gonzalez ([facility]). Dr. Piedra agrees with current care and management of pt and accepts admission.   Admitting Service: [facility]  Admitting Physician: Dr. Piedra  Admit to: Obs Med Surg    6:17 PM: Re-evaluated pt. I have discussed test results, shared treatment plan, and the need for admission with patient and family at bedside. Pt and family express understanding at this time and agree with all information. All questions answered. Pt and family have no further questions or concerns at this  time. Pt is ready for admit.            Clinical Impression:     Final diagnoses:  [G03.9] Meningitis (Primary)  [R51.9] Acute intractable headache, unspecified headache type  [M54.9] Acute bilateral back pain, unspecified back location        ED Disposition Condition    Observation               Sunny Burks MD  06/21/22 [id number]       Kun Krause MD  06/22/22 9633       Kun Krause MD  06/22/22 3143

## 2022-06-21 NOTE — ED NOTES
Report received from TOMMY Roberts. Care of this pt assumed at this time. Pt placed on continuous pulse ox and blood pressure cuff monitoring. Call bell within reach, bed locked and in lowest position.

## 2022-06-21 NOTE — ED NOTES
Bed: 03  Expected date:   Expected time:   Means of arrival:   Comments:  Gorge from TL when clean

## 2022-06-22 ENCOUNTER — TELEPHONE (OUTPATIENT)
Dept: PSYCHIATRY | Facility: CLINIC | Age: 37
End: 2022-06-22
Payer: COMMERCIAL

## 2022-06-22 PROBLEM — R17 ELEVATED BILIRUBIN: Status: ACTIVE | Noted: 2022-06-22

## 2022-06-22 PROBLEM — A41.9 SEPSIS: Status: ACTIVE | Noted: 2022-06-22

## 2022-06-22 PROBLEM — E88.09 HYPOALBUMINEMIA: Status: ACTIVE | Noted: 2022-06-22

## 2022-06-22 PROBLEM — G03.9 MENINGITIS: Status: ACTIVE | Noted: 2022-06-22

## 2022-06-22 LAB
ALBUMIN SERPL BCP-MCNC: 3.1 G/DL (ref 3.5–5.2)
ANION GAP SERPL CALC-SCNC: 9 MMOL/L (ref 8–16)
BASOPHILS # BLD AUTO: 0.04 K/UL (ref 0–0.2)
BASOPHILS NFR BLD: 0.4 % (ref 0–1.9)
BUN SERPL-MCNC: 8 MG/DL (ref 6–20)
CALCIUM SERPL-MCNC: 8.7 MG/DL (ref 8.7–10.5)
CHLORIDE SERPL-SCNC: 104 MMOL/L (ref 95–110)
CO2 SERPL-SCNC: 24 MMOL/L (ref 23–29)
CREAT SERPL-MCNC: 0.7 MG/DL (ref 0.5–1.4)
DIFFERENTIAL METHOD: NORMAL
EOSINOPHIL # BLD AUTO: 0 K/UL (ref 0–0.5)
EOSINOPHIL NFR BLD: 0.3 % (ref 0–8)
ERYTHROCYTE [DISTWIDTH] IN BLOOD BY AUTOMATED COUNT: 12.6 % (ref 11.5–14.5)
EST. GFR  (AFRICAN AMERICAN): >60 ML/MIN/1.73 M^2
EST. GFR  (NON AFRICAN AMERICAN): >60 ML/MIN/1.73 M^2
GLUCOSE SERPL-MCNC: 95 MG/DL (ref 70–110)
HCT VFR BLD AUTO: 48.3 % (ref 37–48.5)
HGB BLD-MCNC: 16 G/DL (ref 12–16)
IMM GRANULOCYTES # BLD AUTO: 0.02 K/UL (ref 0–0.04)
IMM GRANULOCYTES NFR BLD AUTO: 0.2 % (ref 0–0.5)
LYMPHOCYTES # BLD AUTO: 2.2 K/UL (ref 1–4.8)
LYMPHOCYTES NFR BLD: 23.9 % (ref 18–48)
MCH RBC QN AUTO: 30.9 PG (ref 27–31)
MCHC RBC AUTO-ENTMCNC: 33.1 G/DL (ref 32–36)
MCV RBC AUTO: 93 FL (ref 82–98)
MONOCYTES # BLD AUTO: 1 K/UL (ref 0.3–1)
MONOCYTES NFR BLD: 10.6 % (ref 4–15)
NEUTROPHILS # BLD AUTO: 5.8 K/UL (ref 1.8–7.7)
NEUTROPHILS NFR BLD: 64.6 % (ref 38–73)
NRBC BLD-RTO: 0 /100 WBC
PATH INTERP FLD-IMP: NORMAL
PHOSPHATE SERPL-MCNC: 3.2 MG/DL (ref 2.7–4.5)
PLATELET # BLD AUTO: 219 K/UL (ref 150–450)
PMV BLD AUTO: 10.4 FL (ref 9.2–12.9)
POTASSIUM SERPL-SCNC: 3.7 MMOL/L (ref 3.5–5.1)
PROCALCITONIN SERPL IA-MCNC: 0.04 NG/ML
RBC # BLD AUTO: 5.17 M/UL (ref 4–5.4)
SODIUM SERPL-SCNC: 137 MMOL/L (ref 136–145)
WBC # BLD AUTO: 9.03 K/UL (ref 3.9–12.7)

## 2022-06-22 PROCEDURE — 27000207 HC ISOLATION

## 2022-06-22 PROCEDURE — 96376 TX/PRO/DX INJ SAME DRUG ADON: CPT

## 2022-06-22 PROCEDURE — 63700000 PHARM REV CODE 250 ALT 637 W/O HCPCS: Performed by: INTERNAL MEDICINE

## 2022-06-22 PROCEDURE — 25000003 PHARM REV CODE 250: Performed by: NURSE PRACTITIONER

## 2022-06-22 PROCEDURE — 96366 THER/PROPH/DIAG IV INF ADDON: CPT

## 2022-06-22 PROCEDURE — 87040 BLOOD CULTURE FOR BACTERIA: CPT | Mod: 59 | Performed by: NURSE PRACTITIONER

## 2022-06-22 PROCEDURE — 94761 N-INVAS EAR/PLS OXIMETRY MLT: CPT

## 2022-06-22 PROCEDURE — 63600175 PHARM REV CODE 636 W HCPCS: Performed by: NURSE PRACTITIONER

## 2022-06-22 PROCEDURE — 25000003 PHARM REV CODE 250: Performed by: INTERNAL MEDICINE

## 2022-06-22 PROCEDURE — 63600175 PHARM REV CODE 636 W HCPCS: Performed by: INTERNAL MEDICINE

## 2022-06-22 PROCEDURE — 36415 COLL VENOUS BLD VENIPUNCTURE: CPT | Performed by: INTERNAL MEDICINE

## 2022-06-22 PROCEDURE — 84145 PROCALCITONIN (PCT): CPT | Performed by: NURSE PRACTITIONER

## 2022-06-22 PROCEDURE — 36415 COLL VENOUS BLD VENIPUNCTURE: CPT | Performed by: NURSE PRACTITIONER

## 2022-06-22 PROCEDURE — 85025 COMPLETE CBC W/AUTO DIFF WBC: CPT | Performed by: INTERNAL MEDICINE

## 2022-06-22 PROCEDURE — 80069 RENAL FUNCTION PANEL: CPT | Performed by: INTERNAL MEDICINE

## 2022-06-22 PROCEDURE — 21400001 HC TELEMETRY ROOM

## 2022-06-22 RX ORDER — SODIUM CHLORIDE 0.9 % (FLUSH) 0.9 %
10 SYRINGE (ML) INJECTION EVERY 8 HOURS PRN
Status: DISCONTINUED | OUTPATIENT
Start: 2022-06-22 | End: 2022-06-23 | Stop reason: HOSPADM

## 2022-06-22 RX ORDER — IBUPROFEN 200 MG
24 TABLET ORAL
Status: DISCONTINUED | OUTPATIENT
Start: 2022-06-22 | End: 2022-06-23 | Stop reason: HOSPADM

## 2022-06-22 RX ORDER — SODIUM CHLORIDE 0.9 % (FLUSH) 0.9 %
3 SYRINGE (ML) INJECTION
Status: DISCONTINUED | OUTPATIENT
Start: 2022-06-22 | End: 2022-06-23 | Stop reason: HOSPADM

## 2022-06-22 RX ORDER — GABAPENTIN 300 MG/1
300 CAPSULE ORAL 3 TIMES DAILY
Status: DISCONTINUED | OUTPATIENT
Start: 2022-06-22 | End: 2022-06-23 | Stop reason: HOSPADM

## 2022-06-22 RX ORDER — MAG HYDROX/ALUMINUM HYD/SIMETH 200-200-20
30 SUSPENSION, ORAL (FINAL DOSE FORM) ORAL 4 TIMES DAILY PRN
Status: DISCONTINUED | OUTPATIENT
Start: 2022-06-22 | End: 2022-06-23 | Stop reason: HOSPADM

## 2022-06-22 RX ORDER — SODIUM CHLORIDE 9 MG/ML
INJECTION, SOLUTION INTRAVENOUS CONTINUOUS
Status: DISCONTINUED | OUTPATIENT
Start: 2022-06-22 | End: 2022-06-23 | Stop reason: HOSPADM

## 2022-06-22 RX ORDER — ACETAMINOPHEN 325 MG/1
650 TABLET ORAL EVERY 4 HOURS PRN
Status: DISCONTINUED | OUTPATIENT
Start: 2022-06-22 | End: 2022-06-23 | Stop reason: HOSPADM

## 2022-06-22 RX ORDER — ENOXAPARIN SODIUM 100 MG/ML
40 INJECTION SUBCUTANEOUS EVERY 24 HOURS
Status: DISCONTINUED | OUTPATIENT
Start: 2022-06-23 | End: 2022-06-23 | Stop reason: HOSPADM

## 2022-06-22 RX ORDER — POLYETHYLENE GLYCOL 3350 17 G/17G
17 POWDER, FOR SOLUTION ORAL DAILY
Status: DISCONTINUED | OUTPATIENT
Start: 2022-06-22 | End: 2022-06-23 | Stop reason: HOSPADM

## 2022-06-22 RX ORDER — SODIUM CHLORIDE 0.9 % (FLUSH) 0.9 %
.1-1 SYRINGE (ML) INJECTION
Status: DISCONTINUED | OUTPATIENT
Start: 2022-06-22 | End: 2022-06-23 | Stop reason: HOSPADM

## 2022-06-22 RX ORDER — SIMETHICONE 80 MG
1 TABLET,CHEWABLE ORAL 4 TIMES DAILY PRN
Status: DISCONTINUED | OUTPATIENT
Start: 2022-06-22 | End: 2022-06-23 | Stop reason: HOSPADM

## 2022-06-22 RX ORDER — FLUCONAZOLE 100 MG/1
100 TABLET ORAL ONCE
Status: COMPLETED | OUTPATIENT
Start: 2022-06-22 | End: 2022-06-22

## 2022-06-22 RX ORDER — TALC
6 POWDER (GRAM) TOPICAL NIGHTLY PRN
Status: DISCONTINUED | OUTPATIENT
Start: 2022-06-22 | End: 2022-06-23 | Stop reason: HOSPADM

## 2022-06-22 RX ORDER — LEVOTHYROXINE SODIUM 125 UG/1
125 TABLET ORAL
Status: DISCONTINUED | OUTPATIENT
Start: 2022-06-22 | End: 2022-06-23 | Stop reason: HOSPADM

## 2022-06-22 RX ORDER — NALOXONE HCL 0.4 MG/ML
0.02 VIAL (ML) INJECTION
Status: DISCONTINUED | OUTPATIENT
Start: 2022-06-22 | End: 2022-06-23 | Stop reason: HOSPADM

## 2022-06-22 RX ORDER — ESCITALOPRAM OXALATE 10 MG/1
20 TABLET ORAL DAILY
Status: DISCONTINUED | OUTPATIENT
Start: 2022-06-22 | End: 2022-06-23 | Stop reason: HOSPADM

## 2022-06-22 RX ORDER — ONDANSETRON 2 MG/ML
4 INJECTION INTRAMUSCULAR; INTRAVENOUS EVERY 6 HOURS PRN
Status: DISCONTINUED | OUTPATIENT
Start: 2022-06-22 | End: 2022-06-23 | Stop reason: HOSPADM

## 2022-06-22 RX ORDER — AMLODIPINE BESYLATE 10 MG/1
10 TABLET ORAL DAILY
Status: DISCONTINUED | OUTPATIENT
Start: 2022-06-22 | End: 2022-06-23 | Stop reason: HOSPADM

## 2022-06-22 RX ORDER — KETOROLAC TROMETHAMINE 30 MG/ML
15 INJECTION, SOLUTION INTRAMUSCULAR; INTRAVENOUS EVERY 6 HOURS
Status: DISCONTINUED | OUTPATIENT
Start: 2022-06-22 | End: 2022-06-23 | Stop reason: HOSPADM

## 2022-06-22 RX ORDER — HYDROCODONE BITARTRATE AND ACETAMINOPHEN 10; 325 MG/1; MG/1
1 TABLET ORAL EVERY 6 HOURS PRN
Status: DISCONTINUED | OUTPATIENT
Start: 2022-06-22 | End: 2022-06-23 | Stop reason: HOSPADM

## 2022-06-22 RX ORDER — CLONAZEPAM 0.5 MG/1
0.5 TABLET ORAL 2 TIMES DAILY PRN
Status: DISCONTINUED | OUTPATIENT
Start: 2022-06-22 | End: 2022-06-23 | Stop reason: HOSPADM

## 2022-06-22 RX ADMIN — GABAPENTIN 300 MG: 300 CAPSULE ORAL at 09:06

## 2022-06-22 RX ADMIN — VANCOMYCIN HYDROCHLORIDE 2500 MG: 500 INJECTION, POWDER, LYOPHILIZED, FOR SOLUTION INTRAVENOUS at 05:06

## 2022-06-22 RX ADMIN — GABAPENTIN 300 MG: 300 CAPSULE ORAL at 04:06

## 2022-06-22 RX ADMIN — ONDANSETRON 4 MG: 2 INJECTION INTRAMUSCULAR; INTRAVENOUS at 01:06

## 2022-06-22 RX ADMIN — ESCITALOPRAM OXALATE 20 MG: 10 TABLET ORAL at 09:06

## 2022-06-22 RX ADMIN — HYDROCODONE BITARTRATE AND ACETAMINOPHEN 1 TABLET: 10; 325 TABLET ORAL at 09:06

## 2022-06-22 RX ADMIN — FLUCONAZOLE 100 MG: 100 TABLET ORAL at 11:06

## 2022-06-22 RX ADMIN — KETOROLAC TROMETHAMINE 15 MG: 30 INJECTION, SOLUTION INTRAMUSCULAR at 04:06

## 2022-06-22 RX ADMIN — SODIUM CHLORIDE: 0.9 INJECTION, SOLUTION INTRAVENOUS at 03:06

## 2022-06-22 RX ADMIN — CEFTRIAXONE 2 G: 2 INJECTION, SOLUTION INTRAVENOUS at 04:06

## 2022-06-22 RX ADMIN — LEVOTHYROXINE SODIUM 125 MCG: 125 TABLET ORAL at 05:06

## 2022-06-22 RX ADMIN — KETOROLAC TROMETHAMINE 15 MG: 30 INJECTION, SOLUTION INTRAMUSCULAR at 11:06

## 2022-06-22 RX ADMIN — HYDROCODONE BITARTRATE AND ACETAMINOPHEN 1 TABLET: 10; 325 TABLET ORAL at 01:06

## 2022-06-22 RX ADMIN — AMLODIPINE BESYLATE 10 MG: 10 TABLET ORAL at 09:06

## 2022-06-22 RX ADMIN — ONDANSETRON 4 MG: 2 INJECTION INTRAMUSCULAR; INTRAVENOUS at 05:06

## 2022-06-22 NOTE — NURSING
0121 - While educating pt on Norco indication & possible s/e, pt informed me that she is nauseated as well. No orders noted. Secure chat sent to . Awaiting response.    01:30 - New order received for Zofran 4 mg IV q6h PRN & phenergan 12.5 mg IV q6h PRN per RASHMI Dubose NP. Educated pt on medication indication & possible s/e. Verbalized understanding.

## 2022-06-22 NOTE — PLAN OF CARE
Pt arrived to unit via stretcher.  Admitted from ED for meningitis.    No order for cardiac monitoring.   Educated on using call light; purpose of white board; importance of monitoring I/Os; pharmacy bedside delivery.   Aox4. Able to verbalize needs & follow commands. Calm & cooperative throughout shift.   POC reviewed with pt. Interventions implemented as appropriate.    VSS.  On room air.    PIV patent. Integrity maintained.  Skin WDI. *   C/O severe headache radiating to eyes. Alleviated w/ prn Norco 10/325 mg.   Continent of b/b. BRP.  Ambulates independently. Activity ad christopher. Frequent position changes encouraged.    NADN. Resting quietly in bed.   Free of falls. Hourly rounding complete.   All safety measures remain in place. SR up x2; bed low & locked. Call light w/in reach.   Will continue to monitor throughout shift.

## 2022-06-22 NOTE — H&P
[facility]  History & Physical    Patient Name: Haydee Pennington  MRN: [id number]  Patient Class: OP- Observation  Admission Date: 6/21/2022  Attending Physician: Pravin Piedra MD   Primary Care Provider: Gumaro Del Real MD         Patient information was obtained from patient and ER records.     Subjective:     Principal Problem:Meningitis    Chief Complaint:   Chief Complaint   Patient presents with    Headache     Patient presents with headache, lumbar back pain, and nausea. X1 day        HPI: Ms. Pennington came to the ED for headache, low back pain, and generalized body aches.  She denies fever.  The headache is throughout the head and is worse when she is standing upright.  Also with multiple episodes of nausea and vomiting.  She had a nerve block performed at the beginning of the month for chronic low back pain.  No other recent illness.        Past Medical History:   Diagnosis Date    Hypertension     Thyroid disease        Past Surgical History:   Procedure Laterality Date    CERVICAL BIOPSY  W/ LOOP ELECTRODE EXCISION  2005    INJECTION OF ANESTHETIC AGENT INTO SACROILIAC JOINT Right 6/3/2022    Procedure: right Sacroiliac Joint Injection with RN IV sedation;  Surgeon: Rey Monaslve MD;  Location: [facility] PAIN MGT;  Service: Pain Management;  Laterality: Right;    INJECTION OF JOINT Right 6/3/2022    Procedure: right GT bursa injection with RN IV sedation;  Surgeon: Rey Monsalve MD;  Location: [facility] PAIN MGT;  Service: Pain Management;  Laterality: Right;       Review of patient's allergies indicates:   Allergen Reactions    Sulfa (sulfonamide antibiotics) Hives       No current facility-administered medications on file prior to encounter.     Current Outpatient Medications on File Prior to Encounter   Medication Sig    amLODIPine (NORVASC) 10 MG tablet Take 10 mg by mouth once daily.    clonazePAM (KLONOPIN) 0.5 MG tablet Take 1 tablet (0.5 mg total) by mouth 2  (two) times daily as needed for Anxiety.    EScitalopram oxalate (LEXAPRO) 20 MG tablet Take 1 tablet (20 mg total) by mouth once daily.    gabapentin (NEURONTIN) 300 MG capsule Take 300 mg by mouth 3 (three) times daily.    levothyroxine (SYNTHROID) 125 MCG tablet Take 1 tablet (125 mcg total) by mouth before breakfast.    [DISCONTINUED] amLODIPine (NORVASC) 10 MG tablet Take 1 tablet (10 mg total) by mouth once daily.    [DISCONTINUED] carvediloL (COREG) 6.25 MG tablet Take 1 tablet (6.25 mg total) by mouth 2 (two) times daily with meals.    [DISCONTINUED] gabapentin (NEURONTIN) 300 MG capsule Take 1 capsule (300 mg total) by mouth every evening for 3 days, THEN 1 capsule (300 mg total) 2 (two) times daily for 3 days, THEN 1 capsule (300 mg total) 3 (three) times daily for 3 days, THEN 2 capsules (600 mg total) 2 (two) times daily for 3 days, THEN 2 capsules (600 mg total) 3 (three) times daily for 18 days.     Family History       Problem Relation (Age of Onset)    Dementia Father    Diabetes Mother    Heart failure Father    Hypertension Father    Transient ischemic attack Father          Tobacco Use    Smoking status: Current Every Day Smoker     Packs/day: 0.50     Years: 24.00     Pack years: 12.00     Types: Cigarettes     Start date: 1996    Smokeless tobacco: Never Used   Substance and Sexual Activity    Alcohol use: No    Drug use: Never    Sexual activity: Yes     Partners: Male     Birth control/protection: None     Review of Systems   Constitutional:  Negative for chills and fever.   HENT:  Negative for congestion and sore throat.    Eyes:  Negative for visual disturbance.   Respiratory:  Negative for cough, shortness of breath and wheezing.    Cardiovascular:  Negative for chest pain, palpitations and leg swelling.   Gastrointestinal:  Negative for abdominal pain, blood in stool, constipation, diarrhea, nausea and vomiting.   Genitourinary:  Negative for dysuria and hematuria.    Musculoskeletal:  Positive for back pain. Negative for arthralgias.   Skin:  Negative for rash and wound.   Neurological:  Positive for headaches. Negative for dizziness, weakness, light-headedness and numbness.   Hematological:  Negative for adenopathy.   Objective:     Vital Signs (Most Recent):  Temp: 100.1 °F (37.8 °C) (06/21/22 2034)  Pulse: 82 (06/21/22 2034)  Resp: 20 (06/21/22 2034)  BP: (!) 163/89 (06/21/22 2034)  SpO2: 98 % (06/21/22 2034)   Vital Signs (24h Range):  Temp:  [98.2 °F (36.8 °C)-100.1 °F (37.8 °C)] 100.1 °F (37.8 °C)  Pulse:  [69-92] 82  Resp:  [12-20] 20  SpO2:  [95 %-100 %] 98 %  BP: (110-163)/(62-93) 163/89     Weight: 106.3 kg (234 lb 5.6 oz)  Body mass index is 37.82 kg/m².    Physical Exam  Vitals and nursing note reviewed.   Constitutional:       General: She is not in acute distress.     Appearance: She is well-developed.   HENT:      Head: Normocephalic and atraumatic.   Eyes:      Conjunctiva/sclera: Conjunctivae normal.      Pupils: Pupils are equal, round, and reactive to light.   Neck:      Thyroid: No thyromegaly.      Vascular: No JVD.   Cardiovascular:      Rate and Rhythm: Normal rate and regular rhythm.      Heart sounds: No murmur heard.    No friction rub. No gallop.   Pulmonary:      Effort: Pulmonary effort is normal.      Breath sounds: Normal breath sounds. No wheezing or rales.   Abdominal:      General: Bowel sounds are normal. There is no distension.      Palpations: Abdomen is soft.      Tenderness: There is no abdominal tenderness. There is no guarding or rebound.   Musculoskeletal:         General: No deformity. Normal range of motion.      Cervical back: Neck supple.   Lymphadenopathy:      Cervical: No cervical adenopathy.   Skin:     General: Skin is warm and dry.      Findings: No rash.   Neurological:      Mental Status: She is alert and oriented to person, place, and time.      Deep Tendon Reflexes: Reflexes are normal and symmetric.   Psychiatric:          Behavior: Behavior normal.         Thought Content: Thought content normal.         Judgment: Judgment normal.         CRANIAL NERVES     CN III, IV, VI   Pupils are equal, round, and reactive to light.     Significant Labs: All pertinent labs within the past 24 hours have been reviewed.    Significant Imaging: I have reviewed all pertinent imaging results/findings within the past 24 hours.    Assessment/Plan:     * Meningitis  CSF showed high WBC count and protein.  Started IV Ceftriaxone.  Fluid sent for culture.  Gram stain negative and Glucose level was normal.      HTN (hypertension)  Continue home dose Amlodipine    Anxiety  Continue Escitalopram and Clonazepam as needed.    Hypothyroidism  Continue Levothyroxine      VTE Risk Mitigation (From admission, onward)         Ordered     enoxaparin injection 40 mg  Daily         06/22/22 0454     IP VTE HIGH RISK PATIENT  Once         06/22/22 0454     Place sequential compression device  Until discontinued         06/22/22 0454                   Jeff Serraon MD  Department of Hospital Medicine   [facility] - Telemetry ([facility])

## 2022-06-22 NOTE — SUBJECTIVE & OBJECTIVE
Past Medical History:   Diagnosis Date    Hypertension     Thyroid disease        Past Surgical History:   Procedure Laterality Date    CERVICAL BIOPSY  W/ LOOP ELECTRODE EXCISION  2005    INJECTION OF ANESTHETIC AGENT INTO SACROILIAC JOINT Right 6/3/2022    Procedure: right Sacroiliac Joint Injection with RN IV sedation;  Surgeon: Rey Monsalve MD;  Location: [facility] PAIN MGT;  Service: Pain Management;  Laterality: Right;    INJECTION OF JOINT Right 6/3/2022    Procedure: right GT bursa injection with RN IV sedation;  Surgeon: Rey Monsalve MD;  Location: [facility] PAIN MGT;  Service: Pain Management;  Laterality: Right;       Review of patient's allergies indicates:   Allergen Reactions    Sulfa (sulfonamide antibiotics) Hives       No current facility-administered medications on file prior to encounter.     Current Outpatient Medications on File Prior to Encounter   Medication Sig    amLODIPine (NORVASC) 10 MG tablet Take 10 mg by mouth once daily.    clonazePAM (KLONOPIN) 0.5 MG tablet Take 1 tablet (0.5 mg total) by mouth 2 (two) times daily as needed for Anxiety.    EScitalopram oxalate (LEXAPRO) 20 MG tablet Take 1 tablet (20 mg total) by mouth once daily.    gabapentin (NEURONTIN) 300 MG capsule Take 300 mg by mouth 3 (three) times daily.    levothyroxine (SYNTHROID) 125 MCG tablet Take 1 tablet (125 mcg total) by mouth before breakfast.    [DISCONTINUED] amLODIPine (NORVASC) 10 MG tablet Take 1 tablet (10 mg total) by mouth once daily.    [DISCONTINUED] carvediloL (COREG) 6.25 MG tablet Take 1 tablet (6.25 mg total) by mouth 2 (two) times daily with meals.    [DISCONTINUED] gabapentin (NEURONTIN) 300 MG capsule Take 1 capsule (300 mg total) by mouth every evening for 3 days, THEN 1 capsule (300 mg total) 2 (two) times daily for 3 days, THEN 1 capsule (300 mg total) 3 (three) times daily for 3 days, THEN 2 capsules (600 mg total) 2 (two) times daily for 3 days, THEN 2 capsules (600 mg total) 3 (three) times daily  for 18 days.     Family History       Problem Relation (Age of Onset)    Dementia Father    Diabetes Mother    Heart failure Father    Hypertension Father    Transient ischemic attack Father          Tobacco Use    Smoking status: Current Every Day Smoker     Packs/day: 0.50     Years: 24.00     Pack years: 12.00     Types: Cigarettes     Start date: 1996    Smokeless tobacco: Never Used   Substance and Sexual Activity    Alcohol use: No    Drug use: Never    Sexual activity: Yes     Partners: Male     Birth control/protection: None     Review of Systems   Constitutional:  Negative for chills and fever.   HENT:  Negative for congestion and sore throat.    Eyes:  Negative for visual disturbance.   Respiratory:  Negative for cough, shortness of breath and wheezing.    Cardiovascular:  Negative for chest pain, palpitations and leg swelling.   Gastrointestinal:  Negative for abdominal pain, blood in stool, constipation, diarrhea, nausea and vomiting.   Genitourinary:  Negative for dysuria and hematuria.   Musculoskeletal:  Positive for back pain. Negative for arthralgias.   Skin:  Negative for rash and wound.   Neurological:  Positive for headaches. Negative for dizziness, weakness, light-headedness and numbness.   Hematological:  Negative for adenopathy.   Objective:     Vital Signs (Most Recent):  Temp: 100.1 °F (37.8 °C) (06/21/22 2034)  Pulse: 82 (06/21/22 2034)  Resp: 20 (06/21/22 2034)  BP: (!) 163/89 (06/21/22 2034)  SpO2: 98 % (06/21/22 2034)   Vital Signs (24h Range):  Temp:  [98.2 °F (36.8 °C)-100.1 °F (37.8 °C)] 100.1 °F (37.8 °C)  Pulse:  [69-92] 82  Resp:  [12-20] 20  SpO2:  [95 %-100 %] 98 %  BP: (110-163)/(62-93) 163/89     Weight: 106.3 kg (234 lb 5.6 oz)  Body mass index is 37.82 kg/m².    Physical Exam  Vitals and nursing note reviewed.   Constitutional:       General: She is not in acute distress.     Appearance: She is well-developed.   HENT:      Head: Normocephalic and atraumatic.   Eyes:       Conjunctiva/sclera: Conjunctivae normal.      Pupils: Pupils are equal, round, and reactive to light.   Neck:      Thyroid: No thyromegaly.      Vascular: No JVD.   Cardiovascular:      Rate and Rhythm: Normal rate and regular rhythm.      Heart sounds: No murmur heard.    No friction rub. No gallop.   Pulmonary:      Effort: Pulmonary effort is normal.      Breath sounds: Normal breath sounds. No wheezing or rales.   Abdominal:      General: Bowel sounds are normal. There is no distension.      Palpations: Abdomen is soft.      Tenderness: There is no abdominal tenderness. There is no guarding or rebound.   Musculoskeletal:         General: No deformity. Normal range of motion.      Cervical back: Neck supple.   Lymphadenopathy:      Cervical: No cervical adenopathy.   Skin:     General: Skin is warm and dry.      Findings: No rash.   Neurological:      Mental Status: She is alert and oriented to person, place, and time.      Deep Tendon Reflexes: Reflexes are normal and symmetric.   Psychiatric:         Behavior: Behavior normal.         Thought Content: Thought content normal.         Judgment: Judgment normal.         CRANIAL NERVES     CN III, IV, VI   Pupils are equal, round, and reactive to light.     Significant Labs: All pertinent labs within the past 24 hours have been reviewed.    Significant Imaging: I have reviewed all pertinent imaging results/findings within the past 24 hours.

## 2022-06-22 NOTE — NURSING
01:16 - Upon entering pt's room during rounds she was in the bathroom crying. Stated she has a severe headache; rated 10/10. No orders noted. Secure chat sent to . Awaiting response.    01:18 - New order received for Norco 10/325 mg q6h PRN per RASHMI Dubose NP. Educated pt on medication indication & possible s/e. Verbalized understanding.

## 2022-06-22 NOTE — CARE UPDATE
Patient seen and examined. Stable at this time. Awake, alert, appropriate.  Still with complaints of headache and nausea. Patient discussed with Dr Smiley-  continue IV rocephin and await culture results.

## 2022-06-22 NOTE — HOSPITAL COURSE
The patient was monitored closely during her stay. She was continued on IV ABx. ID was consulted. She was given IVF for hydration.     6/22- Patient seen and examined. Stable at this time. Awake, alert, appropriate.  Still with complaints of headache and nausea. Patient discussed with Dr Smiley-  continue IV rocephin and await culture results.      6/23- pt looks very well, comfortably sitting up in bed, eating drinking well, walking around well. Just had her shower. VSS, has remained afebrile since admission. Exam is benign, neck is supple, no tenderness or stiffness FEDE, EOMI B. All Cx have remained neg, no leukocytosis, likely had Viral or Aseptic Meningitis from which she appears to have recovered well and all her Sx have resolved or greatly improved. She also had mild Cystitis on admission which has been adequately treated with three days of IV Rocephin as well. Pt was seen and examined and deemed stable for discharge home today.

## 2022-06-22 NOTE — ASSESSMENT & PLAN NOTE
CSF showed high WBC count and protein.  Started IV Ceftriaxone.  Fluid sent for culture.  Gram stain negative and Glucose level was normal.      6/22 Discussed with Dr Smiley continue IV rocephin. Await culture results.

## 2022-06-22 NOTE — ASSESSMENT & PLAN NOTE
CSF showed high WBC count and protein.  Started IV Ceftriaxone.  Fluid sent for culture.  Gram stain negative and Glucose level was normal.

## 2022-06-22 NOTE — PHARMACY MED REC
"Admission Medication History     The home medication history was taken by Beto Lamb.    You may go to "Admission" then "Reconcile Home Medications" tabs to review and/or act upon these items.      The home medication list has been updated by the Pharmacy department.    Please read ALL comments highlighted in yellow.    Please address this information as you see fit.     Feel free to contact us if you have any questions or require assistance.      The medications listed below were removed from the home medication list. Please reorder if appropriate:  Patient reports no longer taking the following medication(s):   Carvedilol 6.25 mg tablet   Hydrochlorothiazide 25 mg tablet    Medications listed below were obtained from: Patient/family, Analytic software- [employer] and Patient's pharmacy  (Not in a hospital admission)      Potential issues to be addressed PRIOR TO DISCHARGE: NONE    Beto Lamb CPhT  Pharmacy Technician Specialist-Medication History  [facility] [phone]  Secure chat preferred.     Current Outpatient Medications on File Prior to Encounter   Medication Sig Dispense Refill Last Dose    amLODIPine (NORVASC) 10 MG tablet Take 10 mg by mouth once daily.   6/20/2022 at Unknown time    clonazePAM (KLONOPIN) 0.5 MG tablet Take 1 tablet (0.5 mg total) by mouth 2 (two) times daily as needed for Anxiety. 30 tablet 0 Past Week at Unknown time    EScitalopram oxalate (LEXAPRO) 20 MG tablet Take 1 tablet (20 mg total) by mouth once daily. 90 tablet 1 6/20/2022 at Unknown time    gabapentin (NEURONTIN) 300 MG capsule Take 300 mg by mouth 3 (three) times daily.   6/20/2022 at Unknown time    levothyroxine (SYNTHROID) 125 MCG tablet Take 1 tablet (125 mcg total) by mouth before breakfast. 90 tablet 1 6/20/2022 at Unknown time    [DISCONTINUED] carvediloL (COREG) 6.25 MG tablet Take 1 tablet (6.25 mg total) by mouth 2 (two) times daily with meals. 180 tablet 1  "                             .

## 2022-06-22 NOTE — PLAN OF CARE
Received bedside shift report , pt in with n/v r/t to present diagnosis. POC; ABT, IVF and monitor for any changes in neurological status. Chart checked. Pt is on ABT, IVF and no acute changes in neurological status.

## 2022-06-22 NOTE — PROGRESS NOTES
Pharmacokinetic Initial Assessment: IV Vancomycin    Assessment/Plan:    Initiate intravenous vancomycin with loading dose of 2500 mg once followed by a maintenance dose of vancomycin 1750 mg IV every 12 hours  Desired empiric serum trough concentration is 15 to 20 mcg/mL  Draw vancomycin trough level 60 min prior to fourth dose on 6/24 at approximately 0415  Pharmacy will continue to follow and monitor vancomycin.      Please contact pharmacy at extension [phone] with any questions regarding this assessment.     Thank you for the consult,   Елена Ambrizson       Patient brief summary:  Haydee Pennington is a 37 y.o. female initiated on antimicrobial therapy with IV Vancomycin for treatment of suspected meningitis    Drug Allergies:   Review of patient's allergies indicates:   Allergen Reactions    Sulfa (sulfonamide antibiotics) Hives       Actual Body Weight:   107.1 kg    Renal Function:   Estimated Creatinine Clearance: 132.5 mL/min (based on SCr of 0.7 mg/dL).,     Dialysis Method (if applicable):  N/A    CBC (last 72 hours):  Recent Labs   Lab Result Units 06/20/22 2230 06/22/22  0519   WBC K/uL 21.35* 9.03   Hemoglobin g/dL 16.2* 16.0   Hematocrit % 47.7 48.3   Platelets K/uL 401 219   Gran % % 85.7* 64.6   Lymph % % 8.2* 23.9   Mono % % 5.2 10.6   Eosinophil % % 0.1 0.3   Basophil % % 0.4 0.4   Differential Method  Automated Automated       Metabolic Panel (last 72 hours):  Recent Labs   Lab Result Units 06/20/22 2230 06/21/22  0318 06/21/22  1443 06/22/22  0519   Sodium mmol/L 140  --   --  137   Potassium mmol/L 3.9  --   --  3.7   Chloride mmol/L 104  --   --  104   CO2 mmol/L 23  --   --  24   Glucose mg/dL 131*  --   --  95   Glucose, CSF mg/dL  --   --  56  --    Glucose, UA   --  Negative  --   --    BUN mg/dL 10  --   --  8   Creatinine mg/dL 0.9  --   --  0.7   Albumin g/dL 4.2  --   --  3.1*   Total Bilirubin mg/dL 1.1*  --   --   --    Alkaline Phosphatase U/L 88  --   --   --    AST U/L 14  --    --   --    ALT U/L 16  --   --   --    Phosphorus mg/dL  --   --   --  3.2       Drug levels (last 3 results):  No results for input(s): VANCOMYCINRA, VANCORANDOM, VANCOMYCINPE, VANCOPEAK, VANCOMYCINTR, VANCOTROUGH in the last 72 hours.    Microbiologic Results:  Microbiology Results (last 7 days)       Procedure Component Value Units Date/Time    Blood culture [[id number]] Collected: 06/22/22 1018    Order Status: Sent Specimen: Blood Updated: 06/22/22 1442    Blood culture [[id number]] Collected: 06/22/22 1018    Order Status: Sent Specimen: Blood Updated: 06/22/22 1442    CSF culture and Gram Stain (Tube 2) [[id number]] Collected: 06/21/22 1456    Order Status: Completed Specimen: CSF (Spinal Fluid) from CSF Tap, Tube 2 Updated: 06/21/22 2050     Gram Stain Result Cytospin indicates: No organisms seen Many WBC's    Narrative:      On which sequentially labeled tube should this analysis be  performed?->2

## 2022-06-22 NOTE — HPI
Ms. Pennington came to the ED for headache, low back pain, and generalized body aches.  She denies fever.  The headache is throughout the head and is worse when she is standing upright.  Also with multiple episodes of nausea and vomiting.  She had a nerve block performed at the beginning of the month for chronic low back pain.  No other recent illness.

## 2022-06-23 VITALS
SYSTOLIC BLOOD PRESSURE: 148 MMHG | HEART RATE: 60 BPM | DIASTOLIC BLOOD PRESSURE: 82 MMHG | BODY MASS INDEX: 39.34 KG/M2 | HEIGHT: 65 IN | RESPIRATION RATE: 17 BRPM | TEMPERATURE: 98 F | OXYGEN SATURATION: 100 % | WEIGHT: 236.13 LBS

## 2022-06-23 PROBLEM — A41.9 SEPSIS: Status: RESOLVED | Noted: 2022-06-22 | Resolved: 2022-06-23

## 2022-06-23 PROBLEM — G03.0 ASEPTIC MENINGITIS: Status: RESOLVED | Noted: 2022-06-22 | Resolved: 2022-06-23

## 2022-06-23 PROBLEM — E88.09 HYPOALBUMINEMIA: Status: RESOLVED | Noted: 2022-06-22 | Resolved: 2022-06-23

## 2022-06-23 PROBLEM — G03.9 MENINGITIS: Status: RESOLVED | Noted: 2022-06-22 | Resolved: 2022-06-23

## 2022-06-23 PROBLEM — R17 ELEVATED BILIRUBIN: Status: RESOLVED | Noted: 2022-06-22 | Resolved: 2022-06-23

## 2022-06-23 LAB
ALBUMIN SERPL BCP-MCNC: 2.8 G/DL (ref 3.5–5.2)
ALP SERPL-CCNC: 58 U/L (ref 55–135)
ALT SERPL W/O P-5'-P-CCNC: 13 U/L (ref 10–44)
ANION GAP SERPL CALC-SCNC: 11 MMOL/L (ref 8–16)
AST SERPL-CCNC: 10 U/L (ref 10–40)
BASOPHILS # BLD AUTO: 0.08 K/UL (ref 0–0.2)
BASOPHILS NFR BLD: 0.9 % (ref 0–1.9)
BILIRUB SERPL-MCNC: 0.4 MG/DL (ref 0.1–1)
BUN SERPL-MCNC: 7 MG/DL (ref 6–20)
CALCIUM SERPL-MCNC: 8.5 MG/DL (ref 8.7–10.5)
CHLORIDE SERPL-SCNC: 104 MMOL/L (ref 95–110)
CO2 SERPL-SCNC: 23 MMOL/L (ref 23–29)
CREAT SERPL-MCNC: 0.7 MG/DL (ref 0.5–1.4)
DIFFERENTIAL METHOD: NORMAL
EOSINOPHIL # BLD AUTO: 0.2 K/UL (ref 0–0.5)
EOSINOPHIL NFR BLD: 1.9 % (ref 0–8)
ERYTHROCYTE [DISTWIDTH] IN BLOOD BY AUTOMATED COUNT: 12.5 % (ref 11.5–14.5)
EST. GFR  (AFRICAN AMERICAN): >60 ML/MIN/1.73 M^2
EST. GFR  (NON AFRICAN AMERICAN): >60 ML/MIN/1.73 M^2
GLUCOSE SERPL-MCNC: 104 MG/DL (ref 70–110)
HCT VFR BLD AUTO: 40.5 % (ref 37–48.5)
HGB BLD-MCNC: 13.4 G/DL (ref 12–16)
IMM GRANULOCYTES # BLD AUTO: 0.01 K/UL (ref 0–0.04)
IMM GRANULOCYTES NFR BLD AUTO: 0.1 % (ref 0–0.5)
LYMPHOCYTES # BLD AUTO: 2.4 K/UL (ref 1–4.8)
LYMPHOCYTES NFR BLD: 25.9 % (ref 18–48)
MAGNESIUM SERPL-MCNC: 1.9 MG/DL (ref 1.6–2.6)
MCH RBC QN AUTO: 30.7 PG (ref 27–31)
MCHC RBC AUTO-ENTMCNC: 33.1 G/DL (ref 32–36)
MCV RBC AUTO: 93 FL (ref 82–98)
MONOCYTES # BLD AUTO: 0.8 K/UL (ref 0.3–1)
MONOCYTES NFR BLD: 8.3 % (ref 4–15)
NEUTROPHILS # BLD AUTO: 5.9 K/UL (ref 1.8–7.7)
NEUTROPHILS NFR BLD: 62.9 % (ref 38–73)
NRBC BLD-RTO: 0 /100 WBC
PLATELET # BLD AUTO: 280 K/UL (ref 150–450)
PMV BLD AUTO: 11 FL (ref 9.2–12.9)
POTASSIUM SERPL-SCNC: 3.5 MMOL/L (ref 3.5–5.1)
PROT SERPL-MCNC: 5.3 G/DL (ref 6–8.4)
RBC # BLD AUTO: 4.36 M/UL (ref 4–5.4)
SODIUM SERPL-SCNC: 138 MMOL/L (ref 136–145)
WBC # BLD AUTO: 9.37 K/UL (ref 3.9–12.7)

## 2022-06-23 PROCEDURE — 36415 COLL VENOUS BLD VENIPUNCTURE: CPT | Performed by: NURSE PRACTITIONER

## 2022-06-23 PROCEDURE — 85025 COMPLETE CBC W/AUTO DIFF WBC: CPT | Performed by: NURSE PRACTITIONER

## 2022-06-23 PROCEDURE — 25000003 PHARM REV CODE 250: Performed by: NURSE PRACTITIONER

## 2022-06-23 PROCEDURE — 63600175 PHARM REV CODE 636 W HCPCS: Performed by: EMERGENCY MEDICINE

## 2022-06-23 PROCEDURE — 94761 N-INVAS EAR/PLS OXIMETRY MLT: CPT

## 2022-06-23 PROCEDURE — 80053 COMPREHEN METABOLIC PANEL: CPT | Performed by: NURSE PRACTITIONER

## 2022-06-23 PROCEDURE — 25000003 PHARM REV CODE 250: Performed by: INTERNAL MEDICINE

## 2022-06-23 PROCEDURE — 63600175 PHARM REV CODE 636 W HCPCS: Performed by: NURSE PRACTITIONER

## 2022-06-23 PROCEDURE — 83735 ASSAY OF MAGNESIUM: CPT | Performed by: NURSE PRACTITIONER

## 2022-06-23 PROCEDURE — 25000003 PHARM REV CODE 250: Performed by: EMERGENCY MEDICINE

## 2022-06-23 RX ORDER — BUTALBITAL, ACETAMINOPHEN AND CAFFEINE 50; 325; 40 MG/1; MG/1; MG/1
1 TABLET ORAL EVERY 8 HOURS PRN
Qty: 120 TABLET | Refills: 0 | Status: SHIPPED | OUTPATIENT
Start: 2022-06-23 | End: 2022-07-23

## 2022-06-23 RX ADMIN — GABAPENTIN 300 MG: 300 CAPSULE ORAL at 09:06

## 2022-06-23 RX ADMIN — AMLODIPINE BESYLATE 10 MG: 10 TABLET ORAL at 09:06

## 2022-06-23 RX ADMIN — ESCITALOPRAM OXALATE 20 MG: 10 TABLET ORAL at 09:06

## 2022-06-23 RX ADMIN — KETOROLAC TROMETHAMINE 15 MG: 30 INJECTION, SOLUTION INTRAMUSCULAR at 05:06

## 2022-06-23 RX ADMIN — LEVOTHYROXINE SODIUM 125 MCG: 125 TABLET ORAL at 05:06

## 2022-06-23 RX ADMIN — SODIUM CHLORIDE: 0.9 INJECTION, SOLUTION INTRAVENOUS at 05:06

## 2022-06-23 RX ADMIN — VANCOMYCIN HYDROCHLORIDE 1750 MG: 10 INJECTION, POWDER, LYOPHILIZED, FOR SOLUTION INTRAVENOUS at 05:06

## 2022-06-23 RX ADMIN — KETOROLAC TROMETHAMINE 15 MG: 30 INJECTION, SOLUTION INTRAMUSCULAR at 12:06

## 2022-06-23 RX ADMIN — POLYETHYLENE GLYCOL 3350 17 G: 17 POWDER, FOR SOLUTION ORAL at 09:06

## 2022-06-23 RX ADMIN — CEFTRIAXONE 2 G: 2 INJECTION, SOLUTION INTRAVENOUS at 02:06

## 2022-06-23 NOTE — PLAN OF CARE
Pt condition adequate for discharge at this time. Reviewed discharge paperwork with patient. All follow up appointments made. IV removed. Bedside rx given to pt. Pt awaiting ride home.

## 2022-06-23 NOTE — DISCHARGE SUMMARY
[facility] Medicine  Discharge Summary      Patient Name: Haydee Pennington  MRN: [id number]  Patient Class: IP- Inpatient  Admission Date: 6/21/2022  Hospital Length of Stay: 1 days  Discharge Date and Time:  06/23/2022 1:08 PM  Attending Physician: Sandip Smiley MD   Discharging Provider: Sandip Smiley MD  Primary Care Provider: Gumaro Del Real MD      HPI:   Ms. Pennington came to the ED for headache, low back pain, and generalized body aches.  She denies fever.  The headache is throughout the head and is worse when she is standing upright.  Also with multiple episodes of nausea and vomiting.  She had a nerve block performed at the beginning of the month for chronic low back pain.  No other recent illness.        * No surgery found *      Hospital Course:   The patient was monitored closely during her stay. She was continued on IV ABx. ID was consulted. She was given IVF for hydration.     6/22- Patient seen and examined. Stable at this time. Awake, alert, appropriate.  Still with complaints of headache and nausea. Patient discussed with Dr Smiley-  continue IV rocephin and await culture results.      6/23- pt looks very well, comfortably sitting up in bed, eating drinking well, walking around well. Just had her shower. VSS, has remained afebrile since admission. Exam is benign, neck is supple, no tenderness or stiffness FEDE, EOMI B. All Cx have remained neg, no leukocytosis, likely had Viral or Aseptic Meningitis from which she appears to have recovered well and all her Sx have resolved or greatly improved. She also had mild Cystitis on admission which has been adequately treated with three days of IV Rocephin as well. Pt was seen and examined and deemed stable for discharge home today.        Goals of Care Treatment Preferences:  Code Status: Full Code      Consults:   Consults (From admission, onward)        Status Ordering Provider     Pharmacy to dose Vancomycin consult  Once       "  Provider:  (Not yet assigned)   "And" Linked Group Details    Acknowledged JOSE CORTES     Inpatient consult to Infectious Diseases  Once        Provider:  Jareth James MD    Acknowledged JOSE CORTES          * Aseptic meningitis-resolved as of 6/23/2022  CSF showed high WBC count and protein.  Started IV Ceftriaxone.  Fluid sent for culture.  Gram stain negative and Glucose level was normal.      6/22 Discussed with Dr Smiley continue IV rocephin. Await culture results.       Final Active Diagnoses:    Diagnosis Date Noted POA    HTN (hypertension) [I10] 05/14/2020 Yes    Anxiety [F41.9] 01/12/2020 Yes    Hypothyroidism [E03.9] 11/02/2012 Yes      Problems Resolved During this Admission:    Diagnosis Date Noted Date Resolved POA    PRINCIPAL PROBLEM:  Aseptic meningitis [G03.0] 06/22/2022 06/23/2022 Yes    Severe  sepsis secondary to meningitis [A41.9] 06/22/2022 06/23/2022 Yes    Hypoalbuminemia [E88.09] 06/22/2022 06/23/2022 Yes    Elevated bilirubin [R17] 06/22/2022 06/23/2022 Yes       Discharged Condition: stable    Disposition: Home or Self Care    Follow Up:   Follow-up Information     Gumaro Del Real MD. Schedule an appointment as soon as possible for a visit in 3 day(s).    Specialty: Family Medicine  Why: Hospital follow up, As needed  Contact information:  [address] [facility] [address] [phone]                       Patient Instructions:      Diet Cardiac     Activity as tolerated       Significant Diagnostic Studies: Labs:   BMP:   Recent Labs   Lab 06/22/22 0519 06/23/22 0628   GLU 95 104    138   K 3.7 3.5    104   CO2 24 23   BUN 8 7   CREATININE 0.7 0.7   CALCIUM 8.7 8.5*   MG  --  1.9   , CMP   Recent Labs   Lab 06/22/22 0519 06/23/22 0628    138   K 3.7 3.5    104   CO2 24 23   GLU 95 104   BUN 8 7   CREATININE 0.7 0.7   CALCIUM 8.7 8.5*   PROT  --  5.3*   ALBUMIN 3.1* 2.8*   BILITOT  --  0.4   ALKPHOS  --  58   AST  --  " 10   ALT  --  13   ANIONGAP 9 11   ESTGFRAFRICA >60 >60   EGFRNONAA >60 >60   , CBC   Recent Labs   Lab 06/22/22  0519 06/23/22  0628   WBC 9.03 9.37   HGB 16.0 13.4   HCT 48.3 40.5    280    and All labs within the past 24 hours have been reviewed  Microbiology:   CSF culture and Gram Stain (Tube 2) [[id number]] Collected: 06/21/22 1456   Order Status: Completed Specimen: CSF (Spinal Fluid) from CSF Tap, Tube 2 Updated: 06/23/22 0629    CSF CULTURE No Growth to date    Gram Stain Result Cytospin indicates: No organisms seen Many WBC's   Narrative:         Blood Culture   Lab Results   Component Value Date    LABBLOO No Growth to date 06/22/2022    LABBLOO No Growth to date 06/22/2022    and   Radiology:   Imaging Results          CT Head Without Contrast (Final result)  Result time 06/21/22 13:56:45    Final result by Kristen Roth MD (06/21/22 13:56:45)                 Impression:      No acute intracranial abnormality.      Electronically signed by: Kristen Roth  Date:    06/21/2022  Time:    13:56             Narrative:    EXAMINATION:  CT HEAD WITHOUT CONTRAST    CLINICAL HISTORY:  HA - intractable;    TECHNIQUE:  Low dose axial CT images obtained throughout the head without intravenous contrast. Sagittal and coronal reconstructions were performed.  All CT scans at this facility are performed using dose optimization techniques including the following: automated exposure control; adjustment of the mA and/or kV; use of iterative reconstruction technique.    COMPARISON:  CT head without contrast 05/13/2020    FINDINGS:  Intracranial compartment:    Ventricles and sulci are normal in size for age without evidence of hydrocephalus. No extra-axial blood or fluid collections.    The brain parenchyma appears normal. No parenchymal mass, hemorrhage, edema or major vascular distribution infarct.    Skull/extracranial contents (limited evaluation): No fracture. Mastoid air cells and paranasal sinuses  are essentially clear.                               CT Renal Stone Study ABD Pelvis WO (Final result)  Result time 06/21/22 07:13:17    Final result by Kristen Roth MD (06/21/22 07:13:17)                 Impression:      No nephrolithiasis or obstructive uropathy.    Hyperdensity within the gallbladder suggests biliary sludge without calcified stone or significant inflammatory change.    Hepatomegaly.      Electronically signed by: Kristen Roth  Date:    06/21/2022  Time:    07:13             Narrative:    EXAMINATION:  CT RENAL STONE STUDY ABD PELVIS WO    CLINICAL HISTORY:  Flank pain, kidney stone suspected;    TECHNIQUE:  Low dose axial images, sagittal and coronal reformations were obtained from the lung bases to the pubic symphysis, Oral contrast was not administered.  All CT scans at this facility are performed using dose optimization techniques including the following: automated exposure control; adjustment of the mA and/or kV; use of iterative reconstruction technique.    COMPARISON:  None    FINDINGS:  Heart: Normal in size. No pericardial effusion.    Lung Bases: Mild bandlike density at the lung bases suggest subsegmental atelectasis or fibrosis.    Liver: Liver is enlarged measuring 18 cm in craniocaudal dimension.  Hepatic parenchyma is normal in attenuation without focal hepatic abnormality evident on this noncontrast exam.    Gallbladder: Layering hyperdensity within the gallbladder is suggestive of biliary sludge.  No calcified stones or significant inflammatory change.    Bile Ducts: No evidence of dilated ducts.    Pancreas: No mass or peripancreatic fat stranding.    Spleen: Unremarkable.    Adrenals: Unremarkable.    Kidneys/ Ureters: Unremarkable.    Bladder: No evidence of wall thickening.    Reproductive organs: Unremarkable.    GI Tract/Mesentery: No evidence of bowel obstruction or inflammation.  The appendix is normal.    Peritoneal Space: No ascites. No free  air.    Retroperitoneum: No significant adenopathy.    Abdominal wall: Unremarkable.    Vasculature: No significant atherosclerosis or aneurysm.    Bones: No acute fracture.  There is minimal degenerative change of the spine.                               RADIOLOGY REPORT (Final result)  Result time 06/22/22 15:56:56    Final result by Unknown User (06/22/22 15:56:56)                                  Pending Diagnostic Studies:     Procedure Component Value Units Date/Time    Freeze and Hold, [facility] [[id number]] Collected: 06/21/22 1443    Order Status: Sent Lab Status: No result     Specimen: CSF (Spinal Fluid) from Cerebrospinal Fluid          Medications:   Reconciled Home Medications:      Medication List      START taking these medications    butalbital-acetaminophen-caffeine -40 mg -40 mg per tablet  Commonly known as: FIORICET, ESGIC  Take 1 tablet by mouth every 8 (eight) hours as needed for Headaches.        CONTINUE taking these medications    amLODIPine 10 MG tablet  Commonly known as: NORVASC  Take 10 mg by mouth once daily.     clonazePAM 0.5 MG tablet  Commonly known as: KlonoPIN  Take 1 tablet (0.5 mg total) by mouth 2 (two) times daily as needed for Anxiety.     EScitalopram oxalate 20 MG tablet  Commonly known as: LEXAPRO  Take 1 tablet (20 mg total) by mouth once daily.     gabapentin 300 MG capsule  Commonly known as: NEURONTIN  Take 300 mg by mouth 3 (three) times daily.     levothyroxine 125 MCG tablet  Commonly known as: SYNTHROID  Take 1 tablet (125 mcg total) by mouth before breakfast.            Indwelling Lines/Drains at time of discharge:   Lines/Drains/Airways     None                 Time spent on the discharge of patient: 45 minutes         Sandip Smiley MD  Department of Hospital Medicine  '[facility] ([facility])

## 2022-06-23 NOTE — PLAN OF CARE
[facility] - Telemetry (Hospital)  Discharge Final Note    Primary Care Provider: Gumaro Del Real MD    Expected Discharge Date: 6/23/2022    Final Discharge Note (most recent)     Final Note - 06/23/22 1344        Final Note    Assessment Type Final Discharge Note     Anticipated Discharge Disposition Home or Self Care     Hospital Resources/Appts/Education Provided Appointments scheduled by Navigator/Coordinator;Appointments scheduled and added to AVS        Post-Acute Status    Discharge Delays None known at this time                 Important Message from Medicare             Contact Info     Gumaro Del Real MD   Specialty: Family Medicine   Relationship: PCP - General    [address] [place] [id number]   Phone: [phone]       Next Steps: Schedule an appointment as soon as possible for a visit in 3 day(s)    Instructions: Hospital follow up, As needed

## 2022-06-23 NOTE — PLAN OF CARE
Pt remains fall free this shift.  Pt AAOx4, verbal, clear speech.  Skin warm and dry. No new skin issues.  Room air  Up to toilet   Independent with transfers.  0.9 Nacl @ 125   Q4 neuro checks   Droplet precautions   IV abx   Bed low, side rails up x 2, wheels locked, call light in reach.  Bed alarm maintained for safety.  Patient instructed to call for assistance.  Hourly rounding completed.  24 hour chart check completed  POC updated and reviewed with pt. Will continue POC.

## 2022-06-24 ENCOUNTER — PATIENT OUTREACH (OUTPATIENT)
Dept: ADMINISTRATIVE | Facility: HOSPITAL | Age: 37
End: 2022-06-24
Payer: COMMERCIAL

## 2022-06-24 NOTE — PROGRESS NOTES
Called patient to confirm hospital follow up scheduled on 6/27/2022.   Patient is confirmed. Encouraged patient to bring all medications and BP cuff to follow up visit.

## 2022-06-27 LAB
BACTERIA BLD CULT: NORMAL
BACTERIA BLD CULT: NORMAL
BACTERIA CSF CULT: NO GROWTH
GRAM STN SPEC: NORMAL

## 2022-06-28 ENCOUNTER — PATIENT OUTREACH (OUTPATIENT)
Dept: ADMINISTRATIVE | Facility: HOSPITAL | Age: 37
End: 2022-06-28
Payer: COMMERCIAL

## 2022-06-28 ENCOUNTER — OFFICE VISIT (OUTPATIENT)
Dept: INTERNAL MEDICINE | Facility: CLINIC | Age: 37
End: 2022-06-28
Payer: COMMERCIAL

## 2022-06-28 VITALS
WEIGHT: 228.38 LBS | BODY MASS INDEX: 38.05 KG/M2 | HEIGHT: 65 IN | DIASTOLIC BLOOD PRESSURE: 74 MMHG | OXYGEN SATURATION: 97 % | HEART RATE: 83 BPM | TEMPERATURE: 98 F | RESPIRATION RATE: 18 BRPM | SYSTOLIC BLOOD PRESSURE: 108 MMHG

## 2022-06-28 DIAGNOSIS — E03.9 HYPOTHYROIDISM, UNSPECIFIED TYPE: ICD-10-CM

## 2022-06-28 DIAGNOSIS — G89.29 CHRONIC RIGHT-SIDED LOW BACK PAIN WITH SCIATICA, SCIATICA LATERALITY UNSPECIFIED: ICD-10-CM

## 2022-06-28 DIAGNOSIS — R11.0 NAUSEA: ICD-10-CM

## 2022-06-28 DIAGNOSIS — M54.40 CHRONIC RIGHT-SIDED LOW BACK PAIN WITH SCIATICA, SCIATICA LATERALITY UNSPECIFIED: ICD-10-CM

## 2022-06-28 DIAGNOSIS — I10 HYPERTENSION, UNSPECIFIED TYPE: ICD-10-CM

## 2022-06-28 DIAGNOSIS — M70.61 GREATER TROCHANTERIC BURSITIS OF RIGHT HIP: ICD-10-CM

## 2022-06-28 DIAGNOSIS — E88.09 HYPOALBUMINEMIA: ICD-10-CM

## 2022-06-28 DIAGNOSIS — G03.0 ASEPTIC MENINGITIS: ICD-10-CM

## 2022-06-28 DIAGNOSIS — Z09 HOSPITAL DISCHARGE FOLLOW-UP: Primary | ICD-10-CM

## 2022-06-28 DIAGNOSIS — R51.9 NONINTRACTABLE HEADACHE, UNSPECIFIED CHRONICITY PATTERN, UNSPECIFIED HEADACHE TYPE: ICD-10-CM

## 2022-06-28 DIAGNOSIS — F41.9 ANXIETY: ICD-10-CM

## 2022-06-28 PROCEDURE — 99999 PR PBB SHADOW E&M-EST. PATIENT-LVL IV: CPT | Mod: PBBFAC,,, | Performed by: PHYSICIAN ASSISTANT

## 2022-06-28 PROCEDURE — 99999 PR PBB SHADOW E&M-EST. PATIENT-LVL IV: ICD-10-PCS | Mod: PBBFAC,,, | Performed by: PHYSICIAN ASSISTANT

## 2022-06-28 PROCEDURE — 99214 OFFICE O/P EST MOD 30 MIN: CPT | Mod: S$GLB,,, | Performed by: PHYSICIAN ASSISTANT

## 2022-06-28 PROCEDURE — 99214 PR OFFICE/OUTPT VISIT, EST, LEVL IV, 30-39 MIN: ICD-10-PCS | Mod: S$GLB,,, | Performed by: PHYSICIAN ASSISTANT

## 2022-06-28 RX ORDER — METHOCARBAMOL 750 MG/1
750 TABLET, FILM COATED ORAL EVERY 8 HOURS PRN
Qty: 20 TABLET | Refills: 0 | Status: SHIPPED | OUTPATIENT
Start: 2022-06-28 | End: 2022-12-16

## 2022-06-28 RX ORDER — ONDANSETRON 4 MG/1
4 TABLET, ORALLY DISINTEGRATING ORAL EVERY 6 HOURS PRN
Qty: 12 TABLET | Refills: 0 | Status: SHIPPED | OUTPATIENT
Start: 2022-06-28 | End: 2022-12-16

## 2022-06-28 RX ORDER — HYDROCHLOROTHIAZIDE 25 MG/1
25 TABLET ORAL DAILY
COMMUNITY
Start: 2022-06-21 | End: 2023-09-14

## 2022-06-28 NOTE — PROGRESS NOTES
Transitional Care Note  Subjective:      Patient ID: Haydee Pennington is a 37 y.o. female.  Chief Complaint: Hospital Follow Up      Patient Care Team:  Gumaro Del Real MD as PCP - General (Family Medicine)  Yolanda Alonso LPN as Care Coordinator (Internal Medicine)    Patient presents to clinic today for hospital TCC visit. Patient was recently hospitalized for aseptic meningitis.      Family and/or Caretaker present at visit?  No.  Diagnostic tests reviewed/disposition: No diagnosic tests pending after this hospitalization.  Disease/illness education: See A&P  Home health/community services discussion/referrals: Patient does not have home health established from hospital visit.  They do not need home health.  If needed, we will set up home health for the patient.   Establishment or re-establishment of referral orders for community resources: No other necessary community resources.   Discussion with other health care providers: No discussion with other health care providers necessary.     Patient Class: IP- Inpatient  Admission Date: 6/21/2022  Hospital Length of Stay: 1 days  Discharge Date and Time:  06/23/2022 1:08 PM  Attending Physician: Sandip Smiley MD   Discharging Provider: Sandip Smiley MD  Primary Care Provider: Gumaro Del Real MD        HPI:   Ms. Pennington came to the ED for headache, low back pain, and generalized body aches.  She denies fever.  The headache is throughout the head and is worse when she is standing upright.  Also with multiple episodes of nausea and vomiting.  She had a nerve block performed at the beginning of the month for chronic low back pain.  No other recent illness.      Hospital Course:   The patient was monitored closely during her stay. She was continued on IV ABx. ID was consulted. She was given IVF for hydration.      6/22- Patient seen and examined. Stable at this time. Awake, alert, appropriate.  Still with complaints of headache and nausea. Patient discussed  with Dr Smiley-  continue IV rocephin and await culture results.       6/23- pt looks very well, comfortably sitting up in bed, eating drinking well, walking around well. Just had her shower. VSS, has remained afebrile since admission. Exam is benign, neck is supple, no tenderness or stiffness FEDE, EOMI B. All Cx have remained neg, no leukocytosis, likely had Viral or Aseptic Meningitis from which she appears to have recovered well and all her Sx have resolved or greatly improved. She also had mild Cystitis on admission which has been adequately treated with three days of IV Rocephin as well. Pt was seen and examined and deemed stable for discharge home today.     Review of Systems   Constitutional: Positive for fatigue. Negative for chills and fever.   Eyes: Negative for pain and visual disturbance.   Respiratory: Negative for shortness of breath.    Cardiovascular: Negative for chest pain.   Gastrointestinal: Positive for nausea ( with headache). Negative for vomiting.   Musculoskeletal: Positive for back pain ( chronic - recent injection in SI joint right side), myalgias ( chronic - recent injection to right GT bursa) and neck pain ( right posterior).   Neurological: Positive for headaches ( improved with fioricet, right temporal, radiates to posterior neck).        Objective:      Physical Exam  Vitals and nursing note reviewed.   Constitutional:       General: She is not in acute distress.     Appearance: She is well-developed.   HENT:      Head: Normocephalic and atraumatic.   Eyes:      General: Lids are normal. No scleral icterus.     Extraocular Movements: Extraocular movements intact.      Conjunctiva/sclera: Conjunctivae normal.   Cardiovascular:      Rate and Rhythm: Normal rate and regular rhythm.   Pulmonary:      Effort: Pulmonary effort is normal.      Breath sounds: Normal breath sounds. No decreased breath sounds, wheezing, rhonchi or rales.   Musculoskeletal:        Back:    Neurological:       Mental Status: She is alert.      Cranial Nerves: No cranial nerve deficit.   Psychiatric:         Mood and Affect: Mood and affect normal.         Assessment:       1. Hospital discharge follow-up    2. Aseptic meningitis    3. Hypertension, unspecified type    4. Hypothyroidism, unspecified type    5. Anxiety    6. Hypoalbuminemia    7. Nonintractable headache, unspecified chronicity pattern, unspecified headache type    8. Nausea    9. Chronic right-sided low back pain with sciatica, sciatica laterality unspecified    10. Greater trochanteric bursitis of right hip        Plan:   1. Hospital discharge follow-up    2. Aseptic meningitis    3. Hypertension, unspecified type    4. Hypothyroidism, unspecified type    5. Anxiety    6. Hypoalbuminemia  -     Comprehensive Metabolic Panel    7. Nonintractable headache, unspecified chronicity pattern, unspecified headache type    8. Nausea  -     ondansetron (ZOFRAN-ODT) 4 MG TbDL    9. Chronic right-sided low back pain with sciatica, sciatica laterality unspecified  -     methocarbamoL (ROBAXIN) 750 MG Tab    10. Greater trochanteric bursitis of right hip      F/u with Dr. Monsalve for back pain/hip pain issues  Continue Fioricet for H/A, add Zofran for nausea  Rest and fluids  ER if worse

## 2022-06-29 ENCOUNTER — TELEPHONE (OUTPATIENT)
Dept: INTERNAL MEDICINE | Facility: CLINIC | Age: 37
End: 2022-06-29
Payer: COMMERCIAL

## 2022-06-29 DIAGNOSIS — E88.09 HYPOALBUMINEMIA: Primary | ICD-10-CM

## 2022-07-11 ENCOUNTER — TELEPHONE (OUTPATIENT)
Dept: PAIN MEDICINE | Facility: CLINIC | Age: 37
End: 2022-07-11
Payer: COMMERCIAL

## 2022-07-11 NOTE — PROGRESS NOTES
Established Patient Chronic Pain Note     The patient location is: home  The chief complaint leading to consultation is: back and leg pain  Visit type: Virtual visit with synchronous audio and video  Total time spent with patient: 20m  Each patient to whom he or she provides medical services by telemedicine is: (1) informed of the relationship between the physician and patient and the respective role of any other health care provider with respect to management of the patient; and (2) notified that he or she may decline to receive medical services by telemedicine and may withdraw from such care at any time.    Referring Physician: No ref. provider found    PCP: Gumaro Del Real MD    Chief Complaint:   Back and leg pain     SUBJECTIVE:  Interval history 07/12/2022  Patient presents status post right-sided sacroiliac joint and greater trochanteric bursa injection 06/03/2022.  Patient reports at least 45-50% pain relief overlying right-sided sacroiliac joint and greater trochanteric bursa territories.  Patient continues to report pain down the lateral aspect of the right lower extremity in L4 distribution to the foot.  Patient reports improvement in symptoms with continuation of physical therapy exercises.  She does report financial difficulty with physical therapy and has requesting physician directed exercises at home.  Patient has continued gabapentin and has reached a titration of 300 mg 3 times daily.  She does report significant improvement in her neuropathic pain with this medication.  She denies any side effects from this medication.    HPI 05/17/2022  Haydee Pennington is a 37 y.o. female with past medical history significant for anxiety, depression, hypertension, obesity, hypothyroidism, nicotine who presents to the clinic for the evaluation of lower back and right leg pain.  Patient reports pain began several years prior without inciting accident injury or trauma.  Today patient reports pain is constant  which is rated a 6/10.  Pain is described as grinding in nature.  Patient reports pain begins in a bandlike distribution in her lower back and radiates down the lateral aspect of the right lower extremity to the plantar aspect of the foot in L4-S1 distribution.  Pain is exacerbated with prolonged standing, walking, bending, lifting and even with prolonged sitting.  Of note patient works from home for a [employer] and reports pain is exacerbated with prolonged sitting at the end of the day.  Pain is also exacerbated associated with her menstrual cycle.  Pain has been improved in the past with gabapentin, which patient reports she was prescribed for anxiety.     Patient denies night fever/night sweats, urinary incontinence, bowel incontinence, significant weight loss, significant motor weakness and loss of sensations.    Pain Disability Index Review:     Last 3 PDI Scores 5/17/2022   Pain Disability Index (PDI) 41       Non-Pharmacologic Treatments:  Physical Therapy/Home Exercise: no  Ice/Heat:no  TENS: no  Acupuncture: no  Massage: no  Chiropractic: no    Other: no      Pain Medications:  - Adjuvant Medications: Clonazepam (Klonopin) and Lexapro (Escitalopram)    Pain Procedures:   -06/03/2022:  Right-sided greater trochanteric bursa and sacroiliac joint injection    Past Medical History:   Diagnosis Date    Hypertension     Thyroid disease      Past Surgical History:   Procedure Laterality Date    CERVICAL BIOPSY  W/ LOOP ELECTRODE EXCISION  2005    INJECTION OF ANESTHETIC AGENT INTO SACROILIAC JOINT Right 06/03/2022    Procedure: right Sacroiliac Joint Injection with RN IV sedation;  Surgeon: Rey Monsalve MD;  Location: [facility];  Service: Pain Management;  Laterality: Right;    INJECTION OF JOINT Right 06/03/2022    Procedure: right GT bursa injection with RN IV sedation;  Surgeon: Rey Monsalve MD;  Location: [facility];  Service: Pain Management;  Laterality: Right;     Review of patient's  allergies indicates:   Allergen Reactions    Sulfa (sulfonamide antibiotics) Hives       Current Outpatient Medications   Medication Sig    amLODIPine (NORVASC) 10 MG tablet Take 10 mg by mouth once daily.    butalbital-acetaminophen-caffeine -40 mg (FIORICET, ESGIC) -40 mg per tablet Take 1 tablet by mouth every 8 (eight) hours as needed for Headaches.    clonazePAM (KLONOPIN) 0.5 MG tablet Take 1 tablet (0.5 mg total) by mouth 2 (two) times daily as needed for Anxiety.    EScitalopram oxalate (LEXAPRO) 20 MG tablet Take 1 tablet (20 mg total) by mouth once daily.    gabapentin (NEURONTIN) 300 MG capsule Take 300 mg by mouth 3 (three) times daily.    hydroCHLOROthiazide (HYDRODIURIL) 25 MG tablet Take 25 mg by mouth once daily.    levothyroxine (SYNTHROID) 125 MCG tablet Take 1 tablet (125 mcg total) by mouth before breakfast.    methocarbamoL (ROBAXIN) 750 MG Tab Take 1 tablet (750 mg total) by mouth every 8 (eight) hours as needed (muscle spasm).    ondansetron (ZOFRAN-ODT) 4 MG TbDL Take 1 tablet (4 mg total) by mouth every 6 (six) hours as needed (nausea/vomiting).     No current facility-administered medications for this visit.       Review of Systems     GENERAL:  No weight loss, malaise or fevers.  HEENT:   No recent changes in vision or hearing  NECK:  Negative for lumps, no difficulty with swallowing.  RESPIRATORY:  Negative for cough, wheezing or shortness of breath, patient denies any recent URI.  CARDIOVASCULAR:  Negative for chest pain, leg swelling or palpitations.  GI:  Negative for abdominal discomfort, blood in stools or black stools or change in bowel habits.  MUSCULOSKELETAL:  See HPI.  SKIN:  Negative for lesions, rash, and itching.  PSYCH:  No mood disorder or recent psychosocial stressors.   HEMATOLOGY/LYMPHOLOGY:  Negative for prolonged bleeding, bruising easily or swollen nodes.    NEURO:   No history of headaches, syncope, paralysis, seizures or tremors.  All other  reviewed and negative other than HPI.    OBJECTIVE:    There were no vitals taken for this visit.      Physical Exam  Last clinic visit:  GENERAL: Well appearing, in no acute distress, alert and oriented x3.  PSYCH:  Mood and affect appropriate.  SKIN: Skin color, texture, turgor normal, no rashes or lesions.  HEAD/FACE:  Normocephalic, atraumatic. Cranial nerves grossly intact.    CV: RRR with palpation of the radial artery.  PULM: No evidence of respiratory difficulty, symmetric chest rise.  GI:  Soft and non-tender.    BACK: Straight leg raising in the sitting and supine positions is negative to radicular pain. No pain to palpation over the facet joints of the lumbar spine or spinous processes. Normal range of motion without pain reproduction.  EXTREMITIES: Peripheral joint ROM is full and pain free without obvious instability or laxity in all four extremities. No deformities, edema, or skin discoloration. Good capillary refill.  MUSCULOSKELETAL: Able to stand on heels & toes.    hip, and knee provocative maneuvers are negative.   pain with palpation over the sacroiliac joints on R.  FABERs and Gaenslen test is positive on right.  Facet loading test is negative bilaterally.   Bilateral upper and lower extremity strength is normal and symmetric.  No atrophy or tone abnormalities are noted..  RIGHT Lower extremity: Hip flexion 5/5, Hip Abduction 5/5, Hip Adduction 5/5, Knee extension 5/5, Knee flexion 5/5, Ankle dorsiflexion5/5, Extensor hallucis longus 5/5, Ankle plantarflexion 5/5  LEFT Lower extremity:  Hip flexion 5/5, Hip Abduction 5/5,Hip Adduction 5/5, Knee extension 5/5, Knee flexion 5/5, Ankle dorsiflexion 5/5, Extensor hallucis longus 5/5, Ankle plantarflexion 5/5  -Normal testing knee (patellar) jerk and ankle (achilles) jerk    NEURO: Bilateral upper and lower extremity coordination and muscle stretch reflexes are physiologic and symmetric. No loss of sensation is noted.  GAIT: normal.    Imaging:   MRI  lumbar spine 05/17/2022  FINDINGS:  Images are mildly degraded by patient motion artifact.     Alignment: Within normal limits.     Vertebrae: Tiny inferior T11 Schmorl's node.  Lumbar vertebral body heights are maintained.  No evidence of acute fracture.  Marrow signal is within normal limits.     Discs: L4-L5 and L5-S1 disc desiccation.  Disc heights are maintained.     Cord: Within normal limits.  Conus terminates at L1-L2.     Degenerative findings:     T12-L1: No significant disc bulge, spinal canal stenosis or neural foraminal stenosis.     L1-L2: No significant disc bulge, spinal canal stenosis or neural foraminal stenosis.     L2-L3: No significant disc bulge, spinal canal stenosis or neural foraminal stenosis.     L3-L4: No significant disc bulge.  Mild facet arthropathy.  No significant spinal canal stenosis or neural foraminal stenosis.     L4-L5: Disc desiccation.  Minor broad-based disc bulge.  Mild bilateral facet arthropathy.  Facet arthropathy on the right closely approximates the exiting right L4 nerve root.  No significant spinal canal or neural foraminal stenosis.     L5-S1: Disc desiccation.  Asymmetric right mild broad-based disc bulge.  Minor bilateral facet arthropathy.  No significant spinal canal or neural foraminal stenosis.    08/22/18    X-Ray Lumbar Spine Complete 5 View    FINDINGS:  Vertebral body heights and alignment are within normal limits.  Intervertebral disk spaces are well preserved. Posterior elements appear grossly intact.  No pars defects visualized.  No acute fractures or subluxations are demonstrated.  The remaining visualized osseous and soft tissue structures demonstrate no appreciable abnormality.      ASSESSMENT: 37 y.o. year old female with lower back and leg pain, consistent with     1. Greater trochanteric bursitis, unspecified laterality     2. Sacroiliitis     3. Lumbar radiculopathy     4. Lumbar facet arthropathy     5. DDD (degenerative disc disease), lumbar            PLAN:   - Interventions:  Patient has sustained 50% relief following right-sided sacroiliac joint and greater trochanteric bursa injection.  We discussed repeating this injection the future should symptoms exacerbate  Explained the risks and benefits of the procedure in detail with the patient today in clinic along with alternative treatment options    -we have discussed considering a right-sided L4/5 and L5/S1 facet injection to see if this would help with radicular symptoms.  Based on MRI findings.       - Anticoagulation use: no no anticoagulation    - we have discussed considering a right-sided transforaminal epidural steroid injection to address radicular symptoms.  We will 1st review lumbar MRI.     report:  Reviewed and consistent with medication use as prescribed.    - Medications:  --  We have discussed continue gabapentin to a more therapeutic goal.  Patient will increase  medication according to the following algorithm.  We have reviewed potential side effects of this medication including daytime somnolence, weight gain and peripheral edema  Gabapentin Titration:  Step 1: 300mg AM, 300mg afternoon, 600mg qHS  Step 2: 300mg AM, 600mg afternoon, 600mg qHS  Step 3: and after 600mg TID      - Therapy:   We discussed continue physical therapy to help manage the patient/s painful condition. The patient was counseled that muscle strengthening will improve the long term prognosis in regards to pain and may also help increase range of motion and mobility. They were told that one of the goals of physical therapy is that they learn how to do the exercises so that they can do them independently at home daily upon completion. The patient's questions were answered and they were agreeable to this course.  Kirk exercises for core strengthening and lumbar stabilization recommended to the patient    - Imaging: Reviewed available imaging with patient and answered any questions they had regarding study.    -  Follow up visit: return to clinic in 2 months      The above plan and management options were discussed at length with patient. Patient is in agreement with the above and verbalized understanding.    - I discussed the goals of interventional chronic pain management with the patient on today's visit. We discussed a multimodal and systematic approach to pain.  This includes diagnostic and therapeutic injections, adjuvant pharmacologic treatment, physical therapy, and at times psychiatry.  I emphasized the importance of regular exercise, core strengthening and stretching, diet and weight loss as a cornerstone of long-term pain management.    - This condition does not require this patient to take time off of work, and the primary goal of our Pain Management services is to improve the patient's functional capacity.  - Patient Questions: Answered all of the patient's questions regarding diagnoses, therapy, treatment and next steps        Rey Monsalve MD  Interventional Pain Management  [facility]    Disclaimer:  This note was prepared using voice recognition system and is likely to have sound alike errors that may have been overlooked even after proof reading.  Please call me with any questions

## 2022-07-12 ENCOUNTER — OFFICE VISIT (OUTPATIENT)
Dept: PAIN MEDICINE | Facility: CLINIC | Age: 37
End: 2022-07-12
Payer: COMMERCIAL

## 2022-07-12 DIAGNOSIS — M70.60 GREATER TROCHANTERIC BURSITIS, UNSPECIFIED LATERALITY: Primary | ICD-10-CM

## 2022-07-12 DIAGNOSIS — M51.36 DDD (DEGENERATIVE DISC DISEASE), LUMBAR: ICD-10-CM

## 2022-07-12 DIAGNOSIS — M47.816 LUMBAR FACET ARTHROPATHY: ICD-10-CM

## 2022-07-12 DIAGNOSIS — M54.16 LUMBAR RADICULOPATHY: ICD-10-CM

## 2022-07-12 DIAGNOSIS — M46.1 SACROILIITIS: ICD-10-CM

## 2022-07-12 PROCEDURE — 99214 PR OFFICE/OUTPT VISIT, EST, LEVL IV, 30-39 MIN: ICD-10-PCS | Mod: 95,,, | Performed by: ANESTHESIOLOGY

## 2022-07-12 PROCEDURE — 99214 OFFICE O/P EST MOD 30 MIN: CPT | Mod: 95,,, | Performed by: ANESTHESIOLOGY

## 2022-07-12 NOTE — PATIENT INSTRUCTIONS
General Neck and Back Pain    Both neck and back pain are usually caused by injury to the muscles or ligaments of the spine. Sometimes the disks that separate each bone of the spine may cause pain by pressing on a nearby nerve. Back and neck pain may appear after a sudden twisting or bending force (such as in a car accident), or sometimes after a simple awkward movement. In either case, muscle spasm is often present and adds to the pain.  Acute neck and back pain usually gets better in 1 to 2 weeks. Pain related to disk disease, arthritis in the spinal joints or spinal stenosis (narrowing of the spinal canal) can become chronic and last for months or years.  Back and neck pain are common problems. Most people feel better in 1 or 2 weeks, and most of the rest in 1 to 2 months. Most people can remain active.  People experience and describe pain differently.  Pain can be sharp, stabbing, shooting, aching, cramping, or burning  Movement, standing, bending, lifting, sitting, or walking may worsen the pain  Pain can be localized to one spot or area, or it can be more generalized  Pain can spread or radiate upwards, downwards, to the front, or go down your arms  Muscle spasm may occur.  Most of the time mechanical problems with the muscles or spine cause the pain. it is usually caused by an injury, whether known or not, to the muscles or ligaments. While illnesses can cause back pain, it is usually not caused by a serious illness. Pain is usually related to physical activity, whether sports, exercise, work, or normal activity. Sometimes it can occur without an identifiable cause. This can happen simply by stretching or moving wrong, without noting pain at the time. Other causes include:  Overexertion, lifting, pushing, pulling incorrectly or too aggressively.  Sudden twisting, bending or stretching from an accident (car or fall), or accidental movement.  Poor posture  Poor conditioning, lack of regular exercise  Spinal  disc disease or arthritis  Stress  Pregnancy, or illness like appendicitis, bladder or kidney infection, pelvic infections   Home care  For neck pain: Use a comfortable pillow that supports the head and keeps the spine in a neutral position. The position of the head should not be tilted forward or backward.  When in bed, try to find a position of comfort. A firm mattress is best. Try lying flat on your back with pillows under your knees. You can also try lying on your side with your knees bent up towards your chest and a pillow between your knees.  At first, do not try to stretch out the sore spots. If there is a strain, it is not like the good soreness you get after exercising without an injury. In this case, stretching may make it worse.  Avoid prolonged sitting, long car rides or travel. This puts more stress on the lower back than standing or walking.  During the first 24 to 72 hours after an injury, apply an ice pack to the painful area for 20 minutes and then remove it for 20 minutes over a period of 60 to 90 minutes or several times a day.   You can alternate ice and heat therapies. Talk with your healthcare provider about the best treatment for your back or neck pain. As a safety precaution, do not use a heating pad at bedtime. Sleeping with a heating pad can lead to skin burns or tissue damage.  Therapeutic massage can help relax the back and neck muscles without stretching them.  Be aware of safe lifting methods and do not lift anything over 15 pounds until all the pain is gone.  Medications  Talk to your healthcare provider before using medicine, especially if you have other medical problems or are taking other medicines.  You may use over-the-counter medicine to control pain, unless another pain medicine was prescribed. If you have chronic conditions like diabetes, liver or kidney disease, stomach ulcers,  gastrointestinal bleeding, or are taking blood thinner medicines.  Be careful if you are given pain  medicines, narcotics, or medicine for muscle spasm. They can cause drowsiness, and can affect your coordination, reflexes, and judgment. Do not drive or operate heavy machinery.  Follow-up care  Follow up with your healthcare provider, or as advised. Physical therapy or further tests may be needed.  If X-rays were taken, you will be notified of any new findings that may affect your care.  Call 911  Seek emergency medical care if any of the following occur:  Trouble breathing  Confusion  Very drowsy or trouble awakening  Fainting or loss of consciousness  Rapid or very slow heart rate  Loss of bowel or bladder control  When to seek medical advice  Call your healthcare provider right away if any of these occur:  Pain becomes worse or spreads into your arms or legs  Weakness, numbness or pain in one or both arms or legs  Numbness in the groin area  Difficulty walking  Fever of 100.4ºF (38ºC) or higher, or as directed by your healthcare provider  Date Last Reviewed: 7/1/2016  © 7781-0685 [employer]. [address] [place] [address]. All rights reserved. This information is not intended as a substitute for professional medical care. Always follow your healthcare professional's instructions.       Understanding Lumbar Radiculopathy    Lumbar radiculopathy is irritation or inflammation of a nerve root in the low back. It causes symptoms that spread out from the back down one or both legs. To understand this condition, it helps to understand the parts of the spine:  Vertebrae. These are bones that stack to form the spine. The lumbar spine contains the 5 bottom vertebrae.  Disks. These are soft pads of tissue between the vertebrae. They act as shock absorbers for the spine.  Spinal canal. This is a tunnel formed within the stacked vertebrae. In the lumbar spine, nerves run through this canal.  Nerves. These branch off and leave the spinal canal, traveling out to parts of the body. As they leave the  spinal canal, nerves pass through openings between the vertebrae. The nerve root is the part of the nerve that is closest to the spinal canal.  Sciatic nerve. This is a large nerve formed from several nerve roots in the low back. This nerve extends down the back of the leg to the foot.  With lumbar radiculopathy, nerve roots in the low back become irritated. This leads to pain and symptoms. The sciatic nerve is commonly involved, so the condition is often called sciatica.  What causes lumbar radiculopathy?  Aging, injury, poor posture, extra body weight, and other issues can lead to problems in the low back. These problems may then irritate nerve roots. They include:  Damage to a disk in the lumbar spine. The damaged disk may then press on nearby nerve roots.  Degeneration from wear and tear, and aging. This can lead to narrowing (stenosis) of the openings between the vertebrae. The narrowed openings press on nerve roots as they leave the spinal canal.  Unstable spine. This is when a vertebra slips forward. It can then press on a nerve root.  Other, less common things can put pressure on nerves in the low back. These include diabetes, infection, or a tumor.  Symptoms of lumbar radiculopathy  These include:  Pain in the low back  Pain, numbness, tingling, or weakness that travels into the buttocks, hip, groin, or leg  Muscle spasms  Treatment for lumbar radiculopathy  In most cases, your healthcare provider will first try treatments that help relieve symptoms. These may include:  Prescription and over-the-counter pain medicines. These help relieve pain, swelling, and irritation.  Limits on positions and activities that increase pain. But lying in bed or avoiding all movement is only recommended for a short period of time.  Physical therapy, including exercises and stretches. This helps decrease pain and increase movement and function.  Steroid shots into the lower back. This may help relieve symptoms for a  time.  Weight-loss program. If you are overweight, losing extra pounds may help relieve symptoms.  In some cases, you may need surgery to fix the underlying problem. This depends on the cause, the symptoms, and how long the pain has lasted.  Possible complications  Over time, an irritated and inflamed nerve may become damaged. This may lead to long-lasting (permanent) numbness or weakness in your legs and feet. If symptoms change suddenly or get worse, be sure to let your healthcare provider know.  When to call your healthcare provider  Call your healthcare provider right away if you have any of these:  New pain or pain that gets worse  New or increasing weakness, tingling, or numbness in your leg or foot  Problems controlling your bladder or bowel   Date Last Reviewed: 3/10/2016  © 4071-7714 [employer]. [address] [place] [address], [place] [address]. All rights reserved. This information is not intended as a substitute for professional medical care. Always follow your healthcare professional's instructions.       Exercises to Strengthen Your Lower Back  Strong lower back and abdominal muscles work together to support your spine. The exercises below will help strengthen the lower back. It is important that you begin exercising slowly and increase levels gradually.  Always begin any exercise program with stretching. If you feel pain while doing any of these exercises, stop and talk to your doctor about a more specific exercise program that better suits your condition.   Low back stretch  The point of stretching is to make you more flexible and increase your range of motion. Stretch only as much as you are able. Stretch slowly. Do not push your stretch to the limit. If at any point you feel pain while stretching, this is your (temporary) limit.  Lie on your back with your knees bent and both feet on the ground.  Slowly raise your left knee to your chest as you flatten your lower back against the floor. Hold  for 5 seconds.  Relax and repeat the exercise with your right knee.  Do 10 of these exercises for each leg.  Repeat hugging both knees to your chest at the same time.  Building lower back strength  Start your exercise routine with 10 to 30 minutes a day, 1 to 3 times a day.  Initial exercises  Lying on your back:  Ankle pumps: Move your foot up and down, towards your head, and then away. Repeat 10 times with each foot.  Heel slides: Slowly bend your knee, drawing the heel of your foot towards you. Then slide your heel/foot from you, straightening your knee. Do not lift your foot off the floor (this is not a leg lift).  Abdominal contraction: Bend your knees and put your hands on your stomach. Tighten your stomach muscles. Hold for 5 seconds, then relax. Repeat 10 times.  Straight leg raise: Bend one leg at the knee and keep the other leg straight. Tighten your stomach muscles. Slowly lift your straight leg 6 to 12 inches off the floor and hold for up to 5 seconds. Repeat 10 times on each side.  Standing:  Wall squats: Stand with your back against the wall. Move your feet about 12 inches away from the wall. Tighten your stomach muscles, and slowly bend your knees until they are at about a 45 degree angle. Do not go down too far. Hold about 5 seconds. Then slowly return to your starting position. Repeat 10 times.  Heel raises: Stand facing the wall. Slowly raise the heels of your feet up and down, while keeping your toes on the floor. If you have trouble balancing, you can touch the wall with your hands. Repeat 10 times.  More advanced exercises  When you feel comfortable enough, try these exercises.  Kneeling lumbar extension: Begin on your hands and knees. At the same time, raise and straighten your right arm and left leg until they are parallel to the ground. Hold for 2 seconds and come back slowly to a starting position. Repeat with left arm and right leg, alternating 10 times.  Prone lumbar extension: Lie face  down, arms extended overhead, palms on the floor. At the same time, raise your right arm and left leg as high as comfortably possible. Hold for 10 seconds and slowly return to start. Repeat with left arm and right leg, alternating 10 times. Gradually build up to 20 times. (Advanced: Repeat this exercise raising both arms and both legs a few inches off the floor at the same time. Hold for 5 seconds and release.)  Pelvic tilt: Lie on the floor on your back with your knees bent at 90 degrees. Your feet should be flat on the floor. Inhale, exhale, then slowly contract your abdominal muscles bringing your navel toward your spine. Let your pelvis rock back until your lower back is flat on the floor. Hold for 10 seconds while breathing smoothly.  Abdominal crunch: Perform a pelvic tilt (above) flattening your lower back against the floor. Holding the tension in your abdominal muscles, take another breath and raise your shoulder blades off the ground (this is not a full sit-up). Keep your head in line with your body (dont bend your neck forward). Hold for 2 seconds, then slowly lower.  Date Last Reviewed: 6/1/2016  © 7153-9241 [employer]. [address] [place] [address], [place] [address]. All rights reserved. This information is not intended as a substitute for professional medical care. Always follow your healthcare professional's instructions.

## 2022-07-18 ENCOUNTER — OFFICE VISIT (OUTPATIENT)
Dept: PSYCHIATRY | Facility: CLINIC | Age: 37
End: 2022-07-18
Payer: COMMERCIAL

## 2022-07-18 VITALS
WEIGHT: 234.38 LBS | HEART RATE: 73 BPM | SYSTOLIC BLOOD PRESSURE: 130 MMHG | DIASTOLIC BLOOD PRESSURE: 85 MMHG | BODY MASS INDEX: 39 KG/M2

## 2022-07-18 DIAGNOSIS — F39 MOOD DISORDER: Primary | ICD-10-CM

## 2022-07-18 DIAGNOSIS — Z79.899 HIGH RISK MEDICATION USE: ICD-10-CM

## 2022-07-18 DIAGNOSIS — F43.10 PTSD (POST-TRAUMATIC STRESS DISORDER): ICD-10-CM

## 2022-07-18 DIAGNOSIS — F41.1 GAD (GENERALIZED ANXIETY DISORDER): ICD-10-CM

## 2022-07-18 PROCEDURE — 99215 OFFICE O/P EST HI 40 MIN: CPT | Mod: S$GLB,,, | Performed by: PSYCHIATRY & NEUROLOGY

## 2022-07-18 PROCEDURE — 99999 PR PBB SHADOW E&M-EST. PATIENT-LVL III: ICD-10-PCS | Mod: PBBFAC,,, | Performed by: PSYCHIATRY & NEUROLOGY

## 2022-07-18 PROCEDURE — 99215 PR OFFICE/OUTPT VISIT, EST, LEVL V, 40-54 MIN: ICD-10-PCS | Mod: S$GLB,,, | Performed by: PSYCHIATRY & NEUROLOGY

## 2022-07-18 PROCEDURE — 99999 PR PBB SHADOW E&M-EST. PATIENT-LVL III: CPT | Mod: PBBFAC,,, | Performed by: PSYCHIATRY & NEUROLOGY

## 2022-07-18 RX ORDER — QUETIAPINE FUMARATE 100 MG/1
TABLET, FILM COATED ORAL
Qty: 60 TABLET | Refills: 0 | Status: SHIPPED | OUTPATIENT
Start: 2022-07-18 | End: 2022-09-14 | Stop reason: SDUPTHER
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
## 2022-07-21 ENCOUNTER — LAB VISIT (OUTPATIENT)
Dept: LAB | Facility: HOSPITAL | Age: 37
End: 2022-07-21
Attending: PSYCHIATRY & NEUROLOGY
Payer: COMMERCIAL

## 2022-07-21 DIAGNOSIS — Z79.899 HIGH RISK MEDICATION USE: ICD-10-CM

## 2022-07-21 LAB
CHOLEST SERPL-MCNC: 207 MG/DL (ref 120–199)
CHOLEST/HDLC SERPL: 5.9 {RATIO} (ref 2–5)
ESTIMATED AVG GLUCOSE: 97 MG/DL (ref 68–131)
HBA1C MFR BLD: 5 % (ref 4–5.6)
HDLC SERPL-MCNC: 35 MG/DL (ref 40–75)
HDLC SERPL: 16.9 % (ref 20–50)
LDLC SERPL CALC-MCNC: 135.4 MG/DL (ref 63–159)
NONHDLC SERPL-MCNC: 172 MG/DL
TRIGL SERPL-MCNC: 183 MG/DL (ref 30–150)

## 2022-07-21 PROCEDURE — 83036 HEMOGLOBIN GLYCOSYLATED A1C: CPT | Performed by: PSYCHIATRY & NEUROLOGY

## 2022-07-21 PROCEDURE — 80061 LIPID PANEL: CPT | Performed by: PSYCHIATRY & NEUROLOGY

## 2022-07-21 PROCEDURE — 36415 COLL VENOUS BLD VENIPUNCTURE: CPT | Performed by: PSYCHIATRY & NEUROLOGY

## 2022-07-22 ENCOUNTER — PATIENT MESSAGE (OUTPATIENT)
Dept: PSYCHIATRY | Facility: CLINIC | Age: 37
End: 2022-07-22
Payer: COMMERCIAL

## 2022-07-22 NOTE — TELEPHONE ENCOUNTER
Hemoglobin A1c normal.  Lipid panel with slight abnormalities, similar to that of 11 months ago.  I sent a message to the patient regarding reviewing the lipid panel results on her next visit with her PCP.

## 2022-08-04 ENCOUNTER — PATIENT MESSAGE (OUTPATIENT)
Dept: ADMINISTRATIVE | Facility: HOSPITAL | Age: 37
End: 2022-08-04
Payer: COMMERCIAL

## 2022-08-16 ENCOUNTER — TELEPHONE (OUTPATIENT)
Dept: PSYCHIATRY | Facility: CLINIC | Age: 37
End: 2022-08-16
Payer: COMMERCIAL

## 2022-08-22 ENCOUNTER — TELEPHONE (OUTPATIENT)
Dept: PAIN MEDICINE | Facility: CLINIC | Age: 37
End: 2022-08-22
Payer: COMMERCIAL

## 2022-08-23 ENCOUNTER — TELEPHONE (OUTPATIENT)
Dept: PAIN MEDICINE | Facility: CLINIC | Age: 37
End: 2022-08-23
Payer: COMMERCIAL

## 2022-08-24 ENCOUNTER — TELEPHONE (OUTPATIENT)
Dept: PAIN MEDICINE | Facility: CLINIC | Age: 37
End: 2022-08-24
Payer: COMMERCIAL

## 2022-08-25 ENCOUNTER — TELEPHONE (OUTPATIENT)
Dept: PAIN MEDICINE | Facility: CLINIC | Age: 37
End: 2022-08-25
Payer: COMMERCIAL

## 2022-09-06 ENCOUNTER — PATIENT MESSAGE (OUTPATIENT)
Dept: INTERNAL MEDICINE | Facility: CLINIC | Age: 37
End: 2022-09-06
Payer: COMMERCIAL

## 2022-09-06 NOTE — TELEPHONE ENCOUNTER
No new care gaps identified.  [facility] Embedded Care Gaps. Reference number: [id number]. 9/06/2022   1:45:42 PM CDT

## 2022-09-07 RX ORDER — AMLODIPINE BESYLATE 10 MG/1
10 TABLET ORAL DAILY
Qty: 90 TABLET | Refills: 3 | Status: SHIPPED | OUTPATIENT
Start: 2022-09-07 | End: 2023-11-20

## 2022-09-09 ENCOUNTER — PATIENT MESSAGE (OUTPATIENT)
Dept: PAIN MEDICINE | Facility: CLINIC | Age: 37
End: 2022-09-09
Payer: COMMERCIAL

## 2022-09-09 DIAGNOSIS — M46.1 SACROILIITIS: Primary | ICD-10-CM

## 2022-09-09 RX ORDER — METHYLPREDNISOLONE 4 MG/1
TABLET ORAL
Qty: 1 EACH | Refills: 0 | Status: SHIPPED | OUTPATIENT
Start: 2022-09-09 | End: 2022-12-16 | Stop reason: ALTCHOICE

## 2022-09-20 ENCOUNTER — OFFICE VISIT (OUTPATIENT)
Dept: PSYCHIATRY | Facility: CLINIC | Age: 37
End: 2022-09-20
Payer: COMMERCIAL

## 2022-09-20 ENCOUNTER — OFFICE VISIT (OUTPATIENT)
Dept: PAIN MEDICINE | Facility: CLINIC | Age: 37
End: 2022-09-20
Payer: COMMERCIAL

## 2022-09-20 VITALS
DIASTOLIC BLOOD PRESSURE: 88 MMHG | WEIGHT: 243.63 LBS | SYSTOLIC BLOOD PRESSURE: 136 MMHG | BODY MASS INDEX: 40.54 KG/M2 | HEART RATE: 67 BPM

## 2022-09-20 VITALS
WEIGHT: 245.06 LBS | DIASTOLIC BLOOD PRESSURE: 85 MMHG | BODY MASS INDEX: 40.83 KG/M2 | HEIGHT: 65 IN | HEART RATE: 69 BPM | SYSTOLIC BLOOD PRESSURE: 128 MMHG

## 2022-09-20 DIAGNOSIS — F41.1 GAD (GENERALIZED ANXIETY DISORDER): ICD-10-CM

## 2022-09-20 DIAGNOSIS — F43.10 PTSD (POST-TRAUMATIC STRESS DISORDER): ICD-10-CM

## 2022-09-20 DIAGNOSIS — F39 MOOD DISORDER: Primary | ICD-10-CM

## 2022-09-20 DIAGNOSIS — M46.1 SACROILIITIS: Primary | ICD-10-CM

## 2022-09-20 PROCEDURE — 99215 PR OFFICE/OUTPT VISIT, EST, LEVL V, 40-54 MIN: ICD-10-PCS | Mod: S$GLB,,, | Performed by: PSYCHIATRY & NEUROLOGY

## 2022-09-20 PROCEDURE — 99999 PR PBB SHADOW E&M-EST. PATIENT-LVL II: CPT | Mod: PBBFAC,,, | Performed by: PSYCHIATRY & NEUROLOGY

## 2022-09-20 PROCEDURE — 99999 PR PBB SHADOW E&M-EST. PATIENT-LVL IV: CPT | Mod: PBBFAC,,, | Performed by: PHYSICIAN ASSISTANT

## 2022-09-20 PROCEDURE — 99999 PR PBB SHADOW E&M-EST. PATIENT-LVL II: ICD-10-PCS | Mod: PBBFAC,,, | Performed by: PSYCHIATRY & NEUROLOGY

## 2022-09-20 PROCEDURE — 99214 OFFICE O/P EST MOD 30 MIN: CPT | Mod: S$GLB,,, | Performed by: PHYSICIAN ASSISTANT

## 2022-09-20 PROCEDURE — 99215 OFFICE O/P EST HI 40 MIN: CPT | Mod: S$GLB,,, | Performed by: PSYCHIATRY & NEUROLOGY

## 2022-09-20 PROCEDURE — 99214 PR OFFICE/OUTPT VISIT, EST, LEVL IV, 30-39 MIN: ICD-10-PCS | Mod: S$GLB,,, | Performed by: PHYSICIAN ASSISTANT

## 2022-09-20 PROCEDURE — 99999 PR PBB SHADOW E&M-EST. PATIENT-LVL IV: ICD-10-PCS | Mod: PBBFAC,,, | Performed by: PHYSICIAN ASSISTANT

## 2022-09-20 RX ORDER — ESCITALOPRAM OXALATE 20 MG/1
20 TABLET ORAL DAILY
Qty: 90 TABLET | Refills: 0 | Status: SHIPPED | OUTPATIENT
Start: 2022-09-20 | End: 2023-02-21 | Stop reason: SDUPTHER

## 2022-09-20 RX ORDER — GABAPENTIN 300 MG/1
300 CAPSULE ORAL 3 TIMES DAILY
Qty: 90 CAPSULE | Refills: 3 | Status: SHIPPED | OUTPATIENT
Start: 2022-09-20 | End: 2022-12-07 | Stop reason: SDUPTHER

## 2022-09-20 RX ORDER — QUETIAPINE FUMARATE 100 MG/1
100 TABLET, FILM COATED ORAL NIGHTLY
Qty: 90 TABLET | Refills: 0 | Status: SHIPPED | OUTPATIENT
Start: 2022-09-20 | End: 2023-03-08 | Stop reason: SINTOL
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
## 2022-09-20 NOTE — PATIENT INSTRUCTIONS
SIJ (sacroiliac joint)  IT band stretches  Hip flexor stretches    Warm compresses (tube sock with rice, warm in microwave)

## 2022-09-20 NOTE — PROGRESS NOTES
"Established Patient Chronic Pain Note     Referring Physician: No ref. provider found    PCP: Gumaro Del Real MD    Chief Complaint:   Back and leg pain     SUBJECTIVE:  Interval History (9/20/2022):  Haydee Pennington presents today for follow-up visit.  Patient was last seen on 7/12/2022. Patient reports pain as "0/10 today. Last injection right SIJ + right GT bursa injection on 06/03/2022. Patient reports pain flare that began a couple of weeks ago. She reports continued pain over bilateral SIJ and GT bursa. Patient was prescribed a medrol dose pack on 09/09/2022 to help with pain. She reports medrol dose pack helped somewhat with the pain. She is not interested in any repeat injections as she states the last injection worsened her pain. Interested in continuing physician directed exercises and stretches. She also reports significant weight gain with Seroquel, has appointment scheduled with Dr. Kaplan to discuss medication side effects.    Interval history 07/12/2022  Patient presents status post right-sided sacroiliac joint and greater trochanteric bursa injection 06/03/2022.  Patient reports at least 45-50% pain relief overlying right-sided sacroiliac joint and greater trochanteric bursa territories.  Patient continues to report pain down the lateral aspect of the right lower extremity in L4 distribution to the foot.  Patient reports improvement in symptoms with continuation of physical therapy exercises.  She does report financial difficulty with physical therapy and has requesting physician directed exercises at home.  Patient has continued gabapentin and has reached a titration of 300 mg 3 times daily.  She does report significant improvement in her neuropathic pain with this medication.  She denies any side effects from this medication.    HPI 05/17/2022  Haydee Pennington is a 37 y.o. female with past medical history significant for anxiety, depression, hypertension, obesity, hypothyroidism, nicotine " who presents to the clinic for the evaluation of lower back and right leg pain.  Patient reports pain began several years prior without inciting accident injury or trauma.  Today patient reports pain is constant which is rated a 6/10.  Pain is described as grinding in nature.  Patient reports pain begins in a bandlike distribution in her lower back and radiates down the lateral aspect of the right lower extremity to the plantar aspect of the foot in L4-S1 distribution.  Pain is exacerbated with prolonged standing, walking, bending, lifting and even with prolonged sitting.  Of note patient works from home for a [employer] center and reports pain is exacerbated with prolonged sitting at the end of the day.  Pain is also exacerbated associated with her menstrual cycle.  Pain has been improved in the past with gabapentin, which patient reports she was prescribed for anxiety.     Patient denies night fever/night sweats, urinary incontinence, bowel incontinence, significant weight loss, significant motor weakness and loss of sensations.    Pain Disability Index Review:     Last 3 PDI Scores 5/17/2022   Pain Disability Index (PDI) 41       Non-Pharmacologic Treatments:  Physical Therapy/Home Exercise: no  Ice/Heat:no  TENS: no  Acupuncture: no  Massage: no  Chiropractic: no    Other: no      Pain Medications:  - Adjuvant Medications: Clonazepam (Klonopin) and Lexapro (Escitalopram)    Pain Procedures:   -06/03/2022:  Right-sided greater trochanteric bursa and sacroiliac joint injection    Past Medical History:   Diagnosis Date    Hypertension     Thyroid disease      Past Surgical History:   Procedure Laterality Date    CERVICAL BIOPSY  W/ LOOP ELECTRODE EXCISION  2005    INJECTION OF ANESTHETIC AGENT INTO SACROILIAC JOINT Right 06/03/2022    Procedure: right Sacroiliac Joint Injection with RN IV sedation;  Surgeon: Rey Monsalve MD;  Location: [facility];  Service: Pain Management;  Laterality: Right;    INJECTION OF  JOINT Right 06/03/2022    Procedure: right GT bursa injection with RN IV sedation;  Surgeon: Rey Monsalve MD;  Location: [facility];  Service: Pain Management;  Laterality: Right;     Review of patient's allergies indicates:   Allergen Reactions    Sulfa (sulfonamide antibiotics) Hives       Current Outpatient Medications   Medication Sig    amLODIPine (NORVASC) 10 MG tablet Take 1 tablet (10 mg total) by mouth once daily.    clonazePAM (KLONOPIN) 0.5 MG tablet Take 1 tablet (0.5 mg total) by mouth 2 (two) times daily as needed for Anxiety.    hydroCHLOROthiazide (HYDRODIURIL) 25 MG tablet Take 25 mg by mouth once daily.    levothyroxine (SYNTHROID) 125 MCG tablet take 1 tablet by MOUTH BEFORE breakfast    methocarbamoL (ROBAXIN) 750 MG Tab Take 1 tablet (750 mg total) by mouth every 8 (eight) hours as needed (muscle spasm).    methylPREDNISolone (MEDROL DOSEPACK) 4 mg tablet use as directed    ondansetron (ZOFRAN-ODT) 4 MG TbDL Take 1 tablet (4 mg total) by mouth every 6 (six) hours as needed (nausea/vomiting).    EScitalopram oxalate (LEXAPRO) 20 MG tablet Take 1 tablet (20 mg total) by mouth once daily.    gabapentin (NEURONTIN) 300 MG capsule Take 1 capsule (300 mg total) by mouth 3 (three) times daily.    QUEtiapine (SEROQUEL) 100 MG Tab Take 1 tablet (100 mg total) by mouth nightly.     No current facility-administered medications for this visit.       Review of Systems     GENERAL:  No weight loss, malaise or fevers.  HEENT:   No recent changes in vision or hearing  NECK:  Negative for lumps, no difficulty with swallowing.  RESPIRATORY:  Negative for cough, wheezing or shortness of breath, patient denies any recent URI.  CARDIOVASCULAR:  Negative for chest pain, leg swelling or palpitations.  GI:  Negative for abdominal discomfort, blood in stools or black stools or change in bowel habits.  MUSCULOSKELETAL:  See HPI.  SKIN:  Negative for lesions, rash, and itching.  PSYCH:  No mood disorder or recent  "psychosocial stressors.   HEMATOLOGY/LYMPHOLOGY:  Negative for prolonged bleeding, bruising easily or swollen nodes.    NEURO:   No history of headaches, syncope, paralysis, seizures or tremors.  All other reviewed and negative other than HPI.    OBJECTIVE:    /85   Pulse 69   Ht 5' 5" (1.651 m)   Wt 111.1 kg (245 lb 0.7 oz)   BMI 40.78 kg/m²       Physical Exam  Last clinic visit:  GENERAL: Well appearing, in no acute distress, alert and oriented x3.  PSYCH:  Mood and affect appropriate.  SKIN: Skin color, texture, turgor normal, no rashes or lesions.  HEAD/FACE:  Normocephalic, atraumatic. Cranial nerves grossly intact.    CV: RRR with palpation of the radial artery.  PULM: No evidence of respiratory difficulty, symmetric chest rise.  GI:  Soft and non-tender.    BACK: Straight leg raising in the sitting and supine positions is negative to radicular pain. No pain to palpation over the facet joints of the lumbar spine or spinous processes. Normal range of motion without pain reproduction.  EXTREMITIES: Peripheral joint ROM is full and pain free without obvious instability or laxity in all four extremities. No deformities, edema, or skin discoloration. Good capillary refill.  MUSCULOSKELETAL: Able to stand on heels & toes.    hip, and knee provocative maneuvers are negative.   pain with palpation over the sacroiliac joints on R.  FABERs and Gaenslen test is positive on right.  Facet loading test is negative bilaterally.   Bilateral upper and lower extremity strength is normal and symmetric.  No atrophy or tone abnormalities are noted..  RIGHT Lower extremity: Hip flexion 5/5, Hip Abduction 5/5, Hip Adduction 5/5, Knee extension 5/5, Knee flexion 5/5, Ankle dorsiflexion5/5, Extensor hallucis longus 5/5, Ankle plantarflexion 5/5  LEFT Lower extremity:  Hip flexion 5/5, Hip Abduction 5/5,Hip Adduction 5/5, Knee extension 5/5, Knee flexion 5/5, Ankle dorsiflexion 5/5, Extensor hallucis longus 5/5, Ankle " plantarflexion 5/5  -Normal testing knee (patellar) jerk and ankle (achilles) jerk    NEURO: Bilateral upper and lower extremity coordination and muscle stretch reflexes are physiologic and symmetric. No loss of sensation is noted.  GAIT: normal.    Imaging:   MRI lumbar spine 05/17/2022  FINDINGS:  Images are mildly degraded by patient motion artifact.     Alignment: Within normal limits.     Vertebrae: Tiny inferior T11 Schmorl's node.  Lumbar vertebral body heights are maintained.  No evidence of acute fracture.  Marrow signal is within normal limits.     Discs: L4-L5 and L5-S1 disc desiccation.  Disc heights are maintained.     Cord: Within normal limits.  Conus terminates at L1-L2.     Degenerative findings:     T12-L1: No significant disc bulge, spinal canal stenosis or neural foraminal stenosis.     L1-L2: No significant disc bulge, spinal canal stenosis or neural foraminal stenosis.     L2-L3: No significant disc bulge, spinal canal stenosis or neural foraminal stenosis.     L3-L4: No significant disc bulge.  Mild facet arthropathy.  No significant spinal canal stenosis or neural foraminal stenosis.     L4-L5: Disc desiccation.  Minor broad-based disc bulge.  Mild bilateral facet arthropathy.  Facet arthropathy on the right closely approximates the exiting right L4 nerve root.  No significant spinal canal or neural foraminal stenosis.     L5-S1: Disc desiccation.  Asymmetric right mild broad-based disc bulge.  Minor bilateral facet arthropathy.  No significant spinal canal or neural foraminal stenosis.    08/22/18    X-Ray Lumbar Spine Complete 5 View    FINDINGS:  Vertebral body heights and alignment are within normal limits.  Intervertebral disk spaces are well preserved. Posterior elements appear grossly intact.  No pars defects visualized.  No acute fractures or subluxations are demonstrated.  The remaining visualized osseous and soft tissue structures demonstrate no appreciable  abnormality.      ASSESSMENT: 37 y.o. year old female with lower back and leg pain, consistent with     1. Sacroiliitis  gabapentin (NEURONTIN) 300 MG capsule              PLAN:   - Interventions:  None at this time, we will focus on physical therapy and medications    Patient has sustained 50% relief following right-sided sacroiliac joint and greater trochanteric bursa injection.  We discussed repeating this injection, but patient reports she is not interested in repeating injections    -we have discussed considering a right-sided L4/5 and L5/S1 facet injection to see if this would help with radicular symptoms.  Based on MRI findings.       - Anticoagulation use: no no anticoagulation       report:  Reviewed and consistent with medication use as prescribed.    - Medications:  --  We have discussed continue gabapentin to a more therapeutic goal.  Patient will increase  medication according to the following algorithm.  We have reviewed potential side effects of this medication including daytime somnolence, weight gain and peripheral edema  Gabapentin 300 mg TID      - Therapy:   We discussed continue physical therapy to help manage the patient/s painful condition. The patient was counseled that muscle strengthening will improve the long term prognosis in regards to pain and may also help increase range of motion and mobility. They were told that one of the goals of physical therapy is that they learn how to do the exercises so that they can do them independently at home daily upon completion. The patient's questions were answered and they were agreeable to this course.  Kirk exercises for core strengthening and lumbar stabilization recommended to the patient at previous visit, recommend additional core strengthening exercises and recommend hip flexor and IT band stretches.    - Imaging: Reviewed available imaging with patient and answered any questions they had regarding study.    - Follow up visit: return to  clinic in 12 weeks      The above plan and management options were discussed at length with patient. Patient is in agreement with the above and verbalized understanding.    - I discussed the goals of interventional chronic pain management with the patient on today's visit. We discussed a multimodal and systematic approach to pain.  This includes diagnostic and therapeutic injections, adjuvant pharmacologic treatment, physical therapy, and at times psychiatry.  I emphasized the importance of regular exercise, core strengthening and stretching, diet and weight loss as a cornerstone of long-term pain management.    - This condition does not require this patient to take time off of work, and the primary goal of our Pain Management services is to improve the patient's functional capacity.  - Patient Questions: Answered all of the patient's questions regarding diagnoses, therapy, treatment and next steps        Samira Espinal PA-C  Interventional Pain Management  [facility]    Disclaimer:  This note was prepared using voice recognition system and is likely to have sound alike errors that may have been overlooked even after proof reading.  Please call me with any questions

## 2022-09-28 ENCOUNTER — OFFICE VISIT (OUTPATIENT)
Dept: PSYCHIATRY | Facility: CLINIC | Age: 37
End: 2022-09-28
Payer: COMMERCIAL

## 2022-09-28 DIAGNOSIS — F41.1 GAD (GENERALIZED ANXIETY DISORDER): ICD-10-CM

## 2022-09-28 DIAGNOSIS — F43.10 PTSD (POST-TRAUMATIC STRESS DISORDER): ICD-10-CM

## 2022-09-28 DIAGNOSIS — F39 MOOD DISORDER: Primary | ICD-10-CM

## 2022-09-28 PROCEDURE — 90834 PR PSYCHOTHERAPY W/PATIENT, 45 MIN: ICD-10-PCS | Mod: 95,,, | Performed by: SOCIAL WORKER

## 2022-09-28 PROCEDURE — 90834 PSYTX W PT 45 MINUTES: CPT | Mod: 95,,, | Performed by: SOCIAL WORKER

## 2022-10-04 NOTE — PROGRESS NOTES
Individual Psychotherapy (PhD/LCSW)    Patient's stated location at the time of visit: PSY Televisit Pt Location: home/residence in the [facility]    Each patient provided medical services by telemedicine is: (1) informed of the relationship between the provider and patient and the respective role of any other health care provider with respect to management of the patient; and (2) notified that he or she may decline to receive medical services by telemedicine and may withdraw from such care at any time.    Crisis Disclaimer: Patient was informed that due to the virtual nature of the visit, that if a crisis develops, protocols will be implemented to ensure patient safety, including but not limited to: (1) Initiating a welfare check with local Law Enforcement, (2) Calling 1/National Crisis Hotline, and/or (3) Initiating PEC/CEC procedures. Virtual visit with synchronous audio and video.    9/28/2022    Site:  Telemed         Therapeutic Intervention: Met with patient.  Outpatient - Insight oriented psychotherapy 45 min - CPT code 14180    Chief complaint/reason for encounter: depression, anxiety, and interpersonal    PHQ-9 Depression Patient Health Questionnaire 9/28/2022 6/13/2022 4/12/2022   Patient agreed to terms: Yes Yes Yes   Little interest or pleasure in doing things 2 0 1   Feeling down, depressed, or hopeless 1 0 1   Trouble falling or staying asleep, or sleeping too much 1 1 1   Feeling tired or having little energy 1 0 1   Poor appetite or overeating 3 2 1   Feeling bad about yourself - or that you are a failure or have let yourself or your family down 0 0 0   Trouble concentrating on things, such as reading the newspaper or watching television 1 1 2   Moving or speaking so slowly that other people could have noticed. Or the opposite - being so fidgety or restless that you have been moving around a lot more than usual 0 2 3   Thoughts that you would be better off dead, or of hurting yourself in  some way 0 0 1   PHQ-9 Total Score 9 6 11   If you checked off any problems, how difficult have these problems made it for you to do your work, take care of things at home, or get along with other people? Somewhat difficult Somewhat difficult Somewhat difficult   Interpretation Mild Mild Moderate   Some encounter information is confidential and restricted. Go to Review Flowsheets activity to see all data.       No flowsheet data found.     Interval history and content of current session:  Met with this patient for her online virtual follow-up appointment.  The patient was in her home office throughout the appointment, alert and oriented.  The patient stated that she was under extreme stress currently due to the lack of help in the house from her family.  Her immediate family consists of her son Arnaud and her partner, Marcus, with whom she has lived for 20 years.  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  She works hard all day but says that when work is over she comes out of her office and find the house dirty and unkept and most often there is no meals cooked.  She feels that she goes from 1 job to another.  She states that her son recently dropped out of high school because he has repeated the 10th grade twice already and now that he is 16 would rather get his GED.  Her partner has not worked in many years and is most  spends the day smoking marijuana.  In the past she has stated that she loves him and that is why she has not asked him to leave and because he is the father of her son.  But on this day she seems very frustrated with his lack commitment to the family.  On top of her stress at home she has to interact with her mother who lives alone in a house that is so full of objects that she cannot have workers come in to fix any broken utilities.  According to the patient, the mother is content to live in the house as it is and  does not want to remove any of the objects from the house.  The patient has a daughter who has been living with the mother ( her grandmother) since she was a child. Recently the patient's daughter moved out of her grandmother's house but has left behind several of her pets.  The grandmother is tired of caring for them but the patient feels that the animals are being neglected.  This is adding to her stress as well.  The patient feels she is responsible to all of these people in her family. Discussed the idea boundaries with this patient and attempted to elicit 1 change that she could make to set a boundary.  The patient struggled to agree to something such as not doing other people's laundry, not cooking dinner, or leaving to go out to dinner by herself if nothing has been done in the house.  Will continue to provide educational material and tools to this patient to help her gain insight into her lack of boundaries and to start to develop more boundaries. She stated she would make a follow-up appointment.    Treatment plan:  Target symptoms: depression, anxiety   Why chosen therapy is appropriate versus another modality: patient responds to this modality  Outcome monitoring methods: self-report, observation  Therapeutic intervention type: insight oriented psychotherapy, supportive psychotherapy    Risk parameters:  Patient reports no suicidal ideation  Patient reports no homicidal ideation  Patient reports no self-injurious behavior  Patient reports no violent behavior    Verbal deficits: None    Patient's response to intervention:  The patient's response to intervention is accepting.    Progress toward goals and other mental status changes:  The patient's progress toward goals is fair .    Diagnosis:     ICD-10-CM ICD-9-CM   1. Mood disorder  F39 296.90   2. RAOUL (generalized anxiety disorder)  F41.1 300.02   3. PTSD (post-traumatic stress disorder)  F43.10 309.81       Plan:  individual psychotherapy    Return to  clinic: as scheduled    Length of Service (minutes): 45       Belkis Chen LCSW  10/04/2022   9:47 PM

## 2022-10-10 ENCOUNTER — OFFICE VISIT (OUTPATIENT)
Dept: PSYCHIATRY | Facility: CLINIC | Age: 37
End: 2022-10-10
Payer: COMMERCIAL

## 2022-10-10 DIAGNOSIS — F43.10 PTSD (POST-TRAUMATIC STRESS DISORDER): ICD-10-CM

## 2022-10-10 DIAGNOSIS — F39 MOOD DISORDER: Primary | ICD-10-CM

## 2022-10-10 DIAGNOSIS — F32.A DEPRESSION, UNSPECIFIED DEPRESSION TYPE: ICD-10-CM

## 2022-10-10 DIAGNOSIS — F41.1 GAD (GENERALIZED ANXIETY DISORDER): ICD-10-CM

## 2022-10-10 PROCEDURE — 99999 PR PBB SHADOW E&M-EST. PATIENT-LVL I: ICD-10-PCS | Mod: PBBFAC,,, | Performed by: SOCIAL WORKER

## 2022-10-10 PROCEDURE — 90834 PR PSYCHOTHERAPY W/PATIENT, 45 MIN: ICD-10-PCS | Mod: 95,,, | Performed by: SOCIAL WORKER

## 2022-10-10 PROCEDURE — 90834 PSYTX W PT 45 MINUTES: CPT | Mod: 95,,, | Performed by: SOCIAL WORKER

## 2022-10-10 PROCEDURE — 99999 PR PBB SHADOW E&M-EST. PATIENT-LVL I: CPT | Mod: PBBFAC,,, | Performed by: SOCIAL WORKER

## 2022-10-10 NOTE — PROGRESS NOTES
Individual Psychotherapy (PhD/LCSW)    Patient's stated location at the time of visit: PSY Televisit Pt Location: home/residence in the [facility]    Each patient provided medical services by telemedicine is: (1) informed of the relationship between the provider and patient and the respective role of any other health care provider with respect to management of the patient; and (2) notified that he or she may decline to receive medical services by telemedicine and may withdraw from such care at any time.    Crisis Disclaimer: Patient was informed that due to the virtual nature of the visit, that if a crisis develops, protocols will be implemented to ensure patient safety, including but not limited to: (1) Initiating a welfare check with local Law Enforcement, (2) Calling 1/National Crisis Hotline, and/or (3) Initiating PEC/CEC procedures. Virtual visit with synchronous audio and video.    10/10/2022    Site:  Telemed         Therapeutic Intervention: Met with patient.  Outpatient - Insight oriented psychotherapy 45 min - CPT code 10967    Chief complaint/reason for encounter: depression, anxiety, and interpersonal    PHQ-9 Depression Patient Health Questionnaire 10/10/2022 9/28/2022 6/13/2022 4/12/2022   Patient agreed to terms: Yes Yes Yes Yes   Little interest or pleasure in doing things 1 2 0 1   Feeling down, depressed, or hopeless 1 1 0 1   Trouble falling or staying asleep, or sleeping too much 1 1 1 1   Feeling tired or having little energy 1 1 0 1   Poor appetite or overeating 1 3 2 1   Feeling bad about yourself - or that you are a failure or have let yourself or your family down 0 0 0 0   Trouble concentrating on things, such as reading the newspaper or watching television 2 1 1 2   Moving or speaking so slowly that other people could have noticed. Or the opposite - being so fidgety or restless that you have been moving around a lot more than usual 2 0 2 3   Thoughts that you would be better off  dead, or of hurting yourself in some way 0 0 0 1   PHQ-9 Total Score 9 9 6 11   If you checked off any problems, how difficult have these problems made it for you to do your work, take care of things at home, or get along with other people? Somewhat difficult Somewhat difficult Somewhat difficult Somewhat difficult   Interpretation Mild Mild Mild Moderate   Some encounter information is confidential and restricted. Go to Review Flowsheets activity to see all data.       No flowsheet data found.     Interval history and content of current session:  Met with this patient for her online virtual follow-up appointment.  She was in her home office throughout the appointment.  She stated that she had enjoyed being at her company's summer picnic the day before.  She stated that they had cooked out several kinds of meat and ribs.  She did not take her son or her ex- with her. She also stated that she and her  Marcus are continuing the process of getting  and he has not been staying at her house.  She states that she has been packing his things so he will leave.  He is staying across the street with a friend.  The patient stated that in his childhood history, Marcus was in long term or in a group home from the age of 10 or 11 through the age of 20.  She stated that he grew up in long term.  So she understands the deficit that he comes from.  However, after 20 years of putting up with his drug use and lack of motivation to do any work or earn money she is tired and wants to split up from him.  She states that she is also frustrated with her son Arnaud.  He dropped out of school a few months ago and said he planned to get his GED.  So far he has not made a move toward getting it.  The patient discussed her mother and did say that she is detaching herself from that situation and discussed her daughter and again stated that she realizes she cannot control the situation and so she keeps her distance.  The patient states  that she is working on herself because she realizes that she cannot change the people who live with her.  She states that she is trying more to stay in the present and not worry about the future or dwell on the past.  She will make a follow-up appointment.    Previous Note: Met with this patient for her online virtual follow-up appointment.  The patient was in her home office throughout the appointment, alert and oriented.  The patient stated that she was under extreme stress currently due to the lack of help in the house from her family.  Her immediate family consists of her son Arnaud and her partner, Marcus, with whom she has lived for 20 years.  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  She works hard all day but says that when work is over she comes out of her office and find the house dirty and unkept and most often there is no meals cooked.  She feels that she goes from 1 job to another.  She states that her son recently dropped out of high school because he has repeated the 10th grade twice already and now that he is 16 would rather get his GED.  Her partner has not worked in many years and is most  spends the day smoking marijuana.  In the past she has stated that she loves him and that is why she has not asked him to leave and because he is the father of her son.  But on this day she seems very frustrated with his lack commitment to the family.  On top of her stress at home she has to interact with her mother who lives alone in a house that is so full of objects that she cannot have workers come in to fix any broken utilities.  According to the patient, the mother is content to live in the house as it is and does not want to remove any of the objects from the house.  The patient has a daughter who has been living with the mother ( her grandmother) since she was a child. Recently the patient's daughter moved out of her  grandmother's house but has left behind several of her pets.  The grandmother is tired of caring for them but the patient feels that the animals are being neglected.  This is adding to her stress as well.  The patient feels she is responsible to all of these people in her family. Discussed the idea boundaries with this patient and attempted to elicit 1 change that she could make to set a boundary.  The patient struggled to agree to something such as not doing other people's laundry, not cooking dinner, or leaving to go out to dinner by herself if nothing has been done in the house.  Will continue to provide educational material and tools to this patient to help her gain insight into her lack of boundaries and to start to develop more boundaries. She stated she would make a follow-up appointment.    Treatment plan:  Target symptoms: depression, anxiety   Why chosen therapy is appropriate versus another modality: patient responds to this modality  Outcome monitoring methods: self-report, observation  Therapeutic intervention type: insight oriented psychotherapy, supportive psychotherapy    Risk parameters:  Patient reports no suicidal ideation  Patient reports no homicidal ideation  Patient reports no self-injurious behavior  Patient reports no violent behavior    Verbal deficits: None    Patient's response to intervention:  The patient's response to intervention is accepting.    Progress toward goals and other mental status changes:  The patient's progress toward goals is fair .    Diagnosis:     ICD-10-CM ICD-9-CM   1. Mood disorder  F39 296.90   2. RAOUL (generalized anxiety disorder)  F41.1 300.02   3. PTSD (post-traumatic stress disorder)  F43.10 309.81   4. Depression, unspecified depression type  F32.A 311         Plan:  individual psychotherapy    Return to clinic: as scheduled    Length of Service (minutes): 45       Belkis Chen LCSW  10/16/2022   9:47 PM

## 2022-10-13 ENCOUNTER — TELEPHONE (OUTPATIENT)
Dept: PSYCHIATRY | Facility: CLINIC | Age: 37
End: 2022-10-13
Payer: COMMERCIAL

## 2022-10-13 ENCOUNTER — OFFICE VISIT (OUTPATIENT)
Dept: ORTHOPEDICS | Facility: CLINIC | Age: 37
End: 2022-10-13
Payer: COMMERCIAL

## 2022-10-13 ENCOUNTER — HOSPITAL ENCOUNTER (OUTPATIENT)
Dept: RADIOLOGY | Facility: HOSPITAL | Age: 37
Discharge: HOME | End: 2022-10-13
Payer: COMMERCIAL

## 2022-10-13 VITALS — WEIGHT: 243.63 LBS | HEIGHT: 65 IN | BODY MASS INDEX: 40.59 KG/M2

## 2022-10-13 DIAGNOSIS — M25.521 RIGHT ELBOW PAIN: Primary | ICD-10-CM

## 2022-10-13 DIAGNOSIS — M75.21 BICEPS TENDINITIS OF RIGHT UPPER EXTREMITY: Primary | ICD-10-CM

## 2022-10-13 DIAGNOSIS — M25.521 RIGHT ELBOW PAIN: ICD-10-CM

## 2022-10-13 PROCEDURE — 73080 X-RAY EXAM OF ELBOW: CPT | Mod: TC,RT

## 2022-10-13 PROCEDURE — 99999 PR PBB SHADOW E&M-EST. PATIENT-LVL III: CPT | Mod: PBBFAC,,,

## 2022-10-13 PROCEDURE — 99999 PR PBB SHADOW E&M-EST. PATIENT-LVL III: ICD-10-PCS | Mod: PBBFAC,,,

## 2022-10-13 PROCEDURE — 73080 XR ELBOW COMPLETE 3 VIEW RIGHT: ICD-10-PCS | Mod: 26,RT,, | Performed by: RADIOLOGY

## 2022-10-13 PROCEDURE — 99213 OFFICE O/P EST LOW 20 MIN: CPT | Mod: S$GLB,,,

## 2022-10-13 PROCEDURE — 73080 X-RAY EXAM OF ELBOW: CPT | Mod: 26,RT,, | Performed by: RADIOLOGY

## 2022-10-13 PROCEDURE — 99213 PR OFFICE/OUTPT VISIT, EST, LEVL III, 20-29 MIN: ICD-10-PCS | Mod: S$GLB,,,

## 2022-10-13 RX ORDER — DICLOFENAC SODIUM 10 MG/G
2 GEL TOPICAL 4 TIMES DAILY
Qty: 1 EACH | Refills: 3 | Status: SHIPPED | OUTPATIENT
Start: 2022-10-13 | End: 2023-09-14

## 2022-10-13 NOTE — PROGRESS NOTES
SUBJECTIVE:      Chief Complaint: Pain of the Right Elbow and Pain of the Right Forearm    Referring Provider: Self, Aaareferral     History of Present Illness:  Patient is a 37 y.o. right hand dominant female who presents today with complaints of right elbow and forearm pain. Onset of symptoms was 1 month ago when she opened a window. She heard a succession of pops at the time. The arm did not bruise or swell up on her and the pain was gradually improving. However, on Sunday the patient tried to serve a volleyball and that exacerbated the pain. The elbow is sore at rest and twisting motions and lifting will make the pain worse. The pain will radiate into the shoulder and into the forearm. She has tried ice, ibuprofen, epson salt baths for relief. The patient denies any fevers, chills, N/V, D/C and presents for evaluation.       Past Medical History:   Diagnosis Date    Hypertension     Thyroid disease      Past Surgical History:   Procedure Laterality Date    CERVICAL BIOPSY  W/ LOOP ELECTRODE EXCISION  2005    INJECTION OF ANESTHETIC AGENT INTO SACROILIAC JOINT Right 06/03/2022    Procedure: right Sacroiliac Joint Injection with RN IV sedation;  Surgeon: Rey Monsalve MD;  Location: [facility];  Service: Pain Management;  Laterality: Right;    INJECTION OF JOINT Right 06/03/2022    Procedure: right GT bursa injection with RN IV sedation;  Surgeon: Rey Monsalve MD;  Location: [facility];  Service: Pain Management;  Laterality: Right;     Review of patient's allergies indicates:   Allergen Reactions    Sulfa (sulfonamide antibiotics) Hives     Social History     Social History Narrative    Not on file     Family History   Problem Relation Age of Onset    Diabetes Mother     Cancer Mother         Lung and Breast    Hypertension Father     Dementia Father     Transient ischemic attack Father     Heart failure Father     Stroke Father          Current Outpatient Medications:     amLODIPine (NORVASC) 10 MG  "tablet, Take 1 tablet (10 mg total) by mouth once daily., Disp: 90 tablet, Rfl: 3    clonazePAM (KLONOPIN) 0.5 MG tablet, Take 1 tablet (0.5 mg total) by mouth 2 (two) times daily as needed for Anxiety., Disp: 30 tablet, Rfl: 0    EScitalopram oxalate (LEXAPRO) 20 MG tablet, Take 1 tablet (20 mg total) by mouth once daily., Disp: 90 tablet, Rfl: 0    gabapentin (NEURONTIN) 300 MG capsule, Take 1 capsule (300 mg total) by mouth 3 (three) times daily., Disp: 90 capsule, Rfl: 3    hydroCHLOROthiazide (HYDRODIURIL) 25 MG tablet, Take 25 mg by mouth once daily., Disp: , Rfl:     levothyroxine (SYNTHROID) 125 MCG tablet, take 1 tablet by MOUTH BEFORE breakfast, Disp: 90 tablet, Rfl: 1    QUEtiapine (SEROQUEL) 100 MG Tab, Take 1 tablet (100 mg total) by mouth nightly., Disp: 90 tablet, Rfl: 0    diclofenac sodium (VOLTAREN) 1 % Gel, Apply 2 g topically 4 (four) times daily., Disp: 1 each, Rfl: 3    methocarbamoL (ROBAXIN) 750 MG Tab, Take 1 tablet (750 mg total) by mouth every 8 (eight) hours as needed (muscle spasm)., Disp: 20 tablet, Rfl: 0    methylPREDNISolone (MEDROL DOSEPACK) 4 mg tablet, use as directed, Disp: 1 each, Rfl: 0    ondansetron (ZOFRAN-ODT) 4 MG TbDL, Take 1 tablet (4 mg total) by mouth every 6 (six) hours as needed (nausea/vomiting)., Disp: 12 tablet, Rfl: 0      Review of Systems:  Constitutional: Negative for chills and fever.   Respiratory: Negative for cough and shortness of breath.    Gastrointestinal: Negative for nausea and vomiting.   Skin: Negative for rash.   Neurological: Negative for dizziness and headaches.   Psychiatric/Behavioral: Negative for depression.   MSK as in HPI     OBJECTIVE:      Vital Signs (Most Recent):  Vitals:    10/13/22 0933   Weight: 110.5 kg (243 lb 9.7 oz)   Height: 5' 5" (1.651 m)     Body mass index is 40.54 kg/m².      Physical Exam:  Constitutional: The patient appears well-developed and well-nourished. No distress.   Skin: No lesions appreciated  Head: " Normocephalic and atraumatic.   Nose: Nose normal.   Ears: No deformities seen  Eyes: Conjunctivae and EOM are normal.   Neck: No tracheal deviation present.   Cardiovascular: Normal rate and intact distal pulses.    Pulmonary/Chest: Effort normal. No respiratory distress.   Abdominal: There is no guarding.   Neurological: The patient is alert.   Psychiatric: The patient has a normal mood and affect.     General    Vitals reviewed.            Right Hand/Wrist Exam     Inspection   Scars: Wrist - absent   Effusion: Wrist - absent     Other     Neuorologic Exam    Median Distribution: normal  Ulnar Distribution: normal  Radial Distribution: normal      Left Hand/Wrist Exam     Inspection   Scars: Wrist - absent   Effusion: Wrist - absent     Other     Sensory Exam  Median Distribution: normal  Ulnar Distribution: normal  Radial Distribution: normal      Right Elbow Exam     Inspection   Scars: absent  Effusion: absent    Comments:  TTP over antecubital fossa at distal biceps tendon  TTP at extensor forearm musculature  Pain with supination   Hook test negative  No motor or sensory deficits  No local ecchymosis  No swelling  No changes in muscle contour noted      Left Elbow Exam     Inspection   Scars: absent  Effusion: absent        Vascular Exam     Right Pulses      Radial:                    2+      Left Pulses      Radial:                    2+      Capillary Refill  Right Hand: normal capillary refill  Left Hand: normal capillary refill        Diagnostic Results:    EXAMINATION:  XR ELBOW COMPLETE 3 VIEW RIGHT     CLINICAL HISTORY:  . Pain in right elbow     TECHNIQUE:  AP, lateral, and oblique views of the right elbow were performed.     COMPARISON:  None     FINDINGS:  No acute fracture or dislocation.  No significant soft tissue swelling.     The joint spaces are preserved.     No significant joint effusion.     Impression:     No acute radiographic findings of the right elbow.        Electronically signed  by: Maximino Davila  Date:                                            10/13/2022  Time:                                           09:54     Imaging - I independently viewed the patient's imaging as well as the radiology report.  Xrays of the patient's right elbow  demonstrate no evidence of any acute fractures or dislocations or significant degenerative changes.    ASSESSMENT/PLAN:        ICD-10-CM ICD-9-CM   1. Biceps tendinitis of right upper extremity  M75.21 726.12     Orders Placed This Encounter    diclofenac sodium (VOLTAREN) 1 % Gel     No orders of the defined types were placed in this encounter.    Plan:     - Xrays reviewed with patient, no acute changes  - Discussed dx of distal biceps tendinitis. Low clinical suspicion for a biceps tendon rupture as patient was gradually improving before playing volleyball on Sunday and negative hook test on exam. Will treat conservatively at this time.  - Voltaren gel over area TID as needed  - continue ice/heat, NSAIDs, rest, activity mods  - Patient declines PT today. Exercises and stretches for distal bicep tendinitis provided.  - If no improvement in 4-6 weeks, will consider ordering a distal biceps tendon US or MRI for further evaluation  - Follow up in 4-6 weeks as needed    Should the patient's symptoms worsen, persist, or fail to improve they should return for reevaluation and I would be happy to see them back anytime.    Jyotsna Bang PA-C   [facility]     Please be aware that this note has been generated with the assistance of [employer] voice-to-text.  Please excuse any spelling or grammatical errors.

## 2022-11-14 ENCOUNTER — TELEPHONE (OUTPATIENT)
Dept: PAIN MEDICINE | Facility: CLINIC | Age: 37
End: 2022-11-14
Payer: COMMERCIAL

## 2022-11-16 ENCOUNTER — TELEPHONE (OUTPATIENT)
Dept: PAIN MEDICINE | Facility: CLINIC | Age: 37
End: 2022-11-16
Payer: COMMERCIAL

## 2022-11-18 ENCOUNTER — TELEPHONE (OUTPATIENT)
Dept: PSYCHIATRY | Facility: CLINIC | Age: 37
End: 2022-11-18
Payer: COMMERCIAL

## 2022-12-14 ENCOUNTER — OFFICE VISIT (OUTPATIENT)
Dept: PSYCHIATRY | Facility: CLINIC | Age: 37
End: 2022-12-14
Payer: COMMERCIAL

## 2022-12-14 DIAGNOSIS — F41.1 GAD (GENERALIZED ANXIETY DISORDER): ICD-10-CM

## 2022-12-14 DIAGNOSIS — F39 MOOD DISORDER: Primary | ICD-10-CM

## 2022-12-14 DIAGNOSIS — F43.10 PTSD (POST-TRAUMATIC STRESS DISORDER): ICD-10-CM

## 2022-12-14 PROCEDURE — 90834 PR PSYCHOTHERAPY W/PATIENT, 45 MIN: ICD-10-PCS | Mod: 95,,, | Performed by: SOCIAL WORKER

## 2022-12-14 PROCEDURE — 90834 PSYTX W PT 45 MINUTES: CPT | Mod: 95,,, | Performed by: SOCIAL WORKER

## 2022-12-14 NOTE — PROGRESS NOTES
Individual Psychotherapy (PhD/LCSW)    Patient's stated location at the time of visit: PSY Televisit Pt Location: home/residence in the [facility]    Each patient provided medical services by telemedicine is: (1) informed of the relationship between the provider and patient and the respective role of any other health care provider with respect to management of the patient; and (2) notified that he or she may decline to receive medical services by telemedicine and may withdraw from such care at any time.    Crisis Disclaimer: Patient was informed that due to the virtual nature of the visit, that if a crisis develops, protocols will be implemented to ensure patient safety, including but not limited to: (1) Initiating a welfare check with local Law Enforcement, (2) Calling 1/National Crisis Hotline, and/or (3) Initiating PEC/CEC procedures. Virtual visit with synchronous audio and video.    12/14/2022    Site:  Telemed         Therapeutic Intervention: Met with patient.  Outpatient - Insight oriented psychotherapy 45 min - CPT code 50138    Chief complaint/reason for encounter: depression, anxiety, and interpersonal    PHQ-9 Depression Patient Health Questionnaire 12/14/2022 10/10/2022 9/28/2022 6/13/2022 4/12/2022   Patient agreed to terms: Yes Yes Yes Yes Yes   Little interest or pleasure in doing things 1 1 2 0 1   Feeling down, depressed, or hopeless 1 1 1 0 1   Trouble falling or staying asleep, or sleeping too much 1 1 1 1 1   Feeling tired or having little energy 1 1 1 0 1   Poor appetite or overeating 1 1 3 2 1   Feeling bad about yourself - or that you are a failure or have let yourself or your family down 1 0 0 0 0   Trouble concentrating on things, such as reading the newspaper or watching television 1 2 1 1 2   Moving or speaking so slowly that other people could have noticed. Or the opposite - being so fidgety or restless that you have been moving around a lot more than usual 1 2 0 2 3   Thoughts  "that you would be better off dead, or of hurting yourself in some way 0 0 0 0 1   PHQ-9 Total Score 8 9 9 6 11   If you checked off any problems, how difficult have these problems made it for you to do your work, take care of things at home, or get along with other people? Somewhat difficult Somewhat difficult Somewhat difficult Somewhat difficult Somewhat difficult   Interpretation Mild Mild Mild Mild Moderate   Some encounter information is confidential and restricted. Go to Review Flowsheets activity to see all data.       No flowsheet data found.     Interval history and content of current session:  Met with this patient for her online follow-up appointment.  The patient was in her home office throughout the appointment.  The patient stated that she was in a low mood that day.  She attributed this to having spent too much money lately and now was having to juggle finances in order to pay the house payment.  She also stated that her ex partner, Marcus, moved back into the house again.  In the past the patient had been adamant that he was not coming back to the house because he starts fights with her and mostly smokes pot all day.  But now, she stated that he had no other place to go and that he is helping around the house some."  In the long run, the patient still loves this man and he is the father of her son Arnaud.  When asked if Arnaud had been working on getting his GED (as he stated he would when he dropped out of high school,) she stated that he is not.  She states that he mostly sleeps and plays video games.  The patient appeared defeated with no influence over her partner and her son.  She did state that she was planning to take Arnaud to [place] to stay with his God parents for a week during the Christmas holiday.  These God parents are old family friends of hers who have known her son and have always had a bond with him.  He enjoys going to visit them and they enjoy his company.  It is a long 6 hour " drive to take him there but she is willing, and she only stays for about an hour then turns around and drives home.  The patient stated that she also thought that she might be suffering from seasonal affective disorder because of the early darkness this time of year. It often makes her feel more depressed as the afternoons become dark so early.  She stated that this is also the time of year that her grandmother  and that her father  in .  The patient laid out all of the things that were bothering her and was able to process them.  She stated that she would make a follow-up appointment.    Previous Note:  Met with this patient for her online virtual follow-up appointment.  She was in her home office throughout the appointment.  She stated that she had enjoyed being at her company's summer picnic the day before.  She stated that they had cooked out several kinds of meat and ribs.  She did not take her son or her ex- with her. She also stated that she and her  Marcus are continuing the process of getting  and he has not been staying at her house.  She states that she has been packing his things so he will leave.  He is staying across the street with a friend.  The patient stated that in his childhood history, Marcus was in senior living or in a group home from the age of 10 or 11 through the age of 20.  She stated that he grew up in senior living.  So she understands the deficit that he comes from.  However, after 20 years of putting up with his drug use and lack of motivation to do any work or earn money she is tired and wants to split up from him.  She states that she is also frustrated with her son Arnaud.  He dropped out of school a few months ago and said he planned to get his GED.  So far he has not made a move toward getting it.  The patient discussed her mother and did say that she is detaching herself from that situation and discussed her daughter and again stated that she realizes she cannot  control the situation and so she keeps her distance.  The patient states that she is working on herself because she realizes that she cannot change the people who live with her.  She states that she is trying more to stay in the present and not worry about the future or dwell on the past.  She will make a follow-up appointment.      Treatment plan:  Target symptoms: depression, anxiety   Why chosen therapy is appropriate versus another modality: patient responds to this modality  Outcome monitoring methods: self-report, observation  Therapeutic intervention type: insight oriented psychotherapy, supportive psychotherapy    Risk parameters:  Patient reports no suicidal ideation  Patient reports no homicidal ideation  Patient reports no self-injurious behavior  Patient reports no violent behavior    Verbal deficits: None    Patient's response to intervention:  The patient's response to intervention is accepting.    Progress toward goals and other mental status changes:  The patient's progress toward goals is fair .    Diagnosis:     ICD-10-CM ICD-9-CM   1. Mood disorder  F39 296.90   2. RAOUL (generalized anxiety disorder)  F41.1 300.02   3. PTSD (post-traumatic stress disorder)  F43.10 309.81           Plan:  individual psychotherapy    Return to clinic: as scheduled    Length of Service (minutes): 45       Belkis Chen LCSW  12/20/2022   9:47 PM

## 2022-12-15 ENCOUNTER — OFFICE VISIT (OUTPATIENT)
Dept: PAIN MEDICINE | Facility: CLINIC | Age: 37
End: 2022-12-15
Payer: COMMERCIAL

## 2022-12-15 ENCOUNTER — PATIENT MESSAGE (OUTPATIENT)
Dept: PAIN MEDICINE | Facility: CLINIC | Age: 37
End: 2022-12-15

## 2022-12-15 DIAGNOSIS — M54.9 DORSALGIA, UNSPECIFIED: Primary | ICD-10-CM

## 2022-12-15 DIAGNOSIS — M54.16 LUMBAR RADICULOPATHY: ICD-10-CM

## 2022-12-15 PROCEDURE — 99214 OFFICE O/P EST MOD 30 MIN: CPT | Mod: 95,,, | Performed by: PHYSICIAN ASSISTANT

## 2022-12-15 PROCEDURE — 99214 PR OFFICE/OUTPT VISIT, EST, LEVL IV, 30-39 MIN: ICD-10-PCS | Mod: 95,,, | Performed by: PHYSICIAN ASSISTANT

## 2022-12-15 RX ORDER — TOPIRAMATE 25 MG/1
TABLET ORAL
Qty: 70 TABLET | Refills: 0 | Status: SHIPPED | OUTPATIENT
Start: 2022-12-15 | End: 2023-01-14

## 2022-12-15 RX ORDER — TOPIRAMATE 50 MG/1
50 TABLET, FILM COATED ORAL 2 TIMES DAILY
Qty: 60 TABLET | Refills: 0 | Status: SHIPPED | OUTPATIENT
Start: 2023-01-16 | End: 2023-01-26

## 2022-12-15 NOTE — PROGRESS NOTES
Established Patient Chronic Pain Note     Referring Physician: No ref. provider found    PCP: Gumaro Del Real MD    Chief Complaint:   Back and leg pain    The patient location is: [place]  The chief complaint leading to consultation is: chronic pain     Visit type: audiovisual    Face to Face time with patient: 10-15 minutes  20 minutes of total time spent on the encounter, which includes face to face time and non-face to face time preparing to see the patient (eg, review of tests), Obtaining and/or reviewing separately obtained history, Documenting clinical information in the electronic or other health record, Independently interpreting results (not separately reported) and communicating results to the patient/family/caregiver, or Care coordination (not separately reported).     Each patient to whom he or she provides medical services by telemedicine is:  (1) informed of the relationship between the physician and patient and the respective role of any other health care provider with respect to management of the patient; and (2) notified that he or she may decline to receive medical services by telemedicine and may withdraw from such care at any time.        SUBJECTIVE:  Interval History (12/15/2022):  Haydee Pennington presents today via telemed for follow-up visit.  Patient was last seen on 9/20/2022. Last injection right SIJ + right GT bursa injection on 06/03/2022. She is not interested in any repeat injections as she states the last injection worsened her pain. Patient reports pain as 6/10 today. Pain in her right shoulder with tingling in  her right hand and pain along her lumbar spine  rosa  band like distribution with radiation into her left leg described as a tightness and burning. She has been completing Kirk exercises for core strengthening and lumbar stabilization and additional core strengthening exercises and  hip flexor and IT band stretches without improvement in symptoms. She does notice that her  "symptoms are worse around her cycle and she has an appointment with her gynecologist next week.       Interval History (9/20/2022):  Haydee Pennington presents today for follow-up visit.  Patient was last seen on 7/12/2022. Patient reports pain as "0/10 today. Last injection right SIJ + right GT bursa injection on 06/03/2022. Patient reports pain flare that began a couple of weeks ago. She reports continued pain over bilateral SIJ and GT bursa. Patient was prescribed a medrol dose pack on 09/09/2022 to help with pain. She reports medrol dose pack helped somewhat with the pain. She is not interested in any repeat injections as she states the last injection worsened her pain. Interested in continuing physician directed exercises and stretches. She also reports significant weight gain with Seroquel, has appointment scheduled with Dr. Kaplan to discuss medication side effects.    Interval history 07/12/2022  Patient presents status post right-sided sacroiliac joint and greater trochanteric bursa injection 06/03/2022.  Patient reports at least 45-50% pain relief overlying right-sided sacroiliac joint and greater trochanteric bursa territories.  Patient continues to report pain down the lateral aspect of the right lower extremity in L4 distribution to the foot.  Patient reports improvement in symptoms with continuation of physical therapy exercises.  She does report financial difficulty with physical therapy and has requesting physician directed exercises at home.  Patient has continued gabapentin and has reached a titration of 300 mg 3 times daily.  She does report significant improvement in her neuropathic pain with this medication.  She denies any side effects from this medication.    HPI 05/17/2022  Haydee Pennington is a 37 y.o. female with past medical history significant for anxiety, depression, hypertension, obesity, hypothyroidism, nicotine who presents to the clinic for the evaluation of lower back and right " leg pain.  Patient reports pain began several years prior without inciting accident injury or trauma.  Today patient reports pain is constant which is rated a 6/10.  Pain is described as grinding in nature.  Patient reports pain begins in a bandlike distribution in her lower back and radiates down the lateral aspect of the right lower extremity to the plantar aspect of the foot in L4-S1 distribution.  Pain is exacerbated with prolonged standing, walking, bending, lifting and even with prolonged sitting.  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  Pain is also exacerbated associated with her menstrual cycle.  Pain has been improved in the past with gabapentin, which patient reports she was prescribed for anxiety.     Patient denies night fever/night sweats, urinary incontinence, bowel incontinence, significant weight loss, significant motor weakness and loss of sensations.    Pain Disability Index Review:     Last 3 PDI Scores 5/17/2022   Pain Disability Index (PDI) 41       Non-Pharmacologic Treatments:  Physical Therapy/Home Exercise: no  Ice/Heat:no  TENS: no  Acupuncture: no  Massage: no  Chiropractic: no    Other: no      Pain Medications:  - Adjuvant Medications: Clonazepam (Klonopin) and Lexapro (Escitalopram)    Pain Procedures:   -06/03/2022:  Right-sided greater trochanteric bursa and sacroiliac joint injection    Past Medical History:   Diagnosis Date    Hypertension     Thyroid disease      Past Surgical History:   Procedure Laterality Date    CERVICAL BIOPSY  W/ LOOP ELECTRODE EXCISION  2005    INJECTION OF ANESTHETIC AGENT INTO SACROILIAC JOINT Right 06/03/2022    Procedure: right Sacroiliac Joint Injection with RN IV sedation;  Surgeon: Rey Monsalve MD;  Location: [facility];  Service: Pain Management;  Laterality: Right;    INJECTION OF JOINT Right 06/03/2022    Procedure: right GT bursa injection with RN IV  sedation;  Surgeon: Rey Monsalve MD;  Location: [facility];  Service: Pain Management;  Laterality: Right;     Review of patient's allergies indicates:   Allergen Reactions    Sulfa (sulfonamide antibiotics) Hives       Current Outpatient Medications   Medication Sig    amLODIPine (NORVASC) 10 MG tablet Take 1 tablet (10 mg total) by mouth once daily.    clonazePAM (KLONOPIN) 0.5 MG tablet Take 1 tablet (0.5 mg total) by mouth 2 (two) times daily as needed for Anxiety.    diclofenac sodium (VOLTAREN) 1 % Gel Apply 2 g topically 4 (four) times daily.    EScitalopram oxalate (LEXAPRO) 20 MG tablet Take 1 tablet (20 mg total) by mouth once daily.    gabapentin (NEURONTIN) 300 MG capsule Take 1 capsule (300 mg total) by mouth 3 (three) times daily.    hydroCHLOROthiazide (HYDRODIURIL) 25 MG tablet Take 25 mg by mouth once daily.    levothyroxine (SYNTHROID) 125 MCG tablet take 1 tablet by MOUTH BEFORE breakfast    methocarbamoL (ROBAXIN) 750 MG Tab Take 1 tablet (750 mg total) by mouth every 8 (eight) hours as needed (muscle spasm).    methylPREDNISolone (MEDROL DOSEPACK) 4 mg tablet use as directed    ondansetron (ZOFRAN-ODT) 4 MG TbDL Take 1 tablet (4 mg total) by mouth every 6 (six) hours as needed (nausea/vomiting).    QUEtiapine (SEROQUEL) 100 MG Tab Take 1 tablet (100 mg total) by mouth nightly.     No current facility-administered medications for this visit.       Review of Systems     GENERAL:  No weight loss, malaise or fevers.  HEENT:   No recent changes in vision or hearing  NECK:  Negative for lumps, no difficulty with swallowing.  RESPIRATORY:  Negative for cough, wheezing or shortness of breath, patient denies any recent URI.  CARDIOVASCULAR:  Negative for chest pain, leg swelling or palpitations.  GI:  Negative for abdominal discomfort, blood in stools or black stools or change in bowel habits.  MUSCULOSKELETAL:  See HPI.  SKIN:  Negative for lesions, rash, and itching.  PSYCH:  No mood disorder or  recent psychosocial stressors.   HEMATOLOGY/LYMPHOLOGY:  Negative for prolonged bleeding, bruising easily or swollen nodes.    NEURO:   No history of headaches, syncope, paralysis, seizures or tremors.  All other reviewed and negative other than HPI.    OBJECTIVE:    There were no vitals taken for this visit.      Physical Exam  Last clinic visit:  GENERAL: Well appearing, in no acute distress, alert and oriented x3.  PSYCH:  Mood and affect appropriate.  SKIN: Skin color, texture, turgor normal, no rashes or lesions.  HEAD/FACE:  Normocephalic, atraumatic. Cranial nerves grossly intact.    CV: RRR with palpation of the radial artery.  PULM: No evidence of respiratory difficulty, symmetric chest rise.  GI:  Soft and non-tender.    BACK: Straight leg raising in the sitting and supine positions is negative to radicular pain. No pain to palpation over the facet joints of the lumbar spine or spinous processes. Normal range of motion without pain reproduction.  EXTREMITIES: Peripheral joint ROM is full and pain free without obvious instability or laxity in all four extremities. No deformities, edema, or skin discoloration. Good capillary refill.  MUSCULOSKELETAL: Able to stand on heels & toes.    hip, and knee provocative maneuvers are negative.   pain with palpation over the sacroiliac joints on R.  FABERs and Gaenslen test is positive on right.  Facet loading test is negative bilaterally.   Bilateral upper and lower extremity strength is normal and symmetric.  No atrophy or tone abnormalities are noted..  RIGHT Lower extremity: Hip flexion 5/5, Hip Abduction 5/5, Hip Adduction 5/5, Knee extension 5/5, Knee flexion 5/5, Ankle dorsiflexion5/5, Extensor hallucis longus 5/5, Ankle plantarflexion 5/5  LEFT Lower extremity:  Hip flexion 5/5, Hip Abduction 5/5,Hip Adduction 5/5, Knee extension 5/5, Knee flexion 5/5, Ankle dorsiflexion 5/5, Extensor hallucis longus 5/5, Ankle plantarflexion 5/5  -Normal testing knee (patellar)  jerk and ankle (achilles) jerk    NEURO: Bilateral upper and lower extremity coordination and muscle stretch reflexes are physiologic and symmetric. No loss of sensation is noted.  GAIT: normal.    Imaging:   MRI lumbar spine 05/17/2022  FINDINGS:  Images are mildly degraded by patient motion artifact.     Alignment: Within normal limits.     Vertebrae: Tiny inferior T11 Schmorl's node.  Lumbar vertebral body heights are maintained.  No evidence of acute fracture.  Marrow signal is within normal limits.     Discs: L4-L5 and L5-S1 disc desiccation.  Disc heights are maintained.     Cord: Within normal limits.  Conus terminates at L1-L2.     Degenerative findings:     T12-L1: No significant disc bulge, spinal canal stenosis or neural foraminal stenosis.     L1-L2: No significant disc bulge, spinal canal stenosis or neural foraminal stenosis.     L2-L3: No significant disc bulge, spinal canal stenosis or neural foraminal stenosis.     L3-L4: No significant disc bulge.  Mild facet arthropathy.  No significant spinal canal stenosis or neural foraminal stenosis.     L4-L5: Disc desiccation.  Minor broad-based disc bulge.  Mild bilateral facet arthropathy.  Facet arthropathy on the right closely approximates the exiting right L4 nerve root.  No significant spinal canal or neural foraminal stenosis.     L5-S1: Disc desiccation.  Asymmetric right mild broad-based disc bulge.  Minor bilateral facet arthropathy.  No significant spinal canal or neural foraminal stenosis.    08/22/18    X-Ray Lumbar Spine Complete 5 View    FINDINGS:  Vertebral body heights and alignment are within normal limits.  Intervertebral disk spaces are well preserved. Posterior elements appear grossly intact.  No pars defects visualized.  No acute fractures or subluxations are demonstrated.  The remaining visualized osseous and soft tissue structures demonstrate no appreciable abnormality.      ASSESSMENT: 37 y.o. year old female with lower back and leg  pain, consistent with     No diagnosis found.          PLAN:   - Interventions:  None at this time, we will focus on physical therapy and medications    Patient has sustained 50% relief following right-sided sacroiliac joint and greater trochanteric bursa injection.  We discussed repeating this injection, but patient reports she is not interested in repeating injections    -we have discussed considering a right-sided L4/5 and L5/S1 facet injection to see if this would help with radicular symptoms.  Based on MRI findings.       - Anticoagulation use: no no anticoagulation       report:  Reviewed and consistent with medication use as prescribed.    - Medications:  --  Switch from Gabapentin to Topamax. Decrease Gabapentin from 300 mg BID to 300 mg QHS x 3 days then discontinue  -Begin Topamax  Take 1 tablet (25 mg total) by mouth at night x 1 week Then increase to 2 (two) times daily x 1 week Then increase to 1 tablet by mouth in the morning and 2 (50 mg total) at night Then increase to 2 tablets (50 mg) twice daily and continue thereafter, Normal                     - Therapy:   We discussed continue physical therapy to help manage the patient/s painful condition. The patient was counseled that muscle strengthening will improve the long term prognosis in regards to pain and may also help increase range of motion and mobility. They were told that one of the goals of physical therapy is that they learn how to do the exercises so that they can do them independently at home daily upon completion. The patient's questions were answered and they were agreeable to this course.  Kirk exercises for core strengthening and lumbar stabilization recommended to the patient at previous visit, recommend additional core strengthening exercises and recommend hip flexor and IT band stretches.    - Patient would benefit from TENs unit to help increase ROM, reduce pain, strengthen, and allow return to ADLs.  Order sent to [facility].       - Imaging: Reviewed available imaging with patient and answered any questions they had regarding study.    - Follow up visit: return to clinic in 6 weeks, med change      The above plan and management options were discussed at length with patient. Patient is in agreement with the above and verbalized understanding.    - I discussed the goals of interventional chronic pain management with the patient on today's visit. We discussed a multimodal and systematic approach to pain.  This includes diagnostic and therapeutic injections, adjuvant pharmacologic treatment, physical therapy, and at times psychiatry.  I emphasized the importance of regular exercise, core strengthening and stretching, diet and weight loss as a cornerstone of long-term pain management.    - This condition does not require this patient to take time off of work, and the primary goal of our Pain Management services is to improve the patient's functional capacity.  - Patient Questions: Answered all of the patient's questions regarding diagnoses, therapy, treatment and next steps        Samira Espinal PA-C  Interventional Pain Management  [facility]    Disclaimer:  This note was prepared using voice recognition system and is likely to have sound alike errors that may have been overlooked even after proof reading.  Please call me with any questions

## 2022-12-22 ENCOUNTER — LAB VISIT (OUTPATIENT)
Dept: LAB | Facility: HOSPITAL | Age: 37
End: 2022-12-22
Attending: NURSE PRACTITIONER
Payer: COMMERCIAL

## 2022-12-22 ENCOUNTER — PATIENT MESSAGE (OUTPATIENT)
Dept: OBSTETRICS AND GYNECOLOGY | Facility: CLINIC | Age: 37
End: 2022-12-22

## 2022-12-22 ENCOUNTER — OFFICE VISIT (OUTPATIENT)
Dept: OBSTETRICS AND GYNECOLOGY | Facility: CLINIC | Age: 37
End: 2022-12-22
Payer: COMMERCIAL

## 2022-12-22 VITALS — HEIGHT: 65 IN | WEIGHT: 260.56 LBS | BODY MASS INDEX: 43.41 KG/M2

## 2022-12-22 DIAGNOSIS — Z01.419 ROUTINE GYNECOLOGICAL EXAMINATION: ICD-10-CM

## 2022-12-22 DIAGNOSIS — Z01.419 ROUTINE GYNECOLOGICAL EXAMINATION: Primary | ICD-10-CM

## 2022-12-22 DIAGNOSIS — N92.0 MENORRHAGIA WITH REGULAR CYCLE: ICD-10-CM

## 2022-12-22 DIAGNOSIS — Z12.4 PAPANICOLAOU SMEAR FOR CERVICAL CANCER SCREENING: ICD-10-CM

## 2022-12-22 LAB
BASOPHILS # BLD AUTO: 0.11 K/UL (ref 0–0.2)
BASOPHILS NFR BLD: 1 % (ref 0–1.9)
DIFFERENTIAL METHOD: ABNORMAL
EOSINOPHIL # BLD AUTO: 0.2 K/UL (ref 0–0.5)
EOSINOPHIL NFR BLD: 1.6 % (ref 0–8)
ERYTHROCYTE [DISTWIDTH] IN BLOOD BY AUTOMATED COUNT: 12.5 % (ref 11.5–14.5)
HCT VFR BLD AUTO: 49 % (ref 37–48.5)
HGB BLD-MCNC: 16.7 G/DL (ref 12–16)
IMM GRANULOCYTES # BLD AUTO: 0.02 K/UL (ref 0–0.04)
IMM GRANULOCYTES NFR BLD AUTO: 0.2 % (ref 0–0.5)
LYMPHOCYTES # BLD AUTO: 3.7 K/UL (ref 1–4.8)
LYMPHOCYTES NFR BLD: 33.6 % (ref 18–48)
MCH RBC QN AUTO: 31.2 PG (ref 27–31)
MCHC RBC AUTO-ENTMCNC: 34.1 G/DL (ref 32–36)
MCV RBC AUTO: 91 FL (ref 82–98)
MONOCYTES # BLD AUTO: 1 K/UL (ref 0.3–1)
MONOCYTES NFR BLD: 8.8 % (ref 4–15)
NEUTROPHILS # BLD AUTO: 6 K/UL (ref 1.8–7.7)
NEUTROPHILS NFR BLD: 54.8 % (ref 38–73)
NRBC BLD-RTO: 0 /100 WBC
PLATELET # BLD AUTO: 397 K/UL (ref 150–450)
PMV BLD AUTO: 11.4 FL (ref 9.2–12.9)
RBC # BLD AUTO: 5.36 M/UL (ref 4–5.4)
T4 FREE SERPL-MCNC: 0.93 NG/DL (ref 0.71–1.51)
TSH SERPL DL<=0.005 MIU/L-ACNC: 7.45 UIU/ML (ref 0.4–4)
WBC # BLD AUTO: 10.89 K/UL (ref 3.9–12.7)

## 2022-12-22 PROCEDURE — 36415 COLL VENOUS BLD VENIPUNCTURE: CPT | Performed by: NURSE PRACTITIONER

## 2022-12-22 PROCEDURE — 99999 PR PBB SHADOW E&M-EST. PATIENT-LVL III: ICD-10-PCS | Mod: PBBFAC,,, | Performed by: NURSE PRACTITIONER

## 2022-12-22 PROCEDURE — 99385 PR PREVENTIVE VISIT,NEW,18-39: ICD-10-PCS | Mod: S$GLB,,, | Performed by: NURSE PRACTITIONER

## 2022-12-22 PROCEDURE — 84439 ASSAY OF FREE THYROXINE: CPT | Performed by: NURSE PRACTITIONER

## 2022-12-22 PROCEDURE — 84443 ASSAY THYROID STIM HORMONE: CPT | Performed by: NURSE PRACTITIONER

## 2022-12-22 PROCEDURE — 88175 CYTOPATH C/V AUTO FLUID REDO: CPT | Performed by: NURSE PRACTITIONER

## 2022-12-22 PROCEDURE — 99385 PREV VISIT NEW AGE 18-39: CPT | Mod: S$GLB,,, | Performed by: NURSE PRACTITIONER

## 2022-12-22 PROCEDURE — 85025 COMPLETE CBC W/AUTO DIFF WBC: CPT | Performed by: NURSE PRACTITIONER

## 2022-12-22 PROCEDURE — 87624 HPV HI-RISK TYP POOLED RSLT: CPT | Performed by: NURSE PRACTITIONER

## 2022-12-22 PROCEDURE — 99999 PR PBB SHADOW E&M-EST. PATIENT-LVL III: CPT | Mod: PBBFAC,,, | Performed by: NURSE PRACTITIONER

## 2022-12-22 NOTE — PROGRESS NOTES
"  Subjective:       Patient ID: Haydee Pennington is a 37 y.o. female.    Chief Complaint:  Well Woman      History of Present Illness  HPI  Presents today for WWE   Complains of heavy cycles   Goes through 40 to 50 tampons in one week   Unsure if she would want a hysterectomy, however she would like to be able to speak with GYN MD about options medical and surgical for treatment of heavy menstrual bleeding.  Health Maintenance   Topic Date Due    TETANUS VACCINE  Never done    Hepatitis C Screening  Completed    Lipid Panel  Completed     GYN & OB History  Patient's last menstrual period was 2022.   Date of Last Pap: today    OB History    Para Term  AB Living   4 2 2   2 2   SAB IAB Ectopic Multiple Live Births     2     2      # Outcome Date GA Lbr Junior/2nd Weight Sex Delivery Anes PTL Lv   4 Term 2006    M Vag-Spont   DEBBY   3 IAB            2 IAB 2003           1 Term 2001    F Vag-Spont   DEBBY       Review of Systems  Review of Systems        Objective:   Ht 5' 5" (1.651 m)   Wt 118.2 kg (260 lb 9.3 oz)   LMP 2022   BMI 43.36 kg/m²    Physical Exam:   Constitutional: She is oriented to person, place, and time. She appears well-developed and well-nourished.        Pulmonary/Chest: Right breast exhibits no inverted nipple, no mass, no nipple discharge, no skin change, no tenderness and no swelling. Left breast exhibits no inverted nipple, no mass, no nipple discharge, no skin change, no tenderness and no swelling.        Abdominal: Soft.     Genitourinary:    Inguinal canal, vagina, uterus, right adnexa and left adnexa normal.      Pelvic exam was performed with patient supine.   The external female genitalia was normal.   Genitalia hair distrobution normal .   Labial bartholins normal.Cervix is normal. No no adexnal prolapse. Right adnexum displays no mass, no tenderness and no fullness. Left adnexum displays no mass, no tenderness and no fullness. Vaginal cuff normal.  No " erythema,  no vaginal discharge, bleeding, rectocele, cystocele or unspecified prolapse of vaginal walls in the vagina.    No foreign body in the vagina.      No signs of injury in the vagina.      pap smear completed              Neurological: She is alert and oriented to person, place, and time.    Skin: Skin is warm and dry.    Psychiatric: She has a normal mood and affect. Her behavior is normal. Judgment and thought content normal.      Assessment:        1. Routine gynecological examination    2. Papanicolaou smear for cervical cancer screening    3. Menorrhagia with regular cycle               Plan:      Return to clinic in one year for WWE      Haydee was seen today for well woman.    Diagnoses and all orders for this visit:    Routine gynecological examination  -     Mammo Digital Screening Bilat w/ [place]; Future    Papanicolaou smear for cervical cancer screening  -     Liquid-Based Pap Smear, Screening  -     HPV High Risk Genotypes, PCR    Menorrhagia with regular cycle  -     CBC W/ AUTO DIFFERENTIAL; Future  -     TSH; Future  -     US Pelvis Comp with Transvag NON-OB (xpd; Future

## 2022-12-28 ENCOUNTER — HOSPITAL ENCOUNTER (OUTPATIENT)
Dept: RADIOLOGY | Facility: HOSPITAL | Age: 37
Discharge: HOME | End: 2022-12-28
Attending: NURSE PRACTITIONER
Payer: COMMERCIAL

## 2022-12-28 ENCOUNTER — PROCEDURE VISIT (OUTPATIENT)
Dept: OBSTETRICS AND GYNECOLOGY | Facility: CLINIC | Age: 37
End: 2022-12-28
Payer: COMMERCIAL

## 2022-12-28 VITALS
DIASTOLIC BLOOD PRESSURE: 84 MMHG | HEIGHT: 65 IN | BODY MASS INDEX: 44.18 KG/M2 | SYSTOLIC BLOOD PRESSURE: 134 MMHG | WEIGHT: 265.19 LBS

## 2022-12-28 DIAGNOSIS — N92.0 MENORRHAGIA WITH REGULAR CYCLE: ICD-10-CM

## 2022-12-28 DIAGNOSIS — N92.0 MENORRHAGIA WITH REGULAR CYCLE: Primary | ICD-10-CM

## 2022-12-28 PROCEDURE — 88305 TISSUE EXAM BY PATHOLOGIST: CPT | Performed by: PATHOLOGY

## 2022-12-28 PROCEDURE — 76830 US PELVIS COMP WITH TRANSVAG NON-OB (XPD): ICD-10-PCS | Mod: 26,,, | Performed by: RADIOLOGY

## 2022-12-28 PROCEDURE — 88305 TISSUE EXAM BY PATHOLOGIST: CPT | Mod: 26,,, | Performed by: PATHOLOGY

## 2022-12-28 PROCEDURE — 76856 US PELVIS COMP WITH TRANSVAG NON-OB (XPD): ICD-10-PCS | Mod: 26,,, | Performed by: RADIOLOGY

## 2022-12-28 PROCEDURE — 76856 US EXAM PELVIC COMPLETE: CPT | Mod: TC

## 2022-12-28 PROCEDURE — 58100 BIOPSY OF UTERUS LINING: CPT | Mod: S$GLB,,, | Performed by: NURSE PRACTITIONER

## 2022-12-28 PROCEDURE — 58100 ENDOMETRIAL BIOPSY: ICD-10-PCS | Mod: S$GLB,,, | Performed by: NURSE PRACTITIONER

## 2022-12-28 PROCEDURE — 81025 PR  URINE PREGNANCY TEST: ICD-10-PCS | Mod: S$GLB,,, | Performed by: NURSE PRACTITIONER

## 2022-12-28 PROCEDURE — 81025 URINE PREGNANCY TEST: CPT | Mod: S$GLB,,, | Performed by: NURSE PRACTITIONER

## 2022-12-28 PROCEDURE — 88305 TISSUE EXAM BY PATHOLOGIST: ICD-10-PCS | Mod: 26,,, | Performed by: PATHOLOGY

## 2022-12-28 PROCEDURE — 76856 US EXAM PELVIC COMPLETE: CPT | Mod: 26,,, | Performed by: RADIOLOGY

## 2022-12-28 PROCEDURE — 76830 TRANSVAGINAL US NON-OB: CPT | Mod: 26,,, | Performed by: RADIOLOGY

## 2022-12-30 LAB
HPV HR 12 DNA SPEC QL NAA+PROBE: NEGATIVE
HPV16 AG SPEC QL: NEGATIVE
HPV18 DNA SPEC QL NAA+PROBE: NEGATIVE

## 2023-01-03 LAB
FINAL PATHOLOGIC DIAGNOSIS: NORMAL
Lab: NORMAL

## 2023-01-05 LAB
FINAL PATHOLOGIC DIAGNOSIS: NORMAL
GROSS: NORMAL
Lab: NORMAL

## 2023-01-25 ENCOUNTER — TELEPHONE (OUTPATIENT)
Dept: PAIN MEDICINE | Facility: CLINIC | Age: 38
End: 2023-01-25
Payer: COMMERCIAL

## 2023-01-25 ENCOUNTER — OFFICE VISIT (OUTPATIENT)
Dept: OBSTETRICS AND GYNECOLOGY | Facility: CLINIC | Age: 38
End: 2023-01-25
Payer: COMMERCIAL

## 2023-01-25 VITALS
BODY MASS INDEX: 42.93 KG/M2 | DIASTOLIC BLOOD PRESSURE: 80 MMHG | HEIGHT: 65 IN | WEIGHT: 257.69 LBS | SYSTOLIC BLOOD PRESSURE: 110 MMHG

## 2023-01-25 DIAGNOSIS — N92.0 MENORRHAGIA WITH REGULAR CYCLE: Primary | ICD-10-CM

## 2023-01-25 PROCEDURE — 99999 PR PBB SHADOW E&M-EST. PATIENT-LVL III: ICD-10-PCS | Mod: PBBFAC,,, | Performed by: OBSTETRICS & GYNECOLOGY

## 2023-01-25 PROCEDURE — 1159F PR MEDICATION LIST DOCUMENTED IN MEDICAL RECORD: ICD-10-PCS | Mod: CPTII,S$GLB,, | Performed by: OBSTETRICS & GYNECOLOGY

## 2023-01-25 PROCEDURE — 99999 PR PBB SHADOW E&M-EST. PATIENT-LVL III: CPT | Mod: PBBFAC,,, | Performed by: OBSTETRICS & GYNECOLOGY

## 2023-01-25 PROCEDURE — 3079F DIAST BP 80-89 MM HG: CPT | Mod: CPTII,S$GLB,, | Performed by: OBSTETRICS & GYNECOLOGY

## 2023-01-25 PROCEDURE — 3074F PR MOST RECENT SYSTOLIC BLOOD PRESSURE < 130 MM HG: ICD-10-PCS | Mod: CPTII,S$GLB,, | Performed by: OBSTETRICS & GYNECOLOGY

## 2023-01-25 PROCEDURE — 99213 OFFICE O/P EST LOW 20 MIN: CPT | Mod: S$GLB,,, | Performed by: OBSTETRICS & GYNECOLOGY

## 2023-01-25 PROCEDURE — 3074F SYST BP LT 130 MM HG: CPT | Mod: CPTII,S$GLB,, | Performed by: OBSTETRICS & GYNECOLOGY

## 2023-01-25 PROCEDURE — 3008F BODY MASS INDEX DOCD: CPT | Mod: CPTII,S$GLB,, | Performed by: OBSTETRICS & GYNECOLOGY

## 2023-01-25 PROCEDURE — 1160F RVW MEDS BY RX/DR IN RCRD: CPT | Mod: CPTII,S$GLB,, | Performed by: OBSTETRICS & GYNECOLOGY

## 2023-01-25 PROCEDURE — 99213 PR OFFICE/OUTPT VISIT, EST, LEVL III, 20-29 MIN: ICD-10-PCS | Mod: S$GLB,,, | Performed by: OBSTETRICS & GYNECOLOGY

## 2023-01-25 PROCEDURE — 3008F PR BODY MASS INDEX (BMI) DOCUMENTED: ICD-10-PCS | Mod: CPTII,S$GLB,, | Performed by: OBSTETRICS & GYNECOLOGY

## 2023-01-25 PROCEDURE — 1160F PR REVIEW ALL MEDS BY PRESCRIBER/CLIN PHARMACIST DOCUMENTED: ICD-10-PCS | Mod: CPTII,S$GLB,, | Performed by: OBSTETRICS & GYNECOLOGY

## 2023-01-25 PROCEDURE — 3079F PR MOST RECENT DIASTOLIC BLOOD PRESSURE 80-89 MM HG: ICD-10-PCS | Mod: CPTII,S$GLB,, | Performed by: OBSTETRICS & GYNECOLOGY

## 2023-01-25 PROCEDURE — 1159F MED LIST DOCD IN RCRD: CPT | Mod: CPTII,S$GLB,, | Performed by: OBSTETRICS & GYNECOLOGY

## 2023-01-25 NOTE — PROGRESS NOTES
"  Subjective:       Patient ID: Haydee Pennington is a 37 y.o. female.    Chief Complaint:  Consult (Hysterectomy )      History of Present Illness  HPI  Heavy menses with normal ultrasound and benign embx for options discussion   Reviewed hormonal suppression, Ablation with bilateral tubal ligation and robotic hysterectomy   I spent a total of 20 minutes on the day of the visit.  This includes face to face time and non-face to face time preparing to see the patient (eg, review of tests), obtaining and/or reviewing separately obtained history, documenting clinical information in the electronic or other health record, independently interpreting results and communicating results to the patient/family/caregiver, or care coordinator.   Health Maintenance   Topic Date Due    TETANUS VACCINE  Never done    Hepatitis C Screening  Completed    Lipid Panel  Completed     GYN & OB History  Patient's last menstrual period was 2023.   Date of Last Pap: 1/3/2023    OB History    Para Term  AB Living   4 2 2   2 2   SAB IAB Ectopic Multiple Live Births     2     2      # Outcome Date GA Lbr Junior/2nd Weight Sex Delivery Anes PTL Lv   4 Term     M Vag-Spont   DEBBY   3 IAB            2 IAB 2003           1 Term 2001    F Vag-Spont   DEBBY       Review of Systems  Review of Systems        Objective:   /80   Ht 5' 5" (1.651 m)   Wt 116.9 kg (257 lb 11.5 oz)   LMP 2023   BMI 42.89 kg/m²    Physical Exam     Assessment:        1. Menorrhagia with regular cycle                Plan:            Haydee was seen today for consult.    Diagnoses and all orders for this visit:    Menorrhagia with regular cycle  Comments:  robotic hysterectomy salpingectomy       I have explained the risks, benefits , and alternatives of laparoscopic hysterectomy in detail.  The patient voices understanding and all questions have been answered.  The patient agrees to proceed as planned.  Consent forms for the procedure " have been reviewed and signed by the patient.

## 2023-01-26 ENCOUNTER — PATIENT MESSAGE (OUTPATIENT)
Dept: OBSTETRICS AND GYNECOLOGY | Facility: CLINIC | Age: 38
End: 2023-01-26
Payer: COMMERCIAL

## 2023-01-26 ENCOUNTER — OFFICE VISIT (OUTPATIENT)
Dept: PAIN MEDICINE | Facility: CLINIC | Age: 38
End: 2023-01-26
Payer: COMMERCIAL

## 2023-01-26 DIAGNOSIS — M46.1 SACROILIITIS: ICD-10-CM

## 2023-01-26 PROCEDURE — 99214 OFFICE O/P EST MOD 30 MIN: CPT | Mod: 95,,, | Performed by: PHYSICIAN ASSISTANT

## 2023-01-26 PROCEDURE — 1159F PR MEDICATION LIST DOCUMENTED IN MEDICAL RECORD: ICD-10-PCS | Mod: CPTII,95,, | Performed by: PHYSICIAN ASSISTANT

## 2023-01-26 PROCEDURE — 1159F MED LIST DOCD IN RCRD: CPT | Mod: CPTII,95,, | Performed by: PHYSICIAN ASSISTANT

## 2023-01-26 PROCEDURE — 1160F PR REVIEW ALL MEDS BY PRESCRIBER/CLIN PHARMACIST DOCUMENTED: ICD-10-PCS | Mod: CPTII,95,, | Performed by: PHYSICIAN ASSISTANT

## 2023-01-26 PROCEDURE — 1160F RVW MEDS BY RX/DR IN RCRD: CPT | Mod: CPTII,95,, | Performed by: PHYSICIAN ASSISTANT

## 2023-01-26 PROCEDURE — 99214 PR OFFICE/OUTPT VISIT, EST, LEVL IV, 30-39 MIN: ICD-10-PCS | Mod: 95,,, | Performed by: PHYSICIAN ASSISTANT

## 2023-01-26 RX ORDER — GABAPENTIN 300 MG/1
300 CAPSULE ORAL 3 TIMES DAILY
Qty: 90 CAPSULE | Refills: 3 | Status: SHIPPED | OUTPATIENT
Start: 2023-01-26 | End: 2023-03-14 | Stop reason: SDUPTHER

## 2023-01-26 NOTE — PROGRESS NOTES
Established Patient Chronic Pain Note     Referring Physician: No ref. provider found    PCP: Gumaro Del Real MD    Chief Complaint:   Back and leg pain    The patient location is: [place]  The chief complaint leading to consultation is: chronic pain     Visit type: audiovisual    Face to Face time with patient: 10-15 minutes  20 minutes of total time spent on the encounter, which includes face to face time and non-face to face time preparing to see the patient (eg, review of tests), Obtaining and/or reviewing separately obtained history, Documenting clinical information in the electronic or other health record, Independently interpreting results (not separately reported) and communicating results to the patient/family/caregiver, or Care coordination (not separately reported).     Each patient to whom he or she provides medical services by telemedicine is:  (1) informed of the relationship between the physician and patient and the respective role of any other health care provider with respect to management of the patient; and (2) notified that he or she may decline to receive medical services by telemedicine and may withdraw from such care at any time.        SUBJECTIVE:  Interval History (1/26/2023):  Haydee Pennington presents today via telemed for follow-up visit.  Patient was last seen on 12/15/2022. At that visit, the plan was to d/c Gabapentin and begin Topamax.  Patient reports pain as 7/10 today. Patient previously cited that her pain is worse around the time of her cycle and has significant menorrhagia. Saw LEE yesterday, planning for robotic hysterectomy with salpingectomy per Dr. Mock. Possible surgery date of March 10th. She reports that since discontinuing the Gabapentin and starting the Topamax, her emotions have been all over the place. She denies any suicidal or homicidal ideation, but she reports that she believes things were more stable with the Gabapentin.       Interval History (12/15/2022):  " Haydee Pennington presents today via telemed for follow-up visit.  Patient was last seen on 9/20/2022. Last injection right SIJ + right GT bursa injection on 06/03/2022. She is not interested in any repeat injections as she states the last injection worsened her pain. Patient reports pain as 6/10 today. Pain in her right shoulder with tingling in  her right hand and pain along her lumbar spine  rosa  band like distribution with radiation into her left leg described as a tightness and burning. She has been completing Kirk exercises for core strengthening and lumbar stabilization and additional core strengthening exercises and  hip flexor and IT band stretches without improvement in symptoms. She does notice that her symptoms are worse around her cycle and she has an appointment with her gynecologist next week.       Interval History (9/20/2022):  Haydee Pennington presents today for follow-up visit.  Patient was last seen on 7/12/2022. Patient reports pain as "0/10 today. Last injection right SIJ + right GT bursa injection on 06/03/2022. Patient reports pain flare that began a couple of weeks ago. She reports continued pain over bilateral SIJ and GT bursa. Patient was prescribed a medrol dose pack on 09/09/2022 to help with pain. She reports medrol dose pack helped somewhat with the pain. She is not interested in any repeat injections as she states the last injection worsened her pain. Interested in continuing physician directed exercises and stretches. She also reports significant weight gain with Seroquel, has appointment scheduled with Dr. Kaplan to discuss medication side effects.    Interval history 07/12/2022  Patient presents status post right-sided sacroiliac joint and greater trochanteric bursa injection 06/03/2022.  Patient reports at least 45-50% pain relief overlying right-sided sacroiliac joint and greater trochanteric bursa territories.  Patient continues to report pain down the lateral aspect " of the right lower extremity in L4 distribution to the foot.  Patient reports improvement in symptoms with continuation of physical therapy exercises.  She does report financial difficulty with physical therapy and has requesting physician directed exercises at home.  Patient has continued gabapentin and has reached a titration of 300 mg 3 times daily.  She does report significant improvement in her neuropathic pain with this medication.  She denies any side effects from this medication.    HPI 05/17/2022  Haydee Pennington is a 37 y.o. female with past medical history significant for anxiety, depression, hypertension, obesity, hypothyroidism, nicotine who presents to the clinic for the evaluation of lower back and right leg pain.  Patient reports pain began several years prior without inciting accident injury or trauma.  Today patient reports pain is constant which is rated a 6/10.  Pain is described as grinding in nature.  Patient reports pain begins in a bandlike distribution in her lower back and radiates down the lateral aspect of the right lower extremity to the plantar aspect of the foot in L4-S1 distribution.  Pain is exacerbated with prolonged standing, walking, bending, lifting and even with prolonged sitting.  Of note patient works from home for a [employer] and reports pain is exacerbated with prolonged sitting at the end of the day.  Pain is also exacerbated associated with her menstrual cycle.  Pain has been improved in the past with gabapentin, which patient reports she was prescribed for anxiety.     Patient denies night fever/night sweats, urinary incontinence, bowel incontinence, significant weight loss, significant motor weakness and loss of sensations.    Pain Disability Index Review:     Last 3 PDI Scores 5/17/2022   Pain Disability Index (PDI) 41       Non-Pharmacologic Treatments:  Physical Therapy/Home Exercise: no  Ice/Heat:no  TENS: no  Acupuncture: no  Massage: no  Chiropractic: no     Other: no      Pain Medications:  - Adjuvant Medications: Clonazepam (Klonopin) and Lexapro (Escitalopram)    Pain Procedures:   -06/03/2022:  Right-sided greater trochanteric bursa and sacroiliac joint injection    Past Medical History:   Diagnosis Date    Hypertension     Thyroid disease      Past Surgical History:   Procedure Laterality Date    CERVICAL BIOPSY  W/ LOOP ELECTRODE EXCISION  2005    INJECTION OF ANESTHETIC AGENT INTO SACROILIAC JOINT Right 06/03/2022    Procedure: right Sacroiliac Joint Injection with RN IV sedation;  Surgeon: Rey Monsalve MD;  Location: [facility];  Service: Pain Management;  Laterality: Right;    INJECTION OF JOINT Right 06/03/2022    Procedure: right GT bursa injection with RN IV sedation;  Surgeon: Rey Monsalve MD;  Location: [facility];  Service: Pain Management;  Laterality: Right;     Review of patient's allergies indicates:   Allergen Reactions    Sulfa (sulfonamide antibiotics) Hives       Current Outpatient Medications   Medication Sig    amLODIPine (NORVASC) 10 MG tablet Take 1 tablet (10 mg total) by mouth once daily.    clonazePAM (KLONOPIN) 0.5 MG tablet Take 1 tablet (0.5 mg total) by mouth 2 (two) times daily as needed for Anxiety.    diclofenac sodium (VOLTAREN) 1 % Gel Apply 2 g topically 4 (four) times daily.    EScitalopram oxalate (LEXAPRO) 20 MG tablet Take 1 tablet (20 mg total) by mouth once daily.    gabapentin (NEURONTIN) 300 MG capsule Take 1 capsule (300 mg total) by mouth 3 (three) times daily.    hydroCHLOROthiazide (HYDRODIURIL) 25 MG tablet Take 25 mg by mouth once daily.    levothyroxine (SYNTHROID) 125 MCG tablet take 1 tablet by MOUTH BEFORE breakfast    QUEtiapine (SEROQUEL) 100 MG Tab Take 1 tablet (100 mg total) by mouth nightly.     No current facility-administered medications for this visit.       Review of Systems     GENERAL:  No weight loss, malaise or fevers.  HEENT:   No recent changes in vision or hearing  NECK:  Negative for  lumps, no difficulty with swallowing.  RESPIRATORY:  Negative for cough, wheezing or shortness of breath, patient denies any recent URI.  CARDIOVASCULAR:  Negative for chest pain, leg swelling or palpitations.  GI:  Negative for abdominal discomfort, blood in stools or black stools or change in bowel habits.  MUSCULOSKELETAL:  See HPI.  SKIN:  Negative for lesions, rash, and itching.  PSYCH:  No mood disorder or recent psychosocial stressors.   HEMATOLOGY/LYMPHOLOGY:  Negative for prolonged bleeding, bruising easily or swollen nodes.    NEURO:   No history of headaches, syncope, paralysis, seizures or tremors.  All other reviewed and negative other than HPI.    OBJECTIVE:    LMP 01/09/2023       Physical Exam  Last clinic visit:  GENERAL: Well appearing, in no acute distress, alert and oriented x3.  PSYCH:  Mood and affect appropriate.  SKIN: Skin color, texture, turgor normal, no rashes or lesions.  HEAD/FACE:  Normocephalic, atraumatic. Cranial nerves grossly intact.    CV: RRR with palpation of the radial artery.  PULM: No evidence of respiratory difficulty, symmetric chest rise.  GI:  Soft and non-tender.    BACK: Straight leg raising in the sitting and supine positions is negative to radicular pain. No pain to palpation over the facet joints of the lumbar spine or spinous processes. Normal range of motion without pain reproduction.  EXTREMITIES: Peripheral joint ROM is full and pain free without obvious instability or laxity in all four extremities. No deformities, edema, or skin discoloration. Good capillary refill.  MUSCULOSKELETAL: Able to stand on heels & toes.    hip, and knee provocative maneuvers are negative.   pain with palpation over the sacroiliac joints on R.  FABERs and Gaenslen test is positive on right.  Facet loading test is negative bilaterally.   Bilateral upper and lower extremity strength is normal and symmetric.  No atrophy or tone abnormalities are noted..  RIGHT Lower extremity: Hip  flexion 5/5, Hip Abduction 5/5, Hip Adduction 5/5, Knee extension 5/5, Knee flexion 5/5, Ankle dorsiflexion5/5, Extensor hallucis longus 5/5, Ankle plantarflexion 5/5  LEFT Lower extremity:  Hip flexion 5/5, Hip Abduction 5/5,Hip Adduction 5/5, Knee extension 5/5, Knee flexion 5/5, Ankle dorsiflexion 5/5, Extensor hallucis longus 5/5, Ankle plantarflexion 5/5  -Normal testing knee (patellar) jerk and ankle (achilles) jerk    NEURO: Bilateral upper and lower extremity coordination and muscle stretch reflexes are physiologic and symmetric. No loss of sensation is noted.  GAIT: normal.    Imaging:   MRI lumbar spine 05/17/2022  FINDINGS:  Images are mildly degraded by patient motion artifact.     Alignment: Within normal limits.     Vertebrae: Tiny inferior T11 Schmorl's node.  Lumbar vertebral body heights are maintained.  No evidence of acute fracture.  Marrow signal is within normal limits.     Discs: L4-L5 and L5-S1 disc desiccation.  Disc heights are maintained.     Cord: Within normal limits.  Conus terminates at L1-L2.     Degenerative findings:     T12-L1: No significant disc bulge, spinal canal stenosis or neural foraminal stenosis.     L1-L2: No significant disc bulge, spinal canal stenosis or neural foraminal stenosis.     L2-L3: No significant disc bulge, spinal canal stenosis or neural foraminal stenosis.     L3-L4: No significant disc bulge.  Mild facet arthropathy.  No significant spinal canal stenosis or neural foraminal stenosis.     L4-L5: Disc desiccation.  Minor broad-based disc bulge.  Mild bilateral facet arthropathy.  Facet arthropathy on the right closely approximates the exiting right L4 nerve root.  No significant spinal canal or neural foraminal stenosis.     L5-S1: Disc desiccation.  Asymmetric right mild broad-based disc bulge.  Minor bilateral facet arthropathy.  No significant spinal canal or neural foraminal stenosis.    08/22/18    X-Ray Lumbar Spine Complete 5  View    FINDINGS:  Vertebral body heights and alignment are within normal limits.  Intervertebral disk spaces are well preserved. Posterior elements appear grossly intact.  No pars defects visualized.  No acute fractures or subluxations are demonstrated.  The remaining visualized osseous and soft tissue structures demonstrate no appreciable abnormality.      ASSESSMENT: 37 y.o. year old female with lower back and leg pain, consistent with     1. Sacroiliitis  gabapentin (NEURONTIN) 300 MG capsule                PLAN:   - Interventions:  None at this time, we will focus on physical therapy and medications    Patient has sustained 50% relief following right-sided sacroiliac joint and greater trochanteric bursa injection.  We discussed repeating this injection, but patient reports she is not interested in repeating injections    -we have discussed considering a right-sided L4/5 and L5/S1 facet injection to see if this would help with radicular symptoms.  Based on MRI findings.       - Anticoagulation use: no no anticoagulation       report:  Reviewed and consistent with medication use as prescribed.    - Medications:  --  Switch back to Gabapentin and discontinue Topamax    - Therapy:   We discussed continue physical therapy to help manage the patient/s painful condition. The patient was counseled that muscle strengthening will improve the long term prognosis in regards to pain and may also help increase range of motion and mobility. They were told that one of the goals of physical therapy is that they learn how to do the exercises so that they can do them independently at home daily upon completion. The patient's questions were answered and they were agreeable to this course.  Kirk exercises for core strengthening and lumbar stabilization recommended to the patient at previous visit, recommend additional core strengthening exercises and recommend hip flexor and IT band stretches.    - Patient would benefit from  TENs unit to help increase ROM, reduce pain, strengthen, and allow return to ADLs.      -Continue follow up with OBGYN regarding hysterectomy    - Imaging: Reviewed available imaging with patient and answered any questions they had regarding study.    - Follow up visit: return to clinic in 8 weeks      The above plan and management options were discussed at length with patient. Patient is in agreement with the above and verbalized understanding.    - I discussed the goals of interventional chronic pain management with the patient on today's visit. We discussed a multimodal and systematic approach to pain.  This includes diagnostic and therapeutic injections, adjuvant pharmacologic treatment, physical therapy, and at times psychiatry.  I emphasized the importance of regular exercise, core strengthening and stretching, diet and weight loss as a cornerstone of long-term pain management.    - This condition does not require this patient to take time off of work, and the primary goal of our Pain Management services is to improve the patient's functional capacity.  - Patient Questions: Answered all of the patient's questions regarding diagnoses, therapy, treatment and next steps        Samira Espinal PA-C  Interventional Pain Management  [facility]    Disclaimer:  This note was prepared using voice recognition system and is likely to have sound alike errors that may have been overlooked even after proof reading.  Please call me with any questions

## 2023-01-30 ENCOUNTER — PATIENT MESSAGE (OUTPATIENT)
Dept: OBSTETRICS AND GYNECOLOGY | Facility: CLINIC | Age: 38
End: 2023-01-30
Payer: COMMERCIAL

## 2023-01-31 ENCOUNTER — TELEPHONE (OUTPATIENT)
Dept: OBSTETRICS AND GYNECOLOGY | Facility: CLINIC | Age: 38
End: 2023-01-31
Payer: COMMERCIAL

## 2023-01-31 NOTE — TELEPHONE ENCOUNTER
Multiple voicemails and mychart messages sent to attempt to scheduled surgery. Will await patient's call back.

## 2023-02-02 DIAGNOSIS — N92.0 MENORRHAGIA WITH REGULAR CYCLE: Primary | ICD-10-CM

## 2023-02-10 ENCOUNTER — PATIENT MESSAGE (OUTPATIENT)
Dept: PSYCHIATRY | Facility: CLINIC | Age: 38
End: 2023-02-10
Payer: COMMERCIAL

## 2023-02-10 DIAGNOSIS — F41.9 ANXIETY: ICD-10-CM

## 2023-02-10 RX ORDER — CLONAZEPAM 0.5 MG/1
0.5 TABLET ORAL 2 TIMES DAILY PRN
Qty: 30 TABLET | Refills: 0 | Status: CANCELLED | OUTPATIENT
Start: 2023-02-10 | End: 2024-02-10

## 2023-02-10 NOTE — TELEPHONE ENCOUNTER
No new care gaps identified.  [facility] Embedded Care Gaps. Reference number: [id number]. 2/10/2023   11:53:28 AM CST

## 2023-02-10 NOTE — TELEPHONE ENCOUNTER
Patient notified Klonopin was dispensed on 01/03/23 for 15 days supply. Reach out to the pharmacy for pick-up. Patient verbally understood.

## 2023-02-10 NOTE — TELEPHONE ENCOUNTER
----- Message from Kita Mosqueda sent at 2/10/2023 12:57 PM CST -----  Contact: bonifacio  .Type:  Patient Returning Call    Who Called:bonifacio  Who Left Message for Patient:nurse  Does the patient know what this is regarding?:unknown  Would the patient rather a call back or a response via [employer]? Call back  Best Call Back Number:.[phone]   Additional Information: patient returning call

## 2023-02-20 ENCOUNTER — PATIENT MESSAGE (OUTPATIENT)
Dept: PSYCHIATRY | Facility: CLINIC | Age: 38
End: 2023-02-20
Payer: COMMERCIAL

## 2023-02-21 ENCOUNTER — PATIENT MESSAGE (OUTPATIENT)
Dept: INTERNAL MEDICINE | Facility: CLINIC | Age: 38
End: 2023-02-21
Payer: COMMERCIAL

## 2023-02-21 DIAGNOSIS — F39 MOOD DISORDER: ICD-10-CM

## 2023-02-22 RX ORDER — ESCITALOPRAM OXALATE 20 MG/1
20 TABLET ORAL DAILY
Qty: 30 TABLET | Refills: 0 | Status: SHIPPED | OUTPATIENT
Start: 2023-02-22 | End: 2023-03-08 | Stop reason: SDUPTHER

## 2023-02-22 NOTE — TELEPHONE ENCOUNTER
I spoke with the patient.  She has no questions or concerns other than needing a refill of Lexapro.  I explained to her that I had been out of the office until today, and she expressed understanding.  She reports that she had the recent appointment arranged with her work schedule, but then things came up with work, such that she missed the appointment.  No depression at this point, though she has stressors and some SSRI withdrawal symptoms.  No dax, hypomania, psychosis, or thoughts of harm to self or others.  She confirms her appointment in 2 weeks

## 2023-03-08 ENCOUNTER — OFFICE VISIT (OUTPATIENT)
Dept: PSYCHIATRY | Facility: CLINIC | Age: 38
End: 2023-03-08
Payer: COMMERCIAL

## 2023-03-08 DIAGNOSIS — F39 MOOD DISORDER: Primary | ICD-10-CM

## 2023-03-08 DIAGNOSIS — F43.10 PTSD (POST-TRAUMATIC STRESS DISORDER): ICD-10-CM

## 2023-03-08 DIAGNOSIS — F41.1 GAD (GENERALIZED ANXIETY DISORDER): ICD-10-CM

## 2023-03-08 PROCEDURE — 99215 PR OFFICE/OUTPT VISIT, EST, LEVL V, 40-54 MIN: ICD-10-PCS | Mod: 95,,, | Performed by: PSYCHIATRY & NEUROLOGY

## 2023-03-08 PROCEDURE — 99215 OFFICE O/P EST HI 40 MIN: CPT | Mod: 95,,, | Performed by: PSYCHIATRY & NEUROLOGY

## 2023-03-08 RX ORDER — OXCARBAZEPINE 150 MG/1
150 TABLET, FILM COATED ORAL 2 TIMES DAILY
Qty: 60 TABLET | Refills: 1 | Status: SHIPPED | OUTPATIENT
Start: 2023-03-08 | End: 2023-04-20 | Stop reason: SDUPTHER

## 2023-03-08 RX ORDER — ESCITALOPRAM OXALATE 20 MG/1
20 TABLET ORAL DAILY
Qty: 30 TABLET | Refills: 1 | Status: SHIPPED | OUTPATIENT
Start: 2023-03-08 | End: 2023-04-20 | Stop reason: SDUPTHER
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
## 2023-03-10 ENCOUNTER — HOSPITAL ENCOUNTER (OUTPATIENT)
Dept: PREADMISSION TESTING | Facility: HOSPITAL | Age: 38
Discharge: HOME | End: 2023-03-10
Attending: OBSTETRICS & GYNECOLOGY
Payer: COMMERCIAL

## 2023-03-10 VITALS
OXYGEN SATURATION: 98 % | RESPIRATION RATE: 14 BRPM | SYSTOLIC BLOOD PRESSURE: 146 MMHG | HEART RATE: 71 BPM | DIASTOLIC BLOOD PRESSURE: 66 MMHG | TEMPERATURE: 98 F

## 2023-03-10 DIAGNOSIS — M46.1 SACROILIITIS: ICD-10-CM

## 2023-03-10 DIAGNOSIS — E66.01 SEVERE OBESITY (BMI 35.0-39.9) WITH COMORBIDITY: ICD-10-CM

## 2023-03-10 DIAGNOSIS — I10 HYPERTENSION, UNSPECIFIED TYPE: ICD-10-CM

## 2023-03-10 DIAGNOSIS — N92.0 MENORRHAGIA WITH REGULAR CYCLE: ICD-10-CM

## 2023-03-10 DIAGNOSIS — F32.A DEPRESSION, UNSPECIFIED DEPRESSION TYPE: ICD-10-CM

## 2023-03-10 DIAGNOSIS — E03.9 HYPOTHYROIDISM, UNSPECIFIED TYPE: ICD-10-CM

## 2023-03-10 DIAGNOSIS — Z01.818 PRE-OP TESTING: Primary | ICD-10-CM

## 2023-03-10 LAB
ALBUMIN SERPL BCP-MCNC: 3.9 G/DL (ref 3.5–5.2)
ALP SERPL-CCNC: 76 U/L (ref 55–135)
ALT SERPL W/O P-5'-P-CCNC: 18 U/L (ref 10–44)
ANION GAP SERPL CALC-SCNC: 12 MMOL/L (ref 8–16)
AST SERPL-CCNC: 16 U/L (ref 10–40)
BASOPHILS # BLD AUTO: 0.11 K/UL (ref 0–0.2)
BASOPHILS NFR BLD: 1 % (ref 0–1.9)
BILIRUB SERPL-MCNC: 0.7 MG/DL (ref 0.1–1)
BUN SERPL-MCNC: 10 MG/DL (ref 6–20)
CALCIUM SERPL-MCNC: 9.4 MG/DL (ref 8.7–10.5)
CHLORIDE SERPL-SCNC: 106 MMOL/L (ref 95–110)
CO2 SERPL-SCNC: 19 MMOL/L (ref 23–29)
CREAT SERPL-MCNC: 0.8 MG/DL (ref 0.5–1.4)
DIFFERENTIAL METHOD: ABNORMAL
EOSINOPHIL # BLD AUTO: 0.2 K/UL (ref 0–0.5)
EOSINOPHIL NFR BLD: 1.9 % (ref 0–8)
ERYTHROCYTE [DISTWIDTH] IN BLOOD BY AUTOMATED COUNT: 12.5 % (ref 11.5–14.5)
EST. GFR  (NO RACE VARIABLE): >60 ML/MIN/1.73 M^2
GLUCOSE SERPL-MCNC: 94 MG/DL (ref 70–110)
HCT VFR BLD AUTO: 45.6 % (ref 37–48.5)
HGB BLD-MCNC: 16.4 G/DL (ref 12–16)
IMM GRANULOCYTES # BLD AUTO: 0.02 K/UL (ref 0–0.04)
IMM GRANULOCYTES NFR BLD AUTO: 0.2 % (ref 0–0.5)
LYMPHOCYTES # BLD AUTO: 2.5 K/UL (ref 1–4.8)
LYMPHOCYTES NFR BLD: 23.6 % (ref 18–48)
MCH RBC QN AUTO: 32 PG (ref 27–31)
MCHC RBC AUTO-ENTMCNC: 36 G/DL (ref 32–36)
MCV RBC AUTO: 89 FL (ref 82–98)
MONOCYTES # BLD AUTO: 0.8 K/UL (ref 0.3–1)
MONOCYTES NFR BLD: 7.3 % (ref 4–15)
NEUTROPHILS # BLD AUTO: 6.9 K/UL (ref 1.8–7.7)
NEUTROPHILS NFR BLD: 66 % (ref 38–73)
NRBC BLD-RTO: 0 /100 WBC
PLATELET # BLD AUTO: 344 K/UL (ref 150–450)
PMV BLD AUTO: 10.8 FL (ref 9.2–12.9)
POTASSIUM SERPL-SCNC: 4 MMOL/L (ref 3.5–5.1)
PROT SERPL-MCNC: 7.3 G/DL (ref 6–8.4)
RBC # BLD AUTO: 5.12 M/UL (ref 4–5.4)
SODIUM SERPL-SCNC: 137 MMOL/L (ref 136–145)
WBC # BLD AUTO: 10.51 K/UL (ref 3.9–12.7)

## 2023-03-10 PROCEDURE — 85025 COMPLETE CBC W/AUTO DIFF WBC: CPT | Performed by: PHYSICIAN ASSISTANT

## 2023-03-10 PROCEDURE — 93010 EKG 12-LEAD: ICD-10-PCS | Mod: ,,, | Performed by: INTERNAL MEDICINE

## 2023-03-10 PROCEDURE — 93010 ELECTROCARDIOGRAM REPORT: CPT | Mod: ,,, | Performed by: INTERNAL MEDICINE

## 2023-03-10 PROCEDURE — 93005 ELECTROCARDIOGRAM TRACING: CPT

## 2023-03-10 PROCEDURE — 80053 COMPREHEN METABOLIC PANEL: CPT | Performed by: PHYSICIAN ASSISTANT

## 2023-03-10 NOTE — ASSESSMENT & PLAN NOTE
- pt presents at the request of Dr. Moore who plans on performing a robotic hysterectomy on 3/23/23   - Known risk factors for perioperative complications: None  HTN, tobacco use, BMI 42    Difficulty with intubation is not anticipated.    Cardiac Risk Estimation:  Per Revised Cardiac Risk Index patient is a Class I  risk with a 3.9% risk of a major cardiac event.      1.) Preoperative workup as follows: ECG, hemoglobin, hematocrit, electrolytes, creatinine, glucose, liver function studies.  2.) Change in medication regimen before surgery: hold NSAIDs 7 days pre op.  3.) Prophylaxis for cardiac events with perioperative beta-blockers: not indicated.  4.) Invasive hemodynamic monitoring perioperatively: not indicated.  5.) Deep vein thrombosis prophylaxis postoperatively: intermittent pneumatic compression boots and regimen to be chosen by surgical team.  6.) Surveillance for postoperative MI with ECG immediately postoperatively and on postoperati ve days 1 and 2 AND troponin levels 24 hours postoperatively and on day 4 or hospital discharge (whichever comes first): not indicated.  7.) Current medications which may produce withdrawal symptoms if withheld perioperatively: none  8.) Other measures: none

## 2023-03-10 NOTE — H&P (VIEW-ONLY)
Preoperative History and Physical  [facility]                                                                   Chief Complaint: Preoperative evaluation     History of Present Illness:      Haydee Pennington is a 38 y.o. female with PMH of HTN, anxiety, depression, bipolar, hypothyroidism, BMI 42, and tobacco use who presents to the office today for a preoperative consultation at the request of Dr. Mock who plans on performing robotic hysterectomy on March 23.     Functional Status:      The patient is able to climb a flight of stairs. The patient is able to ambulate  without difficulty. The patient's functional status is not affected by the surgical problem. The patient's functional status is not affected by shortness of breath, chest pain, dyspnea on exertion and fatigue.  ADLs, housework no CP no SOb  MET score greater than 4    Past Medical History:      Past Medical History:   Diagnosis Date    Hypertension     Thyroid disease         Past Surgical History:      Past Surgical History:   Procedure Laterality Date    CERVICAL BIOPSY  W/ LOOP ELECTRODE EXCISION  2005    INJECTION OF ANESTHETIC AGENT INTO SACROILIAC JOINT Right 06/03/2022    Procedure: right Sacroiliac Joint Injection with RN IV sedation;  Surgeon: Rey Monsalve MD;  Location: [facility];  Service: Pain Management;  Laterality: Right;    INJECTION OF JOINT Right 06/03/2022    Procedure: right GT bursa injection with RN IV sedation;  Surgeon: Rey Monsalve MD;  Location: [facility];  Service: Pain Management;  Laterality: Right;    REPAIR, PERFORATION, NASAL SEPTUM      sedated for nasal septal manipulation        Social History:      Social History     Socioeconomic History    Marital status: Single   Tobacco Use    Smoking status: Every Day     Packs/day: 0.50     Years: 24.00     Pack years: 12.00     Types: Cigarettes     Start date: 1996    Smokeless tobacco: Never   Substance  and Sexual Activity    Alcohol use: No    Drug use: Yes     Types: Marijuana    Sexual activity: Yes     Partners: Male     Birth control/protection: None     Social Determinants of Health     Financial Resource Strain: Low Risk     Difficulty of Paying Living Expenses: Not hard at all   Food Insecurity: No Food Insecurity    Worried About Running Out of Food in the Last Year: Never true    Ran Out of Food in the Last Year: Never true   Transportation Needs: No Transportation Needs    Lack of Transportation (Medical): No    Lack of Transportation (Non-Medical): No   Physical Activity: Inactive    Days of Exercise per Week: 0 days    Minutes of Exercise per Session: 0 min   Stress: Stress Concern Present    Feeling of Stress : To some extent   Social Connections: Unknown    Frequency of Communication with Friends and Family: Twice a week    Frequency of Social Gatherings with Friends and Family: Twice a week    Active Member of Clubs or Organizations: No    Attends Club or Organization Meetings: Never    Marital Status: Living with partner   Housing Stability: High Risk    Unable to Pay for Housing in the Last Year: Yes    Number of Places Lived in the Last Year: 1    Unstable Housing in the Last Year: No        Family History:      Family History   Problem Relation Age of Onset    Diabetes Mother     Cancer Mother         Lung and Breast    Hypertension Father     Dementia Father     Transient ischemic attack Father     Heart failure Father     Stroke Father        Allergies:      Review of patient's allergies indicates:   Allergen Reactions    Sulfa (sulfonamide antibiotics) Hives       Medications:      Current Outpatient Medications   Medication Sig    amLODIPine (NORVASC) 10 MG tablet Take 1 tablet (10 mg total) by mouth once daily.    clonazePAM (KLONOPIN) 0.5 MG tablet Take 1 tablet (0.5 mg total) by mouth 2 (two) times daily as needed for Anxiety.    EScitalopram oxalate (LEXAPRO) 20 MG tablet Take 1 tablet  (20 mg total) by mouth once daily.    gabapentin (NEURONTIN) 300 MG capsule Take 1 capsule (300 mg total) by mouth 3 (three) times daily.    hydroCHLOROthiazide (HYDRODIURIL) 25 MG tablet Take 25 mg by mouth once daily.    levothyroxine (SYNTHROID) 125 MCG tablet take 1 tablet by MOUTH BEFORE breakfast    OXcarbazepine (TRILEPTAL) 150 MG Tab Take 1 tablet (150 mg total) by mouth 2 (two) times daily.    diclofenac sodium (VOLTAREN) 1 % Gel Apply 2 g topically 4 (four) times daily. (Patient not taking: Reported on 3/10/2023)     No current facility-administered medications for this encounter.       Vitals:      Vitals:    03/10/23 0903   BP: (!) 146/66   Pulse: 71   Resp: 14   Temp: 97.9 °F (36.6 °C)       Review of Systems:        Constitutional: Negative for fever, chills, weight loss, malaise/fatigue and diaphoresis.   HENT: Negative for hearing loss, ear pain, nosebleeds, , sore throat, neck pain, tinnitus and ear discharge.  Rhinitis no meds - no acute illness   Eyes: Negative for blurred vision, double vision, photophobia, pain, discharge and redness.   Respiratory: Negative for cough, hemoptysis, sputum production, shortness of breath, wheezing and stridor.    Cardiovascular: Negative for chest pain, , orthopnea, claudication, leg swelling and PND. Palpitations with anxiety   Gastrointestinal: Negative for heartburn, nausea, vomiting, abdominal pain, diarrhea, constipation, blood in stool and melena.   Genitourinary: Negative for dysuria, urgency, frequency, hematuria and flank pain. Pelvic pain, cramping with periods, headache with cycle   Musculoskeletal: Negative for myalgias, back pain, joint pain and falls. R neck pain, R SI joint pain   Skin: Negative for itching and rash.   Neurological: Negative for dizziness, , tremors, sensory change, speech change, focal weakness, seizures, loss of consciousness, weakness . R LE numbness / tingling; RLS R LE   Endo/Heme/Allergies: positive  for environmental  allergies and Psychiatric/Behavioral: positive  for depression, anxiety, bipolar  All 14 systems reviewed and negative except as noted above.    Physical Exam:      Constitutional: Appears well-developed, well-nourished and in no acute distress.  Patient is oriented to person, place, and time.   Head: Normocephalic and atraumatic. Mucous membranes moist.  Neck: Neck supple no mass.   Cardiovascular: Normal rate and regular rhythm.  S1 S2 appreciated by ascultation.  Pulmonary/Chest: Effort normal and clear to auscultation bilaterally. No respiratory distress.   Abdomen: Soft. Non-tender and non-distended. Bowel sounds are normal.   Neurological: Patient is alert and oriented to person, place and time. Moves all extremities.  Skin: Warm and dry. No lesions.  Extremities: No clubbing, cyanosis or edema.    Laboratory data:      Reviewed and noted in plan where applicable. Please see chart for full laboratory data.    No results for input(s): CPK, CPKMB, TROPONINI, MB in the last 24 hours. No results for input(s): POCTGLUCOSE in the last 24 hours.     Lab Results   Component Value Date    INR 1.0 03/27/2018       Lab Results   Component Value Date    WBC 10.89 12/22/2022    HGB 16.7 (H) 12/22/2022    HCT 49.0 (H) 12/22/2022    MCV 91 12/22/2022     12/22/2022       No results for input(s): GLU, NA, K, CL, CO2, BUN, CREATININE, CALCIUM, MG in the last 24 hours.    Predictors of intubation difficulty:       Morbid obesity? BMI 42   Anatomically abnormal facies? no   Prominent incisors? no   Receding mandible? no   Short, thick neck? yes - thick   Neck range of motion: normal with pulling with ROM to R    Dentition:  intact , missing  2nd molars   Based on the Modified Mallampati, patient is a mallampati score: II (hard and soft palate, upper portion of tonsils anduvula visible)    Cardiographics:      ECG:  3/10/23  Echocardiogram:  none    Imaging:      Chest x-ray:  none      Assessment and Plan:       Menorrhagia with regular cycle  - pt presents at the request of Dr. Moore who plans on performing a robotic hysterectomy on 3/23/23   - Known risk factors for perioperative complications: None  HTN, tobacco use, BMI 42    Difficulty with intubation is not anticipated.    Cardiac Risk Estimation:  Per Revised Cardiac Risk Index patient is a Class I  risk with a 3.9% risk of a major cardiac event.      1.) Preoperative workup as follows: ECG, hemoglobin, hematocrit, electrolytes, creatinine, glucose, liver function studies.  2.) Change in medication regimen before surgery: hold NSAIDs 7 days pre op.  3.) Prophylaxis for cardiac events with perioperative beta-blockers: not indicated.  4.) Invasive hemodynamic monitoring perioperatively: not indicated.  5.) Deep vein thrombosis prophylaxis postoperatively: intermittent pneumatic compression boots and regimen to be chosen by surgical team.  6.) Surveillance for postoperative MI with ECG immediately postoperatively and on postoperati ve days 1 and 2 AND troponin levels 24 hours postoperatively and on day 4 or hospital discharge (whichever comes first): not indicated.  7.) Current medications which may produce withdrawal symptoms if withheld perioperatively: none  8.) Other measures: none      HTN (hypertension)  -  controlled  - continue home amlodipine and hydrochlorothiazide  - keep f/u with PCP     Depression  - continue home Trileptal and Lexapro  - keep follow-up with psych    Hypothyroidism  - continue home Synthroid      Severe obesity (BMI 35.0-39.9) with comorbidity  BMI 42    Sacroiliitis  - s/p CAROL 2022  - continue home gabapentin

## 2023-03-10 NOTE — H&P
Preoperative History and Physical  [facility]                                                                   Chief Complaint: Preoperative evaluation     History of Present Illness:      Haydee Pennington is a 38 y.o. female with PMH of HTN, anxiety, depression, bipolar, hypothyroidism, BMI 42, and tobacco use who presents to the office today for a preoperative consultation at the request of Dr. Mock who plans on performing robotic hysterectomy on March 23.     Functional Status:      The patient is able to climb a flight of stairs. The patient is able to ambulate  without difficulty. The patient's functional status is not affected by the surgical problem. The patient's functional status is not affected by shortness of breath, chest pain, dyspnea on exertion and fatigue.  ADLs, housework no CP no SOb  MET score greater than 4    Past Medical History:      Past Medical History:   Diagnosis Date    Hypertension     Thyroid disease         Past Surgical History:      Past Surgical History:   Procedure Laterality Date    CERVICAL BIOPSY  W/ LOOP ELECTRODE EXCISION  2005    INJECTION OF ANESTHETIC AGENT INTO SACROILIAC JOINT Right 06/03/2022    Procedure: right Sacroiliac Joint Injection with RN IV sedation;  Surgeon: Rey Monsalve MD;  Location: [facility];  Service: Pain Management;  Laterality: Right;    INJECTION OF JOINT Right 06/03/2022    Procedure: right GT bursa injection with RN IV sedation;  Surgeon: Rey Monsalve MD;  Location: [facility];  Service: Pain Management;  Laterality: Right;    REPAIR, PERFORATION, NASAL SEPTUM      sedated for nasal septal manipulation        Social History:      Social History     Socioeconomic History    Marital status: Single   Tobacco Use    Smoking status: Every Day     Packs/day: 0.50     Years: 24.00     Pack years: 12.00     Types: Cigarettes     Start date: 1996    Smokeless tobacco: Never   Substance  and Sexual Activity    Alcohol use: No    Drug use: Yes     Types: Marijuana    Sexual activity: Yes     Partners: Male     Birth control/protection: None     Social Determinants of Health     Financial Resource Strain: Low Risk     Difficulty of Paying Living Expenses: Not hard at all   Food Insecurity: No Food Insecurity    Worried About Running Out of Food in the Last Year: Never true    Ran Out of Food in the Last Year: Never true   Transportation Needs: No Transportation Needs    Lack of Transportation (Medical): No    Lack of Transportation (Non-Medical): No   Physical Activity: Inactive    Days of Exercise per Week: 0 days    Minutes of Exercise per Session: 0 min   Stress: Stress Concern Present    Feeling of Stress : To some extent   Social Connections: Unknown    Frequency of Communication with Friends and Family: Twice a week    Frequency of Social Gatherings with Friends and Family: Twice a week    Active Member of Clubs or Organizations: No    Attends Club or Organization Meetings: Never    Marital Status: Living with partner   Housing Stability: High Risk    Unable to Pay for Housing in the Last Year: Yes    Number of Places Lived in the Last Year: 1    Unstable Housing in the Last Year: No        Family History:      Family History   Problem Relation Age of Onset    Diabetes Mother     Cancer Mother         Lung and Breast    Hypertension Father     Dementia Father     Transient ischemic attack Father     Heart failure Father     Stroke Father        Allergies:      Review of patient's allergies indicates:   Allergen Reactions    Sulfa (sulfonamide antibiotics) Hives       Medications:      Current Outpatient Medications   Medication Sig    amLODIPine (NORVASC) 10 MG tablet Take 1 tablet (10 mg total) by mouth once daily.    clonazePAM (KLONOPIN) 0.5 MG tablet Take 1 tablet (0.5 mg total) by mouth 2 (two) times daily as needed for Anxiety.    EScitalopram oxalate (LEXAPRO) 20 MG tablet Take 1 tablet  (20 mg total) by mouth once daily.    gabapentin (NEURONTIN) 300 MG capsule Take 1 capsule (300 mg total) by mouth 3 (three) times daily.    hydroCHLOROthiazide (HYDRODIURIL) 25 MG tablet Take 25 mg by mouth once daily.    levothyroxine (SYNTHROID) 125 MCG tablet take 1 tablet by MOUTH BEFORE breakfast    OXcarbazepine (TRILEPTAL) 150 MG Tab Take 1 tablet (150 mg total) by mouth 2 (two) times daily.    diclofenac sodium (VOLTAREN) 1 % Gel Apply 2 g topically 4 (four) times daily. (Patient not taking: Reported on 3/10/2023)     No current facility-administered medications for this encounter.       Vitals:      Vitals:    03/10/23 0903   BP: (!) 146/66   Pulse: 71   Resp: 14   Temp: 97.9 °F (36.6 °C)       Review of Systems:        Constitutional: Negative for fever, chills, weight loss, malaise/fatigue and diaphoresis.   HENT: Negative for hearing loss, ear pain, nosebleeds, , sore throat, neck pain, tinnitus and ear discharge.  Rhinitis no meds - no acute illness   Eyes: Negative for blurred vision, double vision, photophobia, pain, discharge and redness.   Respiratory: Negative for cough, hemoptysis, sputum production, shortness of breath, wheezing and stridor.    Cardiovascular: Negative for chest pain, , orthopnea, claudication, leg swelling and PND. Palpitations with anxiety   Gastrointestinal: Negative for heartburn, nausea, vomiting, abdominal pain, diarrhea, constipation, blood in stool and melena.   Genitourinary: Negative for dysuria, urgency, frequency, hematuria and flank pain. Pelvic pain, cramping with periods, headache with cycle   Musculoskeletal: Negative for myalgias, back pain, joint pain and falls. R neck pain, R SI joint pain   Skin: Negative for itching and rash.   Neurological: Negative for dizziness, , tremors, sensory change, speech change, focal weakness, seizures, loss of consciousness, weakness . R LE numbness / tingling; RLS R LE   Endo/Heme/Allergies: positive  for environmental  allergies and Psychiatric/Behavioral: positive  for depression, anxiety, bipolar  All 14 systems reviewed and negative except as noted above.    Physical Exam:      Constitutional: Appears well-developed, well-nourished and in no acute distress.  Patient is oriented to person, place, and time.   Head: Normocephalic and atraumatic. Mucous membranes moist.  Neck: Neck supple no mass.   Cardiovascular: Normal rate and regular rhythm.  S1 S2 appreciated by ascultation.  Pulmonary/Chest: Effort normal and clear to auscultation bilaterally. No respiratory distress.   Abdomen: Soft. Non-tender and non-distended. Bowel sounds are normal.   Neurological: Patient is alert and oriented to person, place and time. Moves all extremities.  Skin: Warm and dry. No lesions.  Extremities: No clubbing, cyanosis or edema.    Laboratory data:      Reviewed and noted in plan where applicable. Please see chart for full laboratory data.    No results for input(s): CPK, CPKMB, TROPONINI, MB in the last 24 hours. No results for input(s): POCTGLUCOSE in the last 24 hours.     Lab Results   Component Value Date    INR 1.0 03/27/2018       Lab Results   Component Value Date    WBC 10.89 12/22/2022    HGB 16.7 (H) 12/22/2022    HCT 49.0 (H) 12/22/2022    MCV 91 12/22/2022     12/22/2022       No results for input(s): GLU, NA, K, CL, CO2, BUN, CREATININE, CALCIUM, MG in the last 24 hours.    Predictors of intubation difficulty:       Morbid obesity? BMI 42   Anatomically abnormal facies? no   Prominent incisors? no   Receding mandible? no   Short, thick neck? yes - thick   Neck range of motion: normal with pulling with ROM to R    Dentition:  intact , missing  2nd molars   Based on the Modified Mallampati, patient is a mallampati score: II (hard and soft palate, upper portion of tonsils anduvula visible)    Cardiographics:      ECG:  3/10/23  Echocardiogram:  none    Imaging:      Chest x-ray:  none      Assessment and Plan:       Menorrhagia with regular cycle  - pt presents at the request of Dr. Moore who plans on performing a robotic hysterectomy on 3/23/23   - Known risk factors for perioperative complications: None  HTN, tobacco use, BMI 42    Difficulty with intubation is not anticipated.    Cardiac Risk Estimation:  Per Revised Cardiac Risk Index patient is a Class I  risk with a 3.9% risk of a major cardiac event.      1.) Preoperative workup as follows: ECG, hemoglobin, hematocrit, electrolytes, creatinine, glucose, liver function studies.  2.) Change in medication regimen before surgery: hold NSAIDs 7 days pre op.  3.) Prophylaxis for cardiac events with perioperative beta-blockers: not indicated.  4.) Invasive hemodynamic monitoring perioperatively: not indicated.  5.) Deep vein thrombosis prophylaxis postoperatively: intermittent pneumatic compression boots and regimen to be chosen by surgical team.  6.) Surveillance for postoperative MI with ECG immediately postoperatively and on postoperati ve days 1 and 2 AND troponin levels 24 hours postoperatively and on day 4 or hospital discharge (whichever comes first): not indicated.  7.) Current medications which may produce withdrawal symptoms if withheld perioperatively: none  8.) Other measures: none      HTN (hypertension)  -  controlled  - continue home amlodipine and hydrochlorothiazide  - keep f/u with PCP     Depression  - continue home Trileptal and Lexapro  - keep follow-up with psych    Hypothyroidism  - continue home Synthroid      Severe obesity (BMI 35.0-39.9) with comorbidity  BMI 42    Sacroiliitis  - s/p CAROL 2022  - continue home gabapentin

## 2023-03-10 NOTE — DISCHARGE INSTRUCTIONS
To confirm, your doctor has instructed you that surgery is scheduled for 3.23.2023.       Pre admit office will call the afternoon prior to surgery between 1PM and 3PM with arrival time.    Surgery will be at [facility],  The address is [address]. [place]  [address].      IMPORTANT INSTRUCTIONS!    Do not eat or drink after 12 midnight, including water.   Do not smoke or use chewing tobacco after 12 midnight  OK to brush teeth, but no gum, candy, or mints!      Take only these medicines with a small swallow of water-morning of surgery.     Gabapentin, amlodipine, levothyroxine , lexapro, oxcarbazepine          ____ Stop Aspirin, Ibuprofen, Motrin and Aleve at least 5-7 days before surgery, unless otherwise instructed by your doctor, or the nurse.   You MAY use Tylenol/acetaminophen until day of surgery.      ____  If you take diabetic medication, do NOT take morning of surgery unless instructed by Doctor. Metformin must be stopped 24 hrs prior to surgery time.       ____ Stop taking any Fish Oil supplements or Vitamins at least 5 days prior to surgery, unless instructed otherwise by your Doctor.       Please notify MD office if you have an active infection, currently taking antibiotics or received a vaccination within the past 7 days.      Bathing Instructions: The night before surgery and the morning prior to coming to the hospital:    - Shower & rinse your body as usual with anti-bacterial Soap (Dial or Lever 2000)   -Hibiclens (if indicated) use AFTER anti-bacterial soap; 1 packet PM/1 packet in AM on surgical site only   -Do not use hibiclens on your head, face, or genitals.    -Do not wash with anti-bacterial soap after you use the hibiclens.    -Do not shave surgical site 5-7 days prior to surgery.    -Pubic hair 7 days prior to surgery (gyn pt's).      Pediatric patients do not need to use anti-bacterial soap or Hibiclens.             After Bathing:   __ No powder, lotions, creams, or  body spray to skin     __No deodorant for any breast procedure, PORT, or upper arm surgery     __ No makeup, mascara, nail polish or artificial nails        **SURGERY WILL BE CANCELLED IF ARTIFICIAL/NAIL POLISH IS PRESENT!!!**    __ Please remove all piercings and leave all jewelry at home.    **SURGERY WILL BE CANCELLED IF PIERCINGS ARE PRESENT!!!**      __ Dentures, Hearing Aids and Contact Lens need to be removed prior to the start of surgery.      __ Wear clean, loose-fitting clothing. Allow for dressings/bandages/surgical equipment     __ You must have transportation, and they MUST stay the entire time.       [facility] Visitor/Ride Policy:   Only 1 adult allowed (over the age of 18) to accompany you into Pre-op/Recovery Surgery Dept and must stay through the entire length of admission.     Must have a ride home from a responsible adult that you know and trust.      Pediatric Patients are allowed 2 adult visitors.     Medical Transport, Uber or Lyft can only be used if patient has a responsible adult to accompany them during ride home.         Post-Op Instructions: You will receive surgery post-op instructions by your Discharge Nurse prior to going home.     Surgical Site Infection:   Prevention of surgical site infections:   -Keep incisions clean and dry.   -Do not soak/submerge incisions in water until completely healed.   -Do not apply lotions, powders, creams, or deodorants to site.   -Always make sure hands are cleaned with antibacterial soap/ alcohol-based   prior to touching the surgical site.       Signs and symptoms:               -Redness and pain around the area where you had surgery               -Drainage of cloudy fluid from your surgical wound               -Fever over 100.4 or chills     >>>Call Surgeon office/on-call Surgeon if you experience any of these signs & symptoms post-surgery @ [phone].      *Please Call [facility] Pre-Admit Department for surgery instruction  questions:  [phone]    *Payment questions:  [phone]    *Billing questions:  [phone]

## 2023-03-10 NOTE — PROGRESS NOTES
To confirm, your doctor has instructed you that surgery is scheduled for 3.23.2023.       Pre admit office will call the afternoon prior to surgery between 1PM and 3PM with arrival time.    Surgery will be at [facility],  The address is [address]. [place]  [address].      IMPORTANT INSTRUCTIONS!    Do not eat or drink after 12 midnight, including water.   Do not smoke or use chewing tobacco after 12 midnight  OK to brush teeth, but no gum, candy, or mints!      Take only these medicines with a small swallow of water-morning of surgery.     Gabapentin, amlodipine, levothyroxine , lexapro, oxcarbazepine          ____ Stop Aspirin, Ibuprofen, Motrin and Aleve at least 5-7 days before surgery, unless otherwise instructed by your doctor, or the nurse.   You MAY use Tylenol/acetaminophen until day of surgery.      ____  If you take diabetic medication, do NOT take morning of surgery unless instructed by Doctor. Metformin must be stopped 24 hrs prior to surgery time.       ____ Stop taking any Fish Oil supplements or Vitamins at least 5 days prior to surgery, unless instructed otherwise by your Doctor.       Please notify MD office if you have an active infection, currently taking antibiotics or received a vaccination within the past 7 days.      Bathing Instructions: The night before surgery and the morning prior to coming to the hospital:    - Shower & rinse your body as usual with anti-bacterial Soap (Dial or Lever 2000)   -Hibiclens (if indicated) use AFTER anti-bacterial soap; 1 packet PM/1 packet in AM on surgical site only   -Do not use hibiclens on your head, face, or genitals.    -Do not wash with anti-bacterial soap after you use the hibiclens.    -Do not shave surgical site 5-7 days prior to surgery.    -Pubic hair 7 days prior to surgery (gyn pt's).      Pediatric patients do not need to use anti-bacterial soap or Hibiclens.             After Bathing:   __ No powder, lotions, creams, or  body spray to skin     __No deodorant for any breast procedure, PORT, or upper arm surgery     __ No makeup, mascara, nail polish or artificial nails        **SURGERY WILL BE CANCELLED IF ARTIFICIAL/NAIL POLISH IS PRESENT!!!**    __ Please remove all piercings and leave all jewelry at home.    **SURGERY WILL BE CANCELLED IF PIERCINGS ARE PRESENT!!!**      __ Dentures, Hearing Aids and Contact Lens need to be removed prior to the start of surgery.      __ Wear clean, loose-fitting clothing. Allow for dressings/bandages/surgical equipment     __ You must have transportation, and they MUST stay the entire time.       [facility] Visitor/Ride Policy:   Only 1 adult allowed (over the age of 18) to accompany you into Pre-op/Recovery Surgery Dept and must stay through the entire length of admission.     Must have a ride home from a responsible adult that you know and trust.      Pediatric Patients are allowed 2 adult visitors.     Medical Transport, Uber or Lyft can only be used if patient has a responsible adult to accompany them during ride home.         Post-Op Instructions: You will receive surgery post-op instructions by your Discharge Nurse prior to going home.     Surgical Site Infection:   Prevention of surgical site infections:   -Keep incisions clean and dry.   -Do not soak/submerge incisions in water until completely healed.   -Do not apply lotions, powders, creams, or deodorants to site.   -Always make sure hands are cleaned with antibacterial soap/ alcohol-based   prior to touching the surgical site.       Signs and symptoms:               -Redness and pain around the area where you had surgery               -Drainage of cloudy fluid from your surgical wound               -Fever over 100.4 or chills     >>>Call Surgeon office/on-call Surgeon if you experience any of these signs & symptoms post-surgery @ [phone].      *Please Call [facility] Pre-Admit Department for surgery instruction  questions:  [phone]    *Payment questions:  [phone]    *Billing questions:  [phone]

## 2023-03-14 ENCOUNTER — OFFICE VISIT (OUTPATIENT)
Dept: INTERNAL MEDICINE | Facility: CLINIC | Age: 38
End: 2023-03-14
Payer: COMMERCIAL

## 2023-03-14 VITALS
BODY MASS INDEX: 42.16 KG/M2 | HEIGHT: 65 IN | SYSTOLIC BLOOD PRESSURE: 120 MMHG | TEMPERATURE: 98 F | HEART RATE: 65 BPM | DIASTOLIC BLOOD PRESSURE: 74 MMHG | WEIGHT: 253.06 LBS | RESPIRATION RATE: 18 BRPM | OXYGEN SATURATION: 97 %

## 2023-03-14 DIAGNOSIS — I10 HYPERTENSION, UNSPECIFIED TYPE: ICD-10-CM

## 2023-03-14 DIAGNOSIS — E66.01 CLASS 3 SEVERE OBESITY WITH SERIOUS COMORBIDITY AND BODY MASS INDEX (BMI) OF 40.0 TO 44.9 IN ADULT, UNSPECIFIED OBESITY TYPE: ICD-10-CM

## 2023-03-14 DIAGNOSIS — E78.5 HYPERLIPIDEMIA, UNSPECIFIED HYPERLIPIDEMIA TYPE: ICD-10-CM

## 2023-03-14 DIAGNOSIS — M46.1 SACROILIITIS: ICD-10-CM

## 2023-03-14 DIAGNOSIS — E03.9 HYPOTHYROIDISM, UNSPECIFIED TYPE: Primary | ICD-10-CM

## 2023-03-14 PROCEDURE — 99214 PR OFFICE/OUTPT VISIT, EST, LEVL IV, 30-39 MIN: ICD-10-PCS | Mod: S$GLB,,, | Performed by: FAMILY MEDICINE

## 2023-03-14 PROCEDURE — 3078F DIAST BP <80 MM HG: CPT | Mod: CPTII,S$GLB,, | Performed by: FAMILY MEDICINE

## 2023-03-14 PROCEDURE — 99999 PR PBB SHADOW E&M-EST. PATIENT-LVL IV: ICD-10-PCS | Mod: PBBFAC,,, | Performed by: FAMILY MEDICINE

## 2023-03-14 PROCEDURE — 3078F PR MOST RECENT DIASTOLIC BLOOD PRESSURE < 80 MM HG: ICD-10-PCS | Mod: CPTII,S$GLB,, | Performed by: FAMILY MEDICINE

## 2023-03-14 PROCEDURE — 3074F PR MOST RECENT SYSTOLIC BLOOD PRESSURE < 130 MM HG: ICD-10-PCS | Mod: CPTII,S$GLB,, | Performed by: FAMILY MEDICINE

## 2023-03-14 PROCEDURE — 1159F MED LIST DOCD IN RCRD: CPT | Mod: CPTII,S$GLB,, | Performed by: FAMILY MEDICINE

## 2023-03-14 PROCEDURE — 1159F PR MEDICATION LIST DOCUMENTED IN MEDICAL RECORD: ICD-10-PCS | Mod: CPTII,S$GLB,, | Performed by: FAMILY MEDICINE

## 2023-03-14 PROCEDURE — 3008F BODY MASS INDEX DOCD: CPT | Mod: CPTII,S$GLB,, | Performed by: FAMILY MEDICINE

## 2023-03-14 PROCEDURE — 3008F PR BODY MASS INDEX (BMI) DOCUMENTED: ICD-10-PCS | Mod: CPTII,S$GLB,, | Performed by: FAMILY MEDICINE

## 2023-03-14 PROCEDURE — 99999 PR PBB SHADOW E&M-EST. PATIENT-LVL IV: CPT | Mod: PBBFAC,,, | Performed by: FAMILY MEDICINE

## 2023-03-14 PROCEDURE — 99214 OFFICE O/P EST MOD 30 MIN: CPT | Mod: S$GLB,,, | Performed by: FAMILY MEDICINE

## 2023-03-14 PROCEDURE — 3074F SYST BP LT 130 MM HG: CPT | Mod: CPTII,S$GLB,, | Performed by: FAMILY MEDICINE

## 2023-03-14 RX ORDER — GABAPENTIN 300 MG/1
300 CAPSULE ORAL 3 TIMES DAILY
Qty: 90 CAPSULE | Refills: 3 | Status: SHIPPED | OUTPATIENT
Start: 2023-03-14 | End: 2023-08-24 | Stop reason: SDUPTHER

## 2023-03-14 RX ORDER — PREDNISONE 20 MG/1
TABLET ORAL
Qty: 8 TABLET | Refills: 0 | Status: SHIPPED | OUTPATIENT
Start: 2023-03-14 | End: 2023-03-20

## 2023-03-14 RX ORDER — CYCLOBENZAPRINE HCL 10 MG
10 TABLET ORAL 3 TIMES DAILY PRN
Qty: 30 TABLET | Refills: 0 | Status: SHIPPED | OUTPATIENT
Start: 2023-03-14 | End: 2023-03-24

## 2023-03-14 RX ORDER — LEVOTHYROXINE SODIUM 137 UG/1
137 TABLET ORAL
Qty: 90 TABLET | Refills: 1 | Status: SHIPPED | OUTPATIENT
Start: 2023-03-14 | End: 2023-09-19

## 2023-03-14 NOTE — PROGRESS NOTES
Subjective:       Patient ID: Haydee Pennington is a 38 y.o. female.    Chief Complaint: Back Pain and Follow-up    39 yo    Patient Active Problem List   Diagnosis    Anxiety    Depression    Hypothyroidism    Stress headaches    HTN (hypertension)    Severe obesity (BMI 35.0-39.9) with comorbidity    Vitamin D deficiency    Tobacco dependence due to cigarettes    Sacroiliitis    Greater trochanteric bursitis of right hip    Menorrhagia with regular cycle       Past Medical History:   Diagnosis Date    Hypertension     Thyroid disease        Past Surgical History:   Procedure Laterality Date    CERVICAL BIOPSY  W/ LOOP ELECTRODE EXCISION  2005    INJECTION OF ANESTHETIC AGENT INTO SACROILIAC JOINT Right 06/03/2022    Procedure: right Sacroiliac Joint Injection with RN IV sedation;  Surgeon: Rey Monsalve MD;  Location: [facility];  Service: Pain Management;  Laterality: Right;    INJECTION OF JOINT Right 06/03/2022    Procedure: right GT bursa injection with RN IV sedation;  Surgeon: Rey Monsalve MD;  Location: [facility];  Service: Pain Management;  Laterality: Right;    REPAIR, PERFORATION, NASAL SEPTUM      sedated for nasal septal manipulation       Family History   Problem Relation Age of Onset    Diabetes Mother     Cancer Mother         Lung and Breast    Hypertension Father     Dementia Father     Transient ischemic attack Father     Heart failure Father     Stroke Father        Social History     Tobacco Use   Smoking Status Every Day    Packs/day: 0.50    Years: 24.00    Pack years: 12.00    Types: Cigarettes    Start date: 1996   Smokeless Tobacco Never       Wt Readings from Last 5 Encounters:   03/14/23 114.8 kg (253 lb 1.4 oz)   01/25/23 116.9 kg (257 lb 11.5 oz)   12/28/22 120.3 kg (265 lb 3.4 oz)   12/22/22 118.2 kg (260 lb 9.3 oz)   10/13/22 110.5 kg (243 lb 9.7 oz)       For further HPI details, see assessment and plan.    Review of Systems   Musculoskeletal:  Positive for back pain.      Objective:      Vitals:    03/14/23 0920   BP: 120/74   Pulse: 65   Resp: 18   Temp: 98.1 °F (36.7 °C)       Physical Exam  Constitutional:       General: She is not in acute distress.     Appearance: Normal appearance. She is well-developed.   Cardiovascular:      Rate and Rhythm: Normal rate and regular rhythm.   Pulmonary:      Effort: Pulmonary effort is normal. No respiratory distress.      Breath sounds: Normal breath sounds.   Musculoskeletal:         General: Normal range of motion.   Neurological:      Mental Status: She is alert. She is not disoriented.   Psychiatric:         Mood and Affect: Mood normal.         Speech: Speech normal.       Assessment:       1. Hypothyroidism, unspecified type    2. Sacroiliitis    3. Hypertension, unspecified type    4. Hyperlipidemia, unspecified hyperlipidemia type    5. Class 3 severe obesity with serious comorbidity and body mass index (BMI) of 40.0 to 44.9 in adult, unspecified obesity type        Plan:       Sciatica  Patient presents with right leg pain.  The onset was Saturday today is Tuesday.  Is a sharp shooting pain down her leg.      No back pain - but has known sacroiliac arthritis and she feels pain there.    Currently she is been using Fioricet but does not want to continue using such.  Additionally she has gabapentin at 300 mg  twice a day.    Patient has no fevers, no chills she has no saddle anesthesia, she has no urinary or fecal incontinence.  In regards to wave leg weakness it is hard to determine if the pain in her right leg is limiting movement or if she has a true weakness.  In her left leg she does have some irritation but is mostly due to having to rely on that leg more.    Continue gabapentin.  Advise he increases it to 3 times a day.      Additionally will place her on a steroid.  Will use 40 mg for the 1st 2 days and then 20 mg for the next few days.      Patient has taken prednisone in the past and is aware disturbs her sleep.  Monitor  for any significant mood changes.    Refill gabapentin at [facility]  Prn relxant - flexeril sent in      Offered PT - she states can't afford it.    Encourage home exercises, she knows some      HTN  BP Readings from Last 3 Encounters:   03/14/23 120/74   03/10/23 (!) 146/66   01/25/23 110/80   Amlodipine 10  Hctz 25  Continue those meds      Hypothyroidism  Lab Results   Component Value Date    TSH 7.451 (H) 12/22/2022   Synthroid 125  Dose has not changed since September  She was compliant with medication  Refill at higher dosage    HLD    Lifestyle  Weight loss      Obesity  Wt Readings from Last 5 Encounters:   03/14/23 114.8 kg (253 lb 1.4 oz)   01/25/23 116.9 kg (257 lb 11.5 oz)   12/28/22 120.3 kg (265 lb 3.4 oz)   12/22/22 118.2 kg (260 lb 9.3 oz)   10/13/22 110.5 kg (243 lb 9.7 oz)   Seroquel caused a lot of weight gain  Emphasized need for reduction.    Needs to quit smoking!  Allergy to pouches  Doesn't like oral nicotine.      Lab in 6 weeks  F/u me 6 months    This note was verbally dictated, please excuse any type errors.

## 2023-03-15 ENCOUNTER — PATIENT MESSAGE (OUTPATIENT)
Dept: INTERNAL MEDICINE | Facility: CLINIC | Age: 38
End: 2023-03-15
Payer: COMMERCIAL

## 2023-03-22 ENCOUNTER — ANESTHESIA EVENT (OUTPATIENT)
Dept: SURGERY | Facility: HOSPITAL | Age: 38
End: 2023-03-22
Payer: COMMERCIAL

## 2023-03-22 NOTE — ANESTHESIA PREPROCEDURE EVALUATION
03/22/2023  Haydee Pennington is a 38 y.o., female.      Pre-op Assessment    I have reviewed the Patient Summary Reports.     I have reviewed the Nursing Notes. I have reviewed the NPO Status.   I have reviewed the Medications.     Review of Systems  Anesthesia Hx:  No problems with previous Anesthesia  Denies Family Hx of Anesthesia complications.   Denies Personal Hx of Anesthesia complications.   Social:  Smoker 3/4 ppd smoker.   Hematology/Oncology:  Hematology Normal        Cardiovascular:   Hypertension hyperlipidemia    Pulmonary:  Pulmonary Normal    Renal/:  Renal/ Normal     Hepatic/GI:  Hepatic/GI Normal    Musculoskeletal:   Arthritis  4 days of oral steroids for back pain , last dose 1 week ago. Spine Disorders: lumbar    Neurological:   Headaches    Endocrine:   Hypothyroidism    Psych:   anxiety depression Bipolar disorder.         Physical Exam  General: Alert and Oriented    Airway:  Mallampati: II   Mouth Opening: Normal  TM Distance: Normal  Tongue: Normal  Neck ROM: Normal ROM    Dental:  Intact, Periodontal disease    Chest/Lungs:  Clear to auscultation, Normal Respiratory Rate    Heart:  Rate: Normal  Rhythm: Regular Rhythm        Anesthesia Plan  Type of Anesthesia, risks & benefits discussed:    Anesthesia Type: Gen ETT  Intra-op Monitoring Plan: Standard ASA Monitors  Post Op Pain Control Plan: multimodal analgesia and IV/PO Opioids PRN  Induction:  IV  Informed Consent: Informed consent signed with the Patient and all parties understand the risks and agree with anesthesia plan.  All questions answered.   ASA Score: 2  Day of Surgery Review of History & Physical: H&P Update referred to the surgeon/provider.    Ready For Surgery From Anesthesia Perspective.     .

## 2023-03-23 ENCOUNTER — HOSPITAL ENCOUNTER (OUTPATIENT)
Facility: HOSPITAL | Age: 38
Discharge: HOME | End: 2023-03-23
Attending: OBSTETRICS & GYNECOLOGY | Admitting: OBSTETRICS & GYNECOLOGY
Payer: COMMERCIAL

## 2023-03-23 ENCOUNTER — ANESTHESIA (OUTPATIENT)
Dept: SURGERY | Facility: HOSPITAL | Age: 38
End: 2023-03-23
Payer: COMMERCIAL

## 2023-03-23 VITALS
HEIGHT: 66 IN | DIASTOLIC BLOOD PRESSURE: 67 MMHG | TEMPERATURE: 98 F | OXYGEN SATURATION: 93 % | HEART RATE: 63 BPM | SYSTOLIC BLOOD PRESSURE: 112 MMHG | RESPIRATION RATE: 20 BRPM | WEIGHT: 258.5 LBS | BODY MASS INDEX: 41.54 KG/M2

## 2023-03-23 DIAGNOSIS — N92.0 MENORRHAGIA WITH REGULAR CYCLE: Primary | ICD-10-CM

## 2023-03-23 PROBLEM — Z90.710 STATUS POST LAPAROSCOPIC HYSTERECTOMY: Status: ACTIVE | Noted: 2023-03-23

## 2023-03-23 LAB — POCT GLUCOSE: 91 MG/DL (ref 70–110)

## 2023-03-23 PROCEDURE — D9220A PRA ANESTHESIA: ICD-10-PCS | Mod: CRNA,,, | Performed by: NURSE ANESTHETIST, CERTIFIED REGISTERED

## 2023-03-23 PROCEDURE — 58571 PR LAPAROSCOPY W TOT HYSTERECTUTERUS <=250 GRAM  W TUBE/OVARY: ICD-10-PCS | Mod: ,,, | Performed by: OBSTETRICS & GYNECOLOGY

## 2023-03-23 PROCEDURE — 37000009 HC ANESTHESIA EA ADD 15 MINS: Performed by: OBSTETRICS & GYNECOLOGY

## 2023-03-23 PROCEDURE — 63600175 PHARM REV CODE 636 W HCPCS: Performed by: OBSTETRICS & GYNECOLOGY

## 2023-03-23 PROCEDURE — C2628 CATHETER, OCCLUSION: HCPCS | Performed by: OBSTETRICS & GYNECOLOGY

## 2023-03-23 PROCEDURE — D9220A PRA ANESTHESIA: Mod: ANES,,, | Performed by: ANESTHESIOLOGY

## 2023-03-23 PROCEDURE — 25000003 PHARM REV CODE 250: Performed by: OBSTETRICS & GYNECOLOGY

## 2023-03-23 PROCEDURE — 71000016 HC POSTOP RECOV ADDL HR: Performed by: OBSTETRICS & GYNECOLOGY

## 2023-03-23 PROCEDURE — D9220A PRA ANESTHESIA: ICD-10-PCS | Mod: ANES,,, | Performed by: ANESTHESIOLOGY

## 2023-03-23 PROCEDURE — 37000008 HC ANESTHESIA 1ST 15 MINUTES: Performed by: OBSTETRICS & GYNECOLOGY

## 2023-03-23 PROCEDURE — 88307 TISSUE EXAM BY PATHOLOGIST: CPT | Mod: 26,,, | Performed by: PATHOLOGY

## 2023-03-23 PROCEDURE — 71000039 HC RECOVERY, EACH ADD'L HOUR: Performed by: OBSTETRICS & GYNECOLOGY

## 2023-03-23 PROCEDURE — 88342 IMHCHEM/IMCYTCHM 1ST ANTB: CPT | Mod: 26,,, | Performed by: PATHOLOGY

## 2023-03-23 PROCEDURE — 25000003 PHARM REV CODE 250: Performed by: NURSE ANESTHETIST, CERTIFIED REGISTERED

## 2023-03-23 PROCEDURE — 88342 CHG IMMUNOCYTOCHEMISTRY: ICD-10-PCS | Mod: 26,,, | Performed by: PATHOLOGY

## 2023-03-23 PROCEDURE — 82962 GLUCOSE BLOOD TEST: CPT | Performed by: OBSTETRICS & GYNECOLOGY

## 2023-03-23 PROCEDURE — 88307 PR  SURG PATH,LEVEL V: ICD-10-PCS | Mod: 26,,, | Performed by: PATHOLOGY

## 2023-03-23 PROCEDURE — 58571 PR LAPAROSCOPY W TOT HYSTERECTUTERUS <=250 GRAM  W TUBE/OVARY: ICD-10-PCS | Mod: AS,,, | Performed by: PHYSICIAN ASSISTANT

## 2023-03-23 PROCEDURE — 58571 TLH W/T/O 250 G OR LESS: CPT | Mod: ,,, | Performed by: OBSTETRICS & GYNECOLOGY

## 2023-03-23 PROCEDURE — 36000713 HC OR TIME LEV V EA ADD 15 MIN: Performed by: OBSTETRICS & GYNECOLOGY

## 2023-03-23 PROCEDURE — 71000033 HC RECOVERY, INTIAL HOUR: Performed by: OBSTETRICS & GYNECOLOGY

## 2023-03-23 PROCEDURE — 88342 IMHCHEM/IMCYTCHM 1ST ANTB: CPT | Performed by: PATHOLOGY

## 2023-03-23 PROCEDURE — D9220A PRA ANESTHESIA: Mod: CRNA,,, | Performed by: NURSE ANESTHETIST, CERTIFIED REGISTERED

## 2023-03-23 PROCEDURE — 88307 TISSUE EXAM BY PATHOLOGIST: CPT | Performed by: PATHOLOGY

## 2023-03-23 PROCEDURE — 88341 IMHCHEM/IMCYTCHM EA ADD ANTB: CPT | Mod: 26,,, | Performed by: PATHOLOGY

## 2023-03-23 PROCEDURE — 88341 IMHCHEM/IMCYTCHM EA ADD ANTB: CPT | Performed by: PATHOLOGY

## 2023-03-23 PROCEDURE — 27201423 OPTIME MED/SURG SUP & DEVICES STERILE SUPPLY: Performed by: OBSTETRICS & GYNECOLOGY

## 2023-03-23 PROCEDURE — 88341 PR IHC OR ICC EACH ADD'L SINGLE ANTIBODY  STAINPR: ICD-10-PCS | Mod: 26,,, | Performed by: PATHOLOGY

## 2023-03-23 PROCEDURE — 58571 TLH W/T/O 250 G OR LESS: CPT | Mod: AS,,, | Performed by: PHYSICIAN ASSISTANT

## 2023-03-23 PROCEDURE — 71000015 HC POSTOP RECOV 1ST HR: Performed by: OBSTETRICS & GYNECOLOGY

## 2023-03-23 PROCEDURE — 63600175 PHARM REV CODE 636 W HCPCS: Performed by: NURSE ANESTHETIST, CERTIFIED REGISTERED

## 2023-03-23 PROCEDURE — 36000712 HC OR TIME LEV V 1ST 15 MIN: Performed by: OBSTETRICS & GYNECOLOGY

## 2023-03-23 RX ORDER — SODIUM CHLORIDE 0.9 % (FLUSH) 0.9 %
3 SYRINGE (ML) INJECTION
Status: DISCONTINUED | OUTPATIENT
Start: 2023-03-23 | End: 2023-03-23 | Stop reason: HOSPADM

## 2023-03-23 RX ORDER — PREGABALIN 25 MG/1
50 CAPSULE ORAL
Status: DISCONTINUED | OUTPATIENT
Start: 2023-03-23 | End: 2023-03-23 | Stop reason: HOSPADM

## 2023-03-23 RX ORDER — CELECOXIB 100 MG/1
400 CAPSULE ORAL
Status: COMPLETED | OUTPATIENT
Start: 2023-03-23 | End: 2023-03-23

## 2023-03-23 RX ORDER — ONDANSETRON 8 MG/1
8 TABLET, ORALLY DISINTEGRATING ORAL EVERY 8 HOURS PRN
Status: DISCONTINUED | OUTPATIENT
Start: 2023-03-23 | End: 2023-03-23 | Stop reason: HOSPADM

## 2023-03-23 RX ORDER — PROPOFOL 10 MG/ML
VIAL (ML) INTRAVENOUS
Status: DISCONTINUED | OUTPATIENT
Start: 2023-03-23 | End: 2023-03-23

## 2023-03-23 RX ORDER — MUPIROCIN 20 MG/G
OINTMENT TOPICAL
Status: DISCONTINUED | OUTPATIENT
Start: 2023-03-23 | End: 2023-03-23 | Stop reason: HOSPADM

## 2023-03-23 RX ORDER — KETOROLAC TROMETHAMINE 30 MG/ML
30 INJECTION, SOLUTION INTRAMUSCULAR; INTRAVENOUS
Status: DISCONTINUED | OUTPATIENT
Start: 2023-03-23 | End: 2023-03-23 | Stop reason: HOSPADM

## 2023-03-23 RX ORDER — DIPHENHYDRAMINE HCL 25 MG
25 CAPSULE ORAL EVERY 4 HOURS PRN
Status: DISCONTINUED | OUTPATIENT
Start: 2023-03-23 | End: 2023-03-23 | Stop reason: HOSPADM

## 2023-03-23 RX ORDER — LIDOCAINE HYDROCHLORIDE 10 MG/ML
0.5 INJECTION, SOLUTION EPIDURAL; INFILTRATION; INTRACAUDAL; PERINEURAL
Status: DISCONTINUED | OUTPATIENT
Start: 2023-03-23 | End: 2023-03-23 | Stop reason: HOSPADM

## 2023-03-23 RX ORDER — HYDROCODONE BITARTRATE AND ACETAMINOPHEN 5; 325 MG/1; MG/1
1 TABLET ORAL EVERY 4 HOURS PRN
Status: DISCONTINUED | OUTPATIENT
Start: 2023-03-23 | End: 2023-03-23 | Stop reason: HOSPADM

## 2023-03-23 RX ORDER — DEXAMETHASONE SODIUM PHOSPHATE 4 MG/ML
INJECTION, SOLUTION INTRA-ARTICULAR; INTRALESIONAL; INTRAMUSCULAR; INTRAVENOUS; SOFT TISSUE
Status: DISCONTINUED | OUTPATIENT
Start: 2023-03-23 | End: 2023-03-23

## 2023-03-23 RX ORDER — HYDROCODONE BITARTRATE AND ACETAMINOPHEN 10; 325 MG/1; MG/1
1 TABLET ORAL EVERY 4 HOURS PRN
Status: DISCONTINUED | OUTPATIENT
Start: 2023-03-23 | End: 2023-03-23 | Stop reason: HOSPADM

## 2023-03-23 RX ORDER — PROCHLORPERAZINE EDISYLATE 5 MG/ML
5 INJECTION INTRAMUSCULAR; INTRAVENOUS EVERY 6 HOURS PRN
Status: DISCONTINUED | OUTPATIENT
Start: 2023-03-23 | End: 2023-03-23 | Stop reason: HOSPADM

## 2023-03-23 RX ORDER — CEFAZOLIN SODIUM 2 G/50ML
2 SOLUTION INTRAVENOUS
Status: COMPLETED | OUTPATIENT
Start: 2023-03-23 | End: 2023-03-23

## 2023-03-23 RX ORDER — HYDROCODONE BITARTRATE AND ACETAMINOPHEN 5; 325 MG/1; MG/1
1 TABLET ORAL EVERY 6 HOURS PRN
Qty: 15 TABLET | Refills: 0 | Status: SHIPPED | OUTPATIENT
Start: 2023-03-23 | End: 2023-03-30

## 2023-03-23 RX ORDER — LIDOCAINE HYDROCHLORIDE 20 MG/ML
INJECTION, SOLUTION EPIDURAL; INFILTRATION; INTRACAUDAL; PERINEURAL
Status: DISCONTINUED | OUTPATIENT
Start: 2023-03-23 | End: 2023-03-23

## 2023-03-23 RX ORDER — DEXMEDETOMIDINE HYDROCHLORIDE 100 UG/ML
INJECTION, SOLUTION INTRAVENOUS
Status: DISCONTINUED | OUTPATIENT
Start: 2023-03-23 | End: 2023-03-23

## 2023-03-23 RX ORDER — ACETAMINOPHEN 325 MG/1
650 TABLET ORAL EVERY 4 HOURS PRN
Status: DISCONTINUED | OUTPATIENT
Start: 2023-03-23 | End: 2023-03-23 | Stop reason: HOSPADM

## 2023-03-23 RX ORDER — FAMOTIDINE 20 MG/1
20 TABLET, FILM COATED ORAL
Status: COMPLETED | OUTPATIENT
Start: 2023-03-23 | End: 2023-03-23

## 2023-03-23 RX ORDER — POLYETHYLENE GLYCOL 3350 17 G/17G
17 POWDER, FOR SOLUTION ORAL DAILY
Status: DISCONTINUED | OUTPATIENT
Start: 2023-03-23 | End: 2023-03-23 | Stop reason: HOSPADM

## 2023-03-23 RX ORDER — AMOXICILLIN 250 MG
1 CAPSULE ORAL 2 TIMES DAILY
Status: DISCONTINUED | OUTPATIENT
Start: 2023-03-23 | End: 2023-03-23 | Stop reason: HOSPADM

## 2023-03-23 RX ORDER — SODIUM CHLORIDE, SODIUM LACTATE, POTASSIUM CHLORIDE, CALCIUM CHLORIDE 600; 310; 30; 20 MG/100ML; MG/100ML; MG/100ML; MG/100ML
INJECTION, SOLUTION INTRAVENOUS CONTINUOUS
Status: DISCONTINUED | OUTPATIENT
Start: 2023-03-23 | End: 2023-03-23 | Stop reason: HOSPADM

## 2023-03-23 RX ORDER — NEOSTIGMINE METHYLSULFATE 1 MG/ML
INJECTION, SOLUTION INTRAVENOUS
Status: DISCONTINUED | OUTPATIENT
Start: 2023-03-23 | End: 2023-03-23

## 2023-03-23 RX ORDER — ROCURONIUM BROMIDE 10 MG/ML
INJECTION, SOLUTION INTRAVENOUS
Status: DISCONTINUED | OUTPATIENT
Start: 2023-03-23 | End: 2023-03-23

## 2023-03-23 RX ORDER — ONDANSETRON 2 MG/ML
4 INJECTION INTRAMUSCULAR; INTRAVENOUS DAILY PRN
Status: DISCONTINUED | OUTPATIENT
Start: 2023-03-23 | End: 2023-03-23 | Stop reason: HOSPADM

## 2023-03-23 RX ORDER — SUCCINYLCHOLINE CHLORIDE 20 MG/ML
INJECTION INTRAMUSCULAR; INTRAVENOUS
Status: DISCONTINUED | OUTPATIENT
Start: 2023-03-23 | End: 2023-03-23

## 2023-03-23 RX ORDER — ONDANSETRON 2 MG/ML
INJECTION INTRAMUSCULAR; INTRAVENOUS
Status: DISCONTINUED | OUTPATIENT
Start: 2023-03-23 | End: 2023-03-23

## 2023-03-23 RX ORDER — MIDAZOLAM HYDROCHLORIDE 1 MG/ML
INJECTION INTRAMUSCULAR; INTRAVENOUS
Status: DISCONTINUED | OUTPATIENT
Start: 2023-03-23 | End: 2023-03-23

## 2023-03-23 RX ORDER — FENTANYL CITRATE 50 UG/ML
INJECTION, SOLUTION INTRAMUSCULAR; INTRAVENOUS
Status: DISCONTINUED | OUTPATIENT
Start: 2023-03-23 | End: 2023-03-23

## 2023-03-23 RX ORDER — ACETAMINOPHEN 500 MG
1000 TABLET ORAL
Status: COMPLETED | OUTPATIENT
Start: 2023-03-23 | End: 2023-03-23

## 2023-03-23 RX ORDER — MEPERIDINE HYDROCHLORIDE 25 MG/ML
12.5 INJECTION INTRAMUSCULAR; INTRAVENOUS; SUBCUTANEOUS ONCE AS NEEDED
Status: DISCONTINUED | OUTPATIENT
Start: 2023-03-23 | End: 2023-03-23 | Stop reason: HOSPADM

## 2023-03-23 RX ORDER — HYDROMORPHONE HYDROCHLORIDE 2 MG/ML
1 INJECTION, SOLUTION INTRAMUSCULAR; INTRAVENOUS; SUBCUTANEOUS EVERY 4 HOURS PRN
Status: DISCONTINUED | OUTPATIENT
Start: 2023-03-23 | End: 2023-03-23 | Stop reason: HOSPADM

## 2023-03-23 RX ORDER — DIPHENHYDRAMINE HYDROCHLORIDE 50 MG/ML
25 INJECTION INTRAMUSCULAR; INTRAVENOUS EVERY 4 HOURS PRN
Status: DISCONTINUED | OUTPATIENT
Start: 2023-03-23 | End: 2023-03-23 | Stop reason: HOSPADM

## 2023-03-23 RX ORDER — PROCHLORPERAZINE EDISYLATE 5 MG/ML
5 INJECTION INTRAMUSCULAR; INTRAVENOUS EVERY 10 MIN PRN
Status: DISCONTINUED | OUTPATIENT
Start: 2023-03-23 | End: 2023-03-23 | Stop reason: HOSPADM

## 2023-03-23 RX ORDER — IBUPROFEN 200 MG
800 TABLET ORAL
Status: DISCONTINUED | OUTPATIENT
Start: 2023-03-24 | End: 2023-03-23 | Stop reason: HOSPADM

## 2023-03-23 RX ORDER — HYDROMORPHONE HYDROCHLORIDE 2 MG/ML
0.2 INJECTION, SOLUTION INTRAMUSCULAR; INTRAVENOUS; SUBCUTANEOUS EVERY 5 MIN PRN
Status: DISCONTINUED | OUTPATIENT
Start: 2023-03-23 | End: 2023-03-23 | Stop reason: HOSPADM

## 2023-03-23 RX ADMIN — GLYCOPYRROLATE 0.8 MG: 0.2 INJECTION, SOLUTION INTRAMUSCULAR; INTRAVITREAL at 10:03

## 2023-03-23 RX ADMIN — SODIUM CHLORIDE, SODIUM LACTATE, POTASSIUM CHLORIDE, AND CALCIUM CHLORIDE: 600; 310; 30; 20 INJECTION, SOLUTION INTRAVENOUS at 10:03

## 2023-03-23 RX ADMIN — SUCCINYLCHOLINE CHLORIDE 180 MG: 20 INJECTION, SOLUTION INTRAMUSCULAR; INTRAVENOUS; PARENTERAL at 09:03

## 2023-03-23 RX ADMIN — LIDOCAINE HYDROCHLORIDE 60 MG: 20 INJECTION, SOLUTION INTRAVENOUS at 09:03

## 2023-03-23 RX ADMIN — DEXMEDETOMIDINE HYDROCHLORIDE 8 MCG: 100 INJECTION, SOLUTION INTRAVENOUS at 10:03

## 2023-03-23 RX ADMIN — ONDANSETRON 4 MG: 2 INJECTION INTRAMUSCULAR; INTRAVENOUS at 11:03

## 2023-03-23 RX ADMIN — ROCURONIUM BROMIDE 10 MG: 10 SOLUTION INTRAVENOUS at 09:03

## 2023-03-23 RX ADMIN — CEFAZOLIN SODIUM 2 G: 2 SOLUTION INTRAVENOUS at 09:03

## 2023-03-23 RX ADMIN — DEXMEDETOMIDINE HYDROCHLORIDE 4 MCG: 100 INJECTION, SOLUTION INTRAVENOUS at 11:03

## 2023-03-23 RX ADMIN — ROCURONIUM BROMIDE 40 MG: 10 SOLUTION INTRAVENOUS at 09:03

## 2023-03-23 RX ADMIN — ONDANSETRON HYDROCHLORIDE 4 MG: 2 SOLUTION INTRAMUSCULAR; INTRAVENOUS at 11:03

## 2023-03-23 RX ADMIN — ACETAMINOPHEN 1000 MG: 500 TABLET ORAL at 08:03

## 2023-03-23 RX ADMIN — ROCURONIUM BROMIDE 10 MG: 10 SOLUTION INTRAVENOUS at 10:03

## 2023-03-23 RX ADMIN — FENTANYL CITRATE 100 MCG: 0.05 INJECTION, SOLUTION INTRAMUSCULAR; INTRAVENOUS at 09:03

## 2023-03-23 RX ADMIN — FENTANYL CITRATE 25 MCG: 0.05 INJECTION, SOLUTION INTRAMUSCULAR; INTRAVENOUS at 10:03

## 2023-03-23 RX ADMIN — NEOSTIGMINE METHYLSULFATE 5 MG: 1 INJECTION INTRAVENOUS at 10:03

## 2023-03-23 RX ADMIN — MIDAZOLAM HYDROCHLORIDE 2 MG: 1 INJECTION INTRAMUSCULAR; INTRAVENOUS at 09:03

## 2023-03-23 RX ADMIN — SODIUM CHLORIDE, SODIUM LACTATE, POTASSIUM CHLORIDE, AND CALCIUM CHLORIDE: 600; 310; 30; 20 INJECTION, SOLUTION INTRAVENOUS at 08:03

## 2023-03-23 RX ADMIN — HYDROMORPHONE HYDROCHLORIDE 0.2 MG: 2 INJECTION INTRAMUSCULAR; INTRAVENOUS; SUBCUTANEOUS at 12:03

## 2023-03-23 RX ADMIN — CELECOXIB 400 MG: 100 CAPSULE ORAL at 08:03

## 2023-03-23 RX ADMIN — PROPOFOL 150 MG: 10 INJECTION, EMULSION INTRAVENOUS at 09:03

## 2023-03-23 RX ADMIN — HYDROCODONE BITARTRATE AND ACETAMINOPHEN 1 TABLET: 5; 325 TABLET ORAL at 12:03

## 2023-03-23 RX ADMIN — FAMOTIDINE 20 MG: 20 TABLET ORAL at 08:03

## 2023-03-23 RX ADMIN — FENTANYL CITRATE 50 MCG: 0.05 INJECTION, SOLUTION INTRAMUSCULAR; INTRAVENOUS at 10:03

## 2023-03-23 RX ADMIN — DEXAMETHASONE SODIUM PHOSPHATE 8 MG: 4 INJECTION, SOLUTION INTRA-ARTICULAR; INTRALESIONAL; INTRAMUSCULAR; INTRAVENOUS; SOFT TISSUE at 11:03

## 2023-03-23 NOTE — TRANSFER OF CARE
"Anesthesia Transfer of Care Note    Patient: Haydee Pennington    Procedure(s) Performed: Procedure(s) (LRB):  XI ROBOTIC HYSTERECTOMY,WITH SALPINGECTOMY (N/A)    Patient location: PACU    Anesthesia Type: general    Transport from OR: Transported from OR on room air with adequate spontaneous ventilation    Post pain: adequate analgesia    Post assessment: no apparent anesthetic complications and tolerated procedure well    Post vital signs: stable    Level of consciousness: sedated    Nausea/Vomiting: no nausea/vomiting    Complications: none    Transfer of care protocol was followed      Last vitals:   Visit Vitals  /80 (BP Location: Right arm, Patient Position: Sitting)   Pulse 78   Temp 36.4 °C (97.6 °F) (Temporal)   Resp 18   Ht 5' 6" (1.676 m)   Wt 117.3 kg (258 lb 7.8 oz)   LMP 03/17/2023   SpO2 97%   Breastfeeding No   BMI 41.72 kg/m²     "

## 2023-03-23 NOTE — PLAN OF CARE
Reviewed and completed all PACU orders. I encouraged questions, answered them thoroughly, and evaluated my instructions via teach-back method. I have disconnected patient from monitoring equipment and prepared them for the next phase of care. Patient has met all PACU discharge criteria at this point. Patient and family agree with the plan of care. Report given to NURSE Caraballo.

## 2023-03-23 NOTE — HPI
Heavy menses with normal ultrasound and benign embx for options discussion   Reviewed hormonal suppression, Ablation with bilateral tubal ligation and robotic hysterectomy

## 2023-03-23 NOTE — PLAN OF CARE
Pt discharged per MD order. Pt transported by wheelchair to family vehicle without incident or complaint. AVS reviewed and all questions answered.

## 2023-03-23 NOTE — ANESTHESIA PROCEDURE NOTES
Intubation    Date/Time: 3/23/2023 9:43 AM  Performed by: Paul Loo CRNA  Authorized by: Cheryle Madden MD     Intubation:     Induction:  Intravenous    Intubated:  Postinduction    Mask Ventilation:  Easy mask    Attempts:  1    Attempted By:  CRNA    Method of Intubation:  Direct    Blade:  Goetz 2    Laryngeal View Grade: Grade I - full view of cords      Difficult Airway Encountered?: No      Complications:  None    Airway Device:  Oral endotracheal tube    Airway Device Size:  7.0    Inflation Amount (mL):  6    Tube secured:  22    Secured at:  The lips    Placement Verified By:  Capnometry    Complicating Factors:  None    Findings Post-Intubation:  BS equal bilateral    
No

## 2023-03-23 NOTE — DISCHARGE INSTRUCTIONS
Nozin Instructions  Goal: the goal of Nozin is to reduce the risk of post-procedural infections by bacteria in the nasal cavity. Think of it as hand  for your nose.    How to use:    1. Shake Nozin bottle well    2. Take a cotton swab and apply 4 drops to one tip    3. Insert cotton tip into one nostril, being sure not to go deeper into nose than tip of the swab.    4. Swab nostril 6 times counterclockwise and 6 times clockwise. Make sure to swab the inside front pocket of the nostril.    5. Take swab out and apply 2 drops to the same cotton tip. Repeat steps 3 and 4 in the other nostril.        Do steps 1-5 twice a day for 7 days. .        Discharge Instructions for Laparoscopic Hysterectomy  You had a procedure called laparoscopic hysterectomy. A surgeon removed your uterus using instruments inserted through small incisions in your abdomen. These incisions may be tender or sore. You may also have pain in your upper back or shoulders. This is from the gas used to enlarge your abdomen to allow your doctor to see inside your pelvis and perform the procedure. This pain usually goes away in a day or two. It usually takes from 1 to 4 weeks to recover from laparoscopic hysterectomy. Remember, though, that recovery time varies from woman to woman. Here's what you can do to speed your recovery following surgery.  Home care   Continue the coughing and deep breathing exercises that you learned in the hospital.  Take your medications exactly as directed by your doctor.  Avoid constipation.  Eat fruits, vegetables, and whole grains.  Drink 6 to 8 glasses of water a day, unless told to do otherwise.  Use a laxative or a mild stool softener if your doctor says it's OK.  Shower as usual. Wash your incisions with mild soap and water. Pat dry.  Don't use oils, powders, or lotions on your incisions.  Don't put anything in your vagina for 4-6 weeks. Don't use tampons or douches. Don't have sex.  If you had both ovaries  removed, report hot flashes, mood swings, and irritability to your doctor. There may be medications that can help you.  Activity  Ask your doctor when you can start driving again. It's usually okay to drive as soon as you are free of pain and able to move comfortably from side to side. Don't drive while you are still taking opioid pain medications.  Ask others to help with chores and errands while you recover.  Dont lift anything heavier than 10 pounds for 6 weeks to avoid straining your incisions.  Dont vacuum or do other strenuous activities until the doctor says it's OK.  Walk as often as you feel able.  Don't drive for a few days after the surgery. You may drive as soon as you are able to move comfortably from side to side and when you are no longer taking narcotics.  Climb stairs slowly and pause after every few steps.  Follow-up care  Make a follow-up appointment as directed by our staff.     When to call your doctor  Call your doctor right away if you have any of the following:  Fever above 100.4°F (38°C) or chills  Bright red vaginal bleeding or vaginal bleeding that soaks more than one sanitary pad per hour  A foul smelling discharge from the vagina  Trouble urinating or burning when you urinate  Severe pain or bloating in your abdomen  Redness, swelling, or drainage at your incision sites  Shortness of breath or chest pain  Nausea and vomiting

## 2023-03-23 NOTE — OP NOTE
[facility]  Surgery Department  Operative Note    SUMMARY     Date of Procedure: 3/23/2023     Procedure: Procedure(s) (LRB):  XI ROBOTIC HYSTERECTOMY,WITH SALPINGECTOMY (N/A)     Surgeon(s) and Role:     * GRICEL Mock MD - Primary    Assisting Surgeon:        Cathy VAUGHN assistance deemed necessary by MD     Pre-Operative Diagnosis: Menorrhagia with regular cycle [N92.0]    Post-Operative Diagnosis: Post-Op Diagnosis Codes:     * Menorrhagia with regular cycle [N92.0]    Anesthesia: General    Operative Findings (including complications, if any):     Description of Technical Procedures:     Procedure in Detail/Findings:  Operative findings    Upper abdomen normal liver , no adhesions, no bowel abnormalities    Pelvis:  Uterus normal size and shape,  Ovaries and tubes normal ,                  No Adhesions, No endometriosis                 Ureters in there normal retroperitoneal position through the pelvis                 Bladder Flap with no adhesions         The patient was taken to the surgical suite.  General              intubation  anesthesia is induced.                                                                   The patient was placed in supine lithotomy  position  with low Jose Alfredo stirrups                                                                   The vagina was prepped with Betadine.  A Catalan     catheter, SHANNAN manipulator with  KOH colpotomizer placed.    Abdomen prepped   with  chlorhexidine solution                                                                Sterile drapes applied     Time-out taken with all members of the operating team.       Veress  needle placed through the umbilicus, abdomen elevated with towel clamps.     CO2 gas insufflation to 15 mmHg.          Trochar placement as follows:       8 mm bladeless trochars:      4 trochars, transverse line 2 cm above the umbilicus, 8 cm apart with direct visualization after the initial placement                      The da Brock robotic XI   system docked from the right side of the patient.     .    Instrument placement as follows   Camera:  Right midline port   Unipolar Scissors:  Right lateral port   [place] Bipolar Forceps:  Left midline port   Fenestrated Forceps:  Left lateral port    The surgeon moved to the robotic console and the first assistant remained at the bedside for uterine manipulation and utilization of instruments placed through the left lateral trochar                                                                              Prior to starting the hysterectomy, the anatomical landmarks of the pelvis and the pelvic course of the ureters right and left are identified.   The [place] bipolar is used for cautery and the scissors used for transection of the pedicles .  The  round ligaments are  Transected after cautery on each side and dissection of the broad ligament and development of the bladder flap is accomplished  with the unipolar scissors.   The mesosalpinx of the right and left fallopian tubes are cauterized with the [place] forceps and incised with scissors to the level of the uterus     The anterior and posterior vagina entered over the colpotomizer with unipolar scissors and the colpotomy is extended over the colpotomizer anterior and posterior to the ascending uterine bilateral uterine arteries.    The Uterine vessels are then cauterized with the Maryland instrument and transected.     With the uterus free of blood supply and vaginal connection, it is removed with the cervix and bilateral tubes through the vagina.    Bleeding of the uterine pedicle or vaginal cuff is cauterized.   Closure of the vaginal cuff done with Zero Vicryl suture at each angle followed by 2 layer running closure with 2 zero V Lock suture        All needles used in the abdomen removed through the vagina or  the 8 mm trocar on the left are  accounted for.       Good hemostasis was  Appreciated in the  operative field, any bleeding noted cauterized with bipolar instrument     Any excess fluid was suctioned free.    After the instruments were removed, the da Brock robotic system was undocked        All trochar  sites closed with 4-0 Monocryl subcuticular and Dermabond.       The specimen removed from the vagina.  Vagina examined for any tears.       The  patient taken to Recovery in stable condition with cao catheter in place               Significant Surgical Tasks Conducted by the Assistant(s), if Applicable: Bedside assistance and trochar site closure     Estimated Blood Loss (EBL): 30cc           Implants: * No implants in log *    Specimens:   Specimen (24h ago, onward)       Start     Ordered    03/23/23 1042  Specimen to Pathology, Surgery Gynecology and Obstetrics  Once        Comments: Pre-op Diagnosis: Menorrhagia with regular cycle [N92.0]Procedure(s):XI ROBOTIC HYSTERECTOMY,WITH SALPINGECTOMY Number of specimens: 1Name of specimens: uterus, cervix, bilateral fallopian tubes -perm     References:    Click here for ordering Quick Tip   Question Answer Comment   Procedure Type: Gynecology and Obstetrics    Which provider would you like to cc? GRICEL HARMON    Release to patient Immediate        03/23/23 1041                            Condition: Good    Disposition: PACU - hemodynamically stable.    Attestation: I was present and scrubbed for the entire procedure.

## 2023-03-23 NOTE — DISCHARGE SUMMARY
[facility]  Obstetrics & Gynecology  Discharge Summary    Patient Name: Haydee Pennington  MRN: [id number]  Admission Date: 3/23/2023  Hospital Length of Stay: 0 days  Discharge Date and Time:  03/23/2023 11:00 AM  Attending Physician: GRICEL Mock MD   Discharging Provider: HANANE Mock MD  Primary Care Provider: Gumaro Del Real MD    HPI:  Heavy menses with normal ultrasound and benign embx for options discussion   Reviewed hormonal suppression, Ablation with bilateral tubal ligation and robotic hysterectomy       Hospital Course:  No notes on file    Goals of Care Treatment Preferences:  Code Status: Full Code      Procedure(s) (LRB):  XI ROBOTIC HYSTERECTOMY,WITH SALPINGECTOMY (N/A)         Significant Diagnostic Studies: none      Pending Diagnostic Studies:     None        Final Active Diagnoses:    Diagnosis Date Noted POA    Status post laparoscopic hysterectomy [Z90.710] 03/23/2023 No    HTN (hypertension) [I10] 05/14/2020 Yes      Problems Resolved During this Admission:    Diagnosis Date Noted Date Resolved POA    Menorrhagia with regular cycle [N92.0] 03/10/2023 03/23/2023 Yes        Discharged Condition: good    Disposition:     Follow Up:   Follow-up Information     HANANE Mock MD Follow up in 4 week(s).    Specialties: Obstetrics, Obstetrics and Gynecology  Why: For post operative follow up   Contact information:  [address]  [place] [id number] [phone]                       Patient Instructions:      Diet Adult Regular     No driving until:   Order Comments: Ok to drive when no pain for 48 hours     Lifting restrictions     Pelvic Rest   Order Comments: No vaginal intercourse for 12 weeks from the day of surgery     No dressing needed     Notify your health care provider if you experience any of the following:  temperature >100.4     Notify your health care provider if you experience any of the following:  persistent nausea and vomiting or  diarrhea     Notify your health care provider if you experience any of the following:  severe uncontrolled pain     Notify your health care provider if you experience any of the following:  redness, tenderness, or signs of infection (pain, swelling, redness, odor or green/yellow discharge around incision site)     Notify your health care provider if you experience any of the following:  difficulty breathing or increased cough     Notify your health care provider if you experience any of the following:  severe persistent headache     Notify your health care provider if you experience any of the following:  worsening rash     Notify your health care provider if you experience any of the following:  persistent dizziness, light-headedness, or visual disturbances     Notify your health care provider if you experience any of the following:  increased confusion or weakness     Activity as tolerated     Medications:  Reconciled Home Medications:      Medication List      START taking these medications    HYDROcodone-acetaminophen 5-325 mg per tablet  Commonly known as: NORCO  Take 1 tablet by mouth every 6 (six) hours as needed for Pain.        CONTINUE taking these medications    amLODIPine 10 MG tablet  Commonly known as: NORVASC  Take 1 tablet (10 mg total) by mouth once daily.     clonazePAM 0.5 MG tablet  Commonly known as: KlonoPIN  Take 1 tablet (0.5 mg total) by mouth 2 (two) times daily as needed for Anxiety.     cyclobenzaprine 10 MG tablet  Commonly known as: FLEXERIL  Take 1 tablet (10 mg total) by mouth 3 (three) times daily as needed.     diclofenac sodium 1 % Gel  Commonly known as: VOLTAREN  Apply 2 g topically 4 (four) times daily.     EScitalopram oxalate 20 MG tablet  Commonly known as: LEXAPRO  Take 1 tablet (20 mg total) by mouth once daily.     gabapentin 300 MG capsule  Commonly known as: NEURONTIN  Take 1 capsule (300 mg total) by mouth 3 (three) times daily.     hydroCHLOROthiazide 25 MG  tablet  Commonly known as: HYDRODIURIL  Take 25 mg by mouth once daily.     levothyroxine 137 MCG Tab tablet  Commonly known as: SYNTHROID  Take 1 tablet (137 mcg total) by mouth before breakfast.     OXcarbazepine 150 MG Tab  Commonly known as: TRILEPTAL  Take 1 tablet (150 mg total) by mouth 2 (two) times daily.            HANANE Mock MD  Obstetrics & Gynecology  [facility]

## 2023-03-23 NOTE — PLAN OF CARE
Received pt from PACU to extended care in room 5. Complaint of pain of 5/10. Assisted to bathroom to try to urinate. Unsuccessful. Significant other is not in building. Called SO and he went to the store. Pain med given per MAR. Call light within reach, will continue to monitor.

## 2023-03-23 NOTE — ANESTHESIA POSTPROCEDURE EVALUATION
Anesthesia Post Evaluation    Patient: Haydee Pennington    Procedure(s) Performed: Procedure(s) (LRB):  XI ROBOTIC HYSTERECTOMY,WITH SALPINGECTOMY (N/A)    Final Anesthesia Type: general      Patient location during evaluation: PACU  Patient participation: Yes- Able to Participate  Level of consciousness: awake and alert and oriented  Post-procedure vital signs: reviewed and stable  Pain management: adequate  Airway patency: patent    PONV status at discharge: No PONV  Anesthetic complications: no      Cardiovascular status: blood pressure returned to baseline, stable and hemodynamically stable  Respiratory status: unassisted  Hydration status: euvolemic  Follow-up not needed.          Vitals Value Taken Time   /67 03/23/23 1229   Temp 36.4 °C (97.6 °F) 03/23/23 1229   Pulse 63 03/23/23 1229   Resp 16 03/23/23 1229   SpO2 93 % 03/23/23 1229         Event Time   Out of Recovery 12:20:00         Pain/Patience Score: Pain Rating Prior to Med Admin: 4 (3/23/2023 12:15 PM)  Patience Score: 10 (3/23/2023 12:18 PM)

## 2023-03-24 ENCOUNTER — OFFICE VISIT (OUTPATIENT)
Dept: OBSTETRICS AND GYNECOLOGY | Facility: CLINIC | Age: 38
End: 2023-03-24
Payer: COMMERCIAL

## 2023-03-24 ENCOUNTER — TELEPHONE (OUTPATIENT)
Dept: OBSTETRICS AND GYNECOLOGY | Facility: CLINIC | Age: 38
End: 2023-03-24
Payer: COMMERCIAL

## 2023-03-24 VITALS
WEIGHT: 263 LBS | BODY MASS INDEX: 42.27 KG/M2 | SYSTOLIC BLOOD PRESSURE: 130 MMHG | DIASTOLIC BLOOD PRESSURE: 100 MMHG | HEIGHT: 66 IN

## 2023-03-24 DIAGNOSIS — Z90.710 STATUS POST LAPAROSCOPIC HYSTERECTOMY: ICD-10-CM

## 2023-03-24 DIAGNOSIS — R33.8 POSTOPERATIVE URINARY RETENTION: Primary | ICD-10-CM

## 2023-03-24 DIAGNOSIS — N99.89 POSTOPERATIVE URINARY RETENTION: Primary | ICD-10-CM

## 2023-03-24 PROCEDURE — 1159F MED LIST DOCD IN RCRD: CPT | Mod: CPTII,S$GLB,, | Performed by: PHYSICIAN ASSISTANT

## 2023-03-24 PROCEDURE — 3080F PR MOST RECENT DIASTOLIC BLOOD PRESSURE >= 90 MM HG: ICD-10-PCS | Mod: CPTII,S$GLB,, | Performed by: PHYSICIAN ASSISTANT

## 2023-03-24 PROCEDURE — 3080F DIAST BP >= 90 MM HG: CPT | Mod: CPTII,S$GLB,, | Performed by: PHYSICIAN ASSISTANT

## 2023-03-24 PROCEDURE — 3008F PR BODY MASS INDEX (BMI) DOCUMENTED: ICD-10-PCS | Mod: CPTII,S$GLB,, | Performed by: PHYSICIAN ASSISTANT

## 2023-03-24 PROCEDURE — 3075F PR MOST RECENT SYSTOLIC BLOOD PRESS GE 130-139MM HG: ICD-10-PCS | Mod: CPTII,S$GLB,, | Performed by: PHYSICIAN ASSISTANT

## 2023-03-24 PROCEDURE — 3075F SYST BP GE 130 - 139MM HG: CPT | Mod: CPTII,S$GLB,, | Performed by: PHYSICIAN ASSISTANT

## 2023-03-24 PROCEDURE — 1159F PR MEDICATION LIST DOCUMENTED IN MEDICAL RECORD: ICD-10-PCS | Mod: CPTII,S$GLB,, | Performed by: PHYSICIAN ASSISTANT

## 2023-03-24 PROCEDURE — 99024 POSTOP FOLLOW-UP VISIT: CPT | Mod: S$GLB,,, | Performed by: PHYSICIAN ASSISTANT

## 2023-03-24 PROCEDURE — 3008F BODY MASS INDEX DOCD: CPT | Mod: CPTII,S$GLB,, | Performed by: PHYSICIAN ASSISTANT

## 2023-03-24 PROCEDURE — 99999 PR PBB SHADOW E&M-EST. PATIENT-LVL III: ICD-10-PCS | Mod: PBBFAC,,, | Performed by: PHYSICIAN ASSISTANT

## 2023-03-24 PROCEDURE — 99999 PR PBB SHADOW E&M-EST. PATIENT-LVL III: CPT | Mod: PBBFAC,,, | Performed by: PHYSICIAN ASSISTANT

## 2023-03-24 PROCEDURE — 99024 PR POST-OP FOLLOW-UP VISIT: ICD-10-PCS | Mod: S$GLB,,, | Performed by: PHYSICIAN ASSISTANT

## 2023-03-24 NOTE — TELEPHONE ENCOUNTER
----- Message from Juwan Stanton sent at 3/24/2023  8:11 AM CDT -----  Pt can not empty her bladder and has a burning sensation when she urinates     She had  her procedure yesterday and was told to call in if anything happened     Pls call her back at [phone]   Thank you

## 2023-03-24 NOTE — PROGRESS NOTES
"OBGYN Post-op Clinic  History and Physical    Patient Name: Haydee Pennington  YOB: 1985 (38 y.o.)  MRN: [id number]  Today's Date: 2023    Referring Md:   No referring provider defined for this encounter.    SUBJECTIVE:     Chief Complaint: Post-op     History of Present Illness:  Haydee Pennington is a 38 y.o. female  who presents to the clinic today for post-op evaluation. S/p RALH on 3/23. Reports that she was unable to void with some difficulty after surgery and was able to be discharged home. Overnight she reports attempting to urinate 10+ times within only minimal urine output. She describes urinating as a "trickle" and takes a while to occur. States that she has some pelvic discomfort but denies hematuria, dysuria, fevers, or other symptoms.       Review of patient's allergies indicates:   Allergen Reactions    Sulfa (sulfonamide antibiotics) Hives       Past Medical History:   Diagnosis Date    Hypertension     Thyroid disease      Past Surgical History:   Procedure Laterality Date    CERVICAL BIOPSY  W/ LOOP ELECTRODE EXCISION      INJECTION OF ANESTHETIC AGENT INTO SACROILIAC JOINT Right 2022    Procedure: right Sacroiliac Joint Injection with RN IV sedation;  Surgeon: Rey Monsalve MD;  Location: [facility];  Service: Pain Management;  Laterality: Right;    INJECTION OF JOINT Right 2022    Procedure: right GT bursa injection with RN IV sedation;  Surgeon: Rey Monsalve MD;  Location: [facility]IN [facility];  Service: Pain Management;  Laterality: Right;    REPAIR, PERFORATION, NASAL SEPTUM      sedated for nasal septal manipulation     Family History   Problem Relation Age of Onset    Diabetes Mother     Cancer Mother         Lung and Breast    Hypertension Father     Dementia Father     Transient ischemic attack Father     Heart failure Father     Stroke Father      Social History     Tobacco Use    Smoking status: Every Day     Packs/day: 0.50     Years: 24.00    " " Pack years: 12.00     Types: Cigarettes     Start date: 1996    Smokeless tobacco: Never   Substance Use Topics    Alcohol use: No    Drug use: Yes     Types: Marijuana     Comment: AWEEK AGO        Review of Systems:  Review of Systems   Constitutional:  Negative for chills and fever.   HENT:  Negative for congestion and sore throat.    Respiratory:  Negative for cough and shortness of breath.    Cardiovascular:  Negative for chest pain and leg swelling.   Gastrointestinal:  Negative for diarrhea, nausea and vomiting.   Genitourinary:         Decreased UOP   Musculoskeletal:  Negative for myalgias.   Skin:  Negative for rash.   Neurological:  Negative for weakness and headaches.     OBJECTIVE:     Vital Signs (Most Recent)  BP (!) 130/100   Ht 5' 6" (1.676 m)   Wt 119.3 kg (263 lb 0.1 oz)   LMP 02/15/2023 (Exact Date)   BMI 42.45 kg/m²     Physical Exam  Vitals reviewed.   Constitutional:       General: She is not in acute distress.     Appearance: Normal appearance. She is not ill-appearing or toxic-appearing.   HENT:      Head: Normocephalic and atraumatic.   Eyes:      Extraocular Movements: Extraocular movements intact.      Conjunctiva/sclera: Conjunctivae normal.   Pulmonary:      Effort: Pulmonary effort is normal. No respiratory distress.   Abdominal:      Palpations: Abdomen is soft.      Comments: Healing lap incisions present on abdomen   Musculoskeletal:         General: Normal range of motion.      Cervical back: Normal range of motion.   Skin:     General: Skin is warm and dry.   Neurological:      General: No focal deficit present.      Mental Status: She is alert and oriented to person, place, and time.   Psychiatric:         Mood and Affect: Mood normal.         Behavior: Behavior normal.         Thought Content: Thought content normal.         Judgment: Judgment normal.           ASSESSMENT/PLAN:     Haydee Pennington is a 38 y.o. female was seen today for post-op evaluation. Catalan placed " in clinic due to patient's urinary retention. 500ml of urine immediately filled catheter. Changed to leg bag for patient to go home with. Scheduled patient a clinic appointment for a voiding trial on Monday.   I reviewed all restrictions & limitations with the patient and instructed her to call clinic with any concerning issues or symptoms. Instructed the importance of showering daily, no tub baths, using an antibacterial soap. Patient verbalized understanding.     Haydee was seen today for post-op evaluation.    Diagnoses and all orders for this visit:    Postoperative urinary retention       - Catalan with leg bag in place       - Voiding trial Monday     Status post laparoscopic hysterectomy       - Showers only and no heavy lifting/strenuous activity for 6 weeks       - Pelvic rest (no tampon use, douching, or intercourse) for 12 weeks       - Continue taking pain medication PRN       - Keep f/u appointment with surgeon in a few weeks        Yaquelin Barraza [facility], PA-C  OBGYN - Surgery  [facility]

## 2023-03-24 NOTE — TELEPHONE ENCOUNTER
Pt complained of very little and not all urine coming out. And feels like uti times 10. Every ten to 15 to wake up and cant get it all out.  Spoke with Yaquelin and she told me to have pt come in this morning so she could address I. Pt verbalized understanding, no further questions.

## 2023-03-24 NOTE — TELEPHONE ENCOUNTER
----- Message from Kuldip Jones sent at 3/24/2023  8:26 AM CDT -----  Contact: patient  Type:  Patient Returning Call    Who Called:Haydee Pennington   Who Left Message for Patient: Naila   Does the patient know what this is regarding?: issue with urinating   Would the patient rather a call back or a response via readeoner? Call back   Best Call Back Number: [phone]  Additional Information:

## 2023-03-27 ENCOUNTER — PATIENT MESSAGE (OUTPATIENT)
Dept: OBSTETRICS AND GYNECOLOGY | Facility: CLINIC | Age: 38
End: 2023-03-27
Payer: COMMERCIAL

## 2023-03-28 ENCOUNTER — TELEPHONE (OUTPATIENT)
Dept: PAIN MEDICINE | Facility: CLINIC | Age: 38
End: 2023-03-28
Payer: COMMERCIAL

## 2023-03-28 NOTE — PROGRESS NOTES
Established Patient Chronic Pain Note     Referring Physician: No ref. provider found    PCP: Gumaro Del Real MD    Chief Complaint:   Back and leg pain    The patient location is: [place]  The chief complaint leading to consultation is: chronic pain     Visit type: audiovisual    Face to Face time with patient: 10-15 minutes  20 minutes of total time spent on the encounter, which includes face to face time and non-face to face time preparing to see the patient (eg, review of tests), Obtaining and/or reviewing separately obtained history, Documenting clinical information in the electronic or other health record, Independently interpreting results (not separately reported) and communicating results to the patient/family/caregiver, or Care coordination (not separately reported).     Each patient to whom he or she provides medical services by telemedicine is:  (1) informed of the relationship between the physician and patient and the respective role of any other health care provider with respect to management of the patient; and (2) notified that he or she may decline to receive medical services by telemedicine and may withdraw from such care at any time.        SUBJECTIVE:  Interval History (3/28/2023):  Haydee Pennington presents today for follow-up visit.  Patient was last seen on 1/26/2023. Recently underwent hysterectomy last week on 03/23/2023 with Dr. Mock. Patient reports pain as 2/10 today. Previous plan was to discontinue Topamax due to side effects, restart Gabapentin and move forward with hysterectomy per OBGYN.  She reports 2 days after the hysterectomy she felt fine, she thinks this was due in part to the anesthesia.  She reports she continues to have pain in her low right lumbosacral region with radiation into her lateral hip.  Pain is exacerbated by moving from sitting to standing and this movement also causes radiation into her right lower extremity L5 dermatomal distribution down to her right shin  in the bottom of her foot.  She endorses tingling in the bottom of her foot and restless sensation in her right leg while sleeping.  A few weeks ago she saw her PCP who prescribed her Flexeril, steroids, Norco, and advised her to increase her gabapentin.  She reports this had no effect on her symptoms and they remain constant and persistent.  She also reports that the last couple of days she is had two near falls due to the right ankle giving out on her.      Interval History (1/26/2023):  Haydee Pennington presents today via telemed for follow-up visit.  Patient was last seen on 12/15/2022. At that visit, the plan was to d/c Gabapentin and begin Topamax.  Patient reports pain as 7/10 today. Patient previously cited that her pain is worse around the time of her cycle and has significant menorrhagia. Saw SERGION yesterday, planning for robotic hysterectomy with salpingectomy per Dr. Mock. Possible surgery date of March 10th. She reports that since discontinuing the Gabapentin and starting the Topamax, her emotions have been all over the place. She denies any suicidal or homicidal ideation, but she reports that she believes things were more stable with the Gabapentin.       Interval History (12/15/2022):  Haydee Pennington presents today via telemed for follow-up visit.  Patient was last seen on 9/20/2022. Last injection right SIJ + right GT bursa injection on 06/03/2022. She is not interested in any repeat injections as she states the last injection worsened her pain. Patient reports pain as 6/10 today. Pain in her right shoulder with tingling in  her right hand and pain along her lumbar spine  rosa  band like distribution with radiation into her left leg described as a tightness and burning. She has been completing Kirk exercises for core strengthening and lumbar stabilization and additional core strengthening exercises and  hip flexor and IT band stretches without improvement in symptoms. She does notice  "that her symptoms are worse around her cycle and she has an appointment with her gynecologist next week.       Interval History (9/20/2022):  Haydee Pennington presents today for follow-up visit.  Patient was last seen on 7/12/2022. Patient reports pain as "0/10 today. Last injection right SIJ + right GT bursa injection on 06/03/2022. Patient reports pain flare that began a couple of weeks ago. She reports continued pain over bilateral SIJ and GT bursa. Patient was prescribed a medrol dose pack on 09/09/2022 to help with pain. She reports medrol dose pack helped somewhat with the pain. She is not interested in any repeat injections as she states the last injection worsened her pain. Interested in continuing physician directed exercises and stretches. She also reports significant weight gain with Seroquel, has appointment scheduled with Dr. Kaplan to discuss medication side effects.    Interval history 07/12/2022  Patient presents status post right-sided sacroiliac joint and greater trochanteric bursa injection 06/03/2022.  Patient reports at least 45-50% pain relief overlying right-sided sacroiliac joint and greater trochanteric bursa territories.  Patient continues to report pain down the lateral aspect of the right lower extremity in L4 distribution to the foot.  Patient reports improvement in symptoms with continuation of physical therapy exercises.  She does report financial difficulty with physical therapy and has requesting physician directed exercises at home.  Patient has continued gabapentin and has reached a titration of 300 mg 3 times daily.  She does report significant improvement in her neuropathic pain with this medication.  She denies any side effects from this medication.    HPI 05/17/2022  Haydee Pennington is a 38 y.o. female with past medical history significant for anxiety, depression, hypertension, obesity, hypothyroidism, nicotine who presents to the clinic for the evaluation of lower back " and right leg pain.  Patient reports pain began several years prior without inciting accident injury or trauma.  Today patient reports pain is constant which is rated a 6/10.  Pain is described as grinding in nature.  Patient reports pain begins in a bandlike distribution in her lower back and radiates down the lateral aspect of the right lower extremity to the plantar aspect of the foot in L4-S1 distribution.  Pain is exacerbated with prolonged standing, walking, bending, lifting and even with prolonged sitting.  Of note patient works from home for a [employer] center and reports pain is exacerbated with prolonged sitting at the end of the day.  Pain is also exacerbated associated with her menstrual cycle.  Pain has been improved in the past with gabapentin, which patient reports she was prescribed for anxiety.     Patient denies night fever/night sweats, urinary incontinence, bowel incontinence, significant weight loss, significant motor weakness and loss of sensations.    Pain Disability Index Review:     Last 3 PDI Scores 5/17/2022   Pain Disability Index (PDI) 41       Non-Pharmacologic Treatments:  Physical Therapy/Home Exercise: no  Ice/Heat:no  TENS: no  Acupuncture: no  Massage: no  Chiropractic: no    Other: no      Pain Medications:  - Adjuvant Medications: Clonazepam (Klonopin) and Lexapro (Escitalopram)    Pain Procedures:   -06/03/2022:  Right-sided greater trochanteric bursa and sacroiliac joint injection    Past Medical History:   Diagnosis Date    Hypertension     Thyroid disease      Past Surgical History:   Procedure Laterality Date    CERVICAL BIOPSY  W/ LOOP ELECTRODE EXCISION  2005    INJECTION OF ANESTHETIC AGENT INTO SACROILIAC JOINT Right 06/03/2022    Procedure: right Sacroiliac Joint Injection with RN IV sedation;  Surgeon: Rey Monsalve MD;  Location: [facility];  Service: Pain Management;  Laterality: Right;    INJECTION OF JOINT Right 06/03/2022    Procedure: right GT bursa injection  with RN IV sedation;  Surgeon: Rey Monsalve MD;  [facility];  Service: Pain Management;  Laterality: Right;    REPAIR, PERFORATION, NASAL SEPTUM      sedated for nasal septal manipulation    XI ROBOTIC HYSTERECTOMY, WITH SALPINGECTOMY N/A 3/23/2023    Procedure: XI ROBOTIC HYSTERECTOMY,WITH SALPINGECTOMY;  Surgeon: GRICEL Mokc MD;  Location: [facility] OR;  Service: OB/GYN;  Laterality: N/A;     Review of patient's allergies indicates:   Allergen Reactions    Sulfa (sulfonamide antibiotics) Hives       Current Outpatient Medications   Medication Sig    amLODIPine (NORVASC) 10 MG tablet Take 1 tablet (10 mg total) by mouth once daily.    clonazePAM (KLONOPIN) 0.5 MG tablet Take 1 tablet (0.5 mg total) by mouth 2 (two) times daily as needed for Anxiety.    diclofenac sodium (VOLTAREN) 1 % Gel Apply 2 g topically 4 (four) times daily.    EScitalopram oxalate (LEXAPRO) 20 MG tablet Take 1 tablet (20 mg total) by mouth once daily.    gabapentin (NEURONTIN) 300 MG capsule Take 1 capsule (300 mg total) by mouth 3 (three) times daily.    hydroCHLOROthiazide (HYDRODIURIL) 25 MG tablet Take 25 mg by mouth once daily.    levothyroxine (SYNTHROID) 137 MCG Tab tablet Take 1 tablet (137 mcg total) by mouth before breakfast.    OXcarbazepine (TRILEPTAL) 150 MG Tab Take 1 tablet (150 mg total) by mouth 2 (two) times daily.    HYDROcodone-acetaminophen (NORCO) 5-325 mg per tablet Take 1 tablet by mouth every 6 (six) hours as needed for Pain. (Patient not taking: Reported on 3/24/2023)     No current facility-administered medications for this visit.       Review of Systems     GENERAL:  No weight loss, malaise or fevers.  HEENT:   No recent changes in vision or hearing  NECK:  Negative for lumps, no difficulty with swallowing.  RESPIRATORY:  Negative for cough, wheezing or shortness of breath, patient denies any recent URI.  CARDIOVASCULAR:  Negative for chest pain, leg swelling or palpitations.  GI:  Negative for  "abdominal discomfort, blood in stools or black stools or change in bowel habits.  MUSCULOSKELETAL:  See HPI.  SKIN:  Negative for lesions, rash, and itching.  PSYCH:  No mood disorder or recent psychosocial stressors.   HEMATOLOGY/LYMPHOLOGY:  Negative for prolonged bleeding, bruising easily or swollen nodes.    NEURO:   No history of headaches, syncope, paralysis, seizures or tremors.  All other reviewed and negative other than HPI.    OBJECTIVE:    BP (!) 143/94   Pulse 85   Resp 17   Ht 5' 6" (1.676 m)   Wt 114.5 kg (252 lb 6.8 oz)   LMP 02/15/2023 (Exact Date)   BMI 40.74 kg/m²       Physical Exam  Last clinic visit:  GENERAL: Well appearing, in no acute distress, alert and oriented x3.  PSYCH:  Mood and affect appropriate.  SKIN: Skin color, texture, turgor normal, no rashes or lesions.  HEAD/FACE:  Normocephalic, atraumatic. Cranial nerves grossly intact.    CV: RRR with palpation of the radial artery.  PULM: No evidence of respiratory difficulty, symmetric chest rise.  GI:  Soft and non-tender.    BACK: Straight leg raising in the sitting and supine positions is negative to radicular pain. No pain to palpation over the facet joints of the lumbar spine or spinous processes. Normal range of motion without pain reproduction.  EXTREMITIES: Peripheral joint ROM is full and pain free without obvious instability or laxity in all four extremities. No deformities, edema, or skin discoloration. Good capillary refill.  MUSCULOSKELETAL: Able to stand on heels & toes.    hip, and knee provocative maneuvers are negative.   pain with palpation over the sacroiliac joints on R.  FABERs and Gaenslen test is positive on right.  Facet loading test is negative bilaterally.   Bilateral upper and lower extremity strength is normal and symmetric.  No atrophy or tone abnormalities are noted..  RIGHT Lower extremity: Hip flexion 5/5, Hip Abduction 5/5, Hip Adduction 5/5, Knee extension 5/5, Knee flexion 5/5, Ankle " dorsiflexion5/5, Extensor hallucis longus 5/5, Ankle plantarflexion 5/5  LEFT Lower extremity:  Hip flexion 5/5, Hip Abduction 5/5,Hip Adduction 5/5, Knee extension 5/5, Knee flexion 5/5, Ankle dorsiflexion 5/5, Extensor hallucis longus 5/5, Ankle plantarflexion 5/5  -Normal testing knee (patellar) jerk and ankle (achilles) jerk    NEURO: Bilateral upper and lower extremity coordination and muscle stretch reflexes are physiologic and symmetric. No loss of sensation is noted.  GAIT: normal.    Imaging:   MRI lumbar spine 05/17/2022  FINDINGS:  Images are mildly degraded by patient motion artifact.     Alignment: Within normal limits.     Vertebrae: Tiny inferior T11 Schmorl's node.  Lumbar vertebral body heights are maintained.  No evidence of acute fracture.  Marrow signal is within normal limits.     Discs: L4-L5 and L5-S1 disc desiccation.  Disc heights are maintained.     Cord: Within normal limits.  Conus terminates at L1-L2.     Degenerative findings:     T12-L1: No significant disc bulge, spinal canal stenosis or neural foraminal stenosis.     L1-L2: No significant disc bulge, spinal canal stenosis or neural foraminal stenosis.     L2-L3: No significant disc bulge, spinal canal stenosis or neural foraminal stenosis.     L3-L4: No significant disc bulge.  Mild facet arthropathy.  No significant spinal canal stenosis or neural foraminal stenosis.     L4-L5: Disc desiccation.  Minor broad-based disc bulge.  Mild bilateral facet arthropathy.  Facet arthropathy on the right closely approximates the exiting right L4 nerve root.  No significant spinal canal or neural foraminal stenosis.     L5-S1: Disc desiccation.  Asymmetric right mild broad-based disc bulge.  Minor bilateral facet arthropathy.  No significant spinal canal or neural foraminal stenosis.    08/22/18    X-Ray Lumbar Spine Complete 5 View    FINDINGS:  Vertebral body heights and alignment are within normal limits.  Intervertebral disk spaces are well  preserved. Posterior elements appear grossly intact.  No pars defects visualized.  No acute fractures or subluxations are demonstrated.  The remaining visualized osseous and soft tissue structures demonstrate no appreciable abnormality.      ASSESSMENT: 38 y.o. year old female with lower back and leg pain, consistent with     1. Lumbar radiculopathy  EMG W/ ULTRASOUND AND NERVE CONDUCTION TEST 2 Extremities                  PLAN:   - Interventions:  We will further evaluate radicular symptoms with bilateral lower extremity EMG  We discussed weight loss and the barriers that she may have that are preventing her from losing weight.  These may include medications including gabapentin Lexapro and Trileptal that may cause weight retention or weight gain.  We also discussed her hypothyroidism including her most recent TSH that was elevated. Her Synthroid was recently adjusted. We also discussed dietary changes including avoid fasting and instead eating every 2-3 hours a small snack/meal to prevent overeating and to promote stable blood sugars and metabolism    Patient has sustained 50% relief following right-sided sacroiliac joint and greater trochanteric bursa injection.  We discussed repeating this injection, but patient reports she is not interested in repeating injections    -we have discussed considering a right-sided L4/5 and L5/S1 facet injection to see if this would help with radicular symptoms.  Based on MRI findings.       - Anticoagulation use: no no anticoagulation       report:  Reviewed and consistent with medication use as prescribed.    - Medications:  --  Switch back to Gabapentin and discontinue Topamax    - Therapy:   We discussed continue physical therapy to help manage the patient/s painful condition. The patient was counseled that muscle strengthening will improve the long term prognosis in regards to pain and may also help increase range of motion and mobility. They were told that one of the  goals of physical therapy is that they learn how to do the exercises so that they can do them independently at home daily upon completion. The patient's questions were answered and they were agreeable to this course.  Kirk exercises for core strengthening and lumbar stabilization recommended to the patient at previous visit, recommend additional core strengthening exercises and recommend hip flexor and IT band stretches.    We discussed that once she has recovered from her hysterectomy we can consider formal aqua therapy    - Patient would benefit from TENs unit to help increase ROM, reduce pain, strengthen, and allow return to ADLs.      -Continue follow up with OBGYN regarding hysterectomy    - Imaging: Reviewed available imaging with patient and answered any questions they had regarding study.  EMG of lower extremities ordered to further evaluate neuropathic pain and weakness in right leg    - Follow up visit: return to clinic in 6 weeks to review EMG      The above plan and management options were discussed at length with patient. Patient is in agreement with the above and verbalized understanding.    - I discussed the goals of interventional chronic pain management with the patient on today's visit. We discussed a multimodal and systematic approach to pain.  This includes diagnostic and therapeutic injections, adjuvant pharmacologic treatment, physical therapy, and at times psychiatry.  I emphasized the importance of regular exercise, core strengthening and stretching, diet and weight loss as a cornerstone of long-term pain management.    - This condition does not require this patient to take time off of work, and the primary goal of our Pain Management services is to improve the patient's functional capacity.  - Patient Questions: Answered all of the patient's questions regarding diagnoses, therapy, treatment and next steps        Samira Espinal PA-C  Interventional Pain Management  [facility]  [place]    Disclaimer:  This note was prepared using voice recognition system and is likely to have sound alike errors that may have been overlooked even after proof reading.  Please call me with any questions

## 2023-03-29 ENCOUNTER — TELEPHONE (OUTPATIENT)
Dept: PHYSICAL MEDICINE AND REHAB | Facility: CLINIC | Age: 38
End: 2023-03-29
Payer: COMMERCIAL

## 2023-03-29 ENCOUNTER — PATIENT MESSAGE (OUTPATIENT)
Dept: OBSTETRICS AND GYNECOLOGY | Facility: CLINIC | Age: 38
End: 2023-03-29
Payer: COMMERCIAL

## 2023-03-29 ENCOUNTER — OFFICE VISIT (OUTPATIENT)
Dept: PAIN MEDICINE | Facility: CLINIC | Age: 38
End: 2023-03-29
Payer: COMMERCIAL

## 2023-03-29 VITALS
SYSTOLIC BLOOD PRESSURE: 143 MMHG | BODY MASS INDEX: 40.57 KG/M2 | DIASTOLIC BLOOD PRESSURE: 94 MMHG | HEIGHT: 66 IN | RESPIRATION RATE: 17 BRPM | HEART RATE: 85 BPM | WEIGHT: 252.44 LBS

## 2023-03-29 DIAGNOSIS — M54.16 LUMBAR RADICULOPATHY: Primary | ICD-10-CM

## 2023-03-29 PROCEDURE — 1159F PR MEDICATION LIST DOCUMENTED IN MEDICAL RECORD: ICD-10-PCS | Mod: CPTII,S$GLB,, | Performed by: PHYSICIAN ASSISTANT

## 2023-03-29 PROCEDURE — 99214 PR OFFICE/OUTPT VISIT, EST, LEVL IV, 30-39 MIN: ICD-10-PCS | Mod: S$GLB,,, | Performed by: PHYSICIAN ASSISTANT

## 2023-03-29 PROCEDURE — 3077F PR MOST RECENT SYSTOLIC BLOOD PRESSURE >= 140 MM HG: ICD-10-PCS | Mod: CPTII,S$GLB,, | Performed by: PHYSICIAN ASSISTANT

## 2023-03-29 PROCEDURE — 1160F RVW MEDS BY RX/DR IN RCRD: CPT | Mod: CPTII,S$GLB,, | Performed by: PHYSICIAN ASSISTANT

## 2023-03-29 PROCEDURE — 3077F SYST BP >= 140 MM HG: CPT | Mod: CPTII,S$GLB,, | Performed by: PHYSICIAN ASSISTANT

## 2023-03-29 PROCEDURE — 3080F DIAST BP >= 90 MM HG: CPT | Mod: CPTII,S$GLB,, | Performed by: PHYSICIAN ASSISTANT

## 2023-03-29 PROCEDURE — 3008F BODY MASS INDEX DOCD: CPT | Mod: CPTII,S$GLB,, | Performed by: PHYSICIAN ASSISTANT

## 2023-03-29 PROCEDURE — 1159F MED LIST DOCD IN RCRD: CPT | Mod: CPTII,S$GLB,, | Performed by: PHYSICIAN ASSISTANT

## 2023-03-29 PROCEDURE — 99999 PR PBB SHADOW E&M-EST. PATIENT-LVL III: CPT | Mod: PBBFAC,,, | Performed by: PHYSICIAN ASSISTANT

## 2023-03-29 PROCEDURE — 3008F PR BODY MASS INDEX (BMI) DOCUMENTED: ICD-10-PCS | Mod: CPTII,S$GLB,, | Performed by: PHYSICIAN ASSISTANT

## 2023-03-29 PROCEDURE — 1160F PR REVIEW ALL MEDS BY PRESCRIBER/CLIN PHARMACIST DOCUMENTED: ICD-10-PCS | Mod: CPTII,S$GLB,, | Performed by: PHYSICIAN ASSISTANT

## 2023-03-29 PROCEDURE — 99214 OFFICE O/P EST MOD 30 MIN: CPT | Mod: S$GLB,,, | Performed by: PHYSICIAN ASSISTANT

## 2023-03-29 PROCEDURE — 99999 PR PBB SHADOW E&M-EST. PATIENT-LVL III: ICD-10-PCS | Mod: PBBFAC,,, | Performed by: PHYSICIAN ASSISTANT

## 2023-03-29 PROCEDURE — 3080F PR MOST RECENT DIASTOLIC BLOOD PRESSURE >= 90 MM HG: ICD-10-PCS | Mod: CPTII,S$GLB,, | Performed by: PHYSICIAN ASSISTANT

## 2023-03-31 LAB
FINAL PATHOLOGIC DIAGNOSIS: NORMAL
Lab: NORMAL

## 2023-04-13 ENCOUNTER — PATIENT MESSAGE (OUTPATIENT)
Dept: OBSTETRICS AND GYNECOLOGY | Facility: CLINIC | Age: 38
End: 2023-04-13
Payer: COMMERCIAL

## 2023-04-17 DIAGNOSIS — F39 MOOD DISORDER: ICD-10-CM

## 2023-04-17 RX ORDER — OXCARBAZEPINE 150 MG/1
150 TABLET, FILM COATED ORAL 2 TIMES DAILY
Qty: 60 TABLET | Refills: 1 | OUTPATIENT
Start: 2023-04-17

## 2023-04-20 ENCOUNTER — OFFICE VISIT (OUTPATIENT)
Dept: PSYCHIATRY | Facility: CLINIC | Age: 38
End: 2023-04-20
Payer: COMMERCIAL

## 2023-04-20 DIAGNOSIS — F41.1 GAD (GENERALIZED ANXIETY DISORDER): Primary | ICD-10-CM

## 2023-04-20 DIAGNOSIS — F43.10 PTSD (POST-TRAUMATIC STRESS DISORDER): ICD-10-CM

## 2023-04-20 DIAGNOSIS — F39 MOOD DISORDER: ICD-10-CM

## 2023-04-20 PROCEDURE — 99214 PR OFFICE/OUTPT VISIT, EST, LEVL IV, 30-39 MIN: ICD-10-PCS | Mod: 95,,, | Performed by: PSYCHIATRY & NEUROLOGY

## 2023-04-20 PROCEDURE — 99214 OFFICE O/P EST MOD 30 MIN: CPT | Mod: 95,,, | Performed by: PSYCHIATRY & NEUROLOGY

## 2023-04-20 PROCEDURE — 1160F RVW MEDS BY RX/DR IN RCRD: CPT | Mod: CPTII,95,, | Performed by: PSYCHIATRY & NEUROLOGY

## 2023-04-20 PROCEDURE — 1159F PR MEDICATION LIST DOCUMENTED IN MEDICAL RECORD: ICD-10-PCS | Mod: CPTII,95,, | Performed by: PSYCHIATRY & NEUROLOGY

## 2023-04-20 PROCEDURE — 1159F MED LIST DOCD IN RCRD: CPT | Mod: CPTII,95,, | Performed by: PSYCHIATRY & NEUROLOGY

## 2023-04-20 PROCEDURE — 1160F PR REVIEW ALL MEDS BY PRESCRIBER/CLIN PHARMACIST DOCUMENTED: ICD-10-PCS | Mod: CPTII,95,, | Performed by: PSYCHIATRY & NEUROLOGY

## 2023-04-20 RX ORDER — OXCARBAZEPINE 150 MG/1
150 TABLET, FILM COATED ORAL 2 TIMES DAILY
Qty: 180 TABLET | Refills: 0 | Status: SHIPPED | OUTPATIENT
Start: 2023-04-20 | End: 2023-07-07 | Stop reason: SDUPTHER

## 2023-04-20 RX ORDER — ESCITALOPRAM OXALATE 20 MG/1
20 TABLET ORAL DAILY
Qty: 90 TABLET | Refills: 0 | Status: SHIPPED | OUTPATIENT
Start: 2023-04-20 | End: 2023-07-07 | Stop reason: SDUPTHER
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
## 2023-05-02 ENCOUNTER — OFFICE VISIT (OUTPATIENT)
Dept: OBSTETRICS AND GYNECOLOGY | Facility: CLINIC | Age: 38
End: 2023-05-02
Payer: COMMERCIAL

## 2023-05-02 VITALS
SYSTOLIC BLOOD PRESSURE: 130 MMHG | BODY MASS INDEX: 41.78 KG/M2 | DIASTOLIC BLOOD PRESSURE: 86 MMHG | WEIGHT: 259.94 LBS | HEIGHT: 66 IN

## 2023-05-02 DIAGNOSIS — Z90.710 STATUS POST LAPAROSCOPIC HYSTERECTOMY: Primary | ICD-10-CM

## 2023-05-02 PROCEDURE — 1159F PR MEDICATION LIST DOCUMENTED IN MEDICAL RECORD: ICD-10-PCS | Mod: CPTII,S$GLB,, | Performed by: OBSTETRICS & GYNECOLOGY

## 2023-05-02 PROCEDURE — 3008F PR BODY MASS INDEX (BMI) DOCUMENTED: ICD-10-PCS | Mod: CPTII,S$GLB,, | Performed by: OBSTETRICS & GYNECOLOGY

## 2023-05-02 PROCEDURE — 3079F PR MOST RECENT DIASTOLIC BLOOD PRESSURE 80-89 MM HG: ICD-10-PCS | Mod: CPTII,S$GLB,, | Performed by: OBSTETRICS & GYNECOLOGY

## 2023-05-02 PROCEDURE — 1160F PR REVIEW ALL MEDS BY PRESCRIBER/CLIN PHARMACIST DOCUMENTED: ICD-10-PCS | Mod: CPTII,S$GLB,, | Performed by: OBSTETRICS & GYNECOLOGY

## 2023-05-02 PROCEDURE — 1159F MED LIST DOCD IN RCRD: CPT | Mod: CPTII,S$GLB,, | Performed by: OBSTETRICS & GYNECOLOGY

## 2023-05-02 PROCEDURE — 3075F SYST BP GE 130 - 139MM HG: CPT | Mod: CPTII,S$GLB,, | Performed by: OBSTETRICS & GYNECOLOGY

## 2023-05-02 PROCEDURE — 99999 PR PBB SHADOW E&M-EST. PATIENT-LVL III: CPT | Mod: PBBFAC,,, | Performed by: OBSTETRICS & GYNECOLOGY

## 2023-05-02 PROCEDURE — 99999 PR PBB SHADOW E&M-EST. PATIENT-LVL III: ICD-10-PCS | Mod: PBBFAC,,, | Performed by: OBSTETRICS & GYNECOLOGY

## 2023-05-02 PROCEDURE — 99024 POSTOP FOLLOW-UP VISIT: CPT | Mod: S$GLB,,, | Performed by: OBSTETRICS & GYNECOLOGY

## 2023-05-02 PROCEDURE — 3008F BODY MASS INDEX DOCD: CPT | Mod: CPTII,S$GLB,, | Performed by: OBSTETRICS & GYNECOLOGY

## 2023-05-02 PROCEDURE — 3079F DIAST BP 80-89 MM HG: CPT | Mod: CPTII,S$GLB,, | Performed by: OBSTETRICS & GYNECOLOGY

## 2023-05-02 PROCEDURE — 3075F PR MOST RECENT SYSTOLIC BLOOD PRESS GE 130-139MM HG: ICD-10-PCS | Mod: CPTII,S$GLB,, | Performed by: OBSTETRICS & GYNECOLOGY

## 2023-05-02 PROCEDURE — 1160F RVW MEDS BY RX/DR IN RCRD: CPT | Mod: CPTII,S$GLB,, | Performed by: OBSTETRICS & GYNECOLOGY

## 2023-05-02 PROCEDURE — 99024 PR POST-OP FOLLOW-UP VISIT: ICD-10-PCS | Mod: S$GLB,,, | Performed by: OBSTETRICS & GYNECOLOGY

## 2023-05-02 NOTE — PROGRESS NOTES
"  Subjective:       Patient ID: Haydee Pennington is a 38 y.o. female.    Chief Complaint:  Post-op Evaluation      History of Present Illness  HPI  Post op 4 weeks Robotic hysterectomy bilateral salpingectomy   Doing well   Path benign   No abnormal findings at surgery     Health Maintenance   Topic Date Due    TETANUS VACCINE  Never done    Hepatitis C Screening  Completed    Lipid Panel  Completed     GYN & OB History  Patient's last menstrual period was 02/15/2023 (exact date).   Date of Last Pap: 1/3/2023    OB History    Para Term  AB Living   4 2 2   2 2   SAB IAB Ectopic Multiple Live Births     2     2      # Outcome Date GA Lbr Junior/2nd Weight Sex Delivery Anes PTL Lv   4 Term 2006    M Vag-Spont   DEBBY   3 IAB 2004           2 IAB            1 Term     F Vag-Spont   DEBBY       Review of Systems  Review of Systems        Objective:   /86   Ht 5' 6" (1.676 m)   Wt 117.9 kg (259 lb 14.8 oz)   LMP 02/15/2023 (Exact Date)   BMI 41.95 kg/m²    Physical Exam   Wounds are well healed   Assessment:        1. Status post laparoscopic hysterectomy                Plan:            Haydee was seen today for post-op evaluation.    Diagnoses and all orders for this visit:    Status post laparoscopic hysterectomy        "

## 2023-05-08 ENCOUNTER — TELEPHONE (OUTPATIENT)
Dept: PAIN MEDICINE | Facility: CLINIC | Age: 38
End: 2023-05-08
Payer: COMMERCIAL

## 2023-05-09 NOTE — PROGRESS NOTES
Established Patient - TeleHealth Visit    The patient location is: [place]  The chief complaint leading to consultation is: chronic pain     Visit type: audiovisual    Face to Face time with patient: 10-15 minutes  20 minutes of total time spent on the encounter, which includes face to face time and non-face to face time preparing to see the patient (eg, review of tests), Obtaining and/or reviewing separately obtained history, Documenting clinical information in the electronic or other health record, Independently interpreting results (not separately reported) and communicating results to the patient/family/caregiver, or Care coordination (not separately reported).     Each patient to whom he or she provides medical services by telemedicine is:  (1) informed of the relationship between the physician and patient and the respective role of any other health care provider with respect to management of the patient; and (2) notified that he or she may decline to receive medical services by telemedicine and may withdraw from such care at any time.    Established Patient Chronic Pain Note       PCP: Gumaro Del Real MD    Chief Complaint:   Back and leg pain      SUBJECTIVE:  Interval History (5/9/2023):  Haydee Pennington presents today for follow-up visit.  Patient was last seen on 3/29/2023. At that visit, the plan was to trial compound cream, she reports this helps somewhat - moreso than other creams. She continues to perform modified yoga. Last follow up with Dr. Mock on 05/02/2023 4 weeks post op. Referral sent to [facility] for EMG, called but not scheduled on 3/29, we will reschedule. Recovering well from hysterectomy. She reports new pain in her left trap that causes intermittent tingling in her left hand into her pinky finger. She is using her theracane and stretching with some relief.    Interval History (3/28/2023):  Haydee Pennington presents today for follow-up visit.  Patient was last seen on 1/26/2023.  Recently underwent hysterectomy last week on 03/23/2023 with Dr. Mock. Patient reports pain as 2/10 today. Previous plan was to discontinue Topamax due to side effects, restart Gabapentin and move forward with hysterectomy per OBGYN.  She reports 2 days after the hysterectomy she felt fine, she thinks this was due in part to the anesthesia.  She reports she continues to have pain in her low right lumbosacral region with radiation into her lateral hip.  Pain is exacerbated by moving from sitting to standing and this movement also causes radiation into her right lower extremity L5 dermatomal distribution down to her right shin in the bottom of her foot.  She endorses tingling in the bottom of her foot and restless sensation in her right leg while sleeping.  A few weeks ago she saw her PCP who prescribed her Flexeril, steroids, Norco, and advised her to increase her gabapentin.  She reports this had no effect on her symptoms and they remain constant and persistent.  She also reports that the last couple of days she is had two near falls due to the right ankle giving out on her.      Interval History (1/26/2023):  Haydee Pennington presents today via telemed for follow-up visit.  Patient was last seen on 12/15/2022. At that visit, the plan was to d/c Gabapentin and begin Topamax.  Patient reports pain as 7/10 today. Patient previously cited that her pain is worse around the time of her cycle and has significant menorrhagia. Saw OBGYN yesterday, planning for robotic hysterectomy with salpingectomy per Dr. Mock. Possible surgery date of March 10th. She reports that since discontinuing the Gabapentin and starting the Topamax, her emotions have been all over the place. She denies any suicidal or homicidal ideation, but she reports that she believes things were more stable with the Gabapentin.       Interval History (12/15/2022):  Haydee Pennington presents today via telemed for follow-up visit.  Patient was  "last seen on 9/20/2022. Last injection right SIJ + right GT bursa injection on 06/03/2022. She is not interested in any repeat injections as she states the last injection worsened her pain. Patient reports pain as 6/10 today. Pain in her right shoulder with tingling in  her right hand and pain along her lumbar spine  rosa  band like distribution with radiation into her left leg described as a tightness and burning. She has been completing Kirk exercises for core strengthening and lumbar stabilization and additional core strengthening exercises and  hip flexor and IT band stretches without improvement in symptoms. She does notice that her symptoms are worse around her cycle and she has an appointment with her gynecologist next week.       Interval History (9/20/2022):  Haydee Pennington presents today for follow-up visit.  Patient was last seen on 7/12/2022. Patient reports pain as "0/10 today. Last injection right SIJ + right GT bursa injection on 06/03/2022. Patient reports pain flare that began a couple of weeks ago. She reports continued pain over bilateral SIJ and GT bursa. Patient was prescribed a medrol dose pack on 09/09/2022 to help with pain. She reports medrol dose pack helped somewhat with the pain. She is not interested in any repeat injections as she states the last injection worsened her pain. Interested in continuing physician directed exercises and stretches. She also reports significant weight gain with Seroquel, has appointment scheduled with Dr. Kaplan to discuss medication side effects.    Interval history 07/12/2022  Patient presents status post right-sided sacroiliac joint and greater trochanteric bursa injection 06/03/2022.  Patient reports at least 45-50% pain relief overlying right-sided sacroiliac joint and greater trochanteric bursa territories.  Patient continues to report pain down the lateral aspect of the right lower extremity in L4 distribution to the foot.  Patient reports " improvement in symptoms with continuation of physical therapy exercises.  She does report financial difficulty with physical therapy and has requesting physician directed exercises at home.  Patient has continued gabapentin and has reached a titration of 300 mg 3 times daily.  She does report significant improvement in her neuropathic pain with this medication.  She denies any side effects from this medication.    HPI 05/17/2022  Haydee Pennington is a 38 y.o. female with past medical history significant for anxiety, depression, hypertension, obesity, hypothyroidism, nicotine who presents to the clinic for the evaluation of lower back and right leg pain.  Patient reports pain began several years prior without inciting accident injury or trauma.  Today patient reports pain is constant which is rated a 6/10.  Pain is described as grinding in nature.  Patient reports pain begins in a bandlike distribution in her lower back and radiates down the lateral aspect of the right lower extremity to the plantar aspect of the foot in L4-S1 distribution.  Pain is exacerbated with prolonged standing, walking, bending, lifting and even with prolonged sitting.  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  Pain is also exacerbated associated with her menstrual cycle.  Pain has been improved in the past with gabapentin, which patient reports she was prescribed for anxiety.     Patient denies night fever/night sweats, urinary incontinence, bowel incontinence, significant weight loss, significant motor weakness and loss of sensations.    Pain Disability Index Review:     Last 3 PDI Scores 5/17/2022   Pain Disability Index (PDI) 41       Non-Pharmacologic Treatments:  Physical Therapy/Home Exercise: no  Ice/Heat:no  TENS: no  Acupuncture: no  Massage: no  Chiropractic: no    Other: no      Pain Medications:  - Adjuvant Medications: Clonazepam  (Klonopin) and Lexapro (Escitalopram)    Pain Procedures:   -06/03/2022:  Right-sided greater trochanteric bursa and sacroiliac joint injection    Past Medical History:   Diagnosis Date    Hypertension     Thyroid disease      Past Surgical History:   Procedure Laterality Date    CERVICAL BIOPSY  W/ LOOP ELECTRODE EXCISION  2005    INJECTION OF ANESTHETIC AGENT INTO SACROILIAC JOINT Right 06/03/2022    Procedure: right Sacroiliac Joint Injection with RN IV sedation;  Surgeon: Rey Monsalve MD;  Location: [facility];  Service: Pain Management;  Laterality: Right;    INJECTION OF JOINT Right 06/03/2022    Procedure: right GT bursa injection with RN IV sedation;  Surgeon: Rey Monsalve MD;  Location: [facility];  Service: Pain Management;  Laterality: Right;    REPAIR, PERFORATION, NASAL SEPTUM      sedated for nasal septal manipulation    XI ROBOTIC HYSTERECTOMY, WITH SALPINGECTOMY N/A 3/23/2023    Procedure: XI ROBOTIC HYSTERECTOMY,WITH SALPINGECTOMY;  Surgeon: GRICEL Mock MD;  Location: [facility] OR;  Service: OB/GYN;  Laterality: N/A;     Review of patient's allergies indicates:   Allergen Reactions    Sulfa (sulfonamide antibiotics) Hives       Current Outpatient Medications   Medication Sig    amLODIPine (NORVASC) 10 MG tablet Take 1 tablet (10 mg total) by mouth once daily.    clonazePAM (KLONOPIN) 0.5 MG tablet Take 1 tablet (0.5 mg total) by mouth 2 (two) times daily as needed for Anxiety.    diclofenac sodium (VOLTAREN) 1 % Gel Apply 2 g topically 4 (four) times daily.    EScitalopram oxalate (LEXAPRO) 20 MG tablet Take 1 tablet (20 mg total) by mouth once daily.    gabapentin (NEURONTIN) 300 MG capsule Take 1 capsule (300 mg total) by mouth 3 (three) times daily.    hydroCHLOROthiazide (HYDRODIURIL) 25 MG tablet Take 25 mg by mouth once daily.    levothyroxine (SYNTHROID) 137 MCG Tab tablet Take 1 tablet (137 mcg total) by mouth before breakfast.    OXcarbazepine (TRILEPTAL) 150 MG Tab Take 1  tablet (150 mg total) by mouth 2 (two) times daily.     No current facility-administered medications for this visit.       Review of Systems     GENERAL:  No weight loss, malaise or fevers.  HEENT:   No recent changes in vision or hearing  NECK:  Negative for lumps, no difficulty with swallowing.  RESPIRATORY:  Negative for cough, wheezing or shortness of breath, patient denies any recent URI.  CARDIOVASCULAR:  Negative for chest pain, leg swelling or palpitations.  GI:  Negative for abdominal discomfort, blood in stools or black stools or change in bowel habits.  MUSCULOSKELETAL:  See HPI.  SKIN:  Negative for lesions, rash, and itching.  PSYCH:  No mood disorder or recent psychosocial stressors.   HEMATOLOGY/LYMPHOLOGY:  Negative for prolonged bleeding, bruising easily or swollen nodes.    NEURO:   No history of headaches, syncope, paralysis, seizures or tremors.  All other reviewed and negative other than HPI.    OBJECTIVE:    [facility] 02/15/2023 (Exact Date)       Physical Exam  Last clinic visit:  GENERAL: Well appearing, in no acute distress, alert and oriented x3.  PSYCH:  Mood and affect appropriate.  SKIN: Skin color, texture, turgor normal, no rashes or lesions.  HEAD/FACE:  Normocephalic, atraumatic. Cranial nerves grossly intact.    CV: RRR with palpation of the radial artery.  PULM: No evidence of respiratory difficulty, symmetric chest rise.  GI:  Soft and non-tender.    BACK: Straight leg raising in the sitting and supine positions is negative to radicular pain. No pain to palpation over the facet joints of the lumbar spine or spinous processes. Normal range of motion without pain reproduction.  EXTREMITIES: Peripheral joint ROM is full and pain free without obvious instability or laxity in all four extremities. No deformities, edema, or skin discoloration. Good capillary refill.  MUSCULOSKELETAL: Able to stand on heels & toes.    hip, and knee provocative maneuvers are negative.   pain with palpation over  the sacroiliac joints on R.  FABERs and Gaenslen test is positive on right.  Facet loading test is negative bilaterally.   Bilateral upper and lower extremity strength is normal and symmetric.  No atrophy or tone abnormalities are noted..  RIGHT Lower extremity: Hip flexion 5/5, Hip Abduction 5/5, Hip Adduction 5/5, Knee extension 5/5, Knee flexion 5/5, Ankle dorsiflexion5/5, Extensor hallucis longus 5/5, Ankle plantarflexion 5/5  LEFT Lower extremity:  Hip flexion 5/5, Hip Abduction 5/5,Hip Adduction 5/5, Knee extension 5/5, Knee flexion 5/5, Ankle dorsiflexion 5/5, Extensor hallucis longus 5/5, Ankle plantarflexion 5/5  -Normal testing knee (patellar) jerk and ankle (achilles) jerk    NEURO: Bilateral upper and lower extremity coordination and muscle stretch reflexes are physiologic and symmetric. No loss of sensation is noted.  GAIT: normal.    Imaging:   MRI lumbar spine 05/17/2022  FINDINGS:  Images are mildly degraded by patient motion artifact.     Alignment: Within normal limits.     Vertebrae: Tiny inferior T11 Schmorl's node.  Lumbar vertebral body heights are maintained.  No evidence of acute fracture.  Marrow signal is within normal limits.     Discs: L4-L5 and L5-S1 disc desiccation.  Disc heights are maintained.     Cord: Within normal limits.  Conus terminates at L1-L2.     Degenerative findings:     T12-L1: No significant disc bulge, spinal canal stenosis or neural foraminal stenosis.     L1-L2: No significant disc bulge, spinal canal stenosis or neural foraminal stenosis.     L2-L3: No significant disc bulge, spinal canal stenosis or neural foraminal stenosis.     L3-L4: No significant disc bulge.  Mild facet arthropathy.  No significant spinal canal stenosis or neural foraminal stenosis.     L4-L5: Disc desiccation.  Minor broad-based disc bulge.  Mild bilateral facet arthropathy.  Facet arthropathy on the right closely approximates the exiting right L4 nerve root.  No significant spinal canal or  neural foraminal stenosis.     L5-S1: Disc desiccation.  Asymmetric right mild broad-based disc bulge.  Minor bilateral facet arthropathy.  No significant spinal canal or neural foraminal stenosis.    08/22/18    X-Ray Lumbar Spine Complete 5 View    FINDINGS:  Vertebral body heights and alignment are within normal limits.  Intervertebral disk spaces are well preserved. Posterior elements appear grossly intact.  No pars defects visualized.  No acute fractures or subluxations are demonstrated.  The remaining visualized osseous and soft tissue structures demonstrate no appreciable abnormality.      ASSESSMENT: 38 y.o. year old female with lower back and leg pain, consistent with     No diagnosis found.              PLAN:   - Interventions:  We will further evaluate radicular symptoms with bilateral lower extremity EMG  We discussed weight loss and the barriers that she may have that are preventing her from losing weight.  These may include medications including gabapentin Lexapro and Trileptal that may cause weight retention or weight gain.  We also discussed her hypothyroidism including her most recent TSH that was elevated. Her Synthroid was recently adjusted. We also discussed dietary changes including avoid fasting and instead eating every 2-3 hours a small snack/meal to prevent overeating and to promote stable blood sugars and metabolism    Patient has sustained 50% relief following right-sided sacroiliac joint and greater trochanteric bursa injection.  We discussed repeating this injection, but patient reports she is not interested in repeating injections    -we have discussed considering a right-sided L4/5 and L5/S1 facet injection to see if this would help with radicular symptoms.  Based on MRI findings.       - Anticoagulation use: no no anticoagulation       report:  Reviewed and consistent with medication use as prescribed.    - Medications:  --  Switch back to Gabapentin and discontinue Topamax    -  Therapy:   We discussed continue physical therapy to help manage the patient/s painful condition. The patient was counseled that muscle strengthening will improve the long term prognosis in regards to pain and may also help increase range of motion and mobility. They were told that one of the goals of physical therapy is that they learn how to do the exercises so that they can do them independently at home daily upon completion. The patient's questions were answered and they were agreeable to this course.  Kirk exercises for core strengthening and lumbar stabilization recommended to the patient at previous visit, recommend additional core strengthening exercises and recommend hip flexor and IT band stretches.  Provided with additional stretches for traps and recommended resistance bands    We discussed that once she has recovered from her hysterectomy we can consider formal aqua therapy    - Patient would benefit from TENs unit to help increase ROM, reduce pain, strengthen, and allow return to ADLs.        - Imaging: Reviewed available imaging with patient and answered any questions they had regarding study.  EMG of lower extremities ordered to further evaluate neuropathic pain and weakness in right leg    - Follow up visit: return to clinic in 6 weeks to review EMG      The above plan and management options were discussed at length with patient. Patient is in agreement with the above and verbalized understanding.    - I discussed the goals of interventional chronic pain management with the patient on today's visit. We discussed a multimodal and systematic approach to pain.  This includes diagnostic and therapeutic injections, adjuvant pharmacologic treatment, physical therapy, and at times psychiatry.  I emphasized the importance of regular exercise, core strengthening and stretching, diet and weight loss as a cornerstone of long-term pain management.    - This condition does not require this patient to take  time off of work, and the primary goal of our Pain Management services is to improve the patient's functional capacity.  - Patient Questions: Answered all of the patient's questions regarding diagnoses, therapy, treatment and next steps        Angela Phillips  Interventional Pain Management  [facility]    Disclaimer:  This note was prepared using voice recognition system and is likely to have sound alike errors that may have been overlooked even after proof reading.  Please call me with any questions

## 2023-05-10 ENCOUNTER — OFFICE VISIT (OUTPATIENT)
Dept: PAIN MEDICINE | Facility: CLINIC | Age: 38
End: 2023-05-10
Payer: COMMERCIAL

## 2023-05-10 DIAGNOSIS — M54.16 LUMBAR RADICULOPATHY: Primary | ICD-10-CM

## 2023-05-10 PROCEDURE — 1159F PR MEDICATION LIST DOCUMENTED IN MEDICAL RECORD: ICD-10-PCS | Mod: CPTII,95,, | Performed by: PHYSICIAN ASSISTANT

## 2023-05-10 PROCEDURE — 99214 PR OFFICE/OUTPT VISIT, EST, LEVL IV, 30-39 MIN: ICD-10-PCS | Mod: 95,,, | Performed by: PHYSICIAN ASSISTANT

## 2023-05-10 PROCEDURE — 99214 OFFICE O/P EST MOD 30 MIN: CPT | Mod: 95,,, | Performed by: PHYSICIAN ASSISTANT

## 2023-05-10 PROCEDURE — 1160F RVW MEDS BY RX/DR IN RCRD: CPT | Mod: CPTII,95,, | Performed by: PHYSICIAN ASSISTANT

## 2023-05-10 PROCEDURE — 1160F PR REVIEW ALL MEDS BY PRESCRIBER/CLIN PHARMACIST DOCUMENTED: ICD-10-PCS | Mod: CPTII,95,, | Performed by: PHYSICIAN ASSISTANT

## 2023-05-10 PROCEDURE — 1159F MED LIST DOCD IN RCRD: CPT | Mod: CPTII,95,, | Performed by: PHYSICIAN ASSISTANT

## 2023-05-18 ENCOUNTER — PATIENT MESSAGE (OUTPATIENT)
Dept: OBSTETRICS AND GYNECOLOGY | Facility: CLINIC | Age: 38
End: 2023-05-18
Payer: COMMERCIAL

## 2023-07-07 ENCOUNTER — OFFICE VISIT (OUTPATIENT)
Dept: PSYCHIATRY | Facility: CLINIC | Age: 38
End: 2023-07-07
Payer: COMMERCIAL

## 2023-07-07 DIAGNOSIS — F41.1 GAD (GENERALIZED ANXIETY DISORDER): Primary | ICD-10-CM

## 2023-07-07 DIAGNOSIS — F43.10 PTSD (POST-TRAUMATIC STRESS DISORDER): ICD-10-CM

## 2023-07-07 DIAGNOSIS — F41.9 ANXIETY: ICD-10-CM

## 2023-07-07 DIAGNOSIS — F39 MOOD DISORDER: ICD-10-CM

## 2023-07-07 PROCEDURE — 99214 PR OFFICE/OUTPT VISIT, EST, LEVL IV, 30-39 MIN: ICD-10-PCS | Mod: 95,,, | Performed by: PSYCHIATRY & NEUROLOGY

## 2023-07-07 PROCEDURE — 99214 OFFICE O/P EST MOD 30 MIN: CPT | Mod: 95,,, | Performed by: PSYCHIATRY & NEUROLOGY

## 2023-07-07 RX ORDER — CLONAZEPAM 0.5 MG/1
0.5 TABLET ORAL DAILY PRN
Qty: 30 TABLET | Refills: 0 | Status: SHIPPED | OUTPATIENT
Start: 2023-07-07 | End: 2023-08-04 | Stop reason: SDUPTHER

## 2023-07-07 RX ORDER — OXCARBAZEPINE 150 MG/1
150 TABLET, FILM COATED ORAL 2 TIMES DAILY
Qty: 180 TABLET | Refills: 0 | Status: SHIPPED | OUTPATIENT
Start: 2023-07-07 | End: 2023-08-04 | Stop reason: SDUPTHER

## 2023-07-07 RX ORDER — BUPROPION HYDROCHLORIDE 150 MG/1
150 TABLET ORAL DAILY
Qty: 90 TABLET | Refills: 0 | Status: SHIPPED | OUTPATIENT
Start: 2023-07-07 | End: 2023-08-29 | Stop reason: SINTOL

## 2023-07-07 RX ORDER — ESCITALOPRAM OXALATE 20 MG/1
20 TABLET ORAL DAILY
Qty: 90 TABLET | Refills: 0 | Status: SHIPPED | OUTPATIENT
Start: 2023-07-07 | End: 2023-08-04 | Stop reason: SDUPTHER
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
## 2023-08-04 DIAGNOSIS — F41.9 ANXIETY: ICD-10-CM

## 2023-08-04 DIAGNOSIS — F39 MOOD DISORDER: ICD-10-CM

## 2023-08-04 RX ORDER — CLONAZEPAM 0.5 MG/1
0.5 TABLET ORAL DAILY PRN
Qty: 30 TABLET | Refills: 0 | Status: SHIPPED | OUTPATIENT
Start: 2023-08-04 | End: 2023-09-07 | Stop reason: SDUPTHER

## 2023-08-04 RX ORDER — ESCITALOPRAM OXALATE 20 MG/1
20 TABLET ORAL DAILY
Qty: 90 TABLET | Refills: 0 | Status: SHIPPED | OUTPATIENT
Start: 2023-08-04 | End: 2023-10-27

## 2023-08-04 RX ORDER — OXCARBAZEPINE 150 MG/1
150 TABLET, FILM COATED ORAL 2 TIMES DAILY
Qty: 180 TABLET | Refills: 0 | Status: SHIPPED | OUTPATIENT
Start: 2023-08-04 | End: 2023-08-29 | Stop reason: SDUPTHER

## 2023-08-24 DIAGNOSIS — M46.1 SACROILIITIS: ICD-10-CM

## 2023-08-24 RX ORDER — GABAPENTIN 300 MG/1
300 CAPSULE ORAL 3 TIMES DAILY
Qty: 90 CAPSULE | Refills: 3 | Status: SHIPPED | OUTPATIENT
Start: 2023-08-24 | End: 2023-10-01 | Stop reason: SDUPTHER

## 2023-08-24 NOTE — TELEPHONE ENCOUNTER
No care due was identified.  [facility] Embedded Care Due Messages. Reference number: [id number].   8/24/2023 11:26:29 AM CDT

## 2023-08-28 ENCOUNTER — PATIENT MESSAGE (OUTPATIENT)
Dept: PSYCHIATRY | Facility: CLINIC | Age: 38
End: 2023-08-28
Payer: COMMERCIAL

## 2023-08-28 DIAGNOSIS — F39 MOOD DISORDER: ICD-10-CM

## 2023-08-29 RX ORDER — OXCARBAZEPINE 300 MG/1
300 TABLET, FILM COATED ORAL 2 TIMES DAILY
Qty: 60 TABLET | Refills: 0 | Status: SHIPPED | OUTPATIENT
Start: 2023-08-29 | End: 2023-10-27 | Stop reason: SDUPTHER

## 2023-08-29 NOTE — TELEPHONE ENCOUNTER
I called the patient regarding her message about Wellbutrin.  She indicates that for about the last 3 weeks, she has been spending too much money, such as on pets, purchasing a few exotic pets with excessive worries.  She reasonably feels that this is potentially a manic symptom.  She reports some difficulty falling asleep but says that she ends up getting at least 6 hours nightly.  She denies any elevated mood, ongoing irritable mood (sometimes transiently reacts with anger to a frustration) increased speech, racing thoughts, increased energy or activity (she reports that she actually has low energy and low activity), distractibility, grandiosity, risks.  Perhaps mild depression.  No excessive anxiety.  No psychosis.  No feelings of aggression or thoughts of harm to self or others.  Function is intact, though she indicates that she has sometimes taken a nap during the work day and is slightly behind on work, not at this point to a problematic degree.  She is largely appropriate on the phone, though speech is mildly increased in rate (normal quantity without pressure); pleasant, appreciative, goal-directed appropriately interactive.    The patient reports adherence to Trileptal, Lexapro, and Klonopin.  She reports consuming 1/2 of a drink on 2 occasions over the last several weeks.  Very small amount of street marijuana twice weekly, and she volunteers understanding the risks with street marijuana based on our previous discussions.    The patient will discontinue Wellbutrin and will increase Trileptal to 300 mg b.i.d..  For now she will continue Lexapro and Klonopin.  She scheduled an appointment in a cancellation spot for next week and agrees to call sooner for any questions or problems or use 911 and/or the ED for any urgent situations.

## 2023-09-07 ENCOUNTER — OFFICE VISIT (OUTPATIENT)
Dept: PSYCHIATRY | Facility: CLINIC | Age: 38
End: 2023-09-07
Payer: COMMERCIAL

## 2023-09-07 DIAGNOSIS — F43.10 PTSD (POST-TRAUMATIC STRESS DISORDER): ICD-10-CM

## 2023-09-07 DIAGNOSIS — F41.1 GAD (GENERALIZED ANXIETY DISORDER): ICD-10-CM

## 2023-09-07 DIAGNOSIS — F41.9 ANXIETY: ICD-10-CM

## 2023-09-07 DIAGNOSIS — F39 MOOD DISORDER: Primary | ICD-10-CM

## 2023-09-07 PROCEDURE — 99214 OFFICE O/P EST MOD 30 MIN: CPT | Mod: 95,,, | Performed by: PSYCHIATRY & NEUROLOGY

## 2023-09-07 PROCEDURE — 99214 PR OFFICE/OUTPT VISIT, EST, LEVL IV, 30-39 MIN: ICD-10-PCS | Mod: 95,,, | Performed by: PSYCHIATRY & NEUROLOGY

## 2023-09-07 RX ORDER — ARIPIPRAZOLE 2 MG/1
2 TABLET ORAL DAILY
Qty: 30 TABLET | Refills: 1 | Status: SHIPPED | OUTPATIENT
Start: 2023-09-07 | End: 2023-10-09

## 2023-09-07 RX ORDER — CLONAZEPAM 0.5 MG/1
0.5 TABLET ORAL DAILY PRN
Qty: 30 TABLET | Refills: 1 | Status: SHIPPED | OUTPATIENT
Start: 2023-09-07 | End: 2024-01-02 | Stop reason: SDUPTHER
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
## 2023-09-14 ENCOUNTER — OFFICE VISIT (OUTPATIENT)
Dept: INTERNAL MEDICINE | Facility: CLINIC | Age: 38
End: 2023-09-14
Payer: COMMERCIAL

## 2023-09-14 ENCOUNTER — LAB VISIT (OUTPATIENT)
Dept: LAB | Facility: HOSPITAL | Age: 38
End: 2023-09-14
Attending: FAMILY MEDICINE
Payer: COMMERCIAL

## 2023-09-14 VITALS
WEIGHT: 254 LBS | TEMPERATURE: 97 F | DIASTOLIC BLOOD PRESSURE: 86 MMHG | HEART RATE: 91 BPM | SYSTOLIC BLOOD PRESSURE: 134 MMHG | OXYGEN SATURATION: 98 % | BODY MASS INDEX: 40.82 KG/M2 | HEIGHT: 66 IN
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
## 2023-09-14 DIAGNOSIS — Z00.00 ANNUAL PHYSICAL EXAM: Primary | ICD-10-CM

## 2023-09-14 DIAGNOSIS — Z00.00 ANNUAL PHYSICAL EXAM: ICD-10-CM

## 2023-09-14 LAB
ALBUMIN SERPL BCP-MCNC: 3.6 G/DL (ref 3.5–5.2)
ALP SERPL-CCNC: 95 U/L (ref 55–135)
ALT SERPL W/O P-5'-P-CCNC: 18 U/L (ref 10–44)
ANION GAP SERPL CALC-SCNC: 8 MMOL/L (ref 8–16)
AST SERPL-CCNC: 17 U/L (ref 10–40)
BASOPHILS # BLD AUTO: 0.09 K/UL (ref 0–0.2)
BASOPHILS NFR BLD: 0.8 % (ref 0–1.9)
BILIRUB SERPL-MCNC: 0.4 MG/DL (ref 0.1–1)
BUN SERPL-MCNC: 14 MG/DL (ref 6–20)
CALCIUM SERPL-MCNC: 9.3 MG/DL (ref 8.7–10.5)
CHLORIDE SERPL-SCNC: 104 MMOL/L (ref 95–110)
CHOLEST SERPL-MCNC: 187 MG/DL (ref 120–199)
CHOLEST/HDLC SERPL: 5.8 {RATIO} (ref 2–5)
CO2 SERPL-SCNC: 27 MMOL/L (ref 23–29)
CREAT SERPL-MCNC: 0.8 MG/DL (ref 0.5–1.4)
DIFFERENTIAL METHOD: NORMAL
EOSINOPHIL # BLD AUTO: 0.2 K/UL (ref 0–0.5)
EOSINOPHIL NFR BLD: 1.6 % (ref 0–8)
ERYTHROCYTE [DISTWIDTH] IN BLOOD BY AUTOMATED COUNT: 12.6 % (ref 11.5–14.5)
EST. GFR  (NO RACE VARIABLE): >60 ML/MIN/1.73 M^2
ESTIMATED AVG GLUCOSE: 97 MG/DL (ref 68–131)
GLUCOSE SERPL-MCNC: 86 MG/DL (ref 70–110)
HBA1C MFR BLD: 5 % (ref 4–5.6)
HCT VFR BLD AUTO: 44.7 % (ref 37–48.5)
HDLC SERPL-MCNC: 32 MG/DL (ref 40–75)
HDLC SERPL: 17.1 % (ref 20–50)
HGB BLD-MCNC: 15.4 G/DL (ref 12–16)
IMM GRANULOCYTES # BLD AUTO: 0.02 K/UL (ref 0–0.04)
IMM GRANULOCYTES NFR BLD AUTO: 0.2 % (ref 0–0.5)
LDLC SERPL CALC-MCNC: 100.2 MG/DL (ref 63–159)
LYMPHOCYTES # BLD AUTO: 2.8 K/UL (ref 1–4.8)
LYMPHOCYTES NFR BLD: 25.9 % (ref 18–48)
MCH RBC QN AUTO: 30.7 PG (ref 27–31)
MCHC RBC AUTO-ENTMCNC: 34.5 G/DL (ref 32–36)
MCV RBC AUTO: 89 FL (ref 82–98)
MONOCYTES # BLD AUTO: 0.9 K/UL (ref 0.3–1)
MONOCYTES NFR BLD: 8.5 % (ref 4–15)
NEUTROPHILS # BLD AUTO: 6.7 K/UL (ref 1.8–7.7)
NEUTROPHILS NFR BLD: 63 % (ref 38–73)
NONHDLC SERPL-MCNC: 155 MG/DL
NRBC BLD-RTO: 0 /100 WBC
PLATELET # BLD AUTO: 369 K/UL (ref 150–450)
PMV BLD AUTO: 11.2 FL (ref 9.2–12.9)
POTASSIUM SERPL-SCNC: 4.3 MMOL/L (ref 3.5–5.1)
PROT SERPL-MCNC: 6.8 G/DL (ref 6–8.4)
RBC # BLD AUTO: 5.02 M/UL (ref 4–5.4)
SODIUM SERPL-SCNC: 139 MMOL/L (ref 136–145)
TRIGL SERPL-MCNC: 274 MG/DL (ref 30–150)
WBC # BLD AUTO: 10.67 K/UL (ref 3.9–12.7)

## 2023-09-14 PROCEDURE — 3079F DIAST BP 80-89 MM HG: CPT | Mod: CPTII,S$GLB,, | Performed by: FAMILY MEDICINE

## 2023-09-14 PROCEDURE — 84439 ASSAY OF FREE THYROXINE: CPT | Performed by: FAMILY MEDICINE

## 2023-09-14 PROCEDURE — 99999 PR PBB SHADOW E&M-EST. PATIENT-LVL III: CPT | Mod: PBBFAC,,, | Performed by: FAMILY MEDICINE

## 2023-09-14 PROCEDURE — 3008F PR BODY MASS INDEX (BMI) DOCUMENTED: ICD-10-PCS | Mod: CPTII,S$GLB,, | Performed by: FAMILY MEDICINE

## 2023-09-14 PROCEDURE — 85025 COMPLETE CBC W/AUTO DIFF WBC: CPT | Performed by: FAMILY MEDICINE

## 2023-09-14 PROCEDURE — 83036 HEMOGLOBIN GLYCOSYLATED A1C: CPT | Performed by: FAMILY MEDICINE

## 2023-09-14 PROCEDURE — 80061 LIPID PANEL: CPT | Performed by: FAMILY MEDICINE

## 2023-09-14 PROCEDURE — 99395 PREV VISIT EST AGE 18-39: CPT | Mod: S$GLB,,, | Performed by: FAMILY MEDICINE

## 2023-09-14 PROCEDURE — 80053 COMPREHEN METABOLIC PANEL: CPT | Performed by: FAMILY MEDICINE

## 2023-09-14 PROCEDURE — 3008F BODY MASS INDEX DOCD: CPT | Mod: CPTII,S$GLB,, | Performed by: FAMILY MEDICINE

## 2023-09-14 PROCEDURE — 1159F MED LIST DOCD IN RCRD: CPT | Mod: CPTII,S$GLB,, | Performed by: FAMILY MEDICINE

## 2023-09-14 PROCEDURE — 1159F PR MEDICATION LIST DOCUMENTED IN MEDICAL RECORD: ICD-10-PCS | Mod: CPTII,S$GLB,, | Performed by: FAMILY MEDICINE

## 2023-09-14 PROCEDURE — 36415 COLL VENOUS BLD VENIPUNCTURE: CPT | Performed by: FAMILY MEDICINE

## 2023-09-14 PROCEDURE — 99999 PR PBB SHADOW E&M-EST. PATIENT-LVL III: ICD-10-PCS | Mod: PBBFAC,,, | Performed by: FAMILY MEDICINE

## 2023-09-14 PROCEDURE — 84443 ASSAY THYROID STIM HORMONE: CPT | Performed by: FAMILY MEDICINE

## 2023-09-14 PROCEDURE — 99395 PR PREVENTIVE VISIT,EST,18-39: ICD-10-PCS | Mod: S$GLB,,, | Performed by: FAMILY MEDICINE

## 2023-09-14 PROCEDURE — 3075F SYST BP GE 130 - 139MM HG: CPT | Mod: CPTII,S$GLB,, | Performed by: FAMILY MEDICINE

## 2023-09-14 PROCEDURE — 3075F PR MOST RECENT SYSTOLIC BLOOD PRESS GE 130-139MM HG: ICD-10-PCS | Mod: CPTII,S$GLB,, | Performed by: FAMILY MEDICINE

## 2023-09-14 PROCEDURE — 3079F PR MOST RECENT DIASTOLIC BLOOD PRESSURE 80-89 MM HG: ICD-10-PCS | Mod: CPTII,S$GLB,, | Performed by: FAMILY MEDICINE

## 2023-09-14 RX ORDER — AMOXICILLIN AND CLAVULANATE POTASSIUM 875; 125 MG/1; MG/1
1 TABLET, FILM COATED ORAL EVERY 12 HOURS
Qty: 14 TABLET | Refills: 0 | Status: SHIPPED | OUTPATIENT
Start: 2023-09-14 | End: 2023-10-05

## 2023-09-14 NOTE — PROGRESS NOTES
Subjective:       Patient ID: Haydee Pennington is a 38 y.o. female.    Chief Complaint: Annual Exam    HPI    38    Patient Active Problem List   Diagnosis    Anxiety    Depression    Hypothyroidism    Stress headaches    HTN (hypertension)    Severe obesity (BMI 35.0-39.9) with comorbidity    Vitamin D deficiency    Tobacco dependence due to cigarettes    Sacroiliitis    Greater trochanteric bursitis of right hip       Past Medical History:   Diagnosis Date    Hypertension     Thyroid disease        Past Surgical History:   Procedure Laterality Date    CERVICAL BIOPSY  W/ LOOP ELECTRODE EXCISION  2005    INJECTION OF ANESTHETIC AGENT INTO SACROILIAC JOINT Right 06/03/2022    Procedure: right Sacroiliac Joint Injection with RN IV sedation;  Surgeon: Rey Monsalve MD;  Location: [facility];  Service: Pain Management;  Laterality: Right;    INJECTION OF JOINT Right 06/03/2022    Procedure: right GT bursa injection with RN IV sedation;  Surgeon: Rey Monsalve MD;  Location: [facility];  Service: Pain Management;  Laterality: Right;    REPAIR, PERFORATION, NASAL SEPTUM      sedated for nasal septal manipulation    XI ROBOTIC HYSTERECTOMY, WITH SALPINGECTOMY N/A 3/23/2023    Procedure: XI ROBOTIC HYSTERECTOMY,WITH SALPINGECTOMY;  Surgeon: GRICEL Mock MD;  Location: [facility] OR;  Service: OB/GYN;  Laterality: N/A;       Family History   Problem Relation Age of Onset    Diabetes Mother     Cancer Mother         Lung and Breast    Hypertension Father     Dementia Father     Transient ischemic attack Father     Heart failure Father     Stroke Father        Social History     Tobacco Use   Smoking Status Every Day    Current packs/day: 0.50    Average packs/day: 0.5 packs/day for 27.7 years (13.9 ttl pk-yrs)    Types: Cigarettes    Start date: 1996   Smokeless Tobacco Never       Wt Readings from Last 5 Encounters:   09/14/23 115.2 kg (253 lb 15.5 oz)   05/02/23 117.9 kg (259 lb 14.8 oz)   03/29/23 114.5 kg  (252 lb 6.8 oz)   03/24/23 119.3 kg (263 lb 0.1 oz)   03/23/23 117.3 kg (258 lb 7.8 oz)       For further HPI details, see assessment and plan.    Review of Systems   Constitutional:  Negative for chills and fever.   Respiratory:  Negative for shortness of breath and wheezing.    Cardiovascular:  Negative for chest pain.   Neurological:  Negative for dizziness.       Objective:      Vitals:    09/14/23 1353   BP: 134/86   Pulse: 91   Temp: 97 °F (36.1 °C)       Physical Exam  Constitutional:       General: She is not in acute distress.     Appearance: She is not ill-appearing.   Pulmonary:      Effort: Pulmonary effort is normal. No respiratory distress.   Neurological:      General: No focal deficit present.      Mental Status: She is alert.   Psychiatric:         Mood and Affect: Mood normal.         Behavior: Behavior normal.         Assessment:       1. Annual physical exam    2. Snake bite, initial encounter        Plan:       Patient presents for her annual examination     Patient quit smoking cigarettes.  She is using a vaping device as a method of quitting.  I want her to eventually get off that but I do congratulate her on smoking cessation efforts.    She does not drink alcohol.  There is no drug use.      Patient does not wear her seat belt.  Strongly encouraged she changed this practice.    She feels safe in her life with no threats of violence.    Immunization   Patient declines vaccinations     Medication list   Polypharmacy  Reviewed the entire drug list.  It is 100% accurate.  Removed hydrochlorothiazide and Voltaren gel as they are no longer utilized.      I am pleased to hear she now has a rowing machine for exercise purposes.  She has intention of increasing her physical activity but has been limited by the intense heat of the summer.  Discussed the CDC physical activity guidelines which is 150 minutes a week of a moderate intensity aerobic activity with 2 days a week of muscle strengthening  activity.  Discussed my strong emphasis and encouraged her to pursue such efforts    Additionally she does have a minor problem.    Earlier today she was bitten by a small python.  This is a non venomous snake.  The bite is exceedingly small with to pinpoint red lesions.  It is on the 1st finger of her left hand.  Does not appear to be clinically concerning whatsoever and very superficial, however given the penetrating nature of such small teeth I do worry about infections in the finger.  Monitor for today and if there is any change or sign of infection I want her to immediately start Augmentin.  If she is not improving within 24 hours I want her to let us know.    Hypertension  Continue current medication amlodipine 10 mg     Lumbar radiculopathy   Patient takes gabapentin.  Seems to help that and she does associate some benefit with her anxiety.  Continue medication    Patient is under the care of a psychiatrist for her mental health concerns.  Defer all psychiatric medications to her specialist.    This note was verbally dictated, please excuse any type errors.

## 2023-09-15 LAB
T4 FREE SERPL-MCNC: 1.11 NG/DL (ref 0.71–1.51)
TSH SERPL DL<=0.005 MIU/L-ACNC: 0.06 UIU/ML (ref 0.4–4)

## 2023-09-19 DIAGNOSIS — E03.9 HYPOTHYROIDISM, UNSPECIFIED TYPE: Primary | ICD-10-CM

## 2023-09-19 RX ORDER — LEVOTHYROXINE SODIUM 125 UG/1
125 TABLET ORAL
Qty: 30 TABLET | Refills: 11 | Status: SHIPPED | OUTPATIENT
Start: 2023-09-19 | End: 2024-03-14

## 2023-09-20 RX ORDER — LEVOTHYROXINE SODIUM 137 UG/1
137 TABLET ORAL
Qty: 90 TABLET | Refills: 1 | OUTPATIENT
Start: 2023-09-20

## 2023-09-20 NOTE — TELEPHONE ENCOUNTER
Refill Decision Note   Haydee Pennington  is requesting a refill authorization.  Brief Assessment and Rationale for Refill:  Quick Discontinue     Medication Therapy Plan:         Comments:     Note composed:10:15 AM 09/20/2023

## 2023-09-20 NOTE — TELEPHONE ENCOUNTER
No care due was identified.  [facility] Embedded Care Due Messages. Reference number: [id number].   9/20/2023 8:04:42 AM CDT

## 2023-09-24 ENCOUNTER — PATIENT MESSAGE (OUTPATIENT)
Dept: PSYCHIATRY | Facility: CLINIC | Age: 38
End: 2023-09-24
Payer: COMMERCIAL

## 2023-09-24 DIAGNOSIS — F41.1 GAD (GENERALIZED ANXIETY DISORDER): ICD-10-CM

## 2023-09-24 DIAGNOSIS — F43.10 PTSD (POST-TRAUMATIC STRESS DISORDER): ICD-10-CM

## 2023-09-24 DIAGNOSIS — F39 MOOD DISORDER: Primary | ICD-10-CM

## 2023-09-29 ENCOUNTER — ON-DEMAND VIRTUAL (OUTPATIENT)
Dept: URGENT CARE | Facility: CLINIC | Age: 38
End: 2023-09-29
Payer: COMMERCIAL

## 2023-09-29 DIAGNOSIS — L23.9 ALLERGIC CONTACT DERMATITIS, UNSPECIFIED TRIGGER: Primary | ICD-10-CM

## 2023-09-29 PROCEDURE — 99213 OFFICE O/P EST LOW 20 MIN: CPT | Mod: 95,,,

## 2023-09-29 PROCEDURE — 99213 PR OFFICE/OUTPT VISIT, EST, LEVL III, 20-29 MIN: ICD-10-PCS | Mod: 95,,,

## 2023-09-29 RX ORDER — PREDNISONE 10 MG/1
TABLET ORAL
Qty: 8 TABLET | Refills: 0 | Status: SHIPPED | OUTPATIENT
Start: 2023-09-29 | End: 2023-10-05

## 2023-09-29 NOTE — PROGRESS NOTES
Subjective:      Patient ID: Haydee Pennington is a 38 y.o. female.    Vitals:  vitals were not taken for this visit.     Chief Complaint: Rash      Visit Type: TELE AUDIOVISUAL    Present with the patient at the time of consultation: TELEMED PRESENT WITH PATIENT: None    Past Medical History:   Diagnosis Date    Hypertension     Thyroid disease      Past Surgical History:   Procedure Laterality Date    CERVICAL BIOPSY  W/ LOOP ELECTRODE EXCISION  2005    INJECTION OF ANESTHETIC AGENT INTO SACROILIAC JOINT Right 06/03/2022    Procedure: right Sacroiliac Joint Injection with RN IV sedation;  Surgeon: Rey Monsalve MD;  Location: [facility];  Service: Pain Management;  Laterality: Right;    INJECTION OF JOINT Right 06/03/2022    Procedure: right GT bursa injection with RN IV sedation;  Surgeon: Rey Monsalve MD;  Location: [facility];  Service: Pain Management;  Laterality: Right;    REPAIR, PERFORATION, NASAL SEPTUM      sedated for nasal septal manipulation    XI ROBOTIC HYSTERECTOMY, WITH SALPINGECTOMY N/A 3/23/2023    Procedure: XI ROBOTIC HYSTERECTOMY,WITH SALPINGECTOMY;  Surgeon: GRICEL Mock MD;  Location: [facility] OR;  Service: OB/GYN;  Laterality: N/A;     Review of patient's allergies indicates:   Allergen Reactions    Sulfa (sulfonamide antibiotics) Hives     Current Outpatient Medications on File Prior to Visit   Medication Sig Dispense Refill    amLODIPine (NORVASC) 10 MG tablet Take 1 tablet (10 mg total) by mouth once daily. 90 tablet 3    amoxicillin-clavulanate 875-125mg (AUGMENTIN) 875-125 mg per tablet Take 1 tablet by mouth every 12 (twelve) hours. 14 tablet 0    ARIPiprazole (ABILIFY) 2 MG Tab Take 1 tablet (2 mg total) by mouth once daily. 30 tablet 1    clonazePAM (KLONOPIN) 0.5 MG tablet Take 1 tablet (0.5 mg total) by mouth daily as needed for Anxiety. 30 tablet 1    EScitalopram oxalate (LEXAPRO) 20 MG tablet Take 1 tablet (20 mg total) by mouth once daily. 90 tablet 0     gabapentin (NEURONTIN) 300 MG capsule Take 1 capsule (300 mg total) by mouth 3 (three) times daily. 90 capsule 3    levothyroxine (SYNTHROID) 125 MCG tablet Take 1 tablet (125 mcg total) by mouth before breakfast. 30 tablet 11    OXcarbazepine (TRILEPTAL) 300 MG Tab Take 1 tablet (300 mg total) by mouth 2 (two) times daily. 60 tablet 0     No current facility-administered medications on file prior to visit.     Family History   Problem Relation Age of Onset    Diabetes Mother     Cancer Mother         Lung and Breast    Hypertension Father     Dementia Father     Transient ischemic attack Father     Heart failure Father     Stroke Father        Medications Ordered                [facility] - [place], [place] - [address]   [address], [facility] [address]    Telephone: [phone]   Fax: [phone]   Hours: Not open 24 hours                         E-Prescribed (1 of 1)              predniSONE (DELTASONE) 10 MG tablet    Sig: Take 20 mg on day one then take 10 mg on day 2-7       Start: 9/29/23     Quantity: 8 tablet Refills: 0                           Ohs Peq Odvv Intake    9/29/2023  8:20 AM CDT - Filed by Patient   Describe your reason for todays visit I have bumps that started yesterday on my arm, but have been multiplying overnight and now are on my back and legs as well, welts really.   What is your current physical address in the event of a medical emergency? [address] [place] [address]   Are you able to take your vital signs? No   Please attach any relevant images or files          Patient states that she went fishing yesterday yesterday and she had some bumps on her thumb and her arms. Patient states that the bumps are very red. Patient states that the bumps have spread and states they are now all over her body. Patient states that the bumps itch and are red.         Constitution: Negative.   HENT: Negative.     Neck: neck negative.   Cardiovascular: Negative.     Eyes: Negative.    Respiratory: Negative.     Gastrointestinal: Negative.    Endocrine: negative.   Genitourinary: Negative.    Musculoskeletal: Negative.    Skin:  Positive for rash.   Allergic/Immunologic: Negative.    Neurological: Negative.    Hematologic/Lymphatic: Negative.    Psychiatric/Behavioral: Negative.          Objective:   The physical exam was conducted virtually.  Physical Exam   Constitutional: She is oriented to person, place, and time.   HENT:   Head: Normocephalic and atraumatic.   Eyes: Conjunctivae are normal. Pupils are equal, round, and reactive to light. Extraocular movement intact   Neck: Neck supple.   Cardiovascular: Normal rate and regular rhythm.   Pulmonary/Chest: Effort normal.   Abdominal: Normal appearance.   Musculoskeletal: Normal range of motion.         General: Normal range of motion.   Neurological: no focal deficit. She is alert, oriented to person, place, and time and at baseline.   Skin: Skin is rash.   Psychiatric: Her behavior is normal. Mood, judgment and thought content normal.       Assessment:     1. Allergic contact dermatitis, unspecified trigger        Plan:       Allergic contact dermatitis, unspecified trigger  -     predniSONE (DELTASONE) 10 MG tablet; Take 20 mg on day one then take 10 mg on day 2-7  Dispense: 8 tablet; Refill: 0

## 2023-09-29 NOTE — PATIENT INSTRUCTIONS
You must understand that you've received an Urgent Care treatment only and that you may be released before all your medical problems are known or treated. You, the patient, will arrange for follow up care as instructed.  Follow up with your PCP or specialty clinic as directed in the next 1-2 weeks if not improved or as needed.  You can call [phone] to schedule an appointment with the appropriate provider.  If your condition worsens we recommend that you receive another evaluation at the emergency room immediately or contact your primary medical clinics after hours call service to discuss your concerns.  Please return here or go to the Emergency Department for any concerns or worsening of condition.  Please if you smoke please consider quitting. [facility] cessation [facility]doline number is [phone], available at this number is free counseling and medications to live a healthier life!         If you were prescribed a narcotic or controlled medication, do not drive or operate heavy equipment or machinery while taking these medications. You must understand that you've received an Urgent Care treatment only and that you may be released before all your medical problems are known or treated. You, the patient, will arrange for follow up care as instructed.  Follow up with your PCP or specialty clinic as directed in the next 1-2 weeks if not improved or as needed.  You can call [phone] to schedule an appointment with the appropriate provider.  If your condition worsens we recommend that you receive another evaluation at the emergency room immediately or contact your primary medical clinics after hours call service to discuss your concerns.  Please return here or go to the Emergency Department for any concerns or worsening of condition.  Please if you smoke please consider quitting. [facility] Smoke cessation flo[facility] number is [phone], available at this number is free counseling and medications to live a  healthier life!         If you were prescribed a narcotic or controlled medication, do not drive or operate heavy equipment or machinery while taking these medications.

## 2023-10-01 DIAGNOSIS — M46.1 SACROILIITIS: ICD-10-CM

## 2023-10-01 NOTE — TELEPHONE ENCOUNTER
No care due was identified.  [facility] Embedded Care Due Messages. Reference number: [id number].   10/01/2023 11:30:21 AM CDT

## 2023-10-02 RX ORDER — GABAPENTIN 300 MG/1
300 CAPSULE ORAL 3 TIMES DAILY
Qty: 90 CAPSULE | Refills: 3 | Status: SHIPPED | OUTPATIENT
Start: 2023-10-02

## 2023-10-04 ENCOUNTER — PATIENT MESSAGE (OUTPATIENT)
Dept: PSYCHIATRY | Facility: CLINIC | Age: 38
End: 2023-10-04
Payer: COMMERCIAL

## 2023-10-05 ENCOUNTER — OFFICE VISIT (OUTPATIENT)
Dept: INTERNAL MEDICINE | Facility: CLINIC | Age: 38
End: 2023-10-05
Payer: COMMERCIAL

## 2023-10-05 VITALS
RESPIRATION RATE: 18 BRPM | HEART RATE: 60 BPM | OXYGEN SATURATION: 98 % | TEMPERATURE: 99 F | SYSTOLIC BLOOD PRESSURE: 146 MMHG | HEIGHT: 66 IN | DIASTOLIC BLOOD PRESSURE: 100 MMHG | BODY MASS INDEX: 39.18 KG/M2 | WEIGHT: 243.81 LBS

## 2023-10-05 DIAGNOSIS — I10 HYPERTENSION, UNSPECIFIED TYPE: ICD-10-CM

## 2023-10-05 DIAGNOSIS — R21 RASH: Primary | ICD-10-CM

## 2023-10-05 PROCEDURE — 1160F PR REVIEW ALL MEDS BY PRESCRIBER/CLIN PHARMACIST DOCUMENTED: ICD-10-PCS | Mod: CPTII,S$GLB,, | Performed by: PHYSICIAN ASSISTANT

## 2023-10-05 PROCEDURE — 3080F DIAST BP >= 90 MM HG: CPT | Mod: CPTII,S$GLB,, | Performed by: PHYSICIAN ASSISTANT

## 2023-10-05 PROCEDURE — 3044F HG A1C LEVEL LT 7.0%: CPT | Mod: CPTII,S$GLB,, | Performed by: PHYSICIAN ASSISTANT

## 2023-10-05 PROCEDURE — 99999 PR PBB SHADOW E&M-EST. PATIENT-LVL V: ICD-10-PCS | Mod: PBBFAC,,, | Performed by: PHYSICIAN ASSISTANT

## 2023-10-05 PROCEDURE — 1159F MED LIST DOCD IN RCRD: CPT | Mod: CPTII,S$GLB,, | Performed by: PHYSICIAN ASSISTANT

## 2023-10-05 PROCEDURE — 99214 OFFICE O/P EST MOD 30 MIN: CPT | Mod: S$GLB,,, | Performed by: PHYSICIAN ASSISTANT

## 2023-10-05 PROCEDURE — 3077F SYST BP >= 140 MM HG: CPT | Mod: CPTII,S$GLB,, | Performed by: PHYSICIAN ASSISTANT

## 2023-10-05 PROCEDURE — 99214 PR OFFICE/OUTPT VISIT, EST, LEVL IV, 30-39 MIN: ICD-10-PCS | Mod: S$GLB,,, | Performed by: PHYSICIAN ASSISTANT

## 2023-10-05 PROCEDURE — 3008F PR BODY MASS INDEX (BMI) DOCUMENTED: ICD-10-PCS | Mod: CPTII,S$GLB,, | Performed by: PHYSICIAN ASSISTANT

## 2023-10-05 PROCEDURE — 3008F BODY MASS INDEX DOCD: CPT | Mod: CPTII,S$GLB,, | Performed by: PHYSICIAN ASSISTANT

## 2023-10-05 PROCEDURE — 3044F PR MOST RECENT HEMOGLOBIN A1C LEVEL <7.0%: ICD-10-PCS | Mod: CPTII,S$GLB,, | Performed by: PHYSICIAN ASSISTANT

## 2023-10-05 PROCEDURE — 99999 PR PBB SHADOW E&M-EST. PATIENT-LVL V: CPT | Mod: PBBFAC,,, | Performed by: PHYSICIAN ASSISTANT

## 2023-10-05 PROCEDURE — 3077F PR MOST RECENT SYSTOLIC BLOOD PRESSURE >= 140 MM HG: ICD-10-PCS | Mod: CPTII,S$GLB,, | Performed by: PHYSICIAN ASSISTANT

## 2023-10-05 PROCEDURE — 1160F RVW MEDS BY RX/DR IN RCRD: CPT | Mod: CPTII,S$GLB,, | Performed by: PHYSICIAN ASSISTANT

## 2023-10-05 PROCEDURE — 3080F PR MOST RECENT DIASTOLIC BLOOD PRESSURE >= 90 MM HG: ICD-10-PCS | Mod: CPTII,S$GLB,, | Performed by: PHYSICIAN ASSISTANT

## 2023-10-05 PROCEDURE — 1159F PR MEDICATION LIST DOCUMENTED IN MEDICAL RECORD: ICD-10-PCS | Mod: CPTII,S$GLB,, | Performed by: PHYSICIAN ASSISTANT

## 2023-10-05 RX ORDER — PREDNISONE 10 MG/1
TABLET ORAL
Qty: 2 TABLET | Refills: 0 | Status: SHIPPED | OUTPATIENT
Start: 2023-10-05 | End: 2023-10-10

## 2023-10-05 NOTE — H&P
[facility]  Obstetrics & Gynecology  History & Physical    Patient Name: Haydee Pennington  MRN: [id number]  Admission Date: 3/23/2023  Primary Care Provider: Gumaro Del Real MD    Subjective:     Chief Complaint/Reason for Admission: Heavy menses     History of Present Illness:  Heavy menses with normal ultrasound and benign embx for options discussion   Reviewed hormonal suppression, Ablation with bilateral tubal ligation and robotic hysterectomy       No new subjective & objective note has been filed under this hospital service since the last note was generated.    Assessment/Plan:     Cardiac/Vascular  HTN (hypertension)  Manage periop    Renal/  Menorrhagia with regular cycle  Robotic Hysterectomy salpingectomy         F Diogenes Mock MD  Obstetrics & Gynecology  [facility]  
No

## 2023-10-05 NOTE — PROGRESS NOTES
Subjective:      Patient ID: Haydee Pennington is a 38 y.o. female.    Chief Complaint: Allergic Reaction and Rash    Patient is new to me, being seen today for allergic reaction x1wk.  Rash to arms, legs, torso, face.  It is both painful and itchy.  Denies oral lesions.  Symptoms started after fishing, no yard work  Evaluated in Urgent Care on 9/29, prescribed steroids which initially seemed to be helped but now symptoms are worsening   Started abilify 2-3wks ago, no other new medications, no recent antibiotics   No new soaps/detergents/fragrances, no suspect food  No one else in household with rash  Other treatment includes pepcid, baking soda, benadryl cream, hydrocortisone, anti-histamines     BP elevated  HTN- amlodipine 10mg   Does not check BP at home, does not have cuff   BP was controlled at last office visit 2wks ago     Last visit Sept 2023 with PCP.    Rash  This is a new problem. The current episode started in the past 7 days. The problem has been rapidly worsening since onset. The rash is diffuse. The rash is characterized by pain, swelling and itchiness. It is unknown if there was an exposure to a precipitant. Associated symptoms include fatigue, joint pain and a sore throat (over the weekend, resolved). Pertinent negatives include no congestion, cough, diarrhea, fever, rhinorrhea, shortness of breath or vomiting. Past treatments include anti-itch cream, antihistamine and oral steroids. The treatment provided no relief. Her past medical history is significant for allergies, eczema and varicella.     Review of Systems   Constitutional:  Positive for fatigue. Negative for chills, diaphoresis and fever.   HENT:  Positive for sore throat (over the weekend, resolved). Negative for congestion, ear pain and rhinorrhea.    Respiratory:  Negative for cough, shortness of breath and wheezing.    Gastrointestinal:  Negative for abdominal pain, constipation, diarrhea, nausea and vomiting.   Genitourinary:   "Negative for dysuria, frequency and hematuria.   Musculoskeletal:  Positive for joint pain.   Skin:  Positive for rash.   Neurological:  Negative for dizziness, light-headedness and headaches.       Objective:   BP (!) 146/100   Pulse 60   Temp 98.9 °F (37.2 °C)   Resp 18   Ht 5' 6" (1.676 m)   Wt 110.6 kg (243 lb 13.3 oz)   LMP 02/15/2023 (Exact Date)   SpO2 98%   BMI 39.35 kg/m²   Physical Exam  Constitutional:       General: She is not in acute distress.     Appearance: She is well-developed. She is not ill-appearing or diaphoretic.   HENT:      Head: Normocephalic and atraumatic.      Right Ear: External ear normal.      Left Ear: External ear normal.   Eyes:      General: Lids are normal.         Right eye: No discharge.         Left eye: No discharge.      Conjunctiva/sclera: Conjunctivae normal.      Right eye: Right conjunctiva is not injected.      Left eye: Left conjunctiva is not injected.   Pulmonary:      Effort: Pulmonary effort is normal. No respiratory distress.   Skin:     General: Skin is warm and dry.      Findings: Rash present.          Neurological:      Mental Status: She is alert and oriented to person, place, and time.   Psychiatric:         Speech: Speech normal.         Behavior: Behavior normal.         Thought Content: Thought content normal.         Judgment: Judgment normal.                 Assessment:      1. Rash    2. Hypertension, unspecified type       Plan:   Rash  -     Ambulatory referral/consult to Dermatology; Future; Expected date: 10/12/2023  -     predniSONE (DELTASONE) 10 MG tablet; Take 20mg (2 tablets) x4 days, followed by 10mg (1 tablet) x4 days  Dispense: 2 tablet; Refill: 0    Hypertension, unspecified type      Discussed with PCP  Denies worsening anxiety/depression on steroids, tolerating ok, will increase dose and extend duration  Can continue otc itch relief   Close f/u Derm     Monitor BP at home  2wk f/u     Discussed worsening signs/symptoms and when " to return to clinic or go to ED.   Patient expresses understanding and agrees with treatment plan.

## 2023-10-08 ENCOUNTER — PATIENT MESSAGE (OUTPATIENT)
Dept: PSYCHIATRY | Facility: CLINIC | Age: 38
End: 2023-10-08
Payer: COMMERCIAL

## 2023-10-09 NOTE — TELEPHONE ENCOUNTER
I called the patient regarding her message.  She began experiencing hives about 2 weeks after starting Abilify.  She will discontinue Abilify and keep her dermatology appointment.  She reports euthymic mood, feeling that the medication had helped with depression.  No depression, dax, or hypomania currently, including with the steroids that she has been prescribed.  No anxiety, psychosis, feelings of aggression, or thoughts of harm to self or others.  The patient will keep me posted with regards to the dermatology opinion, the state of the rash, any returning symptoms, or any questions.  I have added Abilify as an allergy.

## 2023-10-10 ENCOUNTER — OFFICE VISIT (OUTPATIENT)
Dept: PSYCHIATRY | Facility: CLINIC | Age: 38
End: 2023-10-10
Payer: COMMERCIAL

## 2023-10-10 ENCOUNTER — OFFICE VISIT (OUTPATIENT)
Dept: PAIN MEDICINE | Facility: CLINIC | Age: 38
End: 2023-10-10
Payer: COMMERCIAL

## 2023-10-10 VITALS
WEIGHT: 244.19 LBS | HEIGHT: 66 IN | HEART RATE: 65 BPM | BODY MASS INDEX: 39.24 KG/M2 | DIASTOLIC BLOOD PRESSURE: 81 MMHG | SYSTOLIC BLOOD PRESSURE: 153 MMHG

## 2023-10-10 DIAGNOSIS — F41.1 GAD (GENERALIZED ANXIETY DISORDER): ICD-10-CM

## 2023-10-10 DIAGNOSIS — F43.10 PTSD (POST-TRAUMATIC STRESS DISORDER): ICD-10-CM

## 2023-10-10 DIAGNOSIS — M70.61 GREATER TROCHANTERIC BURSITIS OF RIGHT HIP: ICD-10-CM

## 2023-10-10 DIAGNOSIS — F39 MOOD DISORDER: Primary | ICD-10-CM

## 2023-10-10 DIAGNOSIS — G57.01 PIRIFORMIS SYNDROME OF RIGHT SIDE: Primary | ICD-10-CM

## 2023-10-10 PROCEDURE — 3044F PR MOST RECENT HEMOGLOBIN A1C LEVEL <7.0%: ICD-10-PCS | Mod: CPTII,95,, | Performed by: SOCIAL WORKER

## 2023-10-10 PROCEDURE — 3079F DIAST BP 80-89 MM HG: CPT | Mod: CPTII,S$GLB,, | Performed by: PHYSICIAN ASSISTANT

## 2023-10-10 PROCEDURE — 90834 PR PSYCHOTHERAPY W/PATIENT, 45 MIN: ICD-10-PCS | Mod: 95,,, | Performed by: SOCIAL WORKER

## 2023-10-10 PROCEDURE — 3044F HG A1C LEVEL LT 7.0%: CPT | Mod: CPTII,95,, | Performed by: SOCIAL WORKER

## 2023-10-10 PROCEDURE — 3077F PR MOST RECENT SYSTOLIC BLOOD PRESSURE >= 140 MM HG: ICD-10-PCS | Mod: CPTII,S$GLB,, | Performed by: PHYSICIAN ASSISTANT

## 2023-10-10 PROCEDURE — 1159F MED LIST DOCD IN RCRD: CPT | Mod: CPTII,S$GLB,, | Performed by: PHYSICIAN ASSISTANT

## 2023-10-10 PROCEDURE — 3008F PR BODY MASS INDEX (BMI) DOCUMENTED: ICD-10-PCS | Mod: CPTII,S$GLB,, | Performed by: PHYSICIAN ASSISTANT

## 2023-10-10 PROCEDURE — 3079F PR MOST RECENT DIASTOLIC BLOOD PRESSURE 80-89 MM HG: ICD-10-PCS | Mod: CPTII,S$GLB,, | Performed by: PHYSICIAN ASSISTANT

## 2023-10-10 PROCEDURE — 1159F PR MEDICATION LIST DOCUMENTED IN MEDICAL RECORD: ICD-10-PCS | Mod: CPTII,S$GLB,, | Performed by: PHYSICIAN ASSISTANT

## 2023-10-10 PROCEDURE — 99999 PR PBB SHADOW E&M-EST. PATIENT-LVL III: ICD-10-PCS | Mod: PBBFAC,,, | Performed by: PHYSICIAN ASSISTANT

## 2023-10-10 PROCEDURE — 3044F PR MOST RECENT HEMOGLOBIN A1C LEVEL <7.0%: ICD-10-PCS | Mod: CPTII,S$GLB,, | Performed by: PHYSICIAN ASSISTANT

## 2023-10-10 PROCEDURE — 3044F HG A1C LEVEL LT 7.0%: CPT | Mod: CPTII,S$GLB,, | Performed by: PHYSICIAN ASSISTANT

## 2023-10-10 PROCEDURE — 90834 PSYTX W PT 45 MINUTES: CPT | Mod: 95,,, | Performed by: SOCIAL WORKER

## 2023-10-10 PROCEDURE — 3077F SYST BP >= 140 MM HG: CPT | Mod: CPTII,S$GLB,, | Performed by: PHYSICIAN ASSISTANT

## 2023-10-10 PROCEDURE — 99214 OFFICE O/P EST MOD 30 MIN: CPT | Mod: 25,S$GLB,, | Performed by: PHYSICIAN ASSISTANT

## 2023-10-10 PROCEDURE — 99214 PR OFFICE/OUTPT VISIT, EST, LEVL IV, 30-39 MIN: ICD-10-PCS | Mod: 25,S$GLB,, | Performed by: PHYSICIAN ASSISTANT

## 2023-10-10 PROCEDURE — 99999 PR PBB SHADOW E&M-EST. PATIENT-LVL III: CPT | Mod: PBBFAC,,, | Performed by: PHYSICIAN ASSISTANT

## 2023-10-10 PROCEDURE — 3008F BODY MASS INDEX DOCD: CPT | Mod: CPTII,S$GLB,, | Performed by: PHYSICIAN ASSISTANT

## 2023-10-10 RX ORDER — DICLOFENAC SODIUM 75 MG/1
75 TABLET, DELAYED RELEASE ORAL 2 TIMES DAILY PRN
Qty: 60 TABLET | Refills: 1 | Status: SHIPPED | OUTPATIENT
Start: 2023-10-10 | End: 2024-03-14

## 2023-10-10 RX ORDER — CYCLOBENZAPRINE HCL 5 MG
5 TABLET ORAL 3 TIMES DAILY PRN
Qty: 60 TABLET | Refills: 0 | Status: SHIPPED | OUTPATIENT
Start: 2023-10-10 | End: 2023-10-20

## 2023-10-10 NOTE — PROGRESS NOTES
"Established Patient Chronic Pain Note     PCP: Gumaro Del Real MD    Chief Complaint:   R hip pain  R piriformis pain      SUBJECTIVE:  Interval History (10/13/2023):  Patient Haydee Pennington presents today for follow-up visit.  Patient was last seen on 5/10/2023. At that visit, the plan was to complete EMG and determine more treatment options after this procedure. Patient reports pain as 6/10 today.  The patient c/o pain in R piriformis for the past 3-4 weeks. Denies pain radiating past her mid thigh.   Sitting has been painful but whenever she stands, she experiences a "catching" sensation. Admits to using previous Rx for Flexeril which has helped.  The patient has been taking Gabapentin 300 mg TID, Ibuprofen or Naproxen 2-3 times daily as needed.  Denies numbness,tingling and weakness.  The patient has not completed EMG.       Interval History (5/9/2023):  Haydee Pennington presents today for follow-up visit.  Patient was last seen on 3/29/2023. At that visit, the plan was to trial compound cream, she reports this helps somewhat - moreso than other creams. She continues to perform modified yoga. Last follow up with Dr. Mock on 05/02/2023 4 weeks post op. Referral sent to [facility] for EMG, called but not scheduled on 3/29, we will reschedule. Recovering well from hysterectomy. She reports new pain in her left trap that causes intermittent tingling in her left hand into her pinky finger. She is using her theracane and stretching with some relief.    Interval History (3/28/2023):  Haydee Pennington presents today for follow-up visit.  Patient was last seen on 1/26/2023. Recently underwent hysterectomy last week on 03/23/2023 with Dr. Mock. Patient reports pain as 2/10 today. Previous plan was to discontinue Topamax due to side effects, restart Gabapentin and move forward with hysterectomy per OBGYN.  She reports 2 days after the hysterectomy she felt fine, she thinks this was due in part to the " anesthesia.  She reports she continues to have pain in her low right lumbosacral region with radiation into her lateral hip.  Pain is exacerbated by moving from sitting to standing and this movement also causes radiation into her right lower extremity L5 dermatomal distribution down to her right shin in the bottom of her foot.  She endorses tingling in the bottom of her foot and restless sensation in her right leg while sleeping.  A few weeks ago she saw her PCP who prescribed her Flexeril, steroids, Norco, and advised her to increase her gabapentin.  She reports this had no effect on her symptoms and they remain constant and persistent.  She also reports that the last couple of days she is had two near falls due to the right ankle giving out on her.      Interval History (1/26/2023):  Haydee Pennington presents today via telemed for follow-up visit.  Patient was last seen on 12/15/2022. At that visit, the plan was to d/c Gabapentin and begin Topamax.  Patient reports pain as 7/10 today. Patient previously cited that her pain is worse around the time of her cycle and has significant menorrhagia. Saw [facility] yesterday, planning for robotic hysterectomy with salpingectomy per Dr. Mock. Possible surgery date of March 10th. She reports that since discontinuing the Gabapentin and starting the Topamax, her emotions have been all over the place. She denies any suicidal or homicidal ideation, but she reports that she believes things were more stable with the Gabapentin.       Interval History (12/15/2022):  Haydee Pennington presents today via telemed for follow-up visit.  Patient was last seen on 9/20/2022. Last injection right SIJ + right GT bursa injection on 06/03/2022. She is not interested in any repeat injections as she states the last injection worsened her pain. Patient reports pain as 6/10 today. Pain in her right shoulder with tingling in  her right hand and pain along her lumbar spine  rosa  band like  "distribution with radiation into her left leg described as a tightness and burning. She has been completing Kirk exercises for core strengthening and lumbar stabilization and additional core strengthening exercises and  hip flexor and IT band stretches without improvement in symptoms. She does notice that her symptoms are worse around her cycle and she has an appointment with her gynecologist next week.       Interval History (9/20/2022):  Haydee Pennington presents today for follow-up visit.  Patient was last seen on 7/12/2022. Patient reports pain as "0/10 today. Last injection right SIJ + right GT bursa injection on 06/03/2022. Patient reports pain flare that began a couple of weeks ago. She reports continued pain over bilateral SIJ and GT bursa. Patient was prescribed a medrol dose pack on 09/09/2022 to help with pain. She reports medrol dose pack helped somewhat with the pain. She is not interested in any repeat injections as she states the last injection worsened her pain. Interested in continuing physician directed exercises and stretches. She also reports significant weight gain with Seroquel, has appointment scheduled with Dr. Kaplan to discuss medication side effects.    Interval history 07/12/2022  Patient presents status post right-sided sacroiliac joint and greater trochanteric bursa injection 06/03/2022.  Patient reports at least 45-50% pain relief overlying right-sided sacroiliac joint and greater trochanteric bursa territories.  Patient continues to report pain down the lateral aspect of the right lower extremity in L4 distribution to the foot.  Patient reports improvement in symptoms with continuation of physical therapy exercises.  She does report financial difficulty with physical therapy and has requesting physician directed exercises at home.  Patient has continued gabapentin and has reached a titration of 300 mg 3 times daily.  She does report significant improvement in her neuropathic " pain with this medication.  She denies any side effects from this medication.    HPI 05/17/2022  Haydee Pennington is a 38 y.o. female with past medical history significant for anxiety, depression, hypertension, obesity, hypothyroidism, nicotine who presents to the clinic for the evaluation of lower back and right leg pain.  Patient reports pain began several years prior without inciting accident injury or trauma.  Today patient reports pain is constant which is rated a 6/10.  Pain is described as grinding in nature.  Patient reports pain begins in a bandlike distribution in her lower back and radiates down the lateral aspect of the right lower extremity to the plantar aspect of the foot in L4-S1 distribution.  Pain is exacerbated with prolonged standing, walking, bending, lifting and even with prolonged sitting.  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  Pain is also exacerbated associated with her menstrual cycle.  Pain has been improved in the past with gabapentin, which patient reports she was prescribed for anxiety.   Patient denies night fever/night sweats, urinary incontinence, bowel incontinence, significant weight loss, significant motor weakness and loss of sensations.    Pain Disability Index Review:         10/10/2023     9:27 AM 5/17/2022     8:14 AM   Last 3 PDI Scores   Pain Disability Index (PDI) 33 41       Non-Pharmacologic Treatments:  Physical Therapy/Home Exercise: no  Ice/Heat:no  TENS: no  Acupuncture: no  Massage: no  Chiropractic: no    Other: no      Pain Medications:  - Adjuvant Medications: Clonazepam (Klonopin) and Lexapro (Escitalopram)    Pain Procedures:   -06/03/2022:  Right-sided greater trochanteric bursa and sacroiliac joint injection    Past Medical History:   Diagnosis Date    Hypertension     Thyroid disease      Past Surgical History:   Procedure Laterality Date    CERVICAL BIOPSY  W/  LOOP ELECTRODE EXCISION  2005    HYSTERECTOMY  [phone]    INJECTION OF ANESTHETIC AGENT INTO SACROILIAC JOINT Right 06/03/2022    Procedure: right Sacroiliac Joint Injection with RN IV sedation;  Surgeon: Rey Monsalve MD;  Location: [facility];  Service: Pain Management;  Laterality: Right;    INJECTION OF JOINT Right 06/03/2022    Procedure: right GT bursa injection with RN IV sedation;  Surgeon: Rey Monsalve MD;  Location: [facility];  Service: Pain Management;  Laterality: Right;    REPAIR, PERFORATION, NASAL SEPTUM      sedated for nasal septal manipulation    XI ROBOTIC HYSTERECTOMY, WITH SALPINGECTOMY N/A 03/23/2023    Procedure: XI ROBOTIC HYSTERECTOMY,WITH SALPINGECTOMY;  Surgeon: GRICEL Mock MD;  Location: [facility] OR;  Service: OB/GYN;  Laterality: N/A;     Review of patient's allergies indicates:   Allergen Reactions    Abilify [aripiprazole] Rash    Sulfa (sulfonamide antibiotics) Hives       Current Outpatient Medications   Medication Sig    amLODIPine (NORVASC) 10 MG tablet Take 1 tablet (10 mg total) by mouth once daily.    clonazePAM (KLONOPIN) 0.5 MG tablet Take 1 tablet (0.5 mg total) by mouth daily as needed for Anxiety.    EScitalopram oxalate (LEXAPRO) 20 MG tablet Take 1 tablet (20 mg total) by mouth once daily.    gabapentin (NEURONTIN) 300 MG capsule Take 1 capsule (300 mg total) by mouth 3 (three) times daily.    levothyroxine (SYNTHROID) 125 MCG tablet Take 1 tablet (125 mcg total) by mouth before breakfast.    OXcarbazepine (TRILEPTAL) 300 MG Tab Take 1 tablet (300 mg total) by mouth 2 (two) times daily.    cyclobenzaprine (FLEXERIL) 5 MG tablet Take 1 tablet (5 mg total) by mouth 3 (three) times daily as needed for Muscle spasms.    diclofenac (VOLTAREN) 75 MG EC tablet Take 1 tablet (75 mg total) by mouth 2 (two) times daily as needed (pain). Take with food.  Avoid other OTC NSAIDs (ex. Ibuprofen, naproxen) while taking this medication.     No current facility-administered  "medications for this visit.       Review of Systems     GENERAL:  No weight loss, malaise or fevers.  HEENT:   No recent changes in vision or hearing  NECK:  Negative for lumps, no difficulty with swallowing.  RESPIRATORY:  Negative for cough, wheezing or shortness of breath, patient denies any recent URI.  CARDIOVASCULAR:  Negative for chest pain, leg swelling or palpitations.  GI:  Negative for abdominal discomfort, blood in stools or black stools or change in bowel habits.  MUSCULOSKELETAL:  See HPI.  SKIN:  Negative for lesions, rash, and itching.  PSYCH:  No mood disorder or recent psychosocial stressors.   HEMATOLOGY/LYMPHOLOGY:  Negative for prolonged bleeding, bruising easily or swollen nodes.    NEURO:   No history of headaches, syncope, paralysis, seizures or tremors.  All other reviewed and negative other than HPI.    OBJECTIVE:    BP (!) 153/81   Pulse 65   Ht 5' 6" (1.676 m)   Wt 110.7 kg (244 lb 2.6 oz)   LMP 02/15/2023 (Exact Date)   BMI 39.41 kg/m²       Physical Exam  Last clinic visit:  GENERAL: Well appearing, in no acute distress, alert and oriented x3.  PSYCH:  Mood and affect appropriate.  SKIN: Skin color, texture, turgor normal, no rashes or lesions.  HEAD/FACE:  Normocephalic, atraumatic. Cranial nerves grossly intact.  PULM: No evidence of respiratory difficulty, symmetric chest rise.  GI:  Soft and non-tender.    BACK: Straight leg raising in the sitting and supine positions is negative to radicular pain. No pain to palpation over the facet joints of the lumbar spine or spinous processes. Normal range of motion without pain reproduction.  EXTREMITIES: Peripheral joint ROM is full and pain free without obvious instability or laxity in all four extremities. No deformities, edema, or skin discoloration. Good capillary refill.  MUSCULOSKELETAL: Able to stand on heels & toes.    hip, and knee provocative maneuvers are negative. She has pain with palpation over the sacroiliac joints on R.  " FABERs and Gaenslen test is negative.  SLR test is positive on the Right at 20 degrees. She has pain with IR/ER. Facet loading test is negative bilaterally.   Bilateral upper and lower extremity strength is normal and symmetric.  No atrophy or tone abnormalities are noted..  RIGHT Lower extremity: Hip flexion 5/5, Hip Abduction 5/5, Hip Adduction 5/5, Knee extension 5/5, Knee flexion 5/5, Ankle dorsiflexion5/5, Extensor hallucis longus 5/5, Ankle plantarflexion 5/5  LEFT Lower extremity:  Hip flexion 5/5, Hip Abduction 5/5,Hip Adduction 5/5, Knee extension 5/5, Knee flexion 5/5, Ankle dorsiflexion 5/5, Extensor hallucis longus 5/5, Ankle plantarflexion 5/5  -Normal testing knee (patellar) jerk and ankle (achilles) jerk    NEURO: Bilateral upper and lower extremity coordination and muscle stretch reflexes are physiologic and symmetric. No loss of sensation is noted.  GAIT: normal.    Imaging:   MRI lumbar spine 05/17/2022  FINDINGS:  Images are mildly degraded by patient motion artifact.     Alignment: Within normal limits.     Vertebrae: Tiny inferior T11 Schmorl's node.  Lumbar vertebral body heights are maintained.  No evidence of acute fracture.  Marrow signal is within normal limits.     Discs: L4-L5 and L5-S1 disc desiccation.  Disc heights are maintained.     Cord: Within normal limits.  Conus terminates at L1-L2.     Degenerative findings:     T12-L1: No significant disc bulge, spinal canal stenosis or neural foraminal stenosis.     L1-L2: No significant disc bulge, spinal canal stenosis or neural foraminal stenosis.     L2-L3: No significant disc bulge, spinal canal stenosis or neural foraminal stenosis.     L3-L4: No significant disc bulge.  Mild facet arthropathy.  No significant spinal canal stenosis or neural foraminal stenosis.     L4-L5: Disc desiccation.  Minor broad-based disc bulge.  Mild bilateral facet arthropathy.  Facet arthropathy on the right closely approximates the exiting right L4 nerve  root.  No significant spinal canal or neural foraminal stenosis.     L5-S1: Disc desiccation.  Asymmetric right mild broad-based disc bulge.  Minor bilateral facet arthropathy.  No significant spinal canal or neural foraminal stenosis.    08/22/18    X-Ray Lumbar Spine Complete 5 View    FINDINGS:  Vertebral body heights and alignment are within normal limits.  Intervertebral disk spaces are well preserved. Posterior elements appear grossly intact.  No pars defects visualized.  No acute fractures or subluxations are demonstrated.  The remaining visualized osseous and soft tissue structures demonstrate no appreciable abnormality.      ASSESSMENT: 38 y.o. year old female with lower back and leg pain, consistent with     1. Piriformis syndrome of right side        2. Greater trochanteric bursitis of right hip                PLAN:   - Interventions:  Piriformis injection - (right side) performed in clinic.  - Procedure note: An injection of (methylPREDNISolone acetate 40 mg) was administered during clinic visit.  This was well tolerated.  -we have discussed considering a right SI and TB injection in the future if symptoms have not improved.   We will further evaluate radicular symptoms with bilateral lower extremity EMG- message sent to Physiatry to schedule this procedure.    - Anticoagulation use: no anticoagulation       report:  Reviewed and consistent with medication use as prescribed.      - Medications:  -- Continue Gabapentin 300 mg TID.  - - Start Flexeril (cyclobenzaprine) 5 mg BID PRN muscle spasms (or can take 1/2 tablet). Patient understands this may cause drowsiness.  -- Start Diclofenac 75 mg BID PRN pain. she denies any kidney or cardiac issues. Take with food.  Avoid other OTC NSAIDs (ex. Ibuprofen, naproxen) while taking this medication.    - Therapy:   We discussed continue home exercises demonstrated by physical therapy to help manage the patient/s painful condition. The patient was counseled that  muscle strengthening will improve the long term prognosis in regards to pain and may also help increase range of motion and mobility. They were told that one of the goals of physical therapy is that they learn how to do the exercises so that they can do them independently at home daily upon completion. The patient's questions were answered and they were agreeable to this course.      - Imaging: Reviewed available imaging with patient and answered any questions they had regarding study.  EMG of lower extremities ordered to further evaluate neuropathic pain and weakness in right leg    - Follow up visit: return to clinic in 4-6 weeks to review EMG and reassess symptoms.      The above plan and management options were discussed at length with patient. Patient is in agreement with the above and verbalized understanding.    - I discussed the goals of interventional chronic pain management with the patient on today's visit. We discussed a multimodal and systematic approach to pain.  This includes diagnostic and therapeutic injections, adjuvant pharmacologic treatment, physical therapy, and at times psychiatry.  I emphasized the importance of regular exercise, core strengthening and stretching, diet and weight loss as a cornerstone of long-term pain management.    - This condition does not require this patient to take time off of work, and the primary goal of our Pain Management services is to improve the patient's functional capacity.  - Patient Questions: Answered all of the patient's questions regarding diagnoses, therapy, treatment and next steps    Procedure note:  Right piriformis Trigger Point Injection:   The procedure was discussed with the patient including complications of nerve damage,  bleeding, infection, and failure of pain relief.   Trigger points were identified by palpation and marked. Alcohol swab prep of sites done. A mixture of 4mL 1% lidocaine + 40 mg Depo-Medrol was prepared (5 mL total).   A  25-gauge needle was advanced to the point of maximal tenderness, and the medication mixture  was injected after negative aspiration. Patient tolerated the procedure well and without complications. Patient was monitored for 15 min following the injection before discharged from the clinic.  Sites injected included:  Right piriformis           Berna Stevens PA-C  Interventional Pain Management  [facility]    Disclaimer:  This note was prepared using voice recognition system and is likely to have sound alike errors that may have been overlooked even after proof reading.  Please call me with any questions

## 2023-10-10 NOTE — PROGRESS NOTES
Individual Psychotherapy (PhD/LCSW)    Due to the nature of this visit type, a virtual visit with synchronous audio and video, each patient to whom this provider administers behavioral health services by telemedicine is: (1) informed of the relationship between the provider and patient and the respective role of any other health care provider with respect to management of the patient; and (2) notified that he or she may decline to receive services by telemedicine and may withdraw from such care at any time. If technological issues occur, at the professional discretion of the clinical provider, synchronous audio only services may be utilized after unsuccessful attempt(s) to connect via audiovisual services; similarly, if audio only visit occurs, patient's verbal consent will be obtained prior to receipt of service. Prevailing clinical standards of care are upheld despite service methodology; having said this, if the clinical provider is unable to meet the prevailing standards of care, the patient will be rescheduled for the provider's soonest availability - as clinically appropriate.     The patient was informed of the following:     Provider's contact info:  [facility] [address], [address], [address]  [place] [address]  (Phone) [phone]    If technology issues occur, call office phone: Ph: [phone]  If crisis: Dial 911 or go to nearest Emergency Room (ER)  If questions related to privacy practices: contact [facility] Information Department: [phone]    For security purposes, the pt identified that they were at [address] [place] [address] during today's session and contact number is [phone].    The pt's emergency contact(s) is Extended Emergency Contact Information  Primary Emergency Contact: Tati Pennington   [facility]  Home Phone: [phone]  Mobile Phone: [phone]  Relation: Mother.    Crisis Disclaimer:  Patient was informed that due to the virtual nature of the visit, that if a crisis develops, protocols will be implemented to ensure patient safety, including but not limited to: 1) Initiating a welfare check with local law enforcement and/or 2) Calling 911     10/10/2023    Site:  Telemed         Therapeutic Intervention: Met with patient.  Outpatient - Insight oriented psychotherapy 45 min - CPT code 90891    Chief complaint/reason for encounter: depression, anxiety, and interpersonal        10/10/2023     9:06 AM 12/14/2022     2:42 PM 10/10/2022     8:26 AM 9/28/2022     9:57 AM 6/13/2022     4:00 PM 4/12/2022     8:41 AM 8/5/2020     6:48 PM   PHQ-9 Depression Patient Health Questionnaire   Patient agreed to terms: Yes Yes Yes Yes Yes Yes    Little interest or pleasure in doing things 1 1 1 2 0 1    Feeling down, depressed, or hopeless 1 1 1 1 0 1    Trouble falling or staying asleep, or sleeping too much 1 1 1 1 1 1    Feeling tired or having little energy 2 1 1 1 0 1    Poor appetite or overeating 2 1 1 3 2 1    Feeling bad about yourself - or that you are a failure or have let yourself or your family down 1 1 0 0 0 0    Trouble concentrating on things, such as reading the newspaper or watching television 3 1 2 1 1 2    Moving or speaking so slowly that other people could have noticed. Or the opposite - being so fidgety or restless that you have been moving around a lot more than usual 2 1 2 0 2 3    Thoughts that you would be better off dead, or of hurting yourself in some way 1 0 0 0 0 1    PHQ-9 Total Score 14 8 9 9 6 11    If you checked off any problems, how difficult have these problems made it for you to do your work, take care of things at home, or get along with other people? Very difficult Somewhat difficult Somewhat difficult Somewhat difficult Somewhat difficult Somewhat difficult    Interpretation Moderate Mild Mild Mild Mild Moderate        Information is confidential and restricted. Go to  Review Flowsheets to unlock data.           8/5/2020     6:47 PM   GAD7   1. Feeling nervous, anxious, or on edge?    2. Not being able to stop or control worrying?    3. Worrying too much about different things?    4. Trouble relaxing?    5. Being so restless that it is hard to sit still?    6. Becoming easily annoyed or irritable?    7. Feeling afraid as if something awful might happen?    RAOUL-7 Score        Information is confidential and restricted. Go to Review Flowsheets to unlock data.        Interval history and content of current session:  Met with this patient for her online follow-up appointment.  The patient was in her home office and she was solemn and appeared unhappy at the time of the appointment.  She stated that she had been very manic and felt that she was starting to come down from a manic episode.  She also felt that she discovered she was allergic to Abilify and the doctor had changed her medication and she is doing better now.  She described her relationship with her long-term partner Marcus who left August 28th.  She states that he is gone for good this time and she feels much less stress although she does miss having him around despite the drama that he brought with him.  She states that her 18-year-old son is now working at a [employer] store and she continues to have a relationship with her mother and her daughter who live in a home together.  She complains still about the state of their house being full of junk but she states that they are doing okay.  Discussed different forms of therapy that the patient might want to do and will explore more about somatic therapy with the patient.  She stated she would make a follow-up appointment.    Treatment plan:  Target symptoms: depression, anxiety , work stress  Why chosen therapy is appropriate versus another modality: patient responds to this modality  Outcome monitoring methods: self-report  Therapeutic intervention type: insight oriented  psychotherapy    Risk parameters:  Patient reports no suicidal ideation  Patient reports no homicidal ideation  Patient reports no self-injurious behavior  Patient reports no violent behavior    Verbal deficits: None    Patient's response to intervention:  The patient's response to intervention is accepting.    Progress toward goals and other mental status changes:  The patient's progress toward goals is fair .    Diagnosis:     ICD-10-CM ICD-9-CM   1. Mood disorder  F39 296.90   2. PTSD (post-traumatic stress disorder)  F43.10 309.81   3. RAOUL (generalized anxiety disorder)  F41.1 300.02       Plan:  individual psychotherapy    Return to clinic: as scheduled    Length of Service (minutes): 45       Belkis Chen LCSW  11/17/2023   5:24 PM

## 2023-10-16 ENCOUNTER — PATIENT MESSAGE (OUTPATIENT)
Dept: PSYCHIATRY | Facility: CLINIC | Age: 38
End: 2023-10-16
Payer: COMMERCIAL

## 2023-10-16 NOTE — TELEPHONE ENCOUNTER
I called the patient regarding her message.  She describes panic attacks with anxiety, restlessness, and other physical symptoms.  She mentions chest pain, and I advised her to have this cleared in the ED or urgent care.  No depression or dax, and sleep has recently been good.  No psychosis, feelings of aggression, or thoughts of harm to self or others.  She wonders about this being a lingering side effect of Abilify; however, with questioning, she indicates that she was on steroids until 3 days ago, with symptoms beginning while on steroids and then worsening and she continued the medication and not yet resolving.  The patient has no side effects with her current psychotropic medication regimen, including no daytime sedation when using Klonopin, which at 0.5 mg daily has been partially effective for the described anxiety.  For now, the patient will increase the dose of Klonopin to 2 tablets daily as needed, and she will monitor for side effects with the increase dose, including effects on alertness, focus, steadiness, coordination, and activities requiring these to be intact.  She confirms her appointment for next week and indicates that she will message for any problems prior to that.  With discussion, she agrees to use the and/or 911 for any urgent situations, and I reminded her with regards to cardiac concerns.

## 2023-10-27 ENCOUNTER — LAB VISIT (OUTPATIENT)
Dept: LAB | Facility: HOSPITAL | Age: 38
End: 2023-10-27
Attending: FAMILY MEDICINE
Payer: COMMERCIAL

## 2023-10-27 ENCOUNTER — OFFICE VISIT (OUTPATIENT)
Dept: PSYCHIATRY | Facility: CLINIC | Age: 38
End: 2023-10-27
Payer: COMMERCIAL

## 2023-10-27 VITALS — HEART RATE: 101 BPM | DIASTOLIC BLOOD PRESSURE: 89 MMHG | SYSTOLIC BLOOD PRESSURE: 132 MMHG

## 2023-10-27 DIAGNOSIS — E03.9 HYPOTHYROIDISM, UNSPECIFIED TYPE: ICD-10-CM

## 2023-10-27 DIAGNOSIS — F41.1 GAD (GENERALIZED ANXIETY DISORDER): ICD-10-CM

## 2023-10-27 DIAGNOSIS — F43.10 PTSD (POST-TRAUMATIC STRESS DISORDER): ICD-10-CM

## 2023-10-27 DIAGNOSIS — F39 MOOD DISORDER: Primary | ICD-10-CM

## 2023-10-27 LAB — TSH SERPL DL<=0.005 MIU/L-ACNC: 1.01 UIU/ML (ref 0.4–4)

## 2023-10-27 PROCEDURE — 99999 PR PBB SHADOW E&M-EST. PATIENT-LVL II: ICD-10-PCS | Mod: PBBFAC,,, | Performed by: PSYCHIATRY & NEUROLOGY

## 2023-10-27 PROCEDURE — 99214 PR OFFICE/OUTPT VISIT, EST, LEVL IV, 30-39 MIN: ICD-10-PCS | Mod: S$GLB,,, | Performed by: PSYCHIATRY & NEUROLOGY

## 2023-10-27 PROCEDURE — 84443 ASSAY THYROID STIM HORMONE: CPT | Performed by: FAMILY MEDICINE

## 2023-10-27 PROCEDURE — 3079F DIAST BP 80-89 MM HG: CPT | Mod: CPTII,S$GLB,, | Performed by: PSYCHIATRY & NEUROLOGY

## 2023-10-27 PROCEDURE — 3044F PR MOST RECENT HEMOGLOBIN A1C LEVEL <7.0%: ICD-10-PCS | Mod: CPTII,S$GLB,, | Performed by: PSYCHIATRY & NEUROLOGY

## 2023-10-27 PROCEDURE — 1160F RVW MEDS BY RX/DR IN RCRD: CPT | Mod: CPTII,S$GLB,, | Performed by: PSYCHIATRY & NEUROLOGY

## 2023-10-27 PROCEDURE — 1159F MED LIST DOCD IN RCRD: CPT | Mod: CPTII,S$GLB,, | Performed by: PSYCHIATRY & NEUROLOGY

## 2023-10-27 PROCEDURE — 1160F PR REVIEW ALL MEDS BY PRESCRIBER/CLIN PHARMACIST DOCUMENTED: ICD-10-PCS | Mod: CPTII,S$GLB,, | Performed by: PSYCHIATRY & NEUROLOGY

## 2023-10-27 PROCEDURE — 1159F PR MEDICATION LIST DOCUMENTED IN MEDICAL RECORD: ICD-10-PCS | Mod: CPTII,S$GLB,, | Performed by: PSYCHIATRY & NEUROLOGY

## 2023-10-27 PROCEDURE — 36415 COLL VENOUS BLD VENIPUNCTURE: CPT | Performed by: FAMILY MEDICINE

## 2023-10-27 PROCEDURE — 3044F HG A1C LEVEL LT 7.0%: CPT | Mod: CPTII,S$GLB,, | Performed by: PSYCHIATRY & NEUROLOGY

## 2023-10-27 PROCEDURE — 99999 PR PBB SHADOW E&M-EST. PATIENT-LVL II: CPT | Mod: PBBFAC,,, | Performed by: PSYCHIATRY & NEUROLOGY

## 2023-10-27 PROCEDURE — 99214 OFFICE O/P EST MOD 30 MIN: CPT | Mod: S$GLB,,, | Performed by: PSYCHIATRY & NEUROLOGY

## 2023-10-27 PROCEDURE — 3075F PR MOST RECENT SYSTOLIC BLOOD PRESS GE 130-139MM HG: ICD-10-PCS | Mod: CPTII,S$GLB,, | Performed by: PSYCHIATRY & NEUROLOGY

## 2023-10-27 PROCEDURE — 3075F SYST BP GE 130 - 139MM HG: CPT | Mod: CPTII,S$GLB,, | Performed by: PSYCHIATRY & NEUROLOGY

## 2023-10-27 PROCEDURE — 3079F PR MOST RECENT DIASTOLIC BLOOD PRESSURE 80-89 MM HG: ICD-10-PCS | Mod: CPTII,S$GLB,, | Performed by: PSYCHIATRY & NEUROLOGY

## 2023-10-27 RX ORDER — OXCARBAZEPINE 600 MG/1
600 TABLET, FILM COATED ORAL NIGHTLY
Qty: 30 TABLET | Refills: 1 | Status: SHIPPED | OUTPATIENT
Start: 2023-10-27 | End: 2024-01-02

## 2023-10-27 RX ORDER — OXCARBAZEPINE 300 MG/1
300 TABLET, FILM COATED ORAL DAILY
Qty: 30 TABLET | Refills: 1 | Status: SHIPPED | OUTPATIENT
Start: 2023-10-27 | End: 2024-01-02 | Stop reason: DRUGHIGH

## 2023-10-27 RX ORDER — ESCITALOPRAM OXALATE 10 MG/1
10 TABLET ORAL DAILY
Qty: 30 TABLET | Refills: 1 | Status: SHIPPED | OUTPATIENT
Start: 2023-10-27 | End: 2024-01-02 | Stop reason: SDUPTHER
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
## 2023-11-09 ENCOUNTER — OFFICE VISIT (OUTPATIENT)
Dept: INTERNAL MEDICINE | Facility: CLINIC | Age: 38
End: 2023-11-09
Payer: COMMERCIAL

## 2023-11-09 VITALS
TEMPERATURE: 98 F | BODY MASS INDEX: 39.51 KG/M2 | SYSTOLIC BLOOD PRESSURE: 122 MMHG | RESPIRATION RATE: 18 BRPM | HEIGHT: 66 IN | OXYGEN SATURATION: 98 % | DIASTOLIC BLOOD PRESSURE: 88 MMHG | WEIGHT: 245.81 LBS | HEART RATE: 60 BPM

## 2023-11-09 DIAGNOSIS — N39.0 URINARY TRACT INFECTION WITHOUT HEMATURIA, SITE UNSPECIFIED: Primary | ICD-10-CM

## 2023-11-09 DIAGNOSIS — R10.9 FLANK PAIN: ICD-10-CM

## 2023-11-09 LAB
BILIRUB UR QL STRIP: NEGATIVE
CLARITY UR: CLEAR
COLOR UR: YELLOW
GLUCOSE UR QL STRIP: NEGATIVE
HGB UR QL STRIP: NEGATIVE
KETONES UR QL STRIP: NEGATIVE
LEUKOCYTE ESTERASE UR QL STRIP: NEGATIVE
NITRITE UR QL STRIP: NEGATIVE
PH UR STRIP: 6 [PH] (ref 5–8)
PROT UR QL STRIP: ABNORMAL
SP GR UR STRIP: 1.02 (ref 1–1.03)
URN SPEC COLLECT METH UR: ABNORMAL

## 2023-11-09 PROCEDURE — 3074F PR MOST RECENT SYSTOLIC BLOOD PRESSURE < 130 MM HG: ICD-10-PCS | Mod: CPTII,S$GLB,, | Performed by: PHYSICIAN ASSISTANT

## 2023-11-09 PROCEDURE — 1159F PR MEDICATION LIST DOCUMENTED IN MEDICAL RECORD: ICD-10-PCS | Mod: CPTII,S$GLB,, | Performed by: PHYSICIAN ASSISTANT

## 2023-11-09 PROCEDURE — 1160F PR REVIEW ALL MEDS BY PRESCRIBER/CLIN PHARMACIST DOCUMENTED: ICD-10-PCS | Mod: CPTII,S$GLB,, | Performed by: PHYSICIAN ASSISTANT

## 2023-11-09 PROCEDURE — 3079F DIAST BP 80-89 MM HG: CPT | Mod: CPTII,S$GLB,, | Performed by: PHYSICIAN ASSISTANT

## 2023-11-09 PROCEDURE — 1160F RVW MEDS BY RX/DR IN RCRD: CPT | Mod: CPTII,S$GLB,, | Performed by: PHYSICIAN ASSISTANT

## 2023-11-09 PROCEDURE — 81002 URINALYSIS NONAUTO W/O SCOPE: CPT | Performed by: PHYSICIAN ASSISTANT

## 2023-11-09 PROCEDURE — 3044F HG A1C LEVEL LT 7.0%: CPT | Mod: CPTII,S$GLB,, | Performed by: PHYSICIAN ASSISTANT

## 2023-11-09 PROCEDURE — 3008F PR BODY MASS INDEX (BMI) DOCUMENTED: ICD-10-PCS | Mod: CPTII,S$GLB,, | Performed by: PHYSICIAN ASSISTANT

## 2023-11-09 PROCEDURE — 87086 URINE CULTURE/COLONY COUNT: CPT | Performed by: PHYSICIAN ASSISTANT

## 2023-11-09 PROCEDURE — 3074F SYST BP LT 130 MM HG: CPT | Mod: CPTII,S$GLB,, | Performed by: PHYSICIAN ASSISTANT

## 2023-11-09 PROCEDURE — 3008F BODY MASS INDEX DOCD: CPT | Mod: CPTII,S$GLB,, | Performed by: PHYSICIAN ASSISTANT

## 2023-11-09 PROCEDURE — 99999 PR PBB SHADOW E&M-EST. PATIENT-LVL IV: CPT | Mod: PBBFAC,,, | Performed by: PHYSICIAN ASSISTANT

## 2023-11-09 PROCEDURE — 1159F MED LIST DOCD IN RCRD: CPT | Mod: CPTII,S$GLB,, | Performed by: PHYSICIAN ASSISTANT

## 2023-11-09 PROCEDURE — 99999 PR PBB SHADOW E&M-EST. PATIENT-LVL IV: ICD-10-PCS | Mod: PBBFAC,,, | Performed by: PHYSICIAN ASSISTANT

## 2023-11-09 PROCEDURE — 3044F PR MOST RECENT HEMOGLOBIN A1C LEVEL <7.0%: ICD-10-PCS | Mod: CPTII,S$GLB,, | Performed by: PHYSICIAN ASSISTANT

## 2023-11-09 PROCEDURE — 99214 PR OFFICE/OUTPT VISIT, EST, LEVL IV, 30-39 MIN: ICD-10-PCS | Mod: S$GLB,,, | Performed by: PHYSICIAN ASSISTANT

## 2023-11-09 PROCEDURE — 3079F PR MOST RECENT DIASTOLIC BLOOD PRESSURE 80-89 MM HG: ICD-10-PCS | Mod: CPTII,S$GLB,, | Performed by: PHYSICIAN ASSISTANT

## 2023-11-09 PROCEDURE — 99214 OFFICE O/P EST MOD 30 MIN: CPT | Mod: S$GLB,,, | Performed by: PHYSICIAN ASSISTANT

## 2023-11-09 RX ORDER — CIPROFLOXACIN 500 MG/1
500 TABLET ORAL EVERY 12 HOURS
Qty: 14 TABLET | Refills: 0 | Status: SHIPPED | OUTPATIENT
Start: 2023-11-09 | End: 2023-11-16

## 2023-11-09 NOTE — PROGRESS NOTES
Subjective:      Patient ID: Haydee Pennington is a 38 y.o. female.    Chief Complaint: Back Pain    Patient is known to me, being seen today for possible UTI.  Difficulty urinating and low back pain x4-5days.    Treatment includes cranberry juice   Denies back injury or strenuous activity     Last visit Oct 2023 with myself    Back Pain  This is a new problem. The current episode started today. The problem occurs constantly. The problem has been gradually worsening since onset. The pain is present in the sacro-iliac. The quality of the pain is described as aching. The pain does not radiate. The pain is at a severity of 7/10. The pain is moderate. The pain is The same all the time. The symptoms are aggravated by urinating. Stiffness is present All day. Associated symptoms include dysuria, headaches and pelvic pain. Pertinent negatives include no abdominal pain, bladder incontinence, bowel incontinence, chest pain, fever, leg pain, numbness, paresis, paresthesias, perianal numbness, tingling, weakness or weight loss. Risk factors include lack of exercise, obesity, poor posture and sedentary lifestyle. The treatment provided no relief.     Review of Systems   Constitutional:  Negative for chills, diaphoresis, fever and weight loss.   HENT:  Negative for congestion, rhinorrhea and sore throat.    Respiratory:  Negative for cough, shortness of breath and wheezing.    Cardiovascular:  Negative for chest pain.   Gastrointestinal:  Positive for nausea. Negative for abdominal pain, blood in stool, bowel incontinence, constipation, diarrhea and vomiting.        Last BM yesterday, normal   Genitourinary:  Positive for difficulty urinating (feels she has to push pee out), dysuria, flank pain (bilateral) and pelvic pain. Negative for bladder incontinence, frequency, hematuria and urgency.        Partial hysterectomy March 2023   Musculoskeletal:  Positive for back pain (generalized lower back).   Skin:  Negative for rash.  "  Neurological:  Positive for headaches. Negative for dizziness, tingling, weakness, light-headedness, numbness and paresthesias.       Objective:   /88   Pulse 60   Temp 97.9 °F (36.6 °C)   Resp 18   Ht 5' 6" (1.676 m)   Wt 111.5 kg (245 lb 13 oz)   LMP 02/15/2023 (Exact Date)   SpO2 98%   BMI 39.68 kg/m²   Physical Exam  Constitutional:       General: She is not in acute distress.     Appearance: Normal appearance. She is well-developed. She is not ill-appearing.   HENT:      Head: Normocephalic and atraumatic.   Cardiovascular:      Rate and Rhythm: Normal rate and regular rhythm.      Heart sounds: Normal heart sounds. No murmur heard.  Pulmonary:      Effort: Pulmonary effort is normal. No respiratory distress.      Breath sounds: Normal breath sounds. No decreased breath sounds.   Abdominal:      General: Bowel sounds are normal.      Palpations: Abdomen is soft.      Tenderness: There is no abdominal tenderness. There is right CVA tenderness and left CVA tenderness.   Musculoskeletal:      Lumbar back: Bony tenderness present.      Right lower leg: No edema.      Left lower leg: No edema.   Skin:     General: Skin is warm and dry.      Findings: No rash.   Psychiatric:         Speech: Speech normal.         Behavior: Behavior normal.         Thought Content: Thought content normal.       Assessment:      1. Urinary tract infection without hematuria, site unspecified    2. Flank pain       Plan:   Urinary tract infection without hematuria, site unspecified  -     ciprofloxacin HCl (CIPRO) 500 MG tablet; Take 1 tablet (500 mg total) by mouth every 12 (twelve) hours. for 7 days  Dispense: 14 tablet; Refill: 0    Flank pain  -     Urinalysis; Future; Expected date: 11/09/2023  -     Urine culture      Rest, fluids, warm heat     Discussed worsening signs/symptoms and when to return to clinic or go to ED.   Patient expresses understanding and agrees with treatment plan.       "

## 2023-11-11 LAB — BACTERIA UR CULT: NO GROWTH

## 2023-11-20 ENCOUNTER — TELEPHONE (OUTPATIENT)
Dept: PAIN MEDICINE | Facility: CLINIC | Age: 38
End: 2023-11-20
Payer: COMMERCIAL

## 2023-11-21 ENCOUNTER — OFFICE VISIT (OUTPATIENT)
Dept: PAIN MEDICINE | Facility: CLINIC | Age: 38
End: 2023-11-21
Payer: COMMERCIAL

## 2023-11-21 ENCOUNTER — TELEPHONE (OUTPATIENT)
Dept: PHYSICAL MEDICINE AND REHAB | Facility: CLINIC | Age: 38
End: 2023-11-21
Payer: COMMERCIAL

## 2023-11-21 DIAGNOSIS — G57.01 PIRIFORMIS SYNDROME OF RIGHT SIDE: ICD-10-CM

## 2023-11-21 DIAGNOSIS — M46.1 SACROILIITIS: Primary | ICD-10-CM

## 2023-11-21 PROCEDURE — 3044F HG A1C LEVEL LT 7.0%: CPT | Mod: CPTII,95,, | Performed by: PHYSICIAN ASSISTANT

## 2023-11-21 PROCEDURE — 99213 OFFICE O/P EST LOW 20 MIN: CPT | Mod: 95,,, | Performed by: PHYSICIAN ASSISTANT

## 2023-11-21 PROCEDURE — 99213 PR OFFICE/OUTPT VISIT, EST, LEVL III, 20-29 MIN: ICD-10-PCS | Mod: 95,,, | Performed by: PHYSICIAN ASSISTANT

## 2023-11-21 PROCEDURE — 3044F PR MOST RECENT HEMOGLOBIN A1C LEVEL <7.0%: ICD-10-PCS | Mod: CPTII,95,, | Performed by: PHYSICIAN ASSISTANT

## 2023-11-21 NOTE — PROGRESS NOTES
"Established Patient Chronic Pain Note   The patient location is: home  The chief complaint leading to consultation is: injection follow up    Visit type: audiovisual    Face to Face time with patient: 8-10 minutes of total time spent on the encounter, which includes face to face time and non-face to face time preparing to see the patient (eg, review of tests), Obtaining and/or reviewing separately obtained history, Documenting clinical information in the electronic or other health record, Independently interpreting results (not separately reported) and communicating results to the patient/family/caregiver, or Care coordination (not separately reported).     Each patient to whom he or she provides medical services by telemedicine is:  (1) informed of the relationship between the physician and patient and the respective role of any other health care provider with respect to management of the patient; and (2) notified that he or she may decline to receive medical services by telemedicine and may withdraw from such care at any time.    Notes:     PCP: Gumaro Del Real MD    Chief Complaint:   R SIJ pain  R piriformis pain      SUBJECTIVE:    Interval History (11/21/2023):  Patient Haydee Pennington presents today for follow-up visit.  Patient was last seen on 10/10/2023. At that visit, the plan was to start Nsaid, Agustín relaxer and follow up after PF injections. Patient states the piriformis injection helped for a few days "There was relief in the muscular pain." She still complains of pain in Right buttock and piriformis, but she has also been out of Gabapentin for over a week now. States there is a  shortage. Patient reports she has increased pain if she goes from sitting to standing. As soon as she gets up the pain is "excruciating." She denies pain in lateral or anterior hip at this time. Patient reports pain as 6/10 today.      Interval History (10/10/2023):  Patient Haydee Pennington presents today " "for follow-up visit.  Patient was last seen on 5/10/2023. At that visit, the plan was to complete EMG and determine more treatment options after this procedure. Patient reports pain as 6/10 today.  The patient c/o pain in R piriformis for the past 3-4 weeks. Denies pain radiating past her mid thigh.   Sitting has been painful but whenever she stands, she experiences a "catching" sensation. Admits to using previous Rx for Flexeril which has helped.  The patient has been taking Gabapentin 300 mg TID, Ibuprofen or Naproxen 2-3 times daily as needed.  Denies numbness,tingling and weakness.  The patient has not completed EMG.       Interval History (5/9/2023):  Haydee Pennington presents today for follow-up visit.  Patient was last seen on 3/29/2023. At that visit, the plan was to trial compound cream, she reports this helps somewhat - moreso than other creams. She continues to perform modified yoga. Last follow up with Dr. Mock on 05/02/2023 4 weeks post op. Referral sent to [facility] for EMG, called but not scheduled on 3/29, we will reschedule. Recovering well from hysterectomy. She reports new pain in her left trap that causes intermittent tingling in her left hand into her pinky finger. She is using her theracane and stretching with some relief.    Interval History (3/28/2023):  Haydee Pennington presents today for follow-up visit.  Patient was last seen on 1/26/2023. Recently underwent hysterectomy last week on 03/23/2023 with Dr. Mock. Patient reports pain as 2/10 today. Previous plan was to discontinue Topamax due to side effects, restart Gabapentin and move forward with hysterectomy per OBGYN.  She reports 2 days after the hysterectomy she felt fine, she thinks this was due in part to the anesthesia.  She reports she continues to have pain in her low right lumbosacral region with radiation into her lateral hip.  Pain is exacerbated by moving from sitting to standing and this movement also causes " radiation into her right lower extremity L5 dermatomal distribution down to her right shin in the bottom of her foot.  She endorses tingling in the bottom of her foot and restless sensation in her right leg while sleeping.  A few weeks ago she saw her PCP who prescribed her Flexeril, steroids, Norco, and advised her to increase her gabapentin.  She reports this had no effect on her symptoms and they remain constant and persistent.  She also reports that the last couple of days she is had two near falls due to the right ankle giving out on her.      Interval History (1/26/2023):  Haydee Pennington presents today via telemed for follow-up visit.  Patient was last seen on 12/15/2022. At that visit, the plan was to d/c Gabapentin and begin Topamax.  Patient reports pain as 7/10 today. Patient previously cited that her pain is worse around the time of her cycle and has significant menorrhagia. Saw [facility] yesterday, planning for robotic hysterectomy with salpingectomy per Dr. Mock. Possible surgery date of March 10th. She reports that since discontinuing the Gabapentin and starting the Topamax, her emotions have been all over the place. She denies any suicidal or homicidal ideation, but she reports that she believes things were more stable with the Gabapentin.       Interval History (12/15/2022):  Haydee Pennington presents today via telemed for follow-up visit.  Patient was last seen on 9/20/2022. Last injection right SIJ + right GT bursa injection on 06/03/2022. She is not interested in any repeat injections as she states the last injection worsened her pain. Patient reports pain as 6/10 today. Pain in her right shoulder with tingling in  her right hand and pain along her lumbar spine  rosa  band like distribution with radiation into her left leg described as a tightness and burning. She has been completing Kirk exercises for core strengthening and lumbar stabilization and additional core strengthening  "exercises and  hip flexor and IT band stretches without improvement in symptoms. She does notice that her symptoms are worse around her cycle and she has an appointment with her gynecologist next week.       Interval History (9/20/2022):  Haydee Pennington presents today for follow-up visit.  Patient was last seen on 7/12/2022. Patient reports pain as "0/10 today. Last injection right SIJ + right GT bursa injection on 06/03/2022. Patient reports pain flare that began a couple of weeks ago. She reports continued pain over bilateral SIJ and GT bursa. Patient was prescribed a medrol dose pack on 09/09/2022 to help with pain. She reports medrol dose pack helped somewhat with the pain. She is not interested in any repeat injections as she states the last injection worsened her pain. Interested in continuing physician directed exercises and stretches. She also reports significant weight gain with Seroquel, has appointment scheduled with Dr. Kaplan to discuss medication side effects.    Interval history 07/12/2022  Patient presents status post right-sided sacroiliac joint and greater trochanteric bursa injection 06/03/2022.  Patient reports at least 45-50% pain relief overlying right-sided sacroiliac joint and greater trochanteric bursa territories.  Patient continues to report pain down the lateral aspect of the right lower extremity in L4 distribution to the foot.  Patient reports improvement in symptoms with continuation of physical therapy exercises.  She does report financial difficulty with physical therapy and has requesting physician directed exercises at home.  Patient has continued gabapentin and has reached a titration of 300 mg 3 times daily.  She does report significant improvement in her neuropathic pain with this medication.  She denies any side effects from this medication.    HPI 05/17/2022  Haydee Pennington is a 38 y.o. female with past medical history significant for anxiety, depression, " hypertension, obesity, hypothyroidism, nicotine who presents to the clinic for the evaluation of lower back and right leg pain.  Patient reports pain began several years prior without inciting accident injury or trauma.  Today patient reports pain is constant which is rated a 6/10.  Pain is described as grinding in nature.  Patient reports pain begins in a bandlike distribution in her lower back and radiates down the lateral aspect of the right lower extremity to the plantar aspect of the foot in L4-S1 distribution.  Pain is exacerbated with prolonged standing, walking, bending, lifting and even with prolonged sitting.  Of note patient works from home for a [employer] center and reports pain is exacerbated with prolonged sitting at the end of the day.  Pain is also exacerbated associated with her menstrual cycle.  Pain has been improved in the past with gabapentin, which patient reports she was prescribed for anxiety.   Patient denies night fever/night sweats, urinary incontinence, bowel incontinence, significant weight loss, significant motor weakness and loss of sensations.    Pain Disability Index Review:         10/10/2023     9:27 AM 5/17/2022     8:14 AM   Last 3 PDI Scores   Pain Disability Index (PDI) 33 41       Non-Pharmacologic Treatments:  Physical Therapy/Home Exercise: no  Ice/Heat:no  TENS: no  Acupuncture: no  Massage: no  Chiropractic: no    Other: no      Pain Medications:  - Adjuvant Medications: Clonazepam (Klonopin) and Lexapro (Escitalopram)    Pain Procedures:   -06/03/2022:  Right-sided greater trochanteric bursa and sacroiliac joint injection    Past Medical History:   Diagnosis Date    Hypertension     Thyroid disease      Past Surgical History:   Procedure Laterality Date    CERVICAL BIOPSY  W/ LOOP ELECTRODE EXCISION  2005    HYSTERECTOMY  [id number]    INJECTION OF ANESTHETIC AGENT INTO SACROILIAC JOINT Right 06/03/2022    Procedure: right Sacroiliac Joint Injection with RN IV sedation;   Surgeon: Rey Monsalve MD;  Location: [facility];  Service: Pain Management;  Laterality: Right;    INJECTION OF JOINT Right 06/03/2022    Procedure: right GT bursa injection with RN IV sedation;  Surgeon: Rey Monsalve MD;  Location: [facility];  Service: Pain Management;  Laterality: Right;    REPAIR, PERFORATION, NASAL SEPTUM      sedated for nasal septal manipulation    XI ROBOTIC HYSTERECTOMY, WITH SALPINGECTOMY N/A 03/23/2023    Procedure: XI ROBOTIC HYSTERECTOMY,WITH SALPINGECTOMY;  Surgeon: GRICEL Mock MD;  Location: [facility] OR;  Service: OB/GYN;  Laterality: N/A;     Review of patient's allergies indicates:   Allergen Reactions    Abilify [aripiprazole] Rash    Sulfa (sulfonamide antibiotics) Hives       Current Outpatient Medications   Medication Sig    amLODIPine (NORVASC) 10 MG tablet Take 1 tablet (10 mg total) by mouth once daily.    clonazePAM (KLONOPIN) 0.5 MG tablet Take 1 tablet (0.5 mg total) by mouth daily as needed for Anxiety.    diclofenac (VOLTAREN) 75 MG EC tablet Take 1 tablet (75 mg total) by mouth 2 (two) times daily as needed (pain). Take with food.  Avoid other OTC NSAIDs (ex. Ibuprofen, naproxen) while taking this medication.    EScitalopram oxalate (LEXAPRO) 10 MG tablet Take 1 tablet (10 mg total) by mouth once daily.    gabapentin (NEURONTIN) 300 MG capsule Take 1 capsule (300 mg total) by mouth 3 (three) times daily.    levothyroxine (SYNTHROID) 125 MCG tablet Take 1 tablet (125 mcg total) by mouth before breakfast.    OXcarbazepine (TRILEPTAL) 300 MG Tab Take 1 tablet (300 mg total) by mouth once daily.    OXcarbazepine (TRILEPTAL) 600 MG Tab Take 1 tablet (600 mg total) by mouth nightly.     No current facility-administered medications for this visit.       Review of Systems     GENERAL:  No weight loss, malaise or fevers.  HEENT:   No recent changes in vision or hearing  NECK:  Negative for lumps, no difficulty with swallowing.  RESPIRATORY:  Negative for cough,  wheezing or shortness of breath, patient denies any recent URI.  CARDIOVASCULAR:  Negative for chest pain, leg swelling or palpitations.  GI:  Negative for abdominal discomfort, blood in stools or black stools or change in bowel habits.  MUSCULOSKELETAL:  See HPI.  SKIN:  Negative for lesions, rash, and itching.  PSYCH:  No mood disorder or recent psychosocial stressors.   HEMATOLOGY/LYMPHOLOGY:  Negative for prolonged bleeding, bruising easily or swollen nodes.    NEURO:   No history of headaches, syncope, paralysis, seizures or tremors.  All other reviewed and negative other than HPI.    OBJECTIVE:    Telemedicine Exam  There were no vitals filed for this visit.  There is no height or weight on file to calculate BMI.   (reviewed on 11/21/2023)     GENERAL: Well appearing, in no acute distress, alert and oriented x3.  Cooperative.  PSYCH:  Mood and affect appropriate.  SKIN: Skin color & texture with no obvious abnormalities.    HEAD/FACE:  Normocephalic, atraumatic.    PULM:  No difficulty breathing. No nasal flaring. No obvious wheezing.  EXTREMITIES: No obvious deformities. Moving all extremities well, appears to have symmetric strength throughout.  MUSCULOSKELETAL: No obvious atrophy abnormalities are noted.   NEURO: No obvious neurologic deficit.   GAIT: sitting.     Physical Exam: last in clinic visit:        Physical Exam  Last clinic visit:  GENERAL: Well appearing, in no acute distress, alert and oriented x3.  PSYCH:  Mood and affect appropriate.  SKIN: Skin color, texture, turgor normal, no rashes or lesions.  HEAD/FACE:  Normocephalic, atraumatic. Cranial nerves grossly intact.  PULM: No evidence of respiratory difficulty, symmetric chest rise.  GI:  Soft and non-tender.    BACK: Straight leg raising in the sitting and supine positions is negative to radicular pain. No pain to palpation over the facet joints of the lumbar spine or spinous processes. Normal range of motion without pain  reproduction.  EXTREMITIES: Peripheral joint ROM is full and pain free without obvious instability or laxity in all four extremities. No deformities, edema, or skin discoloration. Good capillary refill.  MUSCULOSKELETAL: Able to stand on heels & toes.    hip, and knee provocative maneuvers are negative. She has pain with palpation over the sacroiliac joints on R.  FABERs and Gaenslen test is negative.  SLR test is positive on the Right at 20 degrees. She has pain with IR/ER. Facet loading test is negative bilaterally.   Bilateral upper and lower extremity strength is normal and symmetric.  No atrophy or tone abnormalities are noted..  RIGHT Lower extremity: Hip flexion 5/5, Hip Abduction 5/5, Hip Adduction 5/5, Knee extension 5/5, Knee flexion 5/5, Ankle dorsiflexion5/5, Extensor hallucis longus 5/5, Ankle plantarflexion 5/5  LEFT Lower extremity:  Hip flexion 5/5, Hip Abduction 5/5,Hip Adduction 5/5, Knee extension 5/5, Knee flexion 5/5, Ankle dorsiflexion 5/5, Extensor hallucis longus 5/5, Ankle plantarflexion 5/5  -Normal testing knee (patellar) jerk and ankle (achilles) jerk    NEURO: Bilateral upper and lower extremity coordination and muscle stretch reflexes are physiologic and symmetric. No loss of sensation is noted.  GAIT: normal.    Imaging:   MRI lumbar spine 05/17/2022  FINDINGS:  Images are mildly degraded by patient motion artifact.     Alignment: Within normal limits.     Vertebrae: Tiny inferior T11 Schmorl's node.  Lumbar vertebral body heights are maintained.  No evidence of acute fracture.  Marrow signal is within normal limits.     Discs: L4-L5 and L5-S1 disc desiccation.  Disc heights are maintained.     Cord: Within normal limits.  Conus terminates at L1-L2.     Degenerative findings:     T12-L1: No significant disc bulge, spinal canal stenosis or neural foraminal stenosis.     L1-L2: No significant disc bulge, spinal canal stenosis or neural foraminal stenosis.     L2-L3: No significant disc  bulge, spinal canal stenosis or neural foraminal stenosis.     L3-L4: No significant disc bulge.  Mild facet arthropathy.  No significant spinal canal stenosis or neural foraminal stenosis.     L4-L5: Disc desiccation.  Minor broad-based disc bulge.  Mild bilateral facet arthropathy.  Facet arthropathy on the right closely approximates the exiting right L4 nerve root.  No significant spinal canal or neural foraminal stenosis.     L5-S1: Disc desiccation.  Asymmetric right mild broad-based disc bulge.  Minor bilateral facet arthropathy.  No significant spinal canal or neural foraminal stenosis.    08/22/18    X-Ray Lumbar Spine Complete 5 View    FINDINGS:  Vertebral body heights and alignment are within normal limits.  Intervertebral disk spaces are well preserved. Posterior elements appear grossly intact.  No pars defects visualized.  No acute fractures or subluxations are demonstrated.  The remaining visualized osseous and soft tissue structures demonstrate no appreciable abnormality.      ASSESSMENT: 38 y.o. year old female with lower back and leg pain, consistent with     1. Sacroiliitis  Case Request-RAD/Other Procedure Area: Right piriformis + right SIJ injection, Right piriformis + right SIJ injection      2. Piriformis syndrome of right side  Case Request-RAD/Other Procedure Area: Right piriformis + right SIJ injection, Right piriformis + right SIJ injection                PLAN:   - Interventions:  Will schedule the patient for right SI joint and piriformis injections to help with current symptoms.  Explained the risks and benefits of the procedure in detail with the patient today in clinic along with alternative treatment options, and the patient elected to pursue the intervention.      We will further evaluate radicular symptoms with bilateral lower extremity EMG- message sent to Physiatry to schedule this procedure.    - Anticoagulation use: no anticoagulation       report:  Reviewed and consistent  with medication use as prescribed.      - Medications:  -- Continue Gabapentin 300 mg TID. Advised patient to contact [facility] and [facility] to see if they have medication in stock. I can transfer to them if available.     - - Discontinue Flexeril (cyclobenzaprine) 5 mg BID PRN . Patient states it was not effective.     -- Continue Diclofenac 75 mg BID PRN pain. she denies any kidney or cardiac issues. Take with food.  Avoid other OTC NSAIDs (ex. Ibuprofen, naproxen) while taking this medication.    - Therapy:   We discussed continue home exercises demonstrated by physical therapy to help manage the patient/s painful condition. The patient was counseled that muscle strengthening will improve the long term prognosis in regards to pain and may also help increase range of motion and mobility. They were told that one of the goals of physical therapy is that they learn how to do the exercises so that they can do them independently at home daily upon completion. The patient's questions were answered and they were agreeable to this course.      - Imaging: Reviewed available imaging with patient and answered any questions they had regarding study.  EMG of lower extremities ordered to further evaluate neuropathic pain and weakness in right leg    - Follow up visit: return to clinic in 4-6 weeks after injections and to review EMG.      The above plan and management options were discussed at length with patient. Patient is in agreement with the above and verbalized understanding.    - I discussed the goals of interventional chronic pain management with the patient on today's visit. We discussed a multimodal and systematic approach to pain.  This includes diagnostic and therapeutic injections, adjuvant pharmacologic treatment, physical therapy, and at times psychiatry.  I emphasized the importance of regular exercise, core strengthening and stretching, diet and weight loss as a cornerstone of long-term  pain management.    - This condition does not require this patient to take time off of work, and the primary goal of our Pain Management services is to improve the patient's functional capacity.  - Patient Questions: Answered all of the patient's questions regarding diagnoses, therapy, treatment and next steps    Procedure note:  Right piriformis Trigger Point Injection:   The procedure was discussed with the patient including complications of nerve damage,  bleeding, infection, and failure of pain relief.   Trigger points were identified by palpation and marked. Alcohol swab prep of sites done. A mixture of 4mL 1% lidocaine + 40 mg Depo-Medrol was prepared (5 mL total).   A 25-gauge needle was advanced to the point of maximal tenderness, and the medication mixture  was injected after negative aspiration. Patient tolerated the procedure well and without complications. Patient was monitored for 15 min following the injection before discharged from the clinic.  Sites injected included:  Right piriformis           Berna Stevens PA-C  Interventional Pain Management  [facility]    Disclaimer:  This note was prepared using voice recognition system and is likely to have sound alike errors that may have been overlooked even after proof reading.  Please call me with any questions

## 2023-12-29 ENCOUNTER — TELEPHONE (OUTPATIENT)
Dept: PSYCHIATRY | Facility: CLINIC | Age: 38
End: 2023-12-29
Payer: COMMERCIAL
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
## 2024-01-02 ENCOUNTER — OFFICE VISIT (OUTPATIENT)
Dept: PSYCHIATRY | Facility: CLINIC | Age: 39
End: 2024-01-02
Payer: COMMERCIAL

## 2024-01-02 DIAGNOSIS — F43.10 PTSD (POST-TRAUMATIC STRESS DISORDER): ICD-10-CM

## 2024-01-02 DIAGNOSIS — F39 MOOD DISORDER: Primary | ICD-10-CM

## 2024-01-02 DIAGNOSIS — F41.9 ANXIETY: ICD-10-CM

## 2024-01-02 DIAGNOSIS — F41.1 GAD (GENERALIZED ANXIETY DISORDER): ICD-10-CM

## 2024-01-02 PROCEDURE — 99214 OFFICE O/P EST MOD 30 MIN: CPT | Mod: 95,,, | Performed by: PSYCHIATRY & NEUROLOGY

## 2024-01-02 PROCEDURE — 1159F MED LIST DOCD IN RCRD: CPT | Mod: CPTII,95,, | Performed by: PSYCHIATRY & NEUROLOGY

## 2024-01-02 PROCEDURE — 1160F RVW MEDS BY RX/DR IN RCRD: CPT | Mod: CPTII,95,, | Performed by: PSYCHIATRY & NEUROLOGY

## 2024-01-02 RX ORDER — CLONAZEPAM 0.5 MG/1
0.5 TABLET ORAL DAILY PRN
Qty: 30 TABLET | Refills: 1 | Status: SHIPPED | OUTPATIENT
Start: 2024-01-02

## 2024-01-02 RX ORDER — OXCARBAZEPINE 600 MG/1
600 TABLET, FILM COATED ORAL 2 TIMES DAILY
Qty: 60 TABLET | Refills: 1 | Status: SHIPPED | OUTPATIENT
Start: 2024-01-02 | End: 2024-02-14

## 2024-01-02 RX ORDER — ESCITALOPRAM OXALATE 20 MG/1
20 TABLET ORAL DAILY
Qty: 30 TABLET | Refills: 1 | Status: SHIPPED | OUTPATIENT
Start: 2024-01-02 | End: 2024-02-26 | Stop reason: ALTCHOICE

## 2024-01-29 ENCOUNTER — HOSPITAL ENCOUNTER (EMERGENCY)
Facility: HOSPITAL | Age: 39
Discharge: HOME | End: 2024-01-29
Attending: EMERGENCY MEDICINE
Payer: COMMERCIAL

## 2024-01-29 VITALS
DIASTOLIC BLOOD PRESSURE: 64 MMHG | SYSTOLIC BLOOD PRESSURE: 135 MMHG | OXYGEN SATURATION: 95 % | TEMPERATURE: 98 F | RESPIRATION RATE: 18 BRPM | HEART RATE: 68 BPM | BODY MASS INDEX: 41.72 KG/M2 | WEIGHT: 258.5 LBS

## 2024-01-29 DIAGNOSIS — R07.9 CHEST PAIN: ICD-10-CM

## 2024-01-29 DIAGNOSIS — R07.89 ATYPICAL CHEST PAIN: Primary | ICD-10-CM

## 2024-01-29 LAB
ALBUMIN SERPL BCP-MCNC: 3.4 G/DL (ref 3.5–5.2)
ALP SERPL-CCNC: 79 U/L (ref 55–135)
ALT SERPL W/O P-5'-P-CCNC: 22 U/L (ref 10–44)
ANION GAP SERPL CALC-SCNC: 9 MMOL/L (ref 8–16)
AST SERPL-CCNC: 19 U/L (ref 10–40)
BASOPHILS # BLD AUTO: 0.1 K/UL (ref 0–0.2)
BASOPHILS NFR BLD: 1 % (ref 0–1.9)
BILIRUB SERPL-MCNC: 0.4 MG/DL (ref 0.1–1)
BNP SERPL-MCNC: 24 PG/ML (ref 0–99)
BUN SERPL-MCNC: 13 MG/DL (ref 6–20)
CALCIUM SERPL-MCNC: 8.9 MG/DL (ref 8.7–10.5)
CHLORIDE SERPL-SCNC: 108 MMOL/L (ref 95–110)
CO2 SERPL-SCNC: 22 MMOL/L (ref 23–29)
CREAT SERPL-MCNC: 0.8 MG/DL (ref 0.5–1.4)
D DIMER PPP IA.FEU-MCNC: 0.5 MG/L FEU
D DIMER PPP IA.FEU-MCNC: 0.5 MG/L FEU
DIFFERENTIAL METHOD BLD: ABNORMAL
EOSINOPHIL # BLD AUTO: 0.3 K/UL (ref 0–0.5)
EOSINOPHIL NFR BLD: 2.6 % (ref 0–8)
ERYTHROCYTE [DISTWIDTH] IN BLOOD BY AUTOMATED COUNT: 12.1 % (ref 11.5–14.5)
EST. GFR  (NO RACE VARIABLE): >60 ML/MIN/1.73 M^2
GLUCOSE SERPL-MCNC: 92 MG/DL (ref 70–110)
HCT VFR BLD AUTO: 42 % (ref 37–48.5)
HCV AB SERPL QL IA: NEGATIVE
HEP C VIRUS HOLD SPECIMEN: NORMAL
HGB BLD-MCNC: 14.7 G/DL (ref 12–16)
HIV 1+2 AB+HIV1 P24 AG SERPL QL IA: NEGATIVE
IMM GRANULOCYTES # BLD AUTO: 0.02 K/UL (ref 0–0.04)
IMM GRANULOCYTES NFR BLD AUTO: 0.2 % (ref 0–0.5)
LYMPHOCYTES # BLD AUTO: 2 K/UL (ref 1–4.8)
LYMPHOCYTES NFR BLD: 20.2 % (ref 18–48)
MCH RBC QN AUTO: 31.5 PG (ref 27–31)
MCHC RBC AUTO-ENTMCNC: 35 G/DL (ref 32–36)
MCV RBC AUTO: 90 FL (ref 82–98)
MONOCYTES # BLD AUTO: 0.7 K/UL (ref 0.3–1)
MONOCYTES NFR BLD: 6.9 % (ref 4–15)
NEUTROPHILS # BLD AUTO: 6.7 K/UL (ref 1.8–7.7)
NEUTROPHILS NFR BLD: 69.1 % (ref 38–73)
NRBC BLD-RTO: 0 /100 WBC
PLATELET # BLD AUTO: 366 K/UL (ref 150–450)
PMV BLD AUTO: 10 FL (ref 9.2–12.9)
POTASSIUM SERPL-SCNC: 3.9 MMOL/L (ref 3.5–5.1)
PROT SERPL-MCNC: 6.4 G/DL (ref 6–8.4)
RBC # BLD AUTO: 4.66 M/UL (ref 4–5.4)
SODIUM SERPL-SCNC: 139 MMOL/L (ref 136–145)
TROPONIN I SERPL DL<=0.01 NG/ML-MCNC: 0.01 NG/ML (ref 0–0.03)
WBC # BLD AUTO: 9.75 K/UL (ref 3.9–12.7)

## 2024-01-29 PROCEDURE — 80053 COMPREHEN METABOLIC PANEL: CPT | Performed by: NURSE PRACTITIONER

## 2024-01-29 PROCEDURE — 93010 ELECTROCARDIOGRAM REPORT: CPT | Mod: ,,, | Performed by: STUDENT IN AN ORGANIZED HEALTH CARE EDUCATION/TRAINING PROGRAM

## 2024-01-29 PROCEDURE — 85025 COMPLETE CBC W/AUTO DIFF WBC: CPT | Performed by: NURSE PRACTITIONER

## 2024-01-29 PROCEDURE — 85379 FIBRIN DEGRADATION QUANT: CPT | Performed by: EMERGENCY MEDICINE

## 2024-01-29 PROCEDURE — 84484 ASSAY OF TROPONIN QUANT: CPT | Performed by: NURSE PRACTITIONER

## 2024-01-29 PROCEDURE — 99285 EMERGENCY DEPT VISIT HI MDM: CPT | Mod: 25

## 2024-01-29 PROCEDURE — 25500020 PHARM REV CODE 255: Performed by: EMERGENCY MEDICINE

## 2024-01-29 PROCEDURE — 87389 HIV-1 AG W/HIV-1&-2 AB AG IA: CPT | Performed by: EMERGENCY MEDICINE

## 2024-01-29 PROCEDURE — 93005 ELECTROCARDIOGRAM TRACING: CPT

## 2024-01-29 PROCEDURE — 25000003 PHARM REV CODE 250: Performed by: EMERGENCY MEDICINE

## 2024-01-29 PROCEDURE — 36415 COLL VENOUS BLD VENIPUNCTURE: CPT | Performed by: EMERGENCY MEDICINE

## 2024-01-29 PROCEDURE — 83880 ASSAY OF NATRIURETIC PEPTIDE: CPT | Performed by: EMERGENCY MEDICINE

## 2024-01-29 PROCEDURE — 86803 HEPATITIS C AB TEST: CPT | Performed by: EMERGENCY MEDICINE

## 2024-01-29 RX ORDER — ASPIRIN 325 MG
325 TABLET ORAL
Status: COMPLETED | OUTPATIENT
Start: 2024-01-29 | End: 2024-01-29

## 2024-01-29 RX ORDER — HYDROXYZINE PAMOATE 25 MG/1
25 CAPSULE ORAL
Status: COMPLETED | OUTPATIENT
Start: 2024-01-29 | End: 2024-01-29

## 2024-01-29 RX ADMIN — ASPIRIN 325 MG ORAL TABLET 325 MG: 325 PILL ORAL at 06:01

## 2024-01-29 RX ADMIN — HYDROXYZINE PAMOATE 25 MG: 25 CAPSULE ORAL at 06:01

## 2024-01-29 RX ADMIN — IOHEXOL 100 ML: 350 INJECTION, SOLUTION INTRAVENOUS at 07:01

## 2024-01-29 NOTE — FIRST PROVIDER EVALUATION
Medical screening examination initiated.  I have conducted a focused provider triage encounter, findings are as follows:    Brief history of present illness:  38-year-old female with complaint of left-sided chest pain with radiation and neck since this morning at 8:00 a.m..  Also reports dizziness.    There were no vitals filed for this visit.    Pertinent physical exam: no distress, BBSCTA    Brief workup plan: cardiac work up     Preliminary workup initiated; this workup will be continued and followed by the physician or advanced practice provider that is assigned to the patient when roomed.

## 2024-01-29 NOTE — ED PROVIDER NOTES
"SCRIBE #1 NOTE: I, Christopher Helga, am scribing for, and in the presence of, Desiree Osborn MD. I have scribed the entire note.       History     Chief Complaint   Patient presents with    Chest Pain     Chest pain started this morning while at work, sitting at her desk, also reports dizziness. Denies shortness of breath, nausea or vomiting.     Review of patient's allergies indicates:   Allergen Reactions    Abilify [aripiprazole] Rash    Sulfa (sulfonamide antibiotics) Hives         History of Present Illness     HPI    1/29/2024, 4:29 PM  History obtained from the patient      History of Present Illness: Haydee Pennington is a 38 y.o. female patient with a PMHx of HTN, anxiety, depression, and thyroid disease who presents to the Emergency Department for evaluation of a "clenching" CP which onset this am while sitting at her desk. Pt states that the pain radiated up her neck to her ears. Symptoms are constant and moderate in severity. No mitigating or exacerbating factors reported. Associated sxs include LUE numbness and light-headedness. Patient denies any headache, vomiting, vision changes, no SOB, N/V/D, and all other sxs at this time. No prior Tx. Pt vapes. No further complaints or concerns at this time.       Arrival mode: Personal vehicle     PCP: Gumaro Del Real MD        Past Medical History:  Past Medical History:   Diagnosis Date    Hypertension     Thyroid disease        Past Surgical History:  Past Surgical History:   Procedure Laterality Date    CERVICAL BIOPSY  W/ LOOP ELECTRODE EXCISION  2005    HYSTERECTOMY  [phone]    INJECTION OF ANESTHETIC AGENT INTO SACROILIAC JOINT Right 06/03/2022    Procedure: right Sacroiliac Joint Injection with RN IV sedation;  Surgeon: Rey Monsalve MD;  Location: [facility];  Service: Pain Management;  Laterality: Right;    INJECTION OF JOINT Right 06/03/2022    Procedure: right GT bursa injection with RN IV sedation;  Surgeon: Rey Monsalve MD;  " "Location: [facility] PAIN [facility];  Service: Pain Management;  Laterality: Right;    REPAIR, PERFORATION, NASAL SEPTUM      sedated for nasal septal manipulation    XI ROBOTIC HYSTERECTOMY, WITH SALPINGECTOMY N/A 03/23/2023    Procedure: XI ROBOTIC HYSTERECTOMY,WITH SALPINGECTOMY;  Surgeon: GRICEL Mock MD;  Location: [facility] OR;  Service: OB/GYN;  Laterality: N/A;         Family History:  Family History   Problem Relation Age of Onset    Diabetes Mother     Cancer Mother         Lung and Breast    Hypertension Father     Dementia Father     Transient ischemic attack Father     Heart failure Father     Stroke Father        Social History:  Social History     Tobacco Use    Smoking status: Every Day     Current packs/day: 0.50     Average packs/day: 0.5 packs/day for 28.1 years (14.0 ttl pk-yrs)     Types: Vaping with nicotine, Cigarettes     Start date: 1/1/1996    Smokeless tobacco: Never    Tobacco comments:     3weeks strong only vaping low level nicotine   Substance and Sexual Activity    Alcohol use: No    Drug use: Yes     Frequency: 3.0 times per week     Types: Marijuana     Comment: AWEEK AGO    Sexual activity: Not Currently     Partners: Male     Birth control/protection: See Surgical Hx        Review of Systems     Review of Systems   Constitutional:  Negative for fever.   HENT:  Positive for ear pain (bilateral). Negative for sore throat.    Respiratory:  Negative for shortness of breath.    Cardiovascular:  Positive for chest pain ("clenching").   Gastrointestinal:  Negative for diarrhea, nausea and vomiting.   Genitourinary:  Negative for dysuria.   Musculoskeletal:  Positive for neck pain. Negative for back pain.   Skin:  Negative for rash.   Neurological:  Positive for light-headedness and numbness (LUE). Negative for weakness.   Hematological:  Does not bruise/bleed easily.   All other systems reviewed and are negative.     Physical Exam     Initial Vitals [01/29/24 1555]   BP Pulse Resp Temp SpO2 "   137/87 104 16 98.7 °F (37.1 °C) 96 %      MAP       --          Physical Exam  Nursing Notes and Vital Signs Reviewed.  Constitutional: Patient is in no acute distress. Well-developed and well-nourished.  Head: Atraumatic. Normocephalic.  Eyes: PERRL. EOM intact. Conjunctivae are not pale. No scleral icterus.  ENT: Mucous membranes are moist. Oropharynx is clear and symmetric.    Neck: Supple. Full ROM. No lymphadenopathy.  Cardiovascular: Regular rate. Regular rhythm. No murmurs, rubs, or gallops. Distal pulses are 2+ and symmetric.  Pulmonary/Chest: No respiratory distress. Clear to auscultation bilaterally. No wheezing or rales.  Abdominal: Soft and non-distended.  There is no tenderness.  No rebound, guarding, or rigidity. Good bowel sounds.  Genitourinary: No CVA tenderness  Musculoskeletal: Moves all extremities. No obvious deformities. No edema. No calf tenderness.  Skin: Warm and dry.  Neurological:  Alert, awake, and appropriate.  Normal speech.  No acute focal neurological deficits are appreciated.  Psychiatric: Normal affect. Good eye contact. Appropriate in content.     ED Course   Procedures  ED Vital Signs:  Vitals:    01/29/24 1555 01/29/24 1800 01/29/24 1805 01/29/24 1815   BP: 137/87 131/77     Pulse: 104 64  66   Resp: 16 20 20 (!) 22   Temp: 98.7 °F (37.1 °C)      TempSrc: Oral      SpO2: 96% (!) 92%  (!) 94%   Weight: 117.3 kg (258 lb 7.8 oz)       01/29/24 1820 01/29/24 1830 01/29/24 1930 01/29/24 2000   BP: 139/87 (!) 141/81  135/64   Pulse: 72 66  68   Resp: (!) 33 20 18 18   Temp:    98.1 °F (36.7 °C)   TempSrc:       SpO2: 95% 97%  95%   Weight:           Abnormal Lab Results:  Labs Reviewed   CBC W/ AUTO DIFFERENTIAL - Abnormal; Notable for the following components:       Result Value    MCH 31.5 (*)     All other components within normal limits    Narrative:     Release to patient->Immediate   COMPREHENSIVE METABOLIC PANEL - Abnormal; Notable for the following components:    CO2 22 (*)      Albumin 3.4 (*)     All other components within normal limits    Narrative:     Release to patient->Immediate   D DIMER, QUANTITATIVE - Abnormal; Notable for the following components:    D-Dimer 0.50 (*)     All other components within normal limits   D DIMER, QUANTITATIVE - Abnormal; Notable for the following components:    D-Dimer 0.50 (*)     All other components within normal limits   TROPONIN I    Narrative:     Release to patient->Immediate   HIV 1 / 2 ANTIBODY    Narrative:     Release to patient->Immediate   HEPATITIS C ANTIBODY    Narrative:     Release to patient->Immediate   HEP C VIRUS HOLD SPECIMEN    Narrative:     Release to patient->Immediate   B-TYPE NATRIURETIC PEPTIDE   D DIMER, QUANTITATIVE        All Lab Results:  Results for orders placed or performed during the hospital encounter of 01/29/24   CBC auto differential   Result Value Ref Range    WBC 9.75 3.90 - 12.70 K/uL    RBC 4.66 4.00 - 5.40 M/uL    Hemoglobin 14.7 12.0 - 16.0 g/dL    Hematocrit 42.0 37.0 - 48.5 %    MCV 90 82 - 98 fL    MCH 31.5 (H) 27.0 - 31.0 pg    MCHC 35.0 32.0 - 36.0 g/dL    RDW 12.1 11.5 - 14.5 %    Platelets 366 150 - 450 K/uL    MPV 10.0 9.2 - 12.9 fL    Immature Granulocytes 0.2 0.0 - 0.5 %    Gran # (ANC) 6.7 1.8 - 7.7 K/uL    Immature Grans (Abs) 0.02 0.00 - 0.04 K/uL    Lymph # 2.0 1.0 - 4.8 K/uL    Mono # 0.7 0.3 - 1.0 K/uL    Eos # 0.3 0.0 - 0.5 K/uL    Baso # 0.10 0.00 - 0.20 K/uL    nRBC 0 0 /100 WBC    Gran % 69.1 38.0 - 73.0 %    Lymph % 20.2 18.0 - 48.0 %    Mono % 6.9 4.0 - 15.0 %    Eosinophil % 2.6 0.0 - 8.0 %    Basophil % 1.0 0.0 - 1.9 %    Differential Method Automated    Comprehensive metabolic panel   Result Value Ref Range    Sodium 139 136 - 145 mmol/L    Potassium 3.9 3.5 - 5.1 mmol/L    Chloride 108 95 - 110 mmol/L    CO2 22 (L) 23 - 29 mmol/L    Glucose 92 70 - 110 mg/dL    BUN 13 6 - 20 mg/dL    Creatinine 0.8 0.5 - 1.4 mg/dL    Calcium 8.9 8.7 - 10.5 mg/dL    Total Protein 6.4 6.0 - 8.4  g/dL    Albumin 3.4 (L) 3.5 - 5.2 g/dL    Total Bilirubin 0.4 0.1 - 1.0 mg/dL    Alkaline Phosphatase 79 55 - 135 U/L    AST 19 10 - 40 U/L    ALT 22 10 - 44 U/L    eGFR >60 >60 mL/min/1.73 m^2    Anion Gap 9 8 - 16 mmol/L   Troponin I   Result Value Ref Range    Troponin I 0.010 0.000 - 0.026 ng/mL   HIV 1/2 Ag/Ab (4th Gen)   Result Value Ref Range    HIV 1/2 Ag/Ab Negative Negative   Hepatitis C Antibody   Result Value Ref Range    Hepatitis C Ab Negative Negative   HCV Virus Hold Specimen   Result Value Ref Range    HEP C Virus Hold Specimen Hold for HCV sendout    Brain natriuretic peptide   Result Value Ref Range    BNP 24 0 - 99 pg/mL   D dimer, quantitative   Result Value Ref Range    D-Dimer 0.50 (H) <0.50 mg/L FEU   D-Dimer, Quantitative   Result Value Ref Range    D-Dimer 0.50 (H) <0.50 mg/L FEU        Imaging Results:  Imaging Results              CTA Chest Non-Coronary (PE Studies) (Final result)  Result time 01/29/24 19:37:26      Final result by Ezequiel Prieto MD (01/29/24 19:37:26)                   Impression:      No pulmonary embolism.  No dissection.  No pneumonia.    All CT scans   are performed using dose optimization techniques including the following: automated exposure control; adjustment of the mA and/or kV; use of iterative reconstruction technique.  Dose modulation was employed for ALARA by means of: Automated exposure control; adjustment of the mA and/or kV according to patient size (this includes techniques or standardized protocols for targeted exams where dose is matched to indication/reason for exam; i.e. extremities or head); and/or use of iterative reconstructive technique.      Electronically signed by: Ezequiel Prieto  Date:    01/29/2024  Time:    19:37               Narrative:    EXAMINATION:  CTA CHEST NON CORONARY (PE STUDIES)    CLINICAL HISTORY:  Pulmonary embolism (PE) suspected, positive D-dimer;    TECHNIQUE:  Low dose axial images, sagittal and coronal reformations were  obtained from the thoracic inlet to the lung bases following the IV administration of 100 mL of Omnipaque 350.  Contrast timing was optimized to evaluate the pulmonary arteries.  MIP images were performed.    COMPARISON:  None    FINDINGS:  No evidence for pulmonary embolism.  No evidence for aortic dissection or aneurysm.  Cardiovascular structures have a normal non gated appearance.  Lungs are essentially clear.  No acute osseous injury mild subsegmental atelectasis.                                       X-Ray Chest 1 View (Final result)  Result time 01/29/24 16:46:44      Final result by Ezequiel Prieto MD (01/29/24 16:46:44)                   Impression:      No acute abnormality.      Electronically signed by: Ezequiel Prieto  Date:    01/29/2024  Time:    16:46               Narrative:    EXAMINATION:  XR CHEST 1 VIEW    CLINICAL HISTORY:  Chest pain, unspecified    TECHNIQUE:  Single frontal view of the chest was performed.    COMPARISON:  None    FINDINGS:  The lungs are clear, with normal appearance of pulmonary vasculature and no pleural effusion or pneumothorax.    Prominent cardiac silhouette.    . The hilar and mediastinal contours are unremarkable.    Bones are intact.                                       The EKG was ordered, reviewed, and independently interpreted by the ED provider.  Interpretation time: 15:58  Rate: 87 BPM  Rhythm: normal sinus rhythm  Interpretation: No acute ST changes. No STEMI.           The Emergency Provider reviewed the vital signs and test results, which are outlined above.     ED Discussion       7:18 PM: Reassessed pt at this time. Discussed with pt all pertinent ED information and results. Discussed pt dx and plan of tx. Gave pt all f/u and return to the ED instructions. All questions and concerns were addressed at this time. Pt expresses understanding of information and instructions, and is comfortable with plan to discharge. Pt is stable for discharge.    I discussed  with patient and/or family/caretaker that evaluation in the ED does not suggest any emergent or life threatening medical conditions requiring immediate intervention beyond what was provided in the ED, and I believe patient is safe for discharge.  Regardless, an unremarkable evaluation in the ED does not preclude the development or presence of a serious of life threatening condition. As such, patient was instructed to return immediately for any worsening or change in current symptoms.         Medical Decision Making  DDX:  1. Stress/Anxiety  2. ACS  3. Dissection  4. PE    Presents with sharp non-exertional CP, smokes, no hx of heart disease, HEART Score 1, vital signs normal, ECG reviewed and normal, CXR normal, lab work reviewed and troponin normal, CBC and CMP normal, d dimer mildly elevated and CTA done and negative for PE or other acute process, she is stable for discharge, HEART Score of only 1, outpatient follow up encouraged.     Amount and/or Complexity of Data Reviewed  Labs: ordered. Decision-making details documented in ED Course.  Radiology: ordered. Decision-making details documented in ED Course.  ECG/medicine tests: ordered and independent interpretation performed. Decision-making details documented in ED Course.    Risk  OTC drugs.  Prescription drug management.       Additional MDM:   Smoking Cessation: The patient is a smoker. The patient was counseled on smoking cessation for: 4 minutes. The patient was counseled on tobacco related  health complications.   Heart Score:    History:          Slightly suspicious.  ECG:             Normal  Age:               Less than 45 years  Risk factors: 1-2 risk factors  Troponin:       Less than or equal to normal limit  Heart Score = 1                ED Medication(s):  Medications   aspirin tablet 325 mg (325 mg Oral Given 1/29/24 1829)   hydrOXYzine pamoate capsule 25 mg (25 mg Oral Given 1/29/24 1829)   iohexoL (OMNIPAQUE 350) injection 100 mL (100 mLs  Intravenous Given 1/29/24 1919)       Discharge Medication List as of 1/29/2024  7:48 PM           Follow-up Information       Gumaro Del Real MD. Schedule an appointment as soon as possible for a visit in 2 days.    Specialty: Family Medicine  Why: Return to the Emergency Room, If symptoms worsen  Contact information:  [address] [facility] [address]  [phone]                                 Scribe Attestation:   Scribe #1: I performed the above scribed service and the documentation accurately describes the services I performed. I attest to the accuracy of the note.     Attending:   Physician Attestation Statement for Scribe #1: I, Desiree Osborn MD, personally performed the services described in this documentation, as scribed by Christopher Partida, in my presence, and it is both accurate and complete.           Clinical Impression       ICD-10-CM ICD-9-CM   1. Atypical chest pain  R07.89 786.59   2. Chest pain  R07.9 786.50       Disposition:   Disposition: Discharged  Condition: Stable         Desiree Osborn MD  02/03/24 1526

## 2024-01-30 ENCOUNTER — OFFICE VISIT (OUTPATIENT)
Dept: INTERNAL MEDICINE | Facility: CLINIC | Age: 39
End: 2024-01-30
Payer: COMMERCIAL

## 2024-01-30 VITALS
TEMPERATURE: 99 F | HEIGHT: 66 IN | SYSTOLIC BLOOD PRESSURE: 100 MMHG | OXYGEN SATURATION: 96 % | HEART RATE: 72 BPM | WEIGHT: 252.75 LBS | DIASTOLIC BLOOD PRESSURE: 66 MMHG | BODY MASS INDEX: 40.62 KG/M2

## 2024-01-30 DIAGNOSIS — M46.1 SACROILIITIS: ICD-10-CM

## 2024-01-30 DIAGNOSIS — R07.89 CHEST WALL PAIN: ICD-10-CM

## 2024-01-30 DIAGNOSIS — E03.9 HYPOTHYROIDISM, UNSPECIFIED TYPE: Primary | ICD-10-CM

## 2024-01-30 DIAGNOSIS — R07.9 CHEST PAIN, UNSPECIFIED TYPE: ICD-10-CM
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
## 2024-01-30 DIAGNOSIS — E66.01 CLASS 3 SEVERE OBESITY WITH SERIOUS COMORBIDITY AND BODY MASS INDEX (BMI) OF 40.0 TO 44.9 IN ADULT, UNSPECIFIED OBESITY TYPE: ICD-10-CM

## 2024-01-30 DIAGNOSIS — F41.9 ANXIETY: ICD-10-CM

## 2024-01-30 PROCEDURE — 99999 PR PBB SHADOW E&M-EST. PATIENT-LVL III: CPT | Mod: PBBFAC,,, | Performed by: FAMILY MEDICINE

## 2024-01-30 PROCEDURE — 99214 OFFICE O/P EST MOD 30 MIN: CPT | Mod: S$GLB,,, | Performed by: FAMILY MEDICINE

## 2024-01-30 PROCEDURE — 3078F DIAST BP <80 MM HG: CPT | Mod: CPTII,S$GLB,, | Performed by: FAMILY MEDICINE

## 2024-01-30 PROCEDURE — 3074F SYST BP LT 130 MM HG: CPT | Mod: CPTII,S$GLB,, | Performed by: FAMILY MEDICINE

## 2024-01-30 PROCEDURE — 3008F BODY MASS INDEX DOCD: CPT | Mod: CPTII,S$GLB,, | Performed by: FAMILY MEDICINE

## 2024-01-30 PROCEDURE — 1159F MED LIST DOCD IN RCRD: CPT | Mod: CPTII,S$GLB,, | Performed by: FAMILY MEDICINE

## 2024-01-30 RX ORDER — PANTOPRAZOLE SODIUM 20 MG/1
20 TABLET, DELAYED RELEASE ORAL DAILY
Qty: 30 TABLET | Refills: 0 | Status: SHIPPED | OUTPATIENT
Start: 2024-01-30 | End: 2025-01-29

## 2024-01-30 NOTE — PROGRESS NOTES
Subjective:       Patient ID: Haydee Pennington is a 38 y.o. female.    Chief Complaint: Follow-up (ER visit/On going symptoms are dizziness, cough, and headache)    Follow-up        Patient Active Problem List   Diagnosis    Anxiety    Depression    Hypothyroidism    Stress headaches    HTN (hypertension)    Severe obesity (BMI 35.0-39.9) with comorbidity    Vitamin D deficiency    Tobacco dependence due to cigarettes    Sacroiliitis    Greater trochanteric bursitis of right hip       Past Medical History:   Diagnosis Date    Hypertension     Thyroid disease        Past Surgical History:   Procedure Laterality Date    CERVICAL BIOPSY  W/ LOOP ELECTRODE EXCISION  2005    HYSTERECTOMY  [phone]    INJECTION OF ANESTHETIC AGENT INTO SACROILIAC JOINT Right 06/03/2022    Procedure: right Sacroiliac Joint Injection with RN IV sedation;  Surgeon: Rey Monsalve MD;  Location: [facility]IN [facility];  Service: Pain Management;  Laterality: Right;    INJECTION OF JOINT Right 06/03/2022    Procedure: right GT bursa injection with RN IV sedation;  Surgeon: Rey Monsalve MD;  Location: [facility];  Service: Pain Management;  Laterality: Right;    REPAIR, PERFORATION, NASAL SEPTUM      sedated for nasal septal manipulation    XI ROBOTIC HYSTERECTOMY, WITH SALPINGECTOMY N/A 03/23/2023    Procedure: XI ROBOTIC HYSTERECTOMY,WITH SALPINGECTOMY;  Surgeon: GRICEL Mock MD;  Location: [facility] OR;  Service: OB/GYN;  Laterality: N/A;       Family History   Problem Relation Age of Onset    Diabetes Mother     Cancer Mother         Lung and Breast    Hypertension Father     Dementia Father     Transient ischemic attack Father     Heart failure Father     Stroke Father        Social History     Tobacco Use   Smoking Status Every Day    Current packs/day: 0.50    Average packs/day: 0.5 packs/day for 28.1 years (14.0 ttl pk-yrs)    Types: Vaping with nicotine, Cigarettes    Start date: 1/1/1996   Smokeless Tobacco Never   Tobacco Comments     3weeks strong only vaping low level nicotine       Wt Readings from Last 5 Encounters:   01/30/24 114.7 kg (252 lb 12.1 oz)   01/29/24 117.3 kg (258 lb 7.8 oz)   11/09/23 111.5 kg (245 lb 13 oz)   10/10/23 110.7 kg (244 lb 2.6 oz)   10/05/23 110.6 kg (243 lb 13.3 oz)       For further HPI details, see assessment and plan.    Review of Systems    Objective:      Vitals:    01/30/24 1513   BP: 100/66   Pulse: 72   Temp: 98.8 °F (37.1 °C)       Physical Exam  Constitutional:       General: She is not in acute distress.     Appearance: She is not ill-appearing.   Pulmonary:      Effort: Pulmonary effort is normal. No respiratory distress.   Neurological:      General: No focal deficit present.      Mental Status: She is alert.   Psychiatric:         Mood and Affect: Mood normal.         Behavior: Behavior normal.         Assessment:       1. Hypothyroidism, unspecified type    2. Snake bite, initial encounter    3. Anxiety    4. Chest pain, unspecified type    5. Class 3 severe obesity with serious comorbidity and body mass index (BMI) of 40.0 to 44.9 in adult, unspecified obesity type    6. Sacroiliitis        Plan:     Patient presents for ER follow-up encounter     She presents to the emergency department yesterday with chest pain that radiated up her neck.  Reviewed that encounter.  Cardiovascular studies appeared to be normal.  Negative troponin.  CTA was obtained given D-dimer.  No pulmonary embolism appreciated    Fortunately patient is feeling improved to day.    Continue to monitor   I do have concerns this could be panic attacks.  Encouraged she continue to follow up with her primary psychiatrist in utilize medication as prescribed however patient does have concerns this is different from her previous panic attacks     She does have some reflux.  I am going to send in a month supply of Protonix.  I want her to take it daily for the next week and assess for complete resolution of any kind of reflux symptoms.   If she is able to get off after that with no further symptoms I am fine with it.  If she needs it persistently we will continue the medication for the foreseeable future but want to avoid long-term use if possible.  Encouraged weight loss which he is working on and avoiding certain food triggers     Additionally patient does have some anterior chest wall tenderness to palpation.  Quite possible this could be costochondritis.  Discussed alternating Tylenol and ibuprofen.  Monitor for resolution.    Hypothyroidism  Patient is on Synthroid at 125 mcg   Her TSH has been up and down but most recently as of about 3 months ago blood within normal limits.  He will it would be prudent we monitor this relatively closely given the variability.  It is possible hyperthyroid state could contribute to underlying anxiety.  We have a follow-up visit in a couple of months.  I will order labs to be drawn a few days before her next visit so we can review them in person      Snake bite  Patient has a python.  It bit her.  She has not having pain.  She does have several small pinpoint itchy red lesions on her right thumb.  I had prescribed her Augmentin in September for another snake bite.  She did not take the antibiotic and she is still has it.  I would encourage her to utilize Augmentin to prevent any potential infection given the penetrating nature of snakes teeth.  Monitor closely and if it worsens at all I want her to reach out and let me know.    Chronic idiopathic constipation   Patient has concerned her psychiatric medications to contributing to constipation   Currently using stool softeners and magnesium.  Occasionally magnesium citrate when it reaches a significant point.  I want her to try this with lifestyle intervention initially.  Increased fiber supplementation increase water.  Increased exercise.  Strongly encouraged she picks up Metamucil and utilizes it.  Discussed fiber can be obtained in a number of sources  particularly fruits and vegetables and gummies in breakfast cereals and snack bars.  She has tried a number of these options already.  I will be prescribing Linzess 145 mg to be used on an as needed basis.    Chronic pain discussed  Patient was sacroiliitis documented   Continue working with the pain management department   Emphasis placed on weight loss as a potential treatment plan to address her chronic conditions including her chronic pain.  Her body mass index is maintained at 40.8.  Discussed consistent caloric deficit in regular physical activity as primary means of weight loss  This note was verbally dictated, please excuse any type errors.

## 2024-01-30 NOTE — ED NOTES
IV access attempted x3 unsuccessful, Evie RN (Charge Nurse Notified) ED EMS at bedside attempting IV access.

## 2024-02-13 ENCOUNTER — PATIENT MESSAGE (OUTPATIENT)
Dept: PSYCHIATRY | Facility: CLINIC | Age: 39
End: 2024-02-13
Payer: COMMERCIAL

## 2024-02-14 ENCOUNTER — TELEPHONE (OUTPATIENT)
Dept: PSYCHIATRY | Facility: CLINIC | Age: 39
End: 2024-02-14
Payer: COMMERCIAL

## 2024-02-14 DIAGNOSIS — F39 MOOD DISORDER: ICD-10-CM

## 2024-02-14 RX ORDER — OXCARBAZEPINE 600 MG/1
TABLET, FILM COATED ORAL
Qty: 90 TABLET | Refills: 0 | Status: SHIPPED | OUTPATIENT
Start: 2024-02-14 | End: 2024-02-26 | Stop reason: ALTCHOICE

## 2024-02-14 NOTE — TELEPHONE ENCOUNTER
I called the patient regarding her message.  She indicates that beginning last week, coincident with starting an antibiotic, she began feeling depressed with decreased motivation, energy, and activity and difficulty with sleep.  She was sleeping during part of her work hours.  She felt depressed, anxious, and irritable.  Never with thoughts of self-harm, harm towards others, or psychosis.  She reports that yesterday and to a lesser extent today, she has felt mildly elevated with increased energy, racing thoughts, and distractibility.  She says that her level of activity and function has returned to normal again.  No excessive spending, other risks, or grandiosity.  Still no issues with harm to self or others or psychosis.  She reports medication adherence and denies side effects.  With options discussed regarding indications, rationale, risks, and side effects of increasing Trileptal or changing to Depakote (the patient has had a hysterectomy), the patient elects to increase Trileptal to 600 mg in the morning and 1200 mg at bedtime.  She says that she is also researching counseling options.  She volunteers that she will message for any questions or problems prior to her appointment later this month and volunteers understanding that she can use 911 and/or the ED for any urgent situations outside clinic hours.

## 2024-02-14 NOTE — TELEPHONE ENCOUNTER
----- Message from Candice Olea, PhD, MPAP sent at 2/13/2024  2:47 PM CST -----  Regarding: review chart  I've requested that staff try and get pt in with you sooner/add to wait list; tried to give some helpful ideas in meantime.

## 2024-02-19 ENCOUNTER — TELEPHONE (OUTPATIENT)
Dept: INTERNAL MEDICINE | Facility: CLINIC | Age: 39
End: 2024-02-19
Payer: COMMERCIAL

## 2024-02-22 ENCOUNTER — TELEPHONE (OUTPATIENT)
Dept: PSYCHIATRY | Facility: CLINIC | Age: 39
End: 2024-02-22
Payer: COMMERCIAL
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
## 2024-02-26 ENCOUNTER — OFFICE VISIT (OUTPATIENT)
Dept: PSYCHIATRY | Facility: CLINIC | Age: 39
End: 2024-02-26
Payer: COMMERCIAL

## 2024-02-26 DIAGNOSIS — Z79.899 HIGH RISK MEDICATION USE: ICD-10-CM

## 2024-02-26 DIAGNOSIS — F39 MOOD DISORDER: Primary | ICD-10-CM

## 2024-02-26 DIAGNOSIS — F41.1 GAD (GENERALIZED ANXIETY DISORDER): ICD-10-CM

## 2024-02-26 DIAGNOSIS — F43.10 PTSD (POST-TRAUMATIC STRESS DISORDER): ICD-10-CM

## 2024-02-26 PROCEDURE — 99214 OFFICE O/P EST MOD 30 MIN: CPT | Mod: 95,,, | Performed by: PSYCHIATRY & NEUROLOGY

## 2024-02-26 PROCEDURE — 1160F RVW MEDS BY RX/DR IN RCRD: CPT | Mod: CPTII,95,, | Performed by: PSYCHIATRY & NEUROLOGY

## 2024-02-26 PROCEDURE — 1159F MED LIST DOCD IN RCRD: CPT | Mod: CPTII,95,, | Performed by: PSYCHIATRY & NEUROLOGY

## 2024-02-26 RX ORDER — DIVALPROEX SODIUM 250 MG/1
250 TABLET, FILM COATED, EXTENDED RELEASE ORAL DAILY
Qty: 30 TABLET | Refills: 0 | Status: SHIPPED | OUTPATIENT
Start: 2024-02-26 | End: 2024-03-11 | Stop reason: DRUGHIGH

## 2024-02-26 RX ORDER — DIVALPROEX SODIUM 500 MG/1
500 TABLET, FILM COATED, EXTENDED RELEASE ORAL DAILY
Qty: 30 TABLET | Refills: 0 | Status: SHIPPED | OUTPATIENT
Start: 2024-02-26 | End: 2024-03-11 | Stop reason: SDUPTHER

## 2024-02-29 ENCOUNTER — PATIENT MESSAGE (OUTPATIENT)
Dept: PSYCHIATRY | Facility: CLINIC | Age: 39
End: 2024-02-29
Payer: COMMERCIAL

## 2024-02-29 NOTE — TELEPHONE ENCOUNTER
I called the patient regarding her message.  Her description insomnia, headaches, and diarrhea as not consistent with it being a side effect from Depakote, and she is willing to continue the medication.  She will begin using Klonopin at bedtime for sleep (has not taken any).  She is pleasant, appreciative and appropriate on the phone.  Mood is improved, anxiety is controlled, no psychosis, and no feelings of aggression or thoughts of harm to self or others.

## 2024-03-07 ENCOUNTER — TELEPHONE (OUTPATIENT)
Dept: PSYCHIATRY | Facility: CLINIC | Age: 39
End: 2024-03-07
Payer: COMMERCIAL

## 2024-03-08 ENCOUNTER — LAB VISIT (OUTPATIENT)
Dept: LAB | Facility: HOSPITAL | Age: 39
End: 2024-03-08
Attending: FAMILY MEDICINE
Payer: COMMERCIAL

## 2024-03-08 DIAGNOSIS — Z79.899 HIGH RISK MEDICATION USE: ICD-10-CM

## 2024-03-08 DIAGNOSIS — N92.0 MENORRHAGIA WITH REGULAR CYCLE: ICD-10-CM

## 2024-03-08 DIAGNOSIS — E03.9 HYPOTHYROIDISM, UNSPECIFIED TYPE: ICD-10-CM

## 2024-03-08 DIAGNOSIS — I10 HYPERTENSION, UNSPECIFIED TYPE: ICD-10-CM

## 2024-03-08 LAB
BASOPHILS # BLD AUTO: 0.09 K/UL (ref 0–0.2)
BASOPHILS NFR BLD: 1.1 % (ref 0–1.9)
DIFFERENTIAL METHOD BLD: NORMAL
EOSINOPHIL # BLD AUTO: 0.2 K/UL (ref 0–0.5)
EOSINOPHIL NFR BLD: 2 % (ref 0–8)
ERYTHROCYTE [DISTWIDTH] IN BLOOD BY AUTOMATED COUNT: 12.1 % (ref 11.5–14.5)
HCT VFR BLD AUTO: 44.8 % (ref 37–48.5)
HGB BLD-MCNC: 14.9 G/DL (ref 12–16)
IMM GRANULOCYTES # BLD AUTO: 0.01 K/UL (ref 0–0.04)
IMM GRANULOCYTES NFR BLD AUTO: 0.1 % (ref 0–0.5)
LYMPHOCYTES # BLD AUTO: 2.7 K/UL (ref 1–4.8)
LYMPHOCYTES NFR BLD: 33.6 % (ref 18–48)
MCH RBC QN AUTO: 30.7 PG (ref 27–31)
MCHC RBC AUTO-ENTMCNC: 33.3 G/DL (ref 32–36)
MCV RBC AUTO: 92 FL (ref 82–98)
MONOCYTES # BLD AUTO: 0.6 K/UL (ref 0.3–1)
MONOCYTES NFR BLD: 7.9 % (ref 4–15)
NEUTROPHILS # BLD AUTO: 4.5 K/UL (ref 1.8–7.7)
NEUTROPHILS NFR BLD: 55.3 % (ref 38–73)
NRBC BLD-RTO: 0 /100 WBC
PLATELET # BLD AUTO: 356 K/UL (ref 150–450)
PMV BLD AUTO: 11.1 FL (ref 9.2–12.9)
RBC # BLD AUTO: 4.85 M/UL (ref 4–5.4)
TSH SERPL DL<=0.005 MIU/L-ACNC: 3.84 UIU/ML (ref 0.4–4)
VALPROATE SERPL-MCNC: 37.9 UG/ML (ref 50–100)
WBC # BLD AUTO: 8.12 K/UL (ref 3.9–12.7)

## 2024-03-08 PROCEDURE — 84443 ASSAY THYROID STIM HORMONE: CPT | Performed by: FAMILY MEDICINE

## 2024-03-08 PROCEDURE — 80164 ASSAY DIPROPYLACETIC ACD TOT: CPT | Performed by: PSYCHIATRY & NEUROLOGY

## 2024-03-08 PROCEDURE — 36415 COLL VENOUS BLD VENIPUNCTURE: CPT | Performed by: FAMILY MEDICINE

## 2024-03-08 PROCEDURE — 85025 COMPLETE CBC W/AUTO DIFF WBC: CPT | Performed by: PSYCHIATRY & NEUROLOGY
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
## 2024-03-11 ENCOUNTER — OFFICE VISIT (OUTPATIENT)
Dept: PSYCHIATRY | Facility: CLINIC | Age: 39
End: 2024-03-11
Payer: COMMERCIAL

## 2024-03-11 DIAGNOSIS — F43.10 PTSD (POST-TRAUMATIC STRESS DISORDER): ICD-10-CM

## 2024-03-11 DIAGNOSIS — F39 MOOD DISORDER: Primary | ICD-10-CM

## 2024-03-11 DIAGNOSIS — Z79.899 HIGH RISK MEDICATION USE: ICD-10-CM

## 2024-03-11 DIAGNOSIS — F41.1 GAD (GENERALIZED ANXIETY DISORDER): ICD-10-CM

## 2024-03-11 PROCEDURE — 1160F RVW MEDS BY RX/DR IN RCRD: CPT | Mod: CPTII,95,, | Performed by: PSYCHIATRY & NEUROLOGY

## 2024-03-11 PROCEDURE — 99214 OFFICE O/P EST MOD 30 MIN: CPT | Mod: 95,,, | Performed by: PSYCHIATRY & NEUROLOGY

## 2024-03-11 PROCEDURE — 1159F MED LIST DOCD IN RCRD: CPT | Mod: CPTII,95,, | Performed by: PSYCHIATRY & NEUROLOGY

## 2024-03-11 RX ORDER — DIVALPROEX SODIUM 500 MG/1
1000 TABLET, FILM COATED, EXTENDED RELEASE ORAL DAILY
Qty: 30 TABLET | Refills: 0 | Status: SHIPPED | OUTPATIENT
Start: 2024-03-11 | End: 2024-04-08 | Stop reason: SDUPTHER

## 2024-03-11 RX ORDER — CLONAZEPAM 1 MG/1
1 TABLET ORAL NIGHTLY
Qty: 30 TABLET | Refills: 0 | Status: SHIPPED | OUTPATIENT
Start: 2024-03-11 | End: 2024-04-08 | Stop reason: SDUPTHER

## 2024-03-14 ENCOUNTER — OFFICE VISIT (OUTPATIENT)
Dept: INTERNAL MEDICINE | Facility: CLINIC | Age: 39
End: 2024-03-14
Payer: COMMERCIAL

## 2024-03-14 VITALS
WEIGHT: 259.25 LBS | HEIGHT: 66 IN | HEART RATE: 95 BPM | DIASTOLIC BLOOD PRESSURE: 80 MMHG | SYSTOLIC BLOOD PRESSURE: 114 MMHG | BODY MASS INDEX: 41.66 KG/M2 | OXYGEN SATURATION: 95 %

## 2024-03-14 DIAGNOSIS — K21.9 GASTROESOPHAGEAL REFLUX DISEASE, UNSPECIFIED WHETHER ESOPHAGITIS PRESENT: ICD-10-CM

## 2024-03-14 DIAGNOSIS — E03.9 HYPOTHYROIDISM, UNSPECIFIED TYPE: Primary | ICD-10-CM

## 2024-03-14 DIAGNOSIS — E66.01 CLASS 3 SEVERE OBESITY DUE TO EXCESS CALORIES WITH SERIOUS COMORBIDITY IN ADULT, UNSPECIFIED BMI: ICD-10-CM

## 2024-03-14 DIAGNOSIS — I10 HYPERTENSION, UNSPECIFIED TYPE: ICD-10-CM

## 2024-03-14 PROCEDURE — 3008F BODY MASS INDEX DOCD: CPT | Mod: CPTII,S$GLB,, | Performed by: FAMILY MEDICINE

## 2024-03-14 PROCEDURE — 1159F MED LIST DOCD IN RCRD: CPT | Mod: CPTII,S$GLB,, | Performed by: FAMILY MEDICINE

## 2024-03-14 PROCEDURE — 99999 PR PBB SHADOW E&M-EST. PATIENT-LVL III: CPT | Mod: PBBFAC,,, | Performed by: FAMILY MEDICINE

## 2024-03-14 PROCEDURE — 3079F DIAST BP 80-89 MM HG: CPT | Mod: CPTII,S$GLB,, | Performed by: FAMILY MEDICINE

## 2024-03-14 PROCEDURE — 99214 OFFICE O/P EST MOD 30 MIN: CPT | Mod: S$GLB,,, | Performed by: FAMILY MEDICINE

## 2024-03-14 PROCEDURE — 3074F SYST BP LT 130 MM HG: CPT | Mod: CPTII,S$GLB,, | Performed by: FAMILY MEDICINE

## 2024-03-14 RX ORDER — CLOTRIMAZOLE 1 %
CREAM (GRAM) TOPICAL 2 TIMES DAILY
Qty: 60 G | Refills: 0 | Status: SHIPPED | OUTPATIENT
Start: 2024-03-14

## 2024-03-14 RX ORDER — LEVOTHYROXINE SODIUM 137 UG/1
137 TABLET ORAL
Qty: 90 TABLET | Refills: 1 | Status: SHIPPED | OUTPATIENT
Start: 2024-03-14 | End: 2025-03-14

## 2024-03-14 NOTE — PROGRESS NOTES
Subjective:       Patient ID: Haydee Pennington is a 39 y.o. female.    Chief Complaint: Follow-up (lab)    Follow-up        Patient Active Problem List   Diagnosis    Anxiety    Depression    Hypothyroidism    Stress headaches    HTN (hypertension)    Severe obesity (BMI 35.0-39.9) with comorbidity    Vitamin D deficiency    Tobacco dependence due to cigarettes    Sacroiliitis    Greater trochanteric bursitis of right hip       Past Medical History:   Diagnosis Date    Hypertension     Thyroid disease        Past Surgical History:   Procedure Laterality Date    CERVICAL BIOPSY  W/ LOOP ELECTRODE EXCISION  2005    HYSTERECTOMY  [id number]    INJECTION OF ANESTHETIC AGENT INTO SACROILIAC JOINT Right 06/03/2022    Procedure: right Sacroiliac Joint Injection with RN IV sedation;  Surgeon: Rey Monsalve MD;  Location: [facility];  Service: Pain Management;  Laterality: Right;    INJECTION OF JOINT Right 06/03/2022    Procedure: right GT bursa injection with RN IV sedation;  Surgeon: Rey Monsalve MD;  Location: [facility];  Service: Pain Management;  Laterality: Right;    REPAIR, PERFORATION, NASAL SEPTUM      sedated for nasal septal manipulation    XI ROBOTIC HYSTERECTOMY, WITH SALPINGECTOMY N/A 03/23/2023    Procedure: XI ROBOTIC HYSTERECTOMY,WITH SALPINGECTOMY;  Surgeon: GRICEL Mock MD;  Location: [facility] OR;  Service: OB/GYN;  Laterality: N/A;       Family History   Problem Relation Age of Onset    Diabetes Mother     Cancer Mother         Lung and Breast    Hypertension Father     Dementia Father     Transient ischemic attack Father     Heart failure Father     Stroke Father        Social History     Tobacco Use   Smoking Status Every Day    Current packs/day: 0.50    Average packs/day: 0.5 packs/day for 28.2 years (14.1 ttl pk-yrs)    Types: Vaping with nicotine, Cigarettes    Start date: 1/1/1996   Smokeless Tobacco Never   Tobacco Comments    3weeks strong only vaping low level nicotine       Wt  Readings from Last 5 Encounters:   03/14/24 117.6 kg (259 lb 4.2 oz)   01/30/24 114.7 kg (252 lb 12.1 oz)   01/29/24 117.3 kg (258 lb 7.8 oz)   11/09/23 111.5 kg (245 lb 13 oz)   10/10/23 110.7 kg (244 lb 2.6 oz)       For further HPI details, see assessment and plan.    Review of Systems    Objective:      Vitals:    03/14/24 1302   BP: 114/80   Pulse: 95       Physical Exam  Constitutional:       General: She is not in acute distress.     Appearance: She is not ill-appearing.   Pulmonary:      Effort: Pulmonary effort is normal. No respiratory distress.   Neurological:      General: No focal deficit present.      Mental Status: She is alert.   Psychiatric:         Mood and Affect: Mood normal.         Behavior: Behavior normal.         Assessment:       1. Hypothyroidism, unspecified type    2. Hypertension, unspecified type    3. Gastroesophageal reflux disease, unspecified whether esophagitis present        Plan:   Hypothyroidism, unspecified type  -     levothyroxine (SYNTHROID) 137 MCG Tab tablet; Take 1 tablet (137 mcg total) by mouth before breakfast.  Dispense: 90 tablet; Refill: 1  -     TSH; Future; Expected date: 03/14/2024  -     T4, Free; Future; Expected date: 03/14/2024  -     T3, Free; Future; Expected date: 03/14/2024    Hypertension, unspecified type    Gastroesophageal reflux disease, unspecified whether esophagitis present    Other orders  -     clotrimazole (LOTRIMIN) 1 % cream; Apply topically 2 (two) times daily.  Dispense: 60 g; Refill: 0      Hypertension  Blood pressure well controlled  Continue amlodipine at 10 mg     Hypothyroidism  Her TSH is normal but on the upper end of the normal limit and she is still symptomatic from a hypothyroid perspective.  Despite drastic dietary efforts she is actually gained some weight.  She self admits she has not physically it was hard to squeeze it enough time during her day-to-day but I do encourage increased physical activity.  Continue dietary  efforts.  Given her ongoing chronic struggle I do think aggressive measurements must be taking and we will arrange for her to meet the lifestyle and wellness clinic.    Gastroesophageal reflux disease  Periodic use of Protonix.  Continue as is.    Mental health   Working with Psychiatry.  Defer management of conditions to her psychiatrist     Cleaned up drug list.  At present time should be 100% accurate.      Plan lab work in about 6 weeks.  Plan a follow up with me in about 3 months  This note was verbally dictated, please excuse any type errors.

## 2024-03-15 ENCOUNTER — PATIENT MESSAGE (OUTPATIENT)
Dept: PSYCHIATRY | Facility: CLINIC | Age: 39
End: 2024-03-15
Payer: COMMERCIAL

## 2024-03-15 ENCOUNTER — DOCUMENTATION ONLY (OUTPATIENT)
Dept: PSYCHIATRY | Facility: CLINIC | Age: 39
End: 2024-03-15
Payer: COMMERCIAL

## 2024-03-15 DIAGNOSIS — Z79.899 HIGH RISK MEDICATION USE: Primary | ICD-10-CM

## 2024-03-15 NOTE — PROGRESS NOTES
Comprehensive metabolic panel is being reordered, as the lab indicates that the specimen hemolyzed.

## 2024-03-25 ENCOUNTER — HOSPITAL ENCOUNTER (OUTPATIENT)
Dept: RADIOLOGY | Facility: HOSPITAL | Age: 39
Discharge: HOME | End: 2024-03-25
Attending: NURSE PRACTITIONER
Payer: COMMERCIAL

## 2024-03-25 DIAGNOSIS — Z01.419 ROUTINE GYNECOLOGICAL EXAMINATION: ICD-10-CM

## 2024-03-25 PROCEDURE — 77067 SCR MAMMO BI INCL CAD: CPT | Mod: TC

## 2024-03-25 PROCEDURE — 77063 BREAST TOMOSYNTHESIS BI: CPT | Mod: 26,,, | Performed by: RADIOLOGY

## 2024-03-25 PROCEDURE — 77067 SCR MAMMO BI INCL CAD: CPT | Mod: 26,,, | Performed by: RADIOLOGY

## 2024-03-26 ENCOUNTER — PATIENT MESSAGE (OUTPATIENT)
Dept: PSYCHIATRY | Facility: CLINIC | Age: 39
End: 2024-03-26
Payer: COMMERCIAL

## 2024-03-26 ENCOUNTER — TELEPHONE (OUTPATIENT)
Dept: RADIOLOGY | Facility: HOSPITAL | Age: 39
End: 2024-03-26

## 2024-03-27 ENCOUNTER — HOSPITAL ENCOUNTER (OUTPATIENT)
Dept: RADIOLOGY | Facility: HOSPITAL | Age: 39
Discharge: HOME | End: 2024-03-27
Attending: NURSE PRACTITIONER
Payer: COMMERCIAL

## 2024-03-27 ENCOUNTER — PATIENT MESSAGE (OUTPATIENT)
Dept: SURGERY | Facility: CLINIC | Age: 39
End: 2024-03-27
Payer: COMMERCIAL

## 2024-03-27 DIAGNOSIS — R92.8 ABNORMAL MAMMOGRAM: Primary | ICD-10-CM

## 2024-03-27 DIAGNOSIS — R92.8 ABNORMAL MAMMOGRAM: ICD-10-CM

## 2024-03-27 PROCEDURE — 77061 BREAST TOMOSYNTHESIS UNI: CPT | Mod: TC,LT

## 2024-03-27 PROCEDURE — 76642 ULTRASOUND BREAST LIMITED: CPT | Mod: TC,LT

## 2024-03-27 PROCEDURE — 76642 ULTRASOUND BREAST LIMITED: CPT | Mod: 26,LT,, | Performed by: STUDENT IN AN ORGANIZED HEALTH CARE EDUCATION/TRAINING PROGRAM

## 2024-03-27 PROCEDURE — 77065 DX MAMMO INCL CAD UNI: CPT | Mod: 26,LT,, | Performed by: STUDENT IN AN ORGANIZED HEALTH CARE EDUCATION/TRAINING PROGRAM

## 2024-03-27 PROCEDURE — 77065 DX MAMMO INCL CAD UNI: CPT | Mod: TC,LT

## 2024-03-27 PROCEDURE — 77061 BREAST TOMOSYNTHESIS UNI: CPT | Mod: 26,LT,, | Performed by: STUDENT IN AN ORGANIZED HEALTH CARE EDUCATION/TRAINING PROGRAM

## 2024-04-03 ENCOUNTER — PATIENT MESSAGE (OUTPATIENT)
Dept: PULMONOLOGY | Facility: CLINIC | Age: 39
End: 2024-04-03
Payer: COMMERCIAL

## 2024-04-03 PROBLEM — N63.21 UNSPECIFIED LUMP IN THE LEFT BREAST, UPPER OUTER QUADRANT: Status: ACTIVE | Noted: 2024-04-03

## 2024-04-04 ENCOUNTER — TELEPHONE (OUTPATIENT)
Dept: PSYCHIATRY | Facility: CLINIC | Age: 39
End: 2024-04-04
Payer: COMMERCIAL
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
## 2024-04-08 ENCOUNTER — OFFICE VISIT (OUTPATIENT)
Dept: INTERNAL MEDICINE | Facility: CLINIC | Age: 39
End: 2024-04-08
Payer: COMMERCIAL

## 2024-04-08 ENCOUNTER — LAB VISIT (OUTPATIENT)
Dept: LAB | Facility: HOSPITAL | Age: 39
End: 2024-04-08
Attending: PSYCHIATRY & NEUROLOGY
Payer: COMMERCIAL

## 2024-04-08 ENCOUNTER — OFFICE VISIT (OUTPATIENT)
Dept: PSYCHIATRY | Facility: CLINIC | Age: 39
End: 2024-04-08
Payer: COMMERCIAL

## 2024-04-08 VITALS
HEART RATE: 78 BPM | HEART RATE: 78 BPM | DIASTOLIC BLOOD PRESSURE: 82 MMHG | OXYGEN SATURATION: 96 % | DIASTOLIC BLOOD PRESSURE: 82 MMHG | WEIGHT: 263.88 LBS | RESPIRATION RATE: 18 BRPM | BODY MASS INDEX: 42.61 KG/M2 | WEIGHT: 264 LBS | SYSTOLIC BLOOD PRESSURE: 112 MMHG | SYSTOLIC BLOOD PRESSURE: 112 MMHG | BODY MASS INDEX: 42.59 KG/M2

## 2024-04-08 DIAGNOSIS — F43.10 PTSD (POST-TRAUMATIC STRESS DISORDER): ICD-10-CM

## 2024-04-08 DIAGNOSIS — Z79.899 HIGH RISK MEDICATION USE: ICD-10-CM

## 2024-04-08 DIAGNOSIS — R42 VERTIGO: Primary | ICD-10-CM

## 2024-04-08 DIAGNOSIS — T50.905A ADVERSE EFFECT OF DRUG, INITIAL ENCOUNTER: ICD-10-CM

## 2024-04-08 DIAGNOSIS — F31.32 BIPOLAR AFFECTIVE DISORDER, CURRENTLY DEPRESSED, MODERATE: Primary | ICD-10-CM

## 2024-04-08 DIAGNOSIS — M79.89 SWELLING OF BOTH LOWER EXTREMITIES: ICD-10-CM

## 2024-04-08 DIAGNOSIS — I10 PRIMARY HYPERTENSION: ICD-10-CM

## 2024-04-08 DIAGNOSIS — F41.1 GAD (GENERALIZED ANXIETY DISORDER): ICD-10-CM

## 2024-04-08 DIAGNOSIS — M79.89 SWELLING OF BOTH UPPER EXTREMITIES: ICD-10-CM

## 2024-04-08 LAB
ALBUMIN SERPL BCP-MCNC: 3.6 G/DL (ref 3.5–5.2)
ALP SERPL-CCNC: 82 U/L (ref 55–135)
ALT SERPL W/O P-5'-P-CCNC: 19 U/L (ref 10–44)
ANION GAP SERPL CALC-SCNC: 9 MMOL/L (ref 8–16)
AST SERPL-CCNC: 15 U/L (ref 10–40)
BASOPHILS # BLD AUTO: 0.11 K/UL (ref 0–0.2)
BASOPHILS NFR BLD: 1.1 % (ref 0–1.9)
BILIRUB SERPL-MCNC: 0.5 MG/DL (ref 0.1–1)
BUN SERPL-MCNC: 15 MG/DL (ref 6–20)
CALCIUM SERPL-MCNC: 9.8 MG/DL (ref 8.7–10.5)
CHLORIDE SERPL-SCNC: 106 MMOL/L (ref 95–110)
CO2 SERPL-SCNC: 25 MMOL/L (ref 23–29)
CREAT SERPL-MCNC: 0.8 MG/DL (ref 0.5–1.4)
DIFFERENTIAL METHOD BLD: ABNORMAL
EOSINOPHIL # BLD AUTO: 0.3 K/UL (ref 0–0.5)
EOSINOPHIL NFR BLD: 2.6 % (ref 0–8)
ERYTHROCYTE [DISTWIDTH] IN BLOOD BY AUTOMATED COUNT: 12.3 % (ref 11.5–14.5)
EST. GFR  (NO RACE VARIABLE): >60 ML/MIN/1.73 M^2
GLUCOSE SERPL-MCNC: 92 MG/DL (ref 70–110)
HCT VFR BLD AUTO: 48.7 % (ref 37–48.5)
HGB BLD-MCNC: 16 G/DL (ref 12–16)
IMM GRANULOCYTES # BLD AUTO: 0.03 K/UL (ref 0–0.04)
IMM GRANULOCYTES NFR BLD AUTO: 0.3 % (ref 0–0.5)
LYMPHOCYTES # BLD AUTO: 2.1 K/UL (ref 1–4.8)
LYMPHOCYTES NFR BLD: 19.9 % (ref 18–48)
MCH RBC QN AUTO: 31.1 PG (ref 27–31)
MCHC RBC AUTO-ENTMCNC: 32.9 G/DL (ref 32–36)
MCV RBC AUTO: 95 FL (ref 82–98)
MONOCYTES # BLD AUTO: 1 K/UL (ref 0.3–1)
MONOCYTES NFR BLD: 9.9 % (ref 4–15)
NEUTROPHILS # BLD AUTO: 6.9 K/UL (ref 1.8–7.7)
NEUTROPHILS NFR BLD: 66.2 % (ref 38–73)
NRBC BLD-RTO: 0 /100 WBC
PLATELET # BLD AUTO: 340 K/UL (ref 150–450)
PMV BLD AUTO: 11.1 FL (ref 9.2–12.9)
POTASSIUM SERPL-SCNC: 4.4 MMOL/L (ref 3.5–5.1)
PROT SERPL-MCNC: 6.6 G/DL (ref 6–8.4)
RBC # BLD AUTO: 5.15 M/UL (ref 4–5.4)
SODIUM SERPL-SCNC: 140 MMOL/L (ref 136–145)
VALPROATE SERPL-MCNC: 19 UG/ML (ref 50–100)
WBC # BLD AUTO: 10.36 K/UL (ref 3.9–12.7)

## 2024-04-08 PROCEDURE — G2211 COMPLEX E/M VISIT ADD ON: HCPCS | Mod: S$GLB,,, | Performed by: FAMILY MEDICINE

## 2024-04-08 PROCEDURE — 1159F MED LIST DOCD IN RCRD: CPT | Mod: CPTII,S$GLB,, | Performed by: PSYCHIATRY & NEUROLOGY

## 2024-04-08 PROCEDURE — 3079F DIAST BP 80-89 MM HG: CPT | Mod: CPTII,S$GLB,, | Performed by: FAMILY MEDICINE

## 2024-04-08 PROCEDURE — 1160F RVW MEDS BY RX/DR IN RCRD: CPT | Mod: CPTII,S$GLB,, | Performed by: PSYCHIATRY & NEUROLOGY

## 2024-04-08 PROCEDURE — 80164 ASSAY DIPROPYLACETIC ACD TOT: CPT | Performed by: PSYCHIATRY & NEUROLOGY

## 2024-04-08 PROCEDURE — 3008F BODY MASS INDEX DOCD: CPT | Mod: CPTII,S$GLB,, | Performed by: FAMILY MEDICINE

## 2024-04-08 PROCEDURE — 99214 OFFICE O/P EST MOD 30 MIN: CPT | Mod: S$GLB,,, | Performed by: PSYCHIATRY & NEUROLOGY

## 2024-04-08 PROCEDURE — 99214 OFFICE O/P EST MOD 30 MIN: CPT | Mod: S$GLB,,, | Performed by: FAMILY MEDICINE

## 2024-04-08 PROCEDURE — 3074F SYST BP LT 130 MM HG: CPT | Mod: CPTII,S$GLB,, | Performed by: PSYCHIATRY & NEUROLOGY

## 2024-04-08 PROCEDURE — 80053 COMPREHEN METABOLIC PANEL: CPT | Performed by: PSYCHIATRY & NEUROLOGY

## 2024-04-08 PROCEDURE — 3079F DIAST BP 80-89 MM HG: CPT | Mod: CPTII,S$GLB,, | Performed by: PSYCHIATRY & NEUROLOGY

## 2024-04-08 PROCEDURE — 3074F SYST BP LT 130 MM HG: CPT | Mod: CPTII,S$GLB,, | Performed by: FAMILY MEDICINE

## 2024-04-08 PROCEDURE — 99999 PR PBB SHADOW E&M-EST. PATIENT-LVL II: CPT | Mod: PBBFAC,,, | Performed by: PSYCHIATRY & NEUROLOGY

## 2024-04-08 PROCEDURE — 99999 PR PBB SHADOW E&M-EST. PATIENT-LVL IV: CPT | Mod: PBBFAC,,, | Performed by: FAMILY MEDICINE

## 2024-04-08 PROCEDURE — 1159F MED LIST DOCD IN RCRD: CPT | Mod: CPTII,S$GLB,, | Performed by: FAMILY MEDICINE

## 2024-04-08 PROCEDURE — 85025 COMPLETE CBC W/AUTO DIFF WBC: CPT | Performed by: PSYCHIATRY & NEUROLOGY

## 2024-04-08 PROCEDURE — 3008F BODY MASS INDEX DOCD: CPT | Mod: CPTII,S$GLB,, | Performed by: PSYCHIATRY & NEUROLOGY

## 2024-04-08 PROCEDURE — 36415 COLL VENOUS BLD VENIPUNCTURE: CPT | Performed by: PSYCHIATRY & NEUROLOGY

## 2024-04-08 RX ORDER — CLONAZEPAM 1 MG/1
1 TABLET ORAL NIGHTLY
Qty: 30 TABLET | Refills: 0 | Status: SHIPPED | OUTPATIENT
Start: 2024-04-08 | End: 2024-05-01 | Stop reason: SDUPTHER

## 2024-04-08 RX ORDER — DIVALPROEX SODIUM 500 MG/1
1000 TABLET, FILM COATED, EXTENDED RELEASE ORAL DAILY
Qty: 60 TABLET | Refills: 0 | Status: SHIPPED | OUTPATIENT
Start: 2024-04-08 | End: 2024-05-01 | Stop reason: SDUPTHER

## 2024-04-08 RX ORDER — LURASIDONE HYDROCHLORIDE 20 MG/1
20 TABLET, FILM COATED ORAL DAILY
Qty: 90 TABLET | Refills: 0 | Status: SHIPPED | OUTPATIENT
Start: 2024-04-08 | End: 2024-05-01 | Stop reason: SDUPTHER

## 2024-04-08 RX ORDER — MECLIZINE HYDROCHLORIDE 25 MG/1
25 TABLET ORAL 3 TIMES DAILY PRN
Qty: 30 TABLET | Refills: 0 | Status: SHIPPED | OUTPATIENT
Start: 2024-04-08

## 2024-04-08 RX ORDER — CHLORTHALIDONE 25 MG/1
25 TABLET ORAL DAILY
Qty: 90 TABLET | Refills: 1 | Status: SHIPPED | OUTPATIENT
Start: 2024-04-08 | End: 2025-04-08

## 2024-04-08 NOTE — PROGRESS NOTES
Subjective:       Patient ID: Haydee Pennington is a 39 y.o. female.    Chief Complaint: Dizziness (Vertigo mostly at nights x 2 weeks. When she turns her head or bends over. )    HPI    Patient Active Problem List   Diagnosis    Anxiety    Depression    Hypothyroidism    Stress headaches    HTN (hypertension)    Severe obesity (BMI 35.0-39.9) with comorbidity    Vitamin D deficiency    Tobacco dependence due to cigarettes    Sacroiliitis    Greater trochanteric bursitis of right hip    Unspecified lump in the left breast, upper outer quadrant       Past Medical History:   Diagnosis Date    Hypertension     Thyroid disease        Past Surgical History:   Procedure Laterality Date    CERVICAL BIOPSY  W/ LOOP ELECTRODE EXCISION  2005    HYSTERECTOMY  [id number]    INJECTION OF ANESTHETIC AGENT INTO SACROILIAC JOINT Right 06/03/2022    Procedure: right Sacroiliac Joint Injection with RN IV sedation;  Surgeon: Rey Monsalve MD;  Location: [facility];  Service: Pain Management;  Laterality: Right;    INJECTION OF JOINT Right 06/03/2022    Procedure: right GT bursa injection with RN IV sedation;  Surgeon: Rey Monsalve MD;  Location: [facility];  Service: Pain Management;  Laterality: Right;    REPAIR, PERFORATION, NASAL SEPTUM      sedated for nasal septal manipulation    XI ROBOTIC HYSTERECTOMY, WITH SALPINGECTOMY N/A 03/23/2023    Procedure: XI ROBOTIC HYSTERECTOMY,WITH SALPINGECTOMY;  Surgeon: GRICEL Mokc MD;  Location: [facility] OR;  Service: OB/GYN;  Laterality: N/A;       Family History   Problem Relation Age of Onset    Diabetes Mother     Cancer Mother         Lung and Breast    Hypertension Father     Dementia Father     Transient ischemic attack Father     Heart failure Father     Stroke Father        Social History     Tobacco Use   Smoking Status Every Day    Current packs/day: 0.50    Average packs/day: 0.5 packs/day for 28.3 years (14.1 ttl pk-yrs)    Types: Vaping with nicotine, Cigarettes     Start date: 1/1/1996    Passive exposure: Past   Smokeless Tobacco Never   Tobacco Comments    3weeks strong only vaping low level nicotine       Wt Readings from Last 5 Encounters:   04/08/24 119.7 kg (264 lb)   03/14/24 117.6 kg (259 lb 4.2 oz)   01/30/24 114.7 kg (252 lb 12.1 oz)   01/29/24 117.3 kg (258 lb 7.8 oz)   11/09/23 111.5 kg (245 lb 13 oz)       For further HPI details, see assessment and plan.    Review of Systems   Neurological:  Positive for dizziness.       Objective:      Vitals:    04/08/24 0811   BP: 112/82   Pulse: 78   Resp: 18       Physical Exam  Constitutional:       General: She is not in acute distress.     Appearance: She is not ill-appearing.   HENT:      Right Ear: Ear canal normal. No middle ear effusion. No PE tube. Tympanic membrane is not erythematous or bulging.      Left Ear: Ear canal normal.  No middle ear effusion. No PE tube. Tympanic membrane is not erythematous or bulging.      Ears:      Comments: Some cloudiness along tympanic membranes.  No overt fluid lines or air bubbles appreciated  Pulmonary:      Effort: Pulmonary effort is normal. No respiratory distress.   Neurological:      General: No focal deficit present.      Mental Status: She is alert.   Psychiatric:         Mood and Affect: Mood normal.         Behavior: Behavior normal.         Assessment:       1. Vertigo    2. Primary hypertension    3. Adverse effect of drug, initial encounter    4. Swelling of both lower extremities    5. Swelling of both upper extremities        Plan:     Patient presents today for dizziness     Patient finds she is having some associated nausea.  No substantial vision change.  Dizziness is especially problematic when turning over in bed, positional changes, noted with toileting, noted with bending her head slightly forward     I suspect this is vertigo  Reviewed pathophysiology with the aid of Google images.  Trial of meclizine.  Placing audiology consultation for  maneuvers.    Hypertension  Patient is on amlodipine at 10 mg   Her blood pressure is slightly low.  This could be a contributing factor to dizziness although her description is not in line with orthostatic hypotension.  She is having an adverse drug reaction (suspected extremity swelling).  Plan to discontinue amlodipine and replace it with chlorthalidone in an effort to continue blood pressure control and diminished the adverse risk potential of extremity swelling.      I would like a follow-up visit in about 2-4 weeks with the nursing staff for blood pressure check     Patient saw myself on March 14th and we plan to do lab work in 6 weeks with a follow up in 3 months.  We will keep her on that schedule.      Visit today included increased complexity associated with the care of the above mentioned issues , which was addressed while instituting co-management of the longitudinal care of the patient due to the serious and/or complex managed problem(s) .    I have evaluated and discussed management associated with medical care services that serve as the continuing focal point for all needed health care services and/or with medical care services that are part of ongoing care related to my patient's single, serious condition or a complex condition(s).    I am providing ongoing care and I am the primary care provider for this patient, and they are being managed, monitored, and/or observed for their chronic conditions over time.     I have addressed their ongoing health maintenance requirements and needs for all health care services and reviewed co-management plans provided by specialty providers when available.        This note was verbally dictated, please excuse any type errors.

## 2024-04-09 ENCOUNTER — OFFICE VISIT (OUTPATIENT)
Dept: OTOLARYNGOLOGY | Facility: CLINIC | Age: 39
End: 2024-04-09
Payer: COMMERCIAL

## 2024-04-09 ENCOUNTER — PATIENT MESSAGE (OUTPATIENT)
Dept: PSYCHIATRY | Facility: CLINIC | Age: 39
End: 2024-04-09
Payer: COMMERCIAL

## 2024-04-09 VITALS — TEMPERATURE: 99 F | WEIGHT: 270.94 LBS | BODY MASS INDEX: 43.54 KG/M2 | HEIGHT: 66 IN

## 2024-04-09 DIAGNOSIS — H81.12 BPPV (BENIGN PAROXYSMAL POSITIONAL VERTIGO), LEFT: Primary | ICD-10-CM

## 2024-04-09 PROCEDURE — 99203 OFFICE O/P NEW LOW 30 MIN: CPT | Mod: 25,S$GLB,, | Performed by: PHYSICIAN ASSISTANT

## 2024-04-09 PROCEDURE — 3008F BODY MASS INDEX DOCD: CPT | Mod: CPTII,S$GLB,, | Performed by: PHYSICIAN ASSISTANT

## 2024-04-09 PROCEDURE — 1159F MED LIST DOCD IN RCRD: CPT | Mod: CPTII,S$GLB,, | Performed by: PHYSICIAN ASSISTANT

## 2024-04-09 PROCEDURE — 99999 PR PBB SHADOW E&M-EST. PATIENT-LVL III: CPT | Mod: PBBFAC,,, | Performed by: PHYSICIAN ASSISTANT

## 2024-04-09 PROCEDURE — 95992 CANALITH REPOSITIONING PROC: CPT | Mod: S$GLB,,, | Performed by: PHYSICIAN ASSISTANT

## 2024-04-09 NOTE — TELEPHONE ENCOUNTER
I called the patient regarding her lab results (unremarkable CBC and CMP, valproic acid level 19).  She reports that she in retrospect feels that she forgot to take Depakote on Sunday and did get the lab drawn before her Monday dose.  I indicated to her that it still indicates that there is room for higher dose and that a higher dose certainly can bring more benefit for her mood; I recommended an increase in dose.  She considers the information for now wishes to stay with 2 tablets daily and begin Latuda for now (pharmacy will get the medication in about 2 days per patient).  She indicates that she will message if she reconsiders about increasing Depakote also and will message for any questions or problems.  She understands the risks of mood symptoms without the increase in Depakote.

## 2024-04-09 NOTE — PROGRESS NOTES
Subjective     Patient ID: Haydee Pennington is a 39 y.o. female.    Chief Complaint: Dizziness (Ongoing for about 2 weeks. Patient states the dizziness becomes worst at night. Switching positions in bed also makes it worst.)    Patient is a very pleasant 39 y.o. female here to see me today for the first time for evaluation of dizziness.   She reports that the symptoms have been present for the last two weeks.  She says that her symptoms are more common when lying down or getting up from bed, and make her feel off balance and nauseated.  She describes the dizziness as a sensation of unsteadiness and lightheadedness and says that it lasts 15-30 seconds.  She has had spinning sensation when rolling over in bed as well; also when bending over.   She denies aural pressure, otalgia, otorrhea and hearing loss.  She has had rare tinnitus in her ears.  She has not had any recent dietary changes.  Denies any recent head trauma.  She has had recent changes in her medications over the past one month but states the provider did not think that was the cause of her dizziness.  She was here in 2021 with dizziness and had normal VNG at that time.  She does not recall whether her current dizziness feels similar to that time.        Review of Systems   Constitutional:  Negative for fever.   HENT:  Positive for tinnitus (rare). Negative for ear discharge, ear pain and hearing loss.    Neurological:  Positive for dizziness.          Objective     Physical Exam  Constitutional:       General: She is not in acute distress.     Appearance: She is well-developed.   HENT:      Head: Normocephalic and atraumatic.      Right Ear: Tympanic membrane, ear canal and external ear normal. No middle ear effusion.      Left Ear: Tympanic membrane, ear canal and external ear normal.  No middle ear effusion.      Nose: Nose normal. No mucosal edema, congestion or rhinorrhea.      Mouth/Throat:      Mouth: Mucous membranes are moist. Mucous membranes  are not pale and not dry.      Dentition: Normal dentition.      Pharynx: Uvula midline. No oropharyngeal exudate or posterior oropharyngeal erythema.   Eyes:      General: Lids are normal. No scleral icterus.     Extraocular Movements:      Right eye: Normal extraocular motion and no nystagmus.      Left eye: Normal extraocular motion and no nystagmus.      Conjunctiva/sclera: Conjunctivae normal.      Right eye: Right conjunctiva is not injected. No chemosis.     Left eye: Left conjunctiva is not injected. No chemosis.     Pupils: Pupils are equal, round, and reactive to light.   Neck:      Trachea: Trachea and phonation normal.   Pulmonary:      Effort: Pulmonary effort is normal. No respiratory distress.      Breath sounds: No stridor.   Lymphadenopathy:      Head:      Right side of head: No preauricular or posterior auricular adenopathy.      Left side of head: No preauricular or posterior auricular adenopathy.      Cervical: No cervical adenopathy.   Skin:     General: Skin is warm and dry.      Findings: No erythema or rash.   Neurological:      Mental Status: She is alert and oriented to person, place, and time.      Cranial Nerves: No cranial nerve deficit.   Psychiatric:         Behavior: Behavior normal.       Patricio-Hallpike:  positive on the LEFT          Haydee Pennington was seen 04/09/2024 for Epley maneuver for BPPV in the left ear.      A 5-position Epley maneuver was performed for the left.  Patient tolerated the maneuver well and was asymptomatic upon discharge.  No nystagmus seen on post-procedure examination.  Post-Epley home instructions were reviewed and given to the patient.  Understanding was voiced.         Assessment and Plan     1. BPPV (benign paroxysmal positional vertigo), left        We had a long discussion regarding the relevant anatomy and pathology relevant to BPPV.  We discussed that in the ear there are three semicircular canals that detect rotational movement.  BPPV occurs as  a result of otoconia, tiny crystals of calcium carbonate that are a normal part of the inner ears anatomy, detaching from their normal anatomic position and collecting in one of the semicircular canals.  When the head moves, the otoconia shift. This stimulates the cupula to send false signals to the brain, producing vertigo and triggering nystagmus (involuntary eye movements).  An Epley maneuver was performed for the left today.  Patient tolerated the maneuver well and was asymptomatic upon discharge.  No nystagmus seen on post-procedure examination.  Post-Epley home instructions were reviewed and given to the patient.  Understanding was voiced.  Will schedule a recheck with audiologist in one week.           No follow-ups on file.

## 2024-04-29 ENCOUNTER — TELEPHONE (OUTPATIENT)
Dept: PSYCHIATRY | Facility: CLINIC | Age: 39
End: 2024-04-29
Payer: COMMERCIAL

## 2024-04-29 NOTE — PROGRESS NOTES
The patient location is: Patient's home . Patient reported  that his/her location at the time of this visit was in the [facility].    Visit type: Virtual visit with synchronous audio and video     Each patient to whom he or she provides medical services by telemedicine is: (1) informed of the relationship between the physician and patient and the respective role of any other health care provider with respect to management of the patient; and (2) notified that he or she may decline to receive medical services by telemedicine and may withdraw from such care at any time.    Patient was informed that I am a physician who is licensed in the [facility]:  Huy Kaplan MD:  Employed by [employer]     Patient was instructed that If technology issues arise, he/she may  call our office phone at: [phone].    Pt informed that if he/she is ever in crisis (or has acute concerns), dial 911 or go to nearest Emergency Room (ER).    Pt informed that if questions related to privacy practices arise, contact [facility] Information Department: [phone].    Understanding Expressed. No questions.        Haydee Pennington   1985   05/01/2024        CURRENT PRESENTATION:   Three weeks ago       Encounter Diagnoses   Name Primary?    Bipolar affective disorder, currently depressed, moderate Yes    PTSD (post-traumatic stress disorder)      RAOUL (generalized anxiety disorder)      High risk medication use     PLAN:   Follow up in 3 weeks.    Psychiatry Medication:  Continue Depakote ER 1000 mg daily (the patient reports that she is taking it in the morning) and Klonopin 1 mg at bedtime.  She is agreeable to Latuda 20 mg daily with breakfast.    April 9 documentation:   I called the patient regarding her lab results (unremarkable CBC and CMP, valproic acid level 19). She reports that she in retrospect feels that she forgot to take Depakote on Sunday and did get the lab drawn before her Monday dose. I  indicated to her that it still indicates that there is room for higher dose and that a higher dose certainly can bring more benefit for her mood; I recommended an increase in dose. She considers the information for now wishes to stay with 2 tablets daily and begin Latuda for now (pharmacy will get the medication in about 2 days per patient). She indicates that she will message if she reconsiders about increasing Depakote also and will message for any questions or problems. She understands the risks of mood symptoms without the increase in Depakote.      indicates that the patient continues on gabapentin and that 30 tablets of Klonopin 1 mg were filled on March 20 (with previous prescriptions for 0.5 mg).    In the current session, the patient reports feeling improved with no dax, hypomania, or depression (no elevation, irritability, manic signs or symptoms, sad moods, or decreased interest and enjoyment).  No psychosis.  Anxiety is controlled.  No suicidal ideation, thoughts of self-harm, homicidal ideation, thoughts of violence, or feelings of aggression.  She denies any side effects, including with extensive and specific questioning.  No akathisia, EPS, dyskinetic movements, problems with alertness, unsteadiness, problems with coordination, GI symptoms, or other.    The patient's only complaint is difficulty falling asleep and then ultimately sleeping only 3-6 hours, with fatigue the next day.  She reports taking Klonopin at bedtime and wishes to continue that medication because she feels that it helps her stay asleep after falling asleep and because she notes decreased anxiety the next day after taking it the night before.  With long discussion of rationale, risks, and side effects, the patient is agreeable to sonata 5 mg at bedtime.  The discussion included decreased alertness, dizziness, unsteadiness, effects on activities requiring alertness and steadiness, sleep behaviors, blackouts, confusion,  psychosis, interaction with alcohol and sedating compounds, and others.  The patient will have her son secure all keys to all vehicles and be aware of any activities on her part during the night until it was clear that the patient is tolerating the medication well without side effects or behaviors of concern.      Interim history:  Living situation/supports:  Work has been going well.  Positives with her son, his friend, her daughter (still busy, still living with her mother), and her mother.  Last visit and recently-  Positives with her son.  Little contact with her daughter because the daughter has been busy (daughter lives with the patient's mother).  She decided to break up with her boyfriend last week because she knew it would not work out over the long term.  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     The patient reports that her 20 year significant other remains out of the picture, and she says that she is relieved.  Medical issues:  ENT visit for vertigo  Nonpsychotropic Medications:  Chlorthalidone, gabapentin, levothyroxine, meclizine, pantoprazole   Allergies:   Review of patient's allergies indicates:   Allergen Reactions    Abilify [aripiprazole] Rash    Sulfa (sulfonamide antibiotics) Hives     Alcohol use:  None  Other substance use:  None    Mental Status Exam:   Appearance:  Appropriately groomed  Orientation:  X4  Attitude:  Cooperative, engaged   Eye Contact:  Appropriate  Behavior:  Calm, appropriate  Speech:     Rate - WNL    Volume - WNL    Quantity - WNL    Tone - relaxed, appropriate  Pressure - no  Thought Processes:  Goal-directed  Mood:  Euthymic   Affect:  Without distress, euthymic, including ability to brighten at appropriate times  SI:  No, and no thoughts of self-harm  HI:  No, and no thoughts of harm towards others  Paranoia:  No  Delusions:  No  Hallucinations:  No  Attention:  Intact over the course of the  session  Cognition:  No deficits noted over the course of the session  Insight:  Intact   Judgment:  Intact  Impulse Control:  Intact        ASSESSMENT:   Encounter Diagnoses   Name Primary?    Bipolar affective disorder, currently depressed, moderate Yes    RAOUL (generalized anxiety disorder)     PTSD (post-traumatic stress disorder)     Insomnia, unspecified type     High risk medication use          PLAN:     Follow up in 1 month.      Psychiatry Medication:    Depakote ER 1000 mg daily   Latuda 20 mg with breakfast   Klonopin 1 mg at bedtime  Sonata 5 mg nightly as needed for sleep    Labs:  CBC with diff, comprehensive metabolic panel, valproic acid level      Reviewed with patient:  Report side effects, other problems, or questions to the psychiatrist by way of the [facility] portal, [facility], or by calling [facility] at [phone].  Messages are checked during clinic hours only.  For urgent issues outside of clinic hours, call 911 or go to an emergency department.  Follow up with primary care/MD specialist for continued monitoring of general health and wellness and any medical conditions.  Call [facility] at [phone] or use the [facility] portal if necessary for scheduling or rescheduling.  It is the responsibility of the patient to reschedule an appointment if an appointment has been canceled or missed.  Understanding was expressed; and no further concerns or questions were raised at this time.     [id number]  2 or more stable chronic illnesses and Prescription drug management      Large portions of this note were completed by way of voice recognition dictation software, and transcription errors are possible, such that specific information in the note should be considered in the context of the entire report.

## 2024-05-01 ENCOUNTER — OFFICE VISIT (OUTPATIENT)
Dept: PSYCHIATRY | Facility: CLINIC | Age: 39
End: 2024-05-01
Payer: COMMERCIAL

## 2024-05-01 DIAGNOSIS — Z79.899 HIGH RISK MEDICATION USE: ICD-10-CM

## 2024-05-01 DIAGNOSIS — G47.00 INSOMNIA, UNSPECIFIED TYPE: ICD-10-CM

## 2024-05-01 DIAGNOSIS — F43.10 PTSD (POST-TRAUMATIC STRESS DISORDER): ICD-10-CM

## 2024-05-01 DIAGNOSIS — F31.32 BIPOLAR AFFECTIVE DISORDER, CURRENTLY DEPRESSED, MODERATE: Primary | ICD-10-CM

## 2024-05-01 DIAGNOSIS — F41.1 GAD (GENERALIZED ANXIETY DISORDER): ICD-10-CM

## 2024-05-01 PROCEDURE — 1159F MED LIST DOCD IN RCRD: CPT | Mod: CPTII,95,, | Performed by: PSYCHIATRY & NEUROLOGY

## 2024-05-01 PROCEDURE — 1160F RVW MEDS BY RX/DR IN RCRD: CPT | Mod: CPTII,95,, | Performed by: PSYCHIATRY & NEUROLOGY

## 2024-05-01 PROCEDURE — 99214 OFFICE O/P EST MOD 30 MIN: CPT | Mod: 95,,, | Performed by: PSYCHIATRY & NEUROLOGY

## 2024-05-01 RX ORDER — DIVALPROEX SODIUM 500 MG/1
1000 TABLET, FILM COATED, EXTENDED RELEASE ORAL DAILY
Qty: 60 TABLET | Refills: 0 | Status: SHIPPED | OUTPATIENT
Start: 2024-05-01

## 2024-05-01 RX ORDER — LURASIDONE HYDROCHLORIDE 20 MG/1
20 TABLET, FILM COATED ORAL DAILY
Qty: 30 TABLET | Refills: 0 | Status: SHIPPED | OUTPATIENT
Start: 2024-05-01 | End: 2024-05-21 | Stop reason: SINTOL

## 2024-05-01 RX ORDER — ZALEPLON 5 MG/1
5 CAPSULE ORAL NIGHTLY PRN
Qty: 30 CAPSULE | Refills: 0 | Status: SHIPPED | OUTPATIENT
Start: 2024-05-01 | End: 2024-05-31

## 2024-05-01 RX ORDER — CLONAZEPAM 1 MG/1
1 TABLET ORAL NIGHTLY
Qty: 30 TABLET | Refills: 0 | Status: SHIPPED | OUTPATIENT
Start: 2024-05-01

## 2024-05-02 ENCOUNTER — PATIENT MESSAGE (OUTPATIENT)
Dept: INTERNAL MEDICINE | Facility: CLINIC | Age: 39
End: 2024-05-02
Payer: COMMERCIAL

## 2024-05-20 ENCOUNTER — PATIENT MESSAGE (OUTPATIENT)
Dept: PSYCHIATRY | Facility: CLINIC | Age: 39
End: 2024-05-20
Payer: COMMERCIAL

## 2024-05-21 NOTE — TELEPHONE ENCOUNTER
I called the patient regarding her message.  She reports that she has felt more irritable.  No depression or elevation, and minimal to none with regards to associated manic signs or symptoms.  Anxiety is proportional to situations.  No psychosis, suicidal ideation, thoughts of self-harm, homicidal ideation, thoughts of harm towards others, or feelings of aggression.  She presents as calm, pleasant, appreciative, and fully appropriate in her interaction.    With long discussion, the patient is agreeable to discontinuing Latuda only, as Latuda is the most likely explanation for current feelings of irritability, while continuing Depakote, Klonopin, and sonata.    She confirms her appointment in 3 weeks and will message for any questions problems in the meanwhile, agreeing to 911 and/or the ED for any urgent situations.
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
## 2024-06-04 ENCOUNTER — PATIENT MESSAGE (OUTPATIENT)
Dept: PSYCHIATRY | Facility: CLINIC | Age: 39
End: 2024-06-04
Payer: COMMERCIAL

## 2024-06-05 ENCOUNTER — TELEPHONE (OUTPATIENT)
Dept: INTERNAL MEDICINE | Facility: CLINIC | Age: 39
End: 2024-06-05
Payer: COMMERCIAL

## 2024-06-05 NOTE — TELEPHONE ENCOUNTER
----- Message from Rigoberto Lr sent at 6/5/2024 11:03 AM CDT -----  Contact: Haydee  .Type:  Patient Returning Call    Who Called: Haydee  Who Left Message for Patient: Magda   Does the patient know what this is regarding?: yes   Would the patient rather a call back or a response via [facility]?  Call back   Best Call Back Number: .[phone] (home)     Additional Information:  pt states she is experiencing swelling in both feet      Thanks

## 2024-07-12 ENCOUNTER — TELEPHONE (OUTPATIENT)
Dept: PSYCHIATRY | Facility: CLINIC | Age: 39
End: 2024-07-12
Payer: COMMERCIAL
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
## 2024-07-16 ENCOUNTER — OFFICE VISIT (OUTPATIENT)
Dept: PSYCHIATRY | Facility: CLINIC | Age: 39
End: 2024-07-16
Payer: COMMERCIAL

## 2024-07-16 VITALS — DIASTOLIC BLOOD PRESSURE: 90 MMHG | HEART RATE: 73 BPM | SYSTOLIC BLOOD PRESSURE: 154 MMHG

## 2024-07-16 DIAGNOSIS — G47.00 INSOMNIA, UNSPECIFIED TYPE: ICD-10-CM

## 2024-07-16 DIAGNOSIS — Z86.59 HISTORY OF BIPOLAR DISORDER: ICD-10-CM

## 2024-07-16 DIAGNOSIS — F43.10 PTSD (POST-TRAUMATIC STRESS DISORDER): ICD-10-CM

## 2024-07-16 DIAGNOSIS — F39 MOOD DISORDER: Primary | ICD-10-CM

## 2024-07-16 DIAGNOSIS — F41.1 GAD (GENERALIZED ANXIETY DISORDER): ICD-10-CM

## 2024-07-16 PROCEDURE — 3044F HG A1C LEVEL LT 7.0%: CPT | Mod: CPTII,S$GLB,, | Performed by: PSYCHIATRY & NEUROLOGY

## 2024-07-16 PROCEDURE — 3077F SYST BP >= 140 MM HG: CPT | Mod: CPTII,S$GLB,, | Performed by: PSYCHIATRY & NEUROLOGY

## 2024-07-16 PROCEDURE — 99215 OFFICE O/P EST HI 40 MIN: CPT | Mod: S$GLB,,, | Performed by: PSYCHIATRY & NEUROLOGY

## 2024-07-16 PROCEDURE — 3080F DIAST BP >= 90 MM HG: CPT | Mod: CPTII,S$GLB,, | Performed by: PSYCHIATRY & NEUROLOGY

## 2024-07-16 PROCEDURE — 99999 PR PBB SHADOW E&M-EST. PATIENT-LVL II: CPT | Mod: PBBFAC,,, | Performed by: PSYCHIATRY & NEUROLOGY

## 2024-07-16 RX ORDER — TOPIRAMATE 50 MG/1
50 TABLET, FILM COATED ORAL DAILY
Qty: 90 TABLET | Refills: 0 | Status: SHIPPED | OUTPATIENT
Start: 2024-07-16

## 2024-07-16 RX ORDER — TRAZODONE HYDROCHLORIDE 100 MG/1
100 TABLET ORAL NIGHTLY
Qty: 90 TABLET | Refills: 0 | Status: SHIPPED | OUTPATIENT
Start: 2024-07-16

## 2024-07-16 RX ORDER — PROPRANOLOL HYDROCHLORIDE 10 MG/1
10 TABLET ORAL 3 TIMES DAILY
Qty: 270 TABLET | Refills: 0 | Status: SHIPPED | OUTPATIENT
Start: 2024-07-16

## 2024-07-16 RX ORDER — VENLAFAXINE HYDROCHLORIDE 75 MG/1
75 CAPSULE, EXTENDED RELEASE ORAL DAILY
Qty: 90 CAPSULE | Refills: 0 | Status: SHIPPED | OUTPATIENT
Start: 2024-07-16

## 2024-08-28 DIAGNOSIS — M46.1 SACROILIITIS: ICD-10-CM

## 2024-08-28 NOTE — TELEPHONE ENCOUNTER
No care due was identified.  [facility] Embedded Care Due Messages. Reference number: [id number].   8/28/2024 8:01:45 AM CDT

## 2024-08-29 RX ORDER — GABAPENTIN 300 MG/1
300 CAPSULE ORAL 3 TIMES DAILY
Qty: 90 CAPSULE | Refills: 3 | Status: SHIPPED | OUTPATIENT
Start: 2024-08-29

## 2024-08-30 NOTE — PROGRESS NOTES
The patient location is: Patient's home . Patient reported  that his/her location at the time of this visit was in the [facility].    Visit type: Virtual visit with synchronous audio and video     Each patient to whom he or she provides medical services by telemedicine is: (1) informed of the relationship between the physician and patient and the respective role of any other health care provider with respect to management of the patient; and (2) notified that he or she may decline to receive medical services by telemedicine and may withdraw from such care at any time.    Patient was informed that I am a physician who is licensed in the [facility]:  Huy Kaplan MD:  Employed by [facility] [employer]     Patient was instructed that If technology issues arise, he/she may  call our office phone at: [phone].    Pt informed that if he/she is ever in crisis (or has acute concerns), dial 911 or go to nearest Emergency Room (ER).    Pt informed that if questions related to privacy practices arise, contact [facility] Department: [phone].    Understanding Expressed. No questions.        Haydee Pennington   1985   09/03/2024        CURRENT PRESENTATION:   Last visit 07/16/2024 documentation includes:        Encounter Diagnoses   Name Primary?    Mood disorder Yes    History of bipolar disorder      RAOUL (generalized anxiety disorder)      PTSD (post-traumatic stress disorder)      Insomnia, unspecified type     PLAN:   Follow up in 6 weeks.    Psychiatry Medication:  Increase trazodone to 100 mg at bedtime.  Continue Effexor XR 75 mg daily, Topamax 50 mg daily, and Inderal 10 mg TID.     The patient began individual counseling following hospital stay but has concerns about the therapist and asked for information about alternatives.  I gave her information about [facility].     indicates 30 tablets of Klonopin 1 mg last filled on August 5 and the patient continuing on  gabapentin.    In the current session, the patient reports that she is doing well.  She says that about 3 weeks ago, she stopped all of her psychiatric medications, because she felt slowed down and was focusing poorly.  She indicates that these issues fully resolved off medications.  She strongly wishes to remain off medications for now but volunteers that she will message at the 1st sign of any relapsing symptoms, and she demonstrates awareness of early symptoms of relapse.  Euthymic with no depression, excessively elevated moods, irritable moods, or manic signs or symptoms.  Anxiety is well controlled.  No psychosis.  No thoughts of harm to self or others or feelings of aggression.  Eating and sleeping well.    The patient continues to do research on finding an individual psychotherapist.    Interim history:  Living situation/supports:  The patient evicted her ex, and he and his oldest son were subsequently arrested for possession, distribution, and manufacturing of illegal substances.  Her mother is in chemotherapy for colon cancer, and the prognosis is good.  Her daughter continues to live with her mother.  Her son Arnaud and his friend are working at [employer] and paying her rent.  Work is going well.  Last visit-  Stressors:  Mother had surgery for colon cancer; full prognosis is pending, but the oncologist feels that there is a good chance that surgery may eliminated all of the cancer.  Her ex partner, the father of her son, is temporarily on her couch, and she is beginning to make moves to try to get him out of the house once again      Son Arnaud, 19, is not working but does him in orientation next week with [employer].  His friend, who is already working at [employer], continues to live with them.  They do not help around house, and her hope is that they get an apartment together.  Her daughter, 23, continues to live with the patient's mother but communicates very little with either of them; she is working.   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  Medical issues:  ENT visit for vertigo  Nonpsychotropic Medications:  The patient brings in medications slips from her pharmacy, levothyroxine, fenofibrate, lisinopril  Allergies:   Review of patient's allergies indicates:   Allergen Reactions    Abilify [aripiprazole] Rash    Sulfa (sulfonamide antibiotics) Hives     Alcohol use:  None  Other substance use:  The patient continues with a small amount of unregulated volunteers awareness of the risks of doing so.    Mental Status Exam:   Subjectively and objectively doing well  Appearance:  Appropriately groomed  Orientation:  X4  Attitude:  Cooperative, engaged   Eye Contact:  Appropriate  Behavior:  Calm, appropriate  Speech:     Rate - WNL    Volume - WNL    Quantity - WNL    Tone - relaxed, appropriate  Pressure - no  Thought Processes:  Goal-directed  Mood:  Euthymic   Affect:  Without distress, euthymic, including ability to brighten at appropriate times  SI:  No, and no thoughts of self-harm  HI:  No, and no thoughts of harm towards others  Paranoia:  No  Delusions:  No  Hallucinations:  No  Attention:  Intact over the course of the session  Cognition:  No deficits noted over the course of the session  Insight:  Intact   Judgment:  Intact  Impulse Control:  Intact        ASSESSMENT:   Encounter Diagnoses   Name Primary?    Mood disorder Yes    History of bipolar disorder     RAOUL (generalized anxiety disorder)     PTSD (post-traumatic stress disorder)     Insomnia, unspecified type          PLAN:     Follow up in 2 months.      Psychiatry Medication:  As noted above, the patient discontinued psychiatric medications a few weeks ago and considers risk/benefit of taking medications for prophylaxis versus not taking medications.  She understands the risks of relapse and potential morbidity and mortality of relapse.    Reviewed with patient:  Report side effects, other problems, or  questions to the psychiatrist by way of the [facility] portal, [facility], or by calling [facility] at [phone].  Messages are checked during clinic hours only.  For urgent issues outside of clinic hours, call 911 or go to an emergency department.  Follow up with primary care/MD specialist for continued monitoring of general health and wellness and any medical conditions.  Call [facility] at [phone] or use the [facility] portal if necessary for scheduling or rescheduling.  It is the responsibility of the patient to reschedule an appointment if an appointment has been canceled or missed.  Understanding was expressed; and no further concerns or questions were raised at this time.     [id number]  2 or more stable chronic illnesses and Prescription drug management      Large portions of this note were completed by way of voice recognition dictation software, and transcription errors are possible, such that specific information in the note should be considered in the context of the entire report.

## 2024-09-03 ENCOUNTER — OFFICE VISIT (OUTPATIENT)
Dept: PSYCHIATRY | Facility: CLINIC | Age: 39
End: 2024-09-03
Payer: COMMERCIAL

## 2024-09-03 DIAGNOSIS — G47.00 INSOMNIA, UNSPECIFIED TYPE: ICD-10-CM

## 2024-09-03 DIAGNOSIS — F43.10 PTSD (POST-TRAUMATIC STRESS DISORDER): ICD-10-CM

## 2024-09-03 DIAGNOSIS — F39 MOOD DISORDER: Primary | ICD-10-CM

## 2024-09-03 DIAGNOSIS — F41.1 GAD (GENERALIZED ANXIETY DISORDER): ICD-10-CM

## 2024-09-03 DIAGNOSIS — Z86.59 HISTORY OF BIPOLAR DISORDER: ICD-10-CM

## 2024-09-03 PROCEDURE — 3044F HG A1C LEVEL LT 7.0%: CPT | Mod: CPTII,95,, | Performed by: PSYCHIATRY & NEUROLOGY

## 2024-09-03 PROCEDURE — 1160F RVW MEDS BY RX/DR IN RCRD: CPT | Mod: CPTII,95,, | Performed by: PSYCHIATRY & NEUROLOGY

## 2024-09-03 PROCEDURE — 1159F MED LIST DOCD IN RCRD: CPT | Mod: CPTII,95,, | Performed by: PSYCHIATRY & NEUROLOGY

## 2024-09-03 PROCEDURE — 99214 OFFICE O/P EST MOD 30 MIN: CPT | Mod: 95,,, | Performed by: PSYCHIATRY & NEUROLOGY

## 2024-09-26 ENCOUNTER — HOSPITAL ENCOUNTER (OUTPATIENT)
Dept: RADIOLOGY | Facility: HOSPITAL | Age: 39
Discharge: HOME | End: 2024-09-26
Attending: NURSE PRACTITIONER
Payer: COMMERCIAL

## 2024-09-26 DIAGNOSIS — R92.8 ABNORMAL MAMMOGRAM: ICD-10-CM

## 2024-09-26 PROCEDURE — 76642 ULTRASOUND BREAST LIMITED: CPT | Mod: TC,LT

## 2024-09-26 PROCEDURE — 76642 ULTRASOUND BREAST LIMITED: CPT | Mod: 26,LT,, | Performed by: STUDENT IN AN ORGANIZED HEALTH CARE EDUCATION/TRAINING PROGRAM

## 2024-09-26 PROCEDURE — 77065 DX MAMMO INCL CAD UNI: CPT | Mod: 26,LT,, | Performed by: STUDENT IN AN ORGANIZED HEALTH CARE EDUCATION/TRAINING PROGRAM

## 2024-09-26 PROCEDURE — 77061 BREAST TOMOSYNTHESIS UNI: CPT | Mod: 26,LT,, | Performed by: STUDENT IN AN ORGANIZED HEALTH CARE EDUCATION/TRAINING PROGRAM

## 2024-09-26 PROCEDURE — 77061 BREAST TOMOSYNTHESIS UNI: CPT | Mod: TC,LT

## 2024-09-28 DIAGNOSIS — E03.9 HYPOTHYROIDISM, UNSPECIFIED TYPE: ICD-10-CM

## 2024-09-28 NOTE — TELEPHONE ENCOUNTER
No care due was identified.  [facility] Embedded Care Due Messages. Reference number: [id number].   9/28/2024 8:58:15 AM CDT

## 2024-09-30 DIAGNOSIS — R92.8 ABNORMAL MAMMOGRAM: Primary | ICD-10-CM

## 2024-09-30 RX ORDER — LEVOTHYROXINE SODIUM 137 UG/1
137 TABLET ORAL
Qty: 90 TABLET | Refills: 1 | Status: SHIPPED | OUTPATIENT
Start: 2024-09-30

## 2024-09-30 NOTE — TELEPHONE ENCOUNTER
Refill Decision Note   Haydee Pennington  is requesting a refill authorization.  Brief Assessment and Rationale for Refill:  Approve     Medication Therapy Plan:  TSH out of range. T4 drawn same day WNL ( consistently WNL for previous draws).  Plan noted TSH upper level but normal.Acceptable to approve per [facility] protocol      Comments:     Note composed:8:59 AM 09/30/2024

## 2024-10-03 ENCOUNTER — OFFICE VISIT (OUTPATIENT)
Dept: SURGERY | Facility: CLINIC | Age: 39
End: 2024-10-03
Payer: COMMERCIAL

## 2024-10-03 ENCOUNTER — PATIENT MESSAGE (OUTPATIENT)
Dept: ADMINISTRATIVE | Facility: OTHER | Age: 39
End: 2024-10-03
Payer: COMMERCIAL

## 2024-10-03 VITALS
BODY MASS INDEX: 42.68 KG/M2 | SYSTOLIC BLOOD PRESSURE: 135 MMHG | TEMPERATURE: 98 F | DIASTOLIC BLOOD PRESSURE: 97 MMHG | WEIGHT: 265.56 LBS | HEIGHT: 66 IN | HEART RATE: 73 BPM

## 2024-10-03 DIAGNOSIS — Z80.0 FAMILY HX OF COLON CANCER REQUIRING SCREENING COLONOSCOPY: Primary | ICD-10-CM

## 2024-10-03 DIAGNOSIS — R92.8 ABNORMAL MAMMOGRAM: ICD-10-CM

## 2024-10-03 PROBLEM — E78.1 HYPERTRIGLYCERIDEMIA: Status: RESOLVED | Noted: 2024-05-30 | Resolved: 2024-10-03

## 2024-10-03 PROBLEM — F12.10 CANNABIS USE DISORDER, MILD, ABUSE: Status: ACTIVE | Noted: 2024-05-29

## 2024-10-03 PROBLEM — F60.3 BORDERLINE PERSONALITY DISORDER: Status: ACTIVE | Noted: 2024-05-29

## 2024-10-03 PROCEDURE — 3044F HG A1C LEVEL LT 7.0%: CPT | Mod: CPTII,S$GLB,, | Performed by: SURGERY

## 2024-10-03 PROCEDURE — 99999 PR PBB SHADOW E&M-EST. PATIENT-LVL IV: CPT | Mod: PBBFAC,,, | Performed by: SURGERY

## 2024-10-03 PROCEDURE — 99204 OFFICE O/P NEW MOD 45 MIN: CPT | Mod: S$GLB,,, | Performed by: SURGERY

## 2024-10-03 PROCEDURE — 3075F SYST BP GE 130 - 139MM HG: CPT | Mod: CPTII,S$GLB,, | Performed by: SURGERY

## 2024-10-03 PROCEDURE — 3008F BODY MASS INDEX DOCD: CPT | Mod: CPTII,S$GLB,, | Performed by: SURGERY

## 2024-10-03 PROCEDURE — 1160F RVW MEDS BY RX/DR IN RCRD: CPT | Mod: CPTII,S$GLB,, | Performed by: SURGERY

## 2024-10-03 PROCEDURE — 1159F MED LIST DOCD IN RCRD: CPT | Mod: CPTII,S$GLB,, | Performed by: SURGERY

## 2024-10-03 PROCEDURE — 3080F DIAST BP >= 90 MM HG: CPT | Mod: CPTII,S$GLB,, | Performed by: SURGERY

## 2024-10-03 NOTE — PROGRESS NOTES
Subjective:       Patient ID: Haydee Pennington is a 39 y.o. female.    Chief Complaint: Abnormal Mammogram    HPI.      Patient was found to have a benign mass in her left breast on imaging done 6 months ago.  She had a repeat ultrasound that showed no changes of the mass.      A surgical consult was requested.      The patient offers no breast complaints.        Past Medical History:   Diagnosis Date    Hypertension     Hypertriglyceridemia 05/30/2024    Thyroid disease      Past Surgical History:   Procedure Laterality Date    CERVICAL BIOPSY  W/ LOOP ELECTRODE EXCISION  2005    HYSTERECTOMY  [id number]    INJECTION OF ANESTHETIC AGENT INTO SACROILIAC JOINT Right 06/03/2022    Procedure: right Sacroiliac Joint Injection with RN IV sedation;  Surgeon: Rey Monsalve MD;  Location: [facility] PAIN MGT;  Service: Pain Management;  Laterality: Right;    INJECTION OF JOINT Right 06/03/2022    Procedure: right GT bursa injection with RN IV sedation;  Surgeon: Rey Monsalve MD;  Location: [facility] PAIN MGT;  Service: Pain Management;  Laterality: Right;    REPAIR, PERFORATION, NASAL SEPTUM      sedated for nasal septal manipulation    XI ROBOTIC HYSTERECTOMY, WITH SALPINGECTOMY N/A 03/23/2023    Procedure: XI ROBOTIC HYSTERECTOMY,WITH SALPINGECTOMY;  Surgeon: GRICEL Mock MD;  Location: [facility] OR;  Service: OB/GYN;  Laterality: N/A;     Family History   Problem Relation Name Age of Onset    Diabetes Mother Tati Pennington     Cancer Mother Tati Pennington         Lung and Breast    Hypertension Father Uziel Pennington     Dementia Father Uziel Pennington     Transient ischemic attack Father Uziel Pennington     Heart failure Father Uziel Pennington     Stroke Father Uziel Pennington      Social History     Socioeconomic History    Marital status: Single   Tobacco Use    Smoking status: Every Day     Current packs/day: 0.50     Average packs/day: 0.5 packs/day for 28.8 years (14.4 ttl pk-yrs)     Types: Vaping with nicotine, Cigarettes     Start date:  1/1/1996     Passive exposure: Past    Smokeless tobacco: Never    Tobacco comments:     3weeks strong only vaping low level nicotine   Substance and Sexual Activity    Alcohol use: No    Drug use: Yes     Frequency: 3.0 times per week     Types: Marijuana     Comment: AWEEK AGO    Sexual activity: Not Currently     Partners: Male     Birth control/protection: See Surgical Hx     Social Drivers of Health     Financial Resource Strain: Medium Risk (5/29/2024)    [facility]    Overall Financial Resource Strain (CARDIA)     Difficulty of Paying Living Expenses: Somewhat hard   Food Insecurity: Food Insecurity Present (5/29/2024)    [facility]    Hunger Vital Sign     Worried About Running Out of Food in the Last Year: Sometimes true     Ran Out of Food in the Last Year: Sometimes true   Transportation Needs: No Transportation Needs (5/29/2024)    [facility]    PRAPARE - Transportation     Lack of Transportation (Medical): No     Lack of Transportation (Non-Medical): No   Physical Activity: Insufficiently Active (3/14/2024)    Exercise Vital Sign     Days of Exercise per Week: 2 days     Minutes of Exercise per Session: 10 min   Stress: Stress Concern Present (3/14/2024)    [employer] - Occupational Stress Questionnaire     Feeling of Stress : To some extent   Housing Stability: High Risk (3/14/2024)    Housing Stability Vital Sign     Unable to Pay for Housing in the Last Year: Yes     Number of Places Lived in the Last Year: 1     Unstable Housing in the Last Year: No       Current Outpatient Medications   Medication Sig Dispense Refill    gabapentin (NEURONTIN) 300 MG capsule TAKE 1 CAPSULE BY MOUTH THREE TIMES DAILY 90  "capsule 3    levothyroxine (SYNTHROID) 137 MCG Tab tablet Take 1 tablet (137 mcg total) by mouth before breakfast. 90 tablet 1    propranoloL (INDERAL) 10 MG tablet Take 1 tablet (10 mg total) by mouth 3 (three) times daily. 270 tablet 0     No current facility-administered medications for this visit.     Review of patient's allergies indicates:   Allergen Reactions    Aripiprazole Rash and Hives    Sulfa (sulfonamide antibiotics) Hives     Menses 11  Child at 17  Breast feed no  Menopause not yet    Mother breast cancer 15 years   Lung cancer 2019 unsure if metastatic or primary  Mother colon cancer    Genetic testing of mother    Review of Systems   Constitutional:  Negative for appetite change, chills, fatigue, fever and unexpected weight change.   HENT:  Negative for hearing loss and rhinorrhea.    Eyes:  Negative for visual disturbance.   Respiratory:  Negative for apnea, cough, shortness of breath and wheezing.    Cardiovascular:  Negative for chest pain and palpitations.   Gastrointestinal:  Negative for abdominal distention, abdominal pain, blood in stool, constipation, diarrhea, nausea and vomiting.   Genitourinary:  Negative for dysuria, frequency and urgency.   Musculoskeletal:  Negative for arthralgias and neck pain.   Skin:  Negative for rash.   Neurological:  Negative for seizures, weakness, numbness and headaches.   Hematological:  Negative for adenopathy. Does not bruise/bleed easily.   Psychiatric/Behavioral:  Negative for hallucinations. The patient is not nervous/anxious.      Objective:      Vitals:    10/03/24 0923   BP: (!) 135/97   Pulse: 73   Temp: 98.1 °F (36.7 °C)   Weight: 120.5 kg (265 lb 8.7 oz)   Height: 5' 6" (1.676 m)     Physical Exam  Constitutional:       Appearance: She is well-developed.      Comments: Overweight   HENT:      Head: Normocephalic.   Eyes:      Pupils: Pupils are equal, round, and reactive to light.   Neck:      Thyroid: No thyromegaly.      Vascular: No JVD.      " Trachea: No tracheal deviation.   Cardiovascular:      Rate and Rhythm: Normal rate and regular rhythm.      Heart sounds: Normal heart sounds.   Pulmonary:      Breath sounds: Normal breath sounds. No wheezing.   Abdominal:      General: Bowel sounds are normal. There is no distension.      Palpations: Abdomen is soft. Abdomen is not rigid. There is no mass.      Tenderness: There is no abdominal tenderness. There is no guarding or rebound.   Musculoskeletal:         General: Normal range of motion.   Lymphadenopathy:      Cervical: No cervical adenopathy.   Skin:     General: Skin is warm and dry.      Findings: No erythema or rash.      Comments: Bilateral breast exam was performed.  The right breast shows no skin changes, nipple discharges, masses or axillary adenopathy.      The left breast shows an easily palpable mass in the upper outer quadrant just below the axillary fold.  There are no other skin changes masses nipple discharge or axillary adenopathy   Neurological:      Mental Status: She is oriented to person, place, and time.     Lab Review:  None  Diagnostics Review:  Mammograms and ultrasounds were reviewed.  Images shared with the patient.     Assessment:       1. Abnormal mammogram      Two.  Family history-mother-breast cancer, lung cancer (primary versus metastatic):  Colon can  Plan:       One.  With the guards to the fibroadenoma no surgical intervention it was needed at this time.      Six-month follow up.  If the fibroadenoma is enlarging or if the patient desires to have it removed surgical excision can be arranged.      2.  Screening colonoscopy as the patient is 39 years old and has a mother with a history of breast cancer.      Three.  I asked the patient to have her mother discuss the need for genetic testing (mother) with her oncologist.      Agree with repeat imaging, bilateral mammogram and left breast ultrasound in 6 months.      Follow up with surgery at that time      I explained to  the patient that a colonoscopy is needed to surveil for colon cancer and that 70% of colon cancers can be prevented with colonoscopy.  A request has been made to the endo

## 2024-10-03 NOTE — PATIENT INSTRUCTIONS
Ask her mother to talk to her oncologist about the need for genetic testing.      We have put in a request for you to get a colonoscopy and I would strongly recommend this.      I recommend you check on the mass once a month and if it was enlarging let us know.      We will plan to see you back in 6 months.      The medical reason to review the fibroadenoma is that it is getting bigger  Our office phone numbers are  [phone] and

## 2024-10-06 ENCOUNTER — PATIENT MESSAGE (OUTPATIENT)
Dept: PSYCHIATRY | Facility: CLINIC | Age: 39
End: 2024-10-06
Payer: COMMERCIAL

## 2024-10-09 ENCOUNTER — HOSPITAL ENCOUNTER (OUTPATIENT)
Dept: PREADMISSION TESTING | Facility: HOSPITAL | Age: 39
Discharge: HOME | End: 2024-10-09
Attending: SURGERY
Payer: COMMERCIAL

## 2024-10-09 DIAGNOSIS — Z80.0 FAMILY HX OF COLON CANCER REQUIRING SCREENING COLONOSCOPY: Primary | ICD-10-CM

## 2024-10-09 RX ORDER — POLYETHYLENE GLYCOL 3350, SODIUM SULFATE ANHYDROUS, SODIUM BICARBONATE, SODIUM CHLORIDE, POTASSIUM CHLORIDE 236; 22.74; 6.74; 5.86; 2.97 G/4L; G/4L; G/4L; G/4L; G/4L
4 POWDER, FOR SOLUTION ORAL ONCE
Qty: 4000 ML | Refills: 0 | Status: SHIPPED | OUTPATIENT
Start: 2024-10-09 | End: 2024-10-09

## 2024-10-18 RX ORDER — POLYETHYLENE GLYCOL 3350, SODIUM SULFATE ANHYDROUS, SODIUM BICARBONATE, SODIUM CHLORIDE, POTASSIUM CHLORIDE 236; 22.74; 6.74; 5.86; 2.97 G/4L; G/4L; G/4L; G/4L; G/4L
4 POWDER, FOR SOLUTION ORAL ONCE
Qty: 4000 ML | Refills: 0 | Status: SHIPPED | OUTPATIENT
Start: 2024-10-18 | End: 2024-10-18

## 2024-10-23 ENCOUNTER — HOSPITAL ENCOUNTER (OUTPATIENT)
Facility: HOSPITAL | Age: 39
Discharge: HOME | End: 2024-10-23
Attending: COLON & RECTAL SURGERY | Admitting: COLON & RECTAL SURGERY
Payer: COMMERCIAL

## 2024-10-23 ENCOUNTER — ANESTHESIA (OUTPATIENT)
Dept: ENDOSCOPY | Facility: HOSPITAL | Age: 39
End: 2024-10-23
Payer: COMMERCIAL

## 2024-10-23 ENCOUNTER — ANESTHESIA EVENT (OUTPATIENT)
Dept: ENDOSCOPY | Facility: HOSPITAL | Age: 39
End: 2024-10-23
Payer: COMMERCIAL

## 2024-10-23 DIAGNOSIS — Z12.11 SCREENING FOR COLON CANCER: ICD-10-CM

## 2024-10-23 PROCEDURE — 88305 TISSUE EXAM BY PATHOLOGIST: CPT | Performed by: PATHOLOGY

## 2024-10-23 PROCEDURE — 45385 COLONOSCOPY W/LESION REMOVAL: CPT | Mod: 33,,, | Performed by: COLON & RECTAL SURGERY

## 2024-10-23 PROCEDURE — 27201012 HC FORCEPS, HOT/COLD, DISP: Performed by: COLON & RECTAL SURGERY

## 2024-10-23 PROCEDURE — 45380 COLONOSCOPY AND BIOPSY: CPT | Mod: 33,59 | Performed by: COLON & RECTAL SURGERY

## 2024-10-23 PROCEDURE — 37000008 HC ANESTHESIA 1ST 15 MINUTES: Performed by: COLON & RECTAL SURGERY

## 2024-10-23 PROCEDURE — 63600175 PHARM REV CODE 636 W HCPCS: Performed by: NURSE ANESTHETIST, CERTIFIED REGISTERED

## 2024-10-23 PROCEDURE — 88305 TISSUE EXAM BY PATHOLOGIST: CPT | Mod: 26,,, | Performed by: PATHOLOGY

## 2024-10-23 PROCEDURE — 45385 COLONOSCOPY W/LESION REMOVAL: CPT | Mod: 33 | Performed by: COLON & RECTAL SURGERY

## 2024-10-23 PROCEDURE — 45380 COLONOSCOPY AND BIOPSY: CPT | Mod: 33,59,, | Performed by: COLON & RECTAL SURGERY

## 2024-10-23 PROCEDURE — 27201089 HC SNARE, DISP (ANY): Performed by: COLON & RECTAL SURGERY

## 2024-10-23 PROCEDURE — 37000009 HC ANESTHESIA EA ADD 15 MINS: Performed by: COLON & RECTAL SURGERY

## 2024-10-23 RX ORDER — PROPOFOL 10 MG/ML
VIAL (ML) INTRAVENOUS
Status: DISCONTINUED | OUTPATIENT
Start: 2024-10-23 | End: 2024-10-23

## 2024-10-23 RX ORDER — LIDOCAINE HYDROCHLORIDE 10 MG/ML
INJECTION, SOLUTION EPIDURAL; INFILTRATION; INTRACAUDAL; PERINEURAL
Status: DISCONTINUED | OUTPATIENT
Start: 2024-10-23 | End: 2024-10-23

## 2024-10-23 RX ADMIN — PROPOFOL 50 MG: 10 INJECTION, EMULSION INTRAVENOUS at 12:10

## 2024-10-23 RX ADMIN — LIDOCAINE HYDROCHLORIDE 50 MG: 10 SOLUTION INTRAVENOUS at 11:10

## 2024-10-23 RX ADMIN — PROPOFOL 100 MG: 10 INJECTION, EMULSION INTRAVENOUS at 11:10

## 2024-10-23 RX ADMIN — PROPOFOL 50 MG: 10 INJECTION, EMULSION INTRAVENOUS at 11:10

## 2024-10-23 NOTE — ANESTHESIA POSTPROCEDURE EVALUATION
Anesthesia Post Evaluation    Patient: Haydee Pennington    Procedure(s) Performed: Procedure(s) (LRB):  COLONOSCOPY, SCREENING, HIGH RISK PATIENT (N/A)    Final Anesthesia Type: MAC      Patient location during evaluation: GI PACU  Patient participation: Yes- Able to Participate  Level of consciousness: awake and alert  Post-procedure vital signs: reviewed and stable  Pain management: adequate  Airway patency: patent    PONV status at discharge: No PONV  Anesthetic complications: no      Cardiovascular status: blood pressure returned to baseline  Respiratory status: unassisted and spontaneous ventilation  Hydration status: euvolemic  Follow-up not needed.              Vitals Value Taken Time   /102 10/23/24 1230   Temp 36.4 °C (97.5 °F) 10/23/24 1230   Pulse 82 10/23/24 1230   Resp 17 10/23/24 1230   SpO2 98 % 10/23/24 1230         No case tracking events are documented in the log.      Pain/Patience Score: Patinece Score: 10 (10/23/2024 12:30 PM)

## 2024-10-23 NOTE — TRANSFER OF CARE
"Anesthesia Transfer of Care Note    Patient: Haydee Pennington    Procedure(s) Performed: Procedure(s) (LRB):  COLONOSCOPY, SCREENING, HIGH RISK PATIENT (N/A)    Patient location: GI    Anesthesia Type: MAC    Transport from OR: Transported from OR on room air with adequate spontaneous ventilation    Post pain: adequate analgesia    Post assessment: no apparent anesthetic complications    Post vital signs: stable    Level of consciousness: sedated    Nausea/Vomiting: no nausea/vomiting    Complications: none    Transfer of care protocol was followed      Last vitals: Visit Vitals  BP (!) 174/93 (BP Location: Left arm, Patient Position: Lying)   Pulse (!) 58   Temp 37 °C (98.6 °F) (Temporal)   Resp 20   Ht 5' 6" (1.676 m)   Wt 117.9 kg (260 lb)   LMP 02/15/2023 (Exact Date)   SpO2 99%   Breastfeeding No   BMI 41.97 kg/m²     "

## 2024-10-23 NOTE — ANESTHESIA PREPROCEDURE EVALUATION
10/23/2024  Haydee Pennington is a 39 y.o., female.      Pre-op Assessment    I have reviewed the Patient Summary Reports.    I have reviewed the NPO Status.   I have reviewed the Medications.     Review of Systems  Social:  Smoker       Cardiovascular:     Hypertension, well controlled           hyperlipidemia   ECG has been reviewed.                            Hepatic/GI:  Bowel Prep.                   Endocrine:   Hypothyroidism        Morbid Obesity / BMI > 40  Psych:  Psychiatric History anxiety depression              Physical Exam  General: Well nourished    Airway:  Mallampati: II   Mouth Opening: Normal  TM Distance: Normal  Tongue: Normal  Neck ROM: Normal ROM    Dental:  Intact    Chest/Lungs:  Normal Respiratory Rate    Heart:  Rate: Normal  Rhythm: Regular Rhythm    Anesthesia Plan  Type of Anesthesia, risks & benefits discussed:    Anesthesia Type: MAC  Intra-op Monitoring Plan: Standard ASA Monitors  Post Op Pain Control Plan: multimodal analgesia  Induction:  IV  Informed Consent: Informed consent signed with the Patient and all parties understand the risks and agree with anesthesia plan.  All questions answered.   ASA Score: 3  Day of Surgery Review of History & Physical: H&P Update referred to the surgeon/provider.    Ready For Surgery From Anesthesia Perspective.   .

## 2024-10-24 VITALS
RESPIRATION RATE: 17 BRPM | WEIGHT: 260 LBS | OXYGEN SATURATION: 98 % | TEMPERATURE: 98 F | DIASTOLIC BLOOD PRESSURE: 102 MMHG | HEIGHT: 66 IN | HEART RATE: 82 BPM | SYSTOLIC BLOOD PRESSURE: 170 MMHG | BODY MASS INDEX: 41.78 KG/M2

## 2024-10-29 LAB
FINAL PATHOLOGIC DIAGNOSIS: NORMAL
GROSS: NORMAL
Lab: NORMAL
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
## 2024-11-04 ENCOUNTER — OFFICE VISIT (OUTPATIENT)
Dept: PSYCHIATRY | Facility: CLINIC | Age: 39
End: 2024-11-04
Payer: COMMERCIAL

## 2024-11-04 DIAGNOSIS — F43.10 PTSD (POST-TRAUMATIC STRESS DISORDER): ICD-10-CM

## 2024-11-04 DIAGNOSIS — F39 MOOD DISORDER: Primary | ICD-10-CM

## 2024-11-04 DIAGNOSIS — Z86.59 HISTORY OF BIPOLAR DISORDER: ICD-10-CM

## 2024-11-04 DIAGNOSIS — F41.1 GAD (GENERALIZED ANXIETY DISORDER): ICD-10-CM

## 2024-11-04 PROCEDURE — 99214 OFFICE O/P EST MOD 30 MIN: CPT | Mod: 95,,, | Performed by: PSYCHIATRY & NEUROLOGY

## 2024-11-04 PROCEDURE — 3044F HG A1C LEVEL LT 7.0%: CPT | Mod: CPTII,95,, | Performed by: PSYCHIATRY & NEUROLOGY

## 2024-11-04 PROCEDURE — 1159F MED LIST DOCD IN RCRD: CPT | Mod: CPTII,95,, | Performed by: PSYCHIATRY & NEUROLOGY

## 2024-11-04 PROCEDURE — 1160F RVW MEDS BY RX/DR IN RCRD: CPT | Mod: CPTII,95,, | Performed by: PSYCHIATRY & NEUROLOGY

## 2024-12-26 ENCOUNTER — OFFICE VISIT (OUTPATIENT)
Dept: INTERNAL MEDICINE | Facility: CLINIC | Age: 39
End: 2024-12-26
Payer: COMMERCIAL

## 2024-12-26 VITALS
OXYGEN SATURATION: 96 % | TEMPERATURE: 97 F | HEART RATE: 94 BPM | DIASTOLIC BLOOD PRESSURE: 86 MMHG | SYSTOLIC BLOOD PRESSURE: 132 MMHG | BODY MASS INDEX: 43.44 KG/M2 | HEIGHT: 66 IN | WEIGHT: 270.31 LBS

## 2024-12-26 DIAGNOSIS — N63.21 UNSPECIFIED LUMP IN THE LEFT BREAST, UPPER OUTER QUADRANT: ICD-10-CM

## 2024-12-26 DIAGNOSIS — F41.9 ANXIETY: ICD-10-CM

## 2024-12-26 DIAGNOSIS — I10 PRIMARY HYPERTENSION: ICD-10-CM

## 2024-12-26 DIAGNOSIS — E03.9 HYPOTHYROIDISM, UNSPECIFIED TYPE: ICD-10-CM

## 2024-12-26 DIAGNOSIS — F32.A DEPRESSION, UNSPECIFIED DEPRESSION TYPE: ICD-10-CM

## 2024-12-26 DIAGNOSIS — M46.1 SACROILIITIS: ICD-10-CM

## 2024-12-26 DIAGNOSIS — Z79.899 ENCOUNTER FOR LONG-TERM (CURRENT) USE OF MEDICATIONS: Primary | ICD-10-CM

## 2024-12-26 DIAGNOSIS — M54.31 SCIATICA OF RIGHT SIDE: ICD-10-CM

## 2024-12-26 PROCEDURE — 99214 OFFICE O/P EST MOD 30 MIN: CPT | Mod: S$GLB,,, | Performed by: FAMILY MEDICINE

## 2024-12-26 PROCEDURE — 1159F MED LIST DOCD IN RCRD: CPT | Mod: CPTII,S$GLB,, | Performed by: FAMILY MEDICINE

## 2024-12-26 PROCEDURE — 99999 PR PBB SHADOW E&M-EST. PATIENT-LVL III: CPT | Mod: PBBFAC,,, | Performed by: FAMILY MEDICINE

## 2024-12-26 PROCEDURE — 3075F SYST BP GE 130 - 139MM HG: CPT | Mod: CPTII,S$GLB,, | Performed by: FAMILY MEDICINE

## 2024-12-26 PROCEDURE — 3008F BODY MASS INDEX DOCD: CPT | Mod: CPTII,S$GLB,, | Performed by: FAMILY MEDICINE

## 2024-12-26 PROCEDURE — 3044F HG A1C LEVEL LT 7.0%: CPT | Mod: CPTII,S$GLB,, | Performed by: FAMILY MEDICINE

## 2024-12-26 PROCEDURE — 3079F DIAST BP 80-89 MM HG: CPT | Mod: CPTII,S$GLB,, | Performed by: FAMILY MEDICINE

## 2024-12-26 PROCEDURE — G2211 COMPLEX E/M VISIT ADD ON: HCPCS | Mod: S$GLB,,, | Performed by: FAMILY MEDICINE

## 2024-12-26 RX ORDER — GABAPENTIN 300 MG/1
300 CAPSULE ORAL 3 TIMES DAILY
Qty: 270 CAPSULE | Refills: 3 | Status: SHIPPED | OUTPATIENT
Start: 2024-12-26

## 2024-12-26 RX ORDER — LEVOTHYROXINE SODIUM 137 UG/1
137 TABLET ORAL
Qty: 90 TABLET | Refills: 1 | Status: SHIPPED | OUTPATIENT
Start: 2024-12-26

## 2024-12-26 NOTE — PROGRESS NOTES
Subjective:       Patient ID: Haydee Pennington is a 39 y.o. female.    Chief Complaint: Follow-up (med)    Follow-up  Associated symptoms include headaches. Pertinent negatives include no arthralgias, chest pain, joint swelling, neck pain, vomiting or weakness.       Patient Active Problem List   Diagnosis    Anxiety    Depression    Hypothyroidism    Stress headaches    Hypertension    Severe obesity (BMI 35.0-39.9) with comorbidity    Vitamin D deficiency    Tobacco dependence due to cigarettes    Sacroiliitis    Greater trochanteric bursitis of right hip    Unspecified lump in the left breast, upper outer quadrant    Borderline personality disorder    Cannabis use disorder, mild, abuse       Past Medical History:   Diagnosis Date    Hypertension     Hypertriglyceridemia 05/30/2024    Thyroid disease        Past Surgical History:   Procedure Laterality Date    CERVICAL BIOPSY  W/ LOOP ELECTRODE EXCISION  2005    COLONOSCOPY, SCREENING, HIGH RISK PATIENT N/A 10/23/2024    Procedure: COLONOSCOPY, SCREENING, HIGH RISK PATIENT;  Surgeon: Horacio Schaffer MD;  Location: [facility];  Service: Endoscopy;  Laterality: N/A;    HYSTERECTOMY  [phone]    INJECTION OF ANESTHETIC AGENT INTO SACROILIAC JOINT Right 06/03/2022    Procedure: right Sacroiliac Joint Injection with RN IV sedation;  Surgeon: Rey Monsalve MD;  Location: [facility];  Service: Pain Management;  Laterality: Right;    INJECTION OF JOINT Right 06/03/2022    Procedure: right GT bursa injection with RN IV sedation;  Surgeon: Rey Monsalve MD;  Location: [facility];  Service: Pain Management;  Laterality: Right;    REPAIR, PERFORATION, NASAL SEPTUM      sedated for nasal septal manipulation    XI ROBOTIC HYSTERECTOMY, WITH SALPINGECTOMY N/A 03/23/2023    Procedure: XI ROBOTIC HYSTERECTOMY,WITH SALPINGECTOMY;  Surgeon: GRICEL Mock MD;  Location: [facility] OR;  Service: OB/GYN;  Laterality: N/A;       Family History   Problem Relation Name Age of  Onset    Diabetes Mother Tati Pennington     Cancer Mother Tati Pennington         Lung and Breast    Hypertension Father Uziel Pennington     Dementia Father Uziel Pennington     Transient ischemic attack Father Uziel Pennington     Heart failure Father Uziel Pennington     Stroke Father Uziel Pennington        Social History     Tobacco Use   Smoking Status Every Day    Current packs/day: 0.50    Average packs/day: 0.5 packs/day for 29.0 years (14.5 ttl pk-yrs)    Types: Vaping with nicotine, Cigarettes    Start date: 1/1/1996    Passive exposure: Past   Smokeless Tobacco Never   Tobacco Comments    3weeks strong only vaping low level nicotine       Wt Readings from Last 5 Encounters:   12/26/24 122.6 kg (270 lb 4.5 oz)   10/23/24 117.9 kg (260 lb)   10/03/24 120.5 kg (265 lb 8.7 oz)   04/09/24 122.9 kg (270 lb 15.1 oz)   04/08/24 119.7 kg (263 lb 14.3 oz)       History of Present Illness    CHIEF COMPLAINT:  Patient presents today for follow-up.    CURRENT SYMPTOMS:  She presents with sore throat that started this morning, possibly due to viral exposure from ill child or vaping use.    MENTAL HEALTH:  She was seen by psychiatrist Dr. Nelson one month ago for mood disorder, anxiety, PTSD, and history of bipolar disorder. She visited the ER at [facility] in late May due to emotional distress related to her mother's ongoing chemotherapy treatments. She declines psychiatric medications at this time.    MEDICATIONS:  She reports being out of thyroid medication (last dose 137 mcg) and blood pressure medication due to unexpected pharmacy closure. She expresses needing thyroid medication but not blood pressure medication. She is also out of gabapentin, which she was taking 3 times daily for pain management and anxiety.    MUSCULOSKELETAL:  She reports sciatica affecting right leg. She performs stretches inconsistently at home but cannot pursue physical therapy due to financial constraints.           Review of Systems   Constitutional:  Negative  for activity change and unexpected weight change.   HENT:  Negative for hearing loss, rhinorrhea and trouble swallowing.    Eyes:  Negative for discharge and visual disturbance.   Respiratory:  Negative for chest tightness and wheezing.    Cardiovascular:  Negative for chest pain and palpitations.   Gastrointestinal:  Negative for blood in stool, constipation, diarrhea and vomiting.   Endocrine: Negative for polydipsia and polyuria.   Genitourinary:  Negative for difficulty urinating, dysuria, hematuria and menstrual problem.   Musculoskeletal:  Negative for arthralgias, joint swelling and neck pain.   Neurological:  Positive for headaches. Negative for weakness.   Psychiatric/Behavioral:  Negative for confusion and dysphoric mood.        Objective:      Vitals:    12/26/24 1644   BP: 132/86   Pulse: 94   Temp: 96.9 °F (36.1 °C)       Physical Exam  Constitutional:       General: She is not in acute distress.     Appearance: Normal appearance. She is not ill-appearing, toxic-appearing or diaphoretic.      Comments: Pleasant.    Pulmonary:      Effort: Pulmonary effort is normal. No respiratory distress.   Neurological:      General: No focal deficit present.      Mental Status: She is alert. Mental status is at baseline.   Psychiatric:         Mood and Affect: Mood normal.         Behavior: Behavior normal.         Thought Content: Thought content normal.         Judgment: Judgment normal.         Assessment:       1. Encounter for long-term (current) use of medications    2. Hypothyroidism, unspecified type    3. Sacroiliitis    4. Primary hypertension    5. Unspecified lump in the left breast, upper outer quadrant    6. Anxiety    7. Depression, unspecified depression type    8. Sciatica of right side        Plan:   Assessment & Plan    PLAN SUMMARY:  Refill thyroid medication at 137 mcg  Refill gabapentin at previous dose, 3 times daily  Gargle with warm salt water for sore throat  Tylenol and ibuprofen for  symptomatic relief of sore throat  Continue stretching exercises for sciatica management  Obtain blood pressure machine for home monitoring  Thyroid function test (TSH) ordered for 6 weeks from now  Follow up in 6 weeks for lab work  Follow up in 6 months for general check-up    MENTAL HEALTH (DEPRESSION AND ANXIETY):  Assessed mental health stability; patient declined medications. Seems stable and in no acute distress    COLON CANCER SCREENING:  Reviewed colonoscopy results from October; recommendation for repeat in 5 years due to polyps.    HYPERTENSION: controlled  Recommend obtaining a blood pressure machine for home monitoring.    UPPER RESPIRATORY INFECTION:  Addressed sore throat; likely viral infection.  Explained viral nature of sore throat and recommended symptomatic treatment.  Patient to gargle with warm salt water for sore throat.  Recommend Tylenol and ibuprofen for symptomatic relief of sore throat.    WEIGHT MANAGEMENT AND TOBACCO USE:  Noted concerns about weight gain and vaping.  Discussed potential link between vaping and throat irritation.    SCIATICA:  Discussed sciatica symptoms in right leg.  Patient to continue stretching exercises for sciatica management.  Refilled gabapentin at previous dose and frequency (3 times daily).    HYPOTHYROIDISM:  Refilled thyroid medication at previous dose of 137 mcg.  Ordered thyroid function test (TSH) for 6 weeks from now.    FOLLOW-UP:  Follow up in 6 months.  Follow up in 6 weeks for lab work.

## 2025-01-09 ENCOUNTER — OFFICE VISIT (OUTPATIENT)
Dept: DERMATOLOGY | Facility: CLINIC | Age: 40
End: 2025-01-09
Payer: COMMERCIAL

## 2025-01-09 DIAGNOSIS — D22.9 MULTIPLE BENIGN NEVI: ICD-10-CM

## 2025-01-09 DIAGNOSIS — L30.4 INTERTRIGO: Primary | ICD-10-CM

## 2025-01-09 DIAGNOSIS — L40.9 PSORIASIS: ICD-10-CM

## 2025-01-09 PROCEDURE — G2211 COMPLEX E/M VISIT ADD ON: HCPCS | Mod: S$GLB,,, | Performed by: STUDENT IN AN ORGANIZED HEALTH CARE EDUCATION/TRAINING PROGRAM

## 2025-01-09 PROCEDURE — 99204 OFFICE O/P NEW MOD 45 MIN: CPT | Mod: S$GLB,,, | Performed by: STUDENT IN AN ORGANIZED HEALTH CARE EDUCATION/TRAINING PROGRAM

## 2025-01-09 PROCEDURE — 1160F RVW MEDS BY RX/DR IN RCRD: CPT | Mod: CPTII,S$GLB,, | Performed by: STUDENT IN AN ORGANIZED HEALTH CARE EDUCATION/TRAINING PROGRAM

## 2025-01-09 PROCEDURE — 1159F MED LIST DOCD IN RCRD: CPT | Mod: CPTII,S$GLB,, | Performed by: STUDENT IN AN ORGANIZED HEALTH CARE EDUCATION/TRAINING PROGRAM

## 2025-01-09 PROCEDURE — 99999 PR PBB SHADOW E&M-EST. PATIENT-LVL II: CPT | Mod: PBBFAC,,, | Performed by: STUDENT IN AN ORGANIZED HEALTH CARE EDUCATION/TRAINING PROGRAM

## 2025-01-09 RX ORDER — KETOCONAZOLE 20 MG/G
CREAM TOPICAL 2 TIMES DAILY
Qty: 30 G | Refills: 1 | Status: SHIPPED | OUTPATIENT
Start: 2025-01-09

## 2025-01-09 RX ORDER — CLOBETASOL PROPIONATE 0.5 MG/G
OINTMENT TOPICAL 2 TIMES DAILY
Qty: 60 G | Refills: 2 | Status: SHIPPED | OUTPATIENT
Start: 2025-01-09

## 2025-01-09 RX ORDER — HYDROCORTISONE 25 MG/G
CREAM TOPICAL 2 TIMES DAILY
Qty: 30 G | Refills: 1 | Status: SHIPPED | OUTPATIENT
Start: 2025-01-09

## 2025-01-09 NOTE — PROGRESS NOTES
Patient Information  Name: Haydee Pennington  : 1985  MRN: [id number]     Referring Physician:  Dr. Marroquin   Primary Care Physician:  Gumaro Colon MD   Date of Visit: 2025      Subjective:       Haydee Pennington is a 39 y.o. female who presents for   Chief Complaint   Patient presents with    Rash     Underneath breast. Tx lotrim.     Lesion     On arm, years      HPI  Patient is here with concern of: rash  Location: breast  Duration: years  Symptoms: itching  Prior treatments: lotrimin, desitin    Patient is here with concern of: skin lesion  Location: scalp  Duration: years  Symptoms: itching  Prior treatments: none      Patient was last seen:Visit date not found     Prior notes by myself reviewed.   Clinical documentation obtained by nursing staff reviewed.    Review of Systems   Skin:  Positive for itching and rash.        Objective:    Physical Exam   Constitutional: She appears well-developed and well-nourished. No distress.   Neurological: She is alert and oriented to person, place, and time. She is not disoriented.   Psychiatric: She has a normal mood and affect.   Skin:   Areas Examined (abnormalities noted in diagram):   Scalp / Hair Palpated and Inspected  Chest / Axilla Inspection Performed  Abdomen Inspection Performed  LUE Inspection Performed                   Diagram Legend     Erythematous scaling macule/papule c/w actinic keratosis       Vascular papule c/w angioma      Pigmented verrucoid papule/plaque c/w seborrheic keratosis      Yellow umbilicated papule c/w sebaceous hyperplasia      Irregularly shaped tan macule c/w lentigo     1-2 mm smooth white papules consistent with Milia      Movable subcutaneous cyst with punctum c/w epidermal inclusion cyst      Subcutaneous movable cyst c/w pilar cyst      Firm pink to brown papule c/w dermatofibroma      Pedunculated fleshy papule(s) c/w skin tag(s)      Evenly pigmented macule c/w junctional nevus     Mildly variegated  pigmented, slightly irregular-bordered macule c/w mildly atypical nevus      Flesh colored to evenly pigmented papule c/w intradermal nevus       Pink pearly papule/plaque c/w basal cell carcinoma      Erythematous hyperkeratotic cursted plaque c/w SCC      Surgical scar with no sign of skin cancer recurrence      Open and closed comedones      Inflammatory papules and pustules      Verrucoid papule consistent consistent with wart     Erythematous eczematous patches and plaques     Dystrophic onycholytic nail with subungual debris c/w onychomycosis     Umbilicated papule    Erythematous-base heme-crusted tan verrucoid plaque consistent with inflamed seborrheic keratosis     Erythematous Silvery Scaling Plaque c/w Psoriasis     See annotation      No images are attached to the encounter or orders placed in the encounter.    [] Data reviewed  [] Independent review of test  [] Management discussed with another provider    Assessment / Plan:        Intertrigo vs inverse psoriasis  -     ketoconazole (NIZORAL) 2 % cream; Apply topically 2 (two) times daily. Mix with hydrocortisone cream and AAA on breast BID for up to 2 weeks at a time  Dispense: 30 g; Refill: 1  -     hydrocortisone 2.5 % cream; Apply topically 2 (two) times daily. Mix with ketoconazole cream and AAA on breast BID for up to 2 weeks at a time  Dispense: 30 g; Refill: 1  Counseling on topical steroids:  Patient counseled that the prolonged use of topical steroids can result in the increased appearance superficial blood vessels (telangiectasias) lightening (hypopigmentation), and   thinning of the skin ( atrophy).  Patient understands to avoid using high potency steroids in skin folds, the groin or the face.  The patient verbalized understanding of proper use and possible adverse effects of topical steroids.  All patient's questions and concerns were addressed.    Psoriasis vs tinea corporis  -     clobetasol 0.05% (TEMOVATE) 0.05 % Oint; Apply topically 2  (two) times daily. Use on scalp  Dispense: 60 g; Refill: 2  RTC in 1 month for re-assessment    Multiple benign nevi  Discussed ABCDE's of nevi.  Monitor for new mole or moles that are becoming bigger, darker, irritated, or developing irregular borders. Brochure provided. Instructed patient to observe lesion(s) for changes and follow up in clinic if changes are noted. Patient to monitor skin at home for new or changing lesions.              LOS NUMBER AND COMPLEXITY OF PROBLEMS    COMPLEXITY OF DATA RISK TOTAL TIME (m)   94773  43463 [] 1 self-limited or minor problem [x] Minimal to none [] No treatment recommended or patient to monitor 15-29  10-19   21048  64893 Low  [] 2 or > self limited or minor problems  [] 1 stable chronic illness  [x] 1 acute, uncomplicated illness or injury Limited (2)  [] Prior external notes from each unique source  [] Review result of each unique test  [] Order each unique test []  Low  OTC medications, minor skin biopsy 30-44  20-29   12447  25588 Moderate  [x]  1 or > chronic illness with progression, exacerbation or SE of treatment  []  2 or more stable chronic illnesses  []  1 acute illness with systemic symptoms  []  1 acute complicated injury  []  1 undiagnosed new problem with uncertain prognosis Moderate (1/3 below)  []  3 or more data items        *Now includes assessment requiring independent historian  []  Independent interpretation of a test  []  Discuss management/test with another provider Moderate  [x]  Prescription drug mgmt  []  Minor surgery with risk discussed  []  Mgmt limited by social determinates 45-59  30-39   15085  61715 High  []  1 or more chronic illness with severe exacerbation, progression or SE of treatment  []  1 acute or chronic illness/injury that poses a threat to life or bodily function Extensive (2/3 below)  []  3 or more data items        *Now includes assessment requiring independent historian.  []  Independent interpretation of a test  []  Discuss  management/test with another provider High  []  Major surgery with risk discussed  []  Drug therapy requiring intensive monitoring for toxicity  []  Hospitalization  []  Decision for DNR 60-74  40-54      No follow-ups on file.    Karma Chavez MD, FAAD  [facility]

## 2025-02-03 NOTE — PROGRESS NOTES
The patient location is: Patient's home . Patient reported  that his/her location at the time of this visit was in the [facility].    Visit type: Virtual visit with synchronous audio and video     Each patient to whom he or she provides medical services by telemedicine is: (1) informed of the relationship between the physician and patient and the respective role of any other health care provider with respect to management of the patient; and (2) notified that he or she may decline to receive medical services by telemedicine and may withdraw from such care at any time.    Patient was informed that I am a physician who is licensed in the [facility]:  Huy Kaplan MD:  Employed by [facility] [employer]     Patient was instructed that If technology issues arise, he/she may  call our office phone at: [phone].    Pt informed that if he/she is ever in crisis (or has acute concerns), dial 911 or go to nearest Emergency Room (ER).    Pt informed that if questions related to privacy practices arise, contact [facility]: [phone].    Understanding Expressed. No questions.        Haydee Pennington   1985   02/04/2025        CURRENT PRESENTATION  (psychiatric biopsychosocial evaluation; plan for treatment):   Last visit 11/04/2024 documentation includes:   ...the patient reports that she continues off all psychotropic medications.  With extensive and specific questioning, no depression, elevated moods, ongoing irritable moods, manic signs or symptoms, problematic anxiety, trauma symptoms, or sleep difficulties.  Never with psychosis, suicidal ideation, thoughts of self-harm, homicidal ideation, thoughts violence, or feelings of aggression.      The patient describes some affective shifts and some impulsive expressions of anger, verbally.  She describes appropriate strategies to avoid these or manage them if they are occurring, and she has done so successfully.  She  identifies with symptoms borderline personality disorder in her own research.  She wishes to hold off medications but says that she will consider Lamictal or a returned to Trileptal after discussion of indications rationale, risks, and side effects of both medications if symptoms become problematic or if mood problems reemerge.       Encounter Diagnoses   Name Primary?    Mood disorder Yes    History of bipolar disorder      RAOUL (generalized anxiety disorder)      PTSD (post-traumatic stress disorder)     PLAN:   Follow up in 3 months.    Psychiatry Medication:  As noted above, a returned to Trileptal or a trial of Lamictal were reviewed with the patient.  She declines these for now but says that she will message if she reconsiders.     indicates the patient continues on gabapentin.    In the current session, the patient reports that she was euthymic prior to her mother becoming suddenly ill about 4 weeks ago and is currently successfully coping with the stressful situation.  Euthymic with no depression, excessively elevated moods, irritable moods, or manic signs or symptoms.  Anxiety is well controlled.  No psychosis.  No thoughts of harm to self or others or feelings of aggression.  Sleeping as much as she is able to in light of the situation.  The patient wishes to continue off medications for mood at this point and describes coping techniques she has used with benefit.    The patient reports that her mother was receiving chemo for colon cancer in suddenly 4 weeks ago developed metabolic encephalopathy, for which she was hospitalized for 3 weeks.  The mother refused skilled rehab and so is staying with the patient.  The mother is physically very weak and prone to confusion and agitation night.  She has home health, palliative care, PT, OT, and speech therapy, and all are trying to encourage the mother to enter a skilled rehab.  The patient maintains hope that the mother can recover, and the clinicians indicate  "the possibility.  In the meanwhile, she describes the stress of caring for her mother.  The patient's daughter is staying at the house and giving great assistance, and her son and his friend also help; she appreciates all this.    Psychotherapy:  Target symptoms:  Stress, anxiety  Why chosen therapy is appropriate versus another modality: relevant to diagnosis  Outcome monitoring methods: self-report, observation  Therapeutic intervention type: supportive psychotherapy supporting the multiple adaptive statements made by the patient, as well as adaptive actions and planning; mindfulness, cognitive, and behavioral techniques.  Topics discussed/themes: symptom recognition and management  The patient's response to the intervention is accepting. The patient's progress toward treatment goals is progressing.   Duration of intervention:  27 minutes.      Interim history:  Living situation/supports:  As above.  Work has been going well, but March begins a stretch of busy months.  She indicates that she now has limited contact with her boyfriend greater than a year, though they have not officially broken up.  She indicates that he is not mental health aware in his not supportive when she is stressed.  Last visit-  The patient reports that her mother continues to work from home but is very ill from chemotherapy; she describes the mother being weak and having difficulty getting around her home because the mother is a hoarder.  She says that her daughter tries to clean but her mother stopped her.  The plan is for the mother to move in with the patient for awhile so that the patient can care for the mother and the daughter can clean the mother's house.  She reports a positive relationship with her boyfriend of 7 or 8 months.  He is good, sweet," describing his being thoughtful and a good .  Darrell was fired from [employer] when he refused to work around [email or web address], as he wanted to go out of state to visit family, and is " unemployed; he and his friend continue to live in the home.  His friend continues to work.  Work is going well.  Previously-  The patient evicted her ex, and he and his oldest son were subsequently arrested for possession, distribution, and manufacturing of illegal substances.  Her mother is in chemotherapy for colon cancer, and the prognosis is good.  Her 23-year-old daughter continues to live with her mother.  Her 19-year-old son Arnaud and his friend are working at [employer] and paying her rent.  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  Medical issues:  No change (hypothyroidism, sacroiliitis, history of hypertension, psoriasis).  The patient is status post hysterectomy  Nonpsychotropic Medication:  Includes, per Epic, gabapentin, levothyroxine, topicals   Allergies:        Review of patient's allergies indicates:   Allergen Reactions    Aripiprazole Rash and Hives    Sulfa (sulfonamide antibiotics) Hives      Alcohol use:  None  Other substance use:  Rare very small amount of unregulated marijuana, perhaps once or twice since the last visit    Mental Status Exam:   Appearance:  Appropriately groomed  Orientation:  X4  Attitude:  Cooperative, engaged   Eye Contact:  Appropriate  Behavior:  Calm, appropriate  Speech:     Rate - WNL    Volume - WNL    Quantity - WNL    Tone - relaxed, appropriate  Pressure - no  Thought Processes:  Goal-directed  Mood:  Euthymic   Affect:  Without distress, euthymic, including ability to brighten at appropriate times  SI:  No, and no thoughts of self-harm  HI:  No, and no thoughts of harm towards others  Paranoia:  No  Delusions:  No  Hallucinations:  No  Attention:  Intact over the course of the session  Cognition:  No deficits noted over the course of the session  Insight:  Intact   Judgment:  Intact  Impulse Control:  Intact       ASSESSMENT:   Encounter Diagnoses   Name Primary?    Mood disorder Yes    History of bipolar disorder      RAOUL (generalized anxiety disorder)     PTSD (post-traumatic stress disorder)      PLAN:   Follow up in 6 weeks.    Psychiatry Medication:  None      Reviewed with patient:  Report side effects, other problems, or questions to the psychiatrist by way of the [employer] portal, [facility], or by calling [facility] at [phone].  Messages are checked during clinic hours only.  For urgent issues outside of clinic hours, call 911 or go to an emergency department.  Follow up with primary care/MD specialist for continued monitoring of general health and wellness and any medical conditions.  Call [facility] at [phone] or use the [facility] portal if necessary for scheduling or rescheduling.  It is the responsibility of the patient to reschedule an appointment if an appointment has been canceled or missed.  Understanding was expressed; and no further concerns or questions were raised at this time.     [id number]  Psychotherapy as noted above, 27 minutes      Large portions of this note were completed by way of voice recognition dictation software, and transcription errors are possible, such that specific information in the note should be considered in the context of the entire report.

## 2025-02-04 ENCOUNTER — OFFICE VISIT (OUTPATIENT)
Dept: PSYCHIATRY | Facility: CLINIC | Age: 40
End: 2025-02-04
Payer: COMMERCIAL

## 2025-02-04 DIAGNOSIS — F43.10 PTSD (POST-TRAUMATIC STRESS DISORDER): ICD-10-CM

## 2025-02-04 DIAGNOSIS — F39 MOOD DISORDER: Primary | ICD-10-CM

## 2025-02-04 DIAGNOSIS — F41.1 GAD (GENERALIZED ANXIETY DISORDER): ICD-10-CM

## 2025-02-04 DIAGNOSIS — Z86.59 HISTORY OF BIPOLAR DISORDER: ICD-10-CM

## 2025-02-04 PROCEDURE — 1159F MED LIST DOCD IN RCRD: CPT | Mod: CPTII,95,, | Performed by: PSYCHIATRY & NEUROLOGY

## 2025-02-04 PROCEDURE — 1160F RVW MEDS BY RX/DR IN RCRD: CPT | Mod: CPTII,95,, | Performed by: PSYCHIATRY & NEUROLOGY

## 2025-02-04 PROCEDURE — 90832 PSYTX W PT 30 MINUTES: CPT | Mod: 95,,, | Performed by: PSYCHIATRY & NEUROLOGY

## 2025-02-07 ENCOUNTER — OFFICE VISIT (OUTPATIENT)
Dept: PAIN MEDICINE | Facility: CLINIC | Age: 40
End: 2025-02-07
Payer: COMMERCIAL

## 2025-02-07 ENCOUNTER — LAB VISIT (OUTPATIENT)
Dept: LAB | Facility: HOSPITAL | Age: 40
End: 2025-02-07
Attending: FAMILY MEDICINE
Payer: COMMERCIAL

## 2025-02-07 VITALS
BODY MASS INDEX: 44.05 KG/M2 | DIASTOLIC BLOOD PRESSURE: 102 MMHG | RESPIRATION RATE: 17 BRPM | HEIGHT: 66 IN | WEIGHT: 274.13 LBS | HEART RATE: 76 BPM | SYSTOLIC BLOOD PRESSURE: 141 MMHG

## 2025-02-07 DIAGNOSIS — M70.61 GREATER TROCHANTERIC BURSITIS OF RIGHT HIP: ICD-10-CM

## 2025-02-07 DIAGNOSIS — G57.01 PIRIFORMIS SYNDROME OF RIGHT SIDE: ICD-10-CM

## 2025-02-07 DIAGNOSIS — Z79.899 ENCOUNTER FOR LONG-TERM (CURRENT) USE OF MEDICATIONS: ICD-10-CM

## 2025-02-07 DIAGNOSIS — M46.1 SACROILIITIS: Primary | ICD-10-CM

## 2025-02-07 PROCEDURE — 80053 COMPREHEN METABOLIC PANEL: CPT | Performed by: FAMILY MEDICINE

## 2025-02-07 PROCEDURE — 3077F SYST BP >= 140 MM HG: CPT | Mod: CPTII,S$GLB,, | Performed by: PHYSICIAN ASSISTANT

## 2025-02-07 PROCEDURE — 99214 OFFICE O/P EST MOD 30 MIN: CPT | Mod: 25,S$GLB,, | Performed by: PHYSICIAN ASSISTANT

## 2025-02-07 PROCEDURE — 3008F BODY MASS INDEX DOCD: CPT | Mod: CPTII,S$GLB,, | Performed by: PHYSICIAN ASSISTANT

## 2025-02-07 PROCEDURE — 1159F MED LIST DOCD IN RCRD: CPT | Mod: CPTII,S$GLB,, | Performed by: PHYSICIAN ASSISTANT

## 2025-02-07 PROCEDURE — 99999 PR PBB SHADOW E&M-EST. PATIENT-LVL IV: CPT | Mod: PBBFAC,,, | Performed by: PHYSICIAN ASSISTANT

## 2025-02-07 PROCEDURE — 84443 ASSAY THYROID STIM HORMONE: CPT | Performed by: FAMILY MEDICINE

## 2025-02-07 PROCEDURE — 3080F DIAST BP >= 90 MM HG: CPT | Mod: CPTII,S$GLB,, | Performed by: PHYSICIAN ASSISTANT

## 2025-02-07 PROCEDURE — 96372 THER/PROPH/DIAG INJ SC/IM: CPT | Mod: S$GLB,,, | Performed by: PHYSICIAN ASSISTANT

## 2025-02-07 PROCEDURE — 83036 HEMOGLOBIN GLYCOSYLATED A1C: CPT | Performed by: FAMILY MEDICINE

## 2025-02-07 PROCEDURE — 84439 ASSAY OF FREE THYROXINE: CPT | Performed by: FAMILY MEDICINE

## 2025-02-07 PROCEDURE — 85025 COMPLETE CBC W/AUTO DIFF WBC: CPT | Performed by: FAMILY MEDICINE

## 2025-02-07 RX ORDER — KETOROLAC TROMETHAMINE 30 MG/ML
30 INJECTION, SOLUTION INTRAMUSCULAR; INTRAVENOUS
Status: COMPLETED | OUTPATIENT
Start: 2025-02-07 | End: 2025-02-07

## 2025-02-07 RX ORDER — METHOCARBAMOL 500 MG/1
500 TABLET, FILM COATED ORAL 3 TIMES DAILY PRN
Qty: 90 TABLET | Refills: 0 | Status: SHIPPED | OUTPATIENT
Start: 2025-02-07

## 2025-02-07 RX ORDER — METHYLPREDNISOLONE 4 MG/1
TABLET ORAL
Qty: 21 TABLET | Refills: 0 | Status: SHIPPED | OUTPATIENT
Start: 2025-02-07 | End: 2025-02-28

## 2025-02-07 RX ADMIN — KETOROLAC TROMETHAMINE 30 MG: 30 INJECTION, SOLUTION INTRAMUSCULAR; INTRAVENOUS at 11:02

## 2025-02-07 NOTE — PROGRESS NOTES
"Established Patient Chronic Pain Note   PCP: Gumaro Del Real MD    Chief Complaint   Patient presents with    Low-back Pain    Leg Pain     Right side    Ankle Pain     Right side     Notes:    SUBJECTIVE:    Interval History (2/7/2025):  Haydee Pennington presents today for follow-up visit.  Patient was last seen on 11/21/23. Patient reports pain as 7/10 today. Localizes her pain to right buttock and it will radiate down the side of her thigh. "When my IT band gets really really tight, my ankle will hurt." For the past 3-4 weeks, she's had worsening pain.  Since picking up on her elderly mother often, she has irritated her back.  Patient reports that her mother requires constant care- including changing adult diapers and lifting. She was able to get a belt to help lift on her mother this week from physical therapy.  She admits to taking lots of NSAIDS and does not want to harm her kidneys/liver.  Patient states her child has been trying to help her off of the couch due to the pain.    Interval History (11/21/2023):   Haydee Pennington presents today for follow-up visit.  Patient was last seen on 10/10/2023. At that visit, the plan was to start Nsaid, Agustín relaxer and follow up after PF injections. Patient states the piriformis injection helped for a few days "There was relief in the muscular pain." She still complains of pain in Right buttock and piriformis, but she has also been out of Gabapentin for over a week now. States there is a  shortage. Patient reports she has increased pain if she goes from sitting to standing. As soon as she gets up the pain is "excruciating." She denies pain in lateral or anterior hip at this time. Patient reports pain as 6/10 today.      Interval History (10/10/2023):  Haydee Pennington presents today for follow-up visit.  Patient was last seen on 5/10/2023. At that visit, the plan was to complete EMG and determine more treatment options after this procedure. " "Patient reports pain as 6/10 today.  The patient c/o pain in R piriformis for the past 3-4 weeks. Denies pain radiating past her mid thigh.   Sitting has been painful but whenever she stands, she experiences a "catching" sensation. Admits to using previous Rx for Flexeril which has helped.  The patient has been taking Gabapentin 300 mg TID, Ibuprofen or Naproxen 2-3 times daily as needed.  Denies numbness,tingling and weakness.  The patient has not completed EMG.       Interval History (5/9/2023):  Haydee Pennington presents today for follow-up visit.  Patient was last seen on 3/29/2023. At that visit, the plan was to trial compound cream, she reports this helps somewhat - moreso than other creams. She continues to perform modified yoga. Last follow up with Dr. Mock on 05/02/2023 4 weeks post op. Referral sent to [facility] for EMG, called but not scheduled on 3/29, we will reschedule. Recovering well from hysterectomy. She reports new pain in her left trap that causes intermittent tingling in her left hand into her pinky finger. She is using her theracane and stretching with some relief.    Interval History (3/28/2023):  Haydee Pennington presents today for follow-up visit.  Patient was last seen on 1/26/2023. Recently underwent hysterectomy last week on 03/23/2023 with Dr. Mock. Patient reports pain as 2/10 today. Previous plan was to discontinue Topamax due to side effects, restart Gabapentin and move forward with hysterectomy per OBGYN.  She reports 2 days after the hysterectomy she felt fine, she thinks this was due in part to the anesthesia.  She reports she continues to have pain in her low right lumbosacral region with radiation into her lateral hip.  Pain is exacerbated by moving from sitting to standing and this movement also causes radiation into her right lower extremity L5 dermatomal distribution down to her right shin in the bottom of her foot.  She endorses tingling in the bottom of her " foot and restless sensation in her right leg while sleeping.  A few weeks ago she saw her PCP who prescribed her Flexeril, steroids, Norco, and advised her to increase her gabapentin.  She reports this had no effect on her symptoms and they remain constant and persistent.  She also reports that the last couple of days she is had two near falls due to the right ankle giving out on her.      Interval History (1/26/2023):  Haydee Pennington presents today via telemed for follow-up visit.  Patient was last seen on 12/15/2022. At that visit, the plan was to d/c Gabapentin and begin Topamax.  Patient reports pain as 7/10 today. Patient previously cited that her pain is worse around the time of her cycle and has significant menorrhagia. Saw [facility] yesterday, planning for robotic hysterectomy with salpingectomy per Dr. Mock. Possible surgery date of March 10th. She reports that since discontinuing the Gabapentin and starting the Topamax, her emotions have been all over the place. She denies any suicidal or homicidal ideation, but she reports that she believes things were more stable with the Gabapentin.       Interval History (12/15/2022):  Haydee Pennington presents today via telemed for follow-up visit.  Patient was last seen on 9/20/2022. Last injection right SIJ + right GT bursa injection on 06/03/2022. She is not interested in any repeat injections as she states the last injection worsened her pain. Patient reports pain as 6/10 today. Pain in her right shoulder with tingling in  her right hand and pain along her lumbar spine  rosa  band like distribution with radiation into her left leg described as a tightness and burning. She has been completing Kirk exercises for core strengthening and lumbar stabilization and additional core strengthening exercises and  hip flexor and IT band stretches without improvement in symptoms. She does notice that her symptoms are worse around her cycle and she has an appointment  "with her gynecologist next week.       Interval History (9/20/2022):  Haydee Pennington presents today for follow-up visit.  Patient was last seen on 7/12/2022. Patient reports pain as "0/10 today. Last injection right SIJ + right GT bursa injection on 06/03/2022. Patient reports pain flare that began a couple of weeks ago. She reports continued pain over bilateral SIJ and GT bursa. Patient was prescribed a medrol dose pack on 09/09/2022 to help with pain. She reports medrol dose pack helped somewhat with the pain. She is not interested in any repeat injections as she states the last injection worsened her pain. Interested in continuing physician directed exercises and stretches. She also reports significant weight gain with Seroquel, has appointment scheduled with Dr. Kaplan to discuss medication side effects.    Interval history 07/12/2022  Patient presents status post right-sided sacroiliac joint and greater trochanteric bursa injection 06/03/2022.  Patient reports at least 45-50% pain relief overlying right-sided sacroiliac joint and greater trochanteric bursa territories.  Patient continues to report pain down the lateral aspect of the right lower extremity in L4 distribution to the foot.  Patient reports improvement in symptoms with continuation of physical therapy exercises.  She does report financial difficulty with physical therapy and has requesting physician directed exercises at home.  Patient has continued gabapentin and has reached a titration of 300 mg 3 times daily.  She does report significant improvement in her neuropathic pain with this medication.  She denies any side effects from this medication.    HPI 05/17/2022  Haydee Pennington is a 39 y.o. female with past medical history significant for anxiety, depression, hypertension, obesity, hypothyroidism, nicotine who presents to the clinic for the evaluation of lower back and right leg pain.  Patient reports pain began several years prior " without inciting accident injury or trauma.  Today patient reports pain is constant which is rated a 6/10.  Pain is described as grinding in nature.  Patient reports pain begins in a bandlike distribution in her lower back and radiates down the lateral aspect of the right lower extremity to the plantar aspect of the foot in L4-S1 distribution.  Pain is exacerbated with prolonged standing, walking, bending, lifting and even with prolonged sitting.  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  Pain is also exacerbated associated with her menstrual cycle.  Pain has been improved in the past with gabapentin, which patient reports she was prescribed for anxiety.   Patient denies night fever/night sweats, urinary incontinence, bowel incontinence, significant weight loss, significant motor weakness and loss of sensations.    Pain Disability Index Review:         2/7/2025    10:43 AM 10/10/2023     9:27 AM 5/17/2022     8:14 AM   Last 3 PDI Scores   Pain Disability Index (PDI) [phone]       Non-Pharmacologic Treatments:  Physical Therapy/Home Exercise: no  Ice/Heat:no  TENS: no  Acupuncture: no  Massage: no  Chiropractic: no    Other: no      Pain Medications:  - Adjuvant Medications: Clonazepam (Klonopin) and Lexapro (Escitalopram)    Pain Procedures:   -06/03/2022:  Right-sided greater trochanteric bursa and sacroiliac joint injection    Past Medical History:   Diagnosis Date    Hypertension     Hypertriglyceridemia 05/30/2024    Thyroid disease      Past Surgical History:   Procedure Laterality Date    CERVICAL BIOPSY  W/ LOOP ELECTRODE EXCISION  2005    COLONOSCOPY, SCREENING, HIGH RISK PATIENT N/A 10/23/2024    Procedure: COLONOSCOPY, SCREENING, HIGH RISK PATIENT;  Surgeon: Horacio Schaffer MD;  Location: [facility];  Service: Endoscopy;  Laterality: N/A;    HYSTERECTOMY  [phone]    INJECTION OF ANESTHETIC AGENT INTO SACROILIAC  JOINT Right 06/03/2022    Procedure: right Sacroiliac Joint Injection with RN IV sedation;  Surgeon: Rey Monsalve MD;  Location: [facility];  Service: Pain Management;  Laterality: Right;    INJECTION OF JOINT Right 06/03/2022    Procedure: right GT bursa injection with RN IV sedation;  Surgeon: Rey Monsalve MD;  Location: [facility];  Service: Pain Management;  Laterality: Right;    REPAIR, PERFORATION, NASAL SEPTUM      sedated for nasal septal manipulation    XI ROBOTIC HYSTERECTOMY, WITH SALPINGECTOMY N/A 03/23/2023    Procedure: XI ROBOTIC HYSTERECTOMY,WITH SALPINGECTOMY;  Surgeon: GRICEL Mock MD;  Location: [facility] OR;  Service: OB/GYN;  Laterality: N/A;     Review of patient's allergies indicates:   Allergen Reactions    Aripiprazole Rash and Hives    Sulfa (sulfonamide antibiotics) Hives       Current Outpatient Medications   Medication Sig    clobetasol 0.05% (TEMOVATE) 0.05 % Oint Apply topically 2 (two) times daily. Use on scalp    gabapentin (NEURONTIN) 300 MG capsule Take 1 capsule (300 mg total) by mouth 3 (three) times daily.    hydrocortisone 2.5 % cream Apply topically 2 (two) times daily. Mix with ketoconazole cream and AAA on breast BID for up to 2 weeks at a time    ketoconazole (NIZORAL) 2 % cream Apply topically 2 (two) times daily. Mix with hydrocortisone cream and AAA on breast BID for up to 2 weeks at a time    levothyroxine (SYNTHROID) 137 MCG Tab tablet Take 1 tablet (137 mcg total) by mouth before breakfast.     No current facility-administered medications for this visit.       Review of Systems     GENERAL:  No weight loss, malaise or fevers.  HEENT:   No recent changes in vision or hearing  NECK:  Negative for lumps, no difficulty with swallowing.  RESPIRATORY:  Negative for cough, wheezing or shortness of breath, patient denies any recent URI.  CARDIOVASCULAR:  Negative for chest pain, leg swelling or palpitations.  GI:  Negative for abdominal discomfort, blood in stools  "or black stools or change in bowel habits.  MUSCULOSKELETAL:  See HPI.  SKIN:  Negative for lesions, rash, and itching.  PSYCH:  No mood disorder or recent psychosocial stressors.   HEMATOLOGY/LYMPHOLOGY:  Negative for prolonged bleeding, bruising easily or swollen nodes.    NEURO:   No history of headaches, syncope, paralysis, seizures or tremors.  All other reviewed and negative other than HPI.    OBJECTIVE:  Vitals:    02/07/25 1042   BP: (!) 141/102   BP Location: Right forearm   Patient Position: Sitting   Pulse: 76   Resp: 17   Weight: 124.3 kg (274 lb 2.3 oz)   Height: 5' 6" (1.676 m)     Body mass index is 44.25 kg/m².   (reviewed on 2/7/2025)     Physical Exam  GENERAL: Well appearing, in no acute distress, alert and oriented x3.  PSYCH:  Mood and affect appropriate.  SKIN: Skin color, texture, turgor normal, no rashes or lesions.  HEAD/FACE:  Normocephalic, atraumatic. Cranial nerves grossly intact.  PULM: No evidence of respiratory difficulty, symmetric chest rise.  GI:  Soft and non-tender.    BACK: Straight leg raising in the sitting and supine positions is positive to radicular pain on the right. No pain to palpation over the facet joints of the lumbar spine or spinous processes. Normal range of motion without pain reproduction.  EXTREMITIES: Peripheral joint ROM is full and pain free without obvious instability or larxity in all four extremities. No deformities, edema, or skin discoloration. Good capillary refill.  MUSCULOSKELETAL: Able to stand on heels & toes.    hip, and knee provocative maneuvers are negative. She has pain with palpation over the sacroiliac joints on R>L.  FABERs test is positive.  SLR test is positive on the Right at 20 degrees- she is unable to lift RLE any higher due to the pain. She has pain with IR/ER. Facet loading test is negative bilaterally.   Bilateral upper and lower extremity strength is normal and symmetric.  No atrophy or tone abnormalities are noted..  RIGHT Lower " extremity: Hip flexion 4+/5, Hip Abduction 4+/5, Hip Adduction 4+/5, Knee extension 5/5, Knee flexion 5/5, Ankle dorsiflexion5/5, Extensor hallucis longus 5/5, Ankle plantarflexion 5/5  LEFT Lower extremity:  Hip flexion 5/5, Hip Abduction 5/5,Hip Adduction 5/5, Knee extension 5/5, Knee flexion 5/5, Ankle dorsiflexion 5/5, Extensor hallucis longus 5/5, Ankle plantarflexion 5/5  -Normal testing knee (patellar) jerk and ankle (achilles) jerk    NEURO: Bilateral upper and lower extremity coordination and muscle stretch reflexes are physiologic and symmetric. No loss of sensation is noted.  GAIT: normal.    Imaging:   MRI lumbar spine 05/17/2022  FINDINGS:  Images are mildly degraded by patient motion artifact.     Alignment: Within normal limits.     Vertebrae: Tiny inferior T11 Schmorl's node.  Lumbar vertebral body heights are maintained.  No evidence of acute fracture.  Marrow signal is within normal limits.     Discs: L4-L5 and L5-S1 disc desiccation.  Disc heights are maintained.     Cord: Within normal limits.  Conus terminates at L1-L2.     Degenerative findings:     T12-L1: No significant disc bulge, spinal canal stenosis or neural foraminal stenosis.     L1-L2: No significant disc bulge, spinal canal stenosis or neural foraminal stenosis.     L2-L3: No significant disc bulge, spinal canal stenosis or neural foraminal stenosis.     L3-L4: No significant disc bulge.  Mild facet arthropathy.  No significant spinal canal stenosis or neural foraminal stenosis.     L4-L5: Disc desiccation.  Minor broad-based disc bulge.  Mild bilateral facet arthropathy.  Facet arthropathy on the right closely approximates the exiting right L4 nerve root.  No significant spinal canal or neural foraminal stenosis.     L5-S1: Disc desiccation.  Asymmetric right mild broad-based disc bulge.  Minor bilateral facet arthropathy.  No significant spinal canal or neural foraminal stenosis.    08/22/18    X-Ray Lumbar Spine Complete 5  View    FINDINGS:  Vertebral body heights and alignment are within normal limits.  Intervertebral disk spaces are well preserved. Posterior elements appear grossly intact.  No pars defects visualized.  No acute fractures or subluxations are demonstrated.  The remaining visualized osseous and soft tissue structures demonstrate no appreciable abnormality.      ASSESSMENT: 39 y.o. year old female with lower back and leg pain, consistent with     1. Sacroiliitis  ketorolac injection 30 mg    methocarbamoL (ROBAXIN) 500 MG Tab    methylPREDNISolone (MEDROL DOSEPACK) 4 mg tablet      2. Piriformis syndrome of right side  ketorolac injection 30 mg    methocarbamoL (ROBAXIN) 500 MG Tab    methylPREDNISolone (MEDROL DOSEPACK) 4 mg tablet      3. Greater trochanteric bursitis of right hip  ketorolac injection 30 mg    methocarbamoL (ROBAXIN) 500 MG Tab    methylPREDNISolone (MEDROL DOSEPACK) 4 mg tablet      PLAN:   - Interventions:  - Procedure note: An IM injection of (ketolorac 30mg/1mL) was administered during clinic visit by our medical assistant.  This was well tolerated.      Consider intervention for SI joint ,  piriformis and hips in the future if pain does not improve.      - Anticoagulation use: no anticoagulation       report:  Reviewed and consistent with medication use as prescribed.      - Medications:  -- Continue Gabapentin 300 mg TID.     - - Discontinue Flexeril (cyclobenzaprine) 5 mg BID PRN . Patient states it was not effective.   - Start Robaxin (methocarbamol) 500mg TID PRN muscle spasms (or can take 1/2 tablet).  she understands this could cause drowsiness. Other common potential deleterious side effects associated with this medication including dizziness, drowsiness, dry mouth, or tingling sensation in the upper or lower extremities.     - Will prescribe Medrol Dose pack with instructions - for acute pain flare:  Day 1: 2 tablets before breakfast, 1 tablet after lunch, 1 tablet after dinner, 2  tablets at bedtime   Day 2: 1 tablet before breakfast, 1 tablet after lunch, 1 tablet after dinner, 2 tablets at bedtime   Day 3: 1 tablet before breakfast, 1 tablet after lunch, 1 tablet after dinner, 1 tablet at bedtime   Day 4: 1 tablet before breakfast, 1 tablet after lunch, 1 tablet at bedtime   Day 5: 1 tablet before breakfast, 1 tablet at bedtime   Day 6: 1 tablet before breakfast.    -- Continue Diclofenac 75 mg BID PRN pain. she denies any kidney or cardiac issues. Take with food.  Avoid other OTC NSAIDs (ex. Ibuprofen, naproxen) while taking this medication.    - Patient instructed to utilize heat/heating pad as needed but to avoid leaving on longer than 20 min or sleeping with heat.     - Therapy:   We discussed performing home exercises (provider directed). Patient was provided with exercises for hip, lower back on AVS today.    - Imaging: Reviewed available imaging with patient and answered any questions they had regarding study.  Consider updated studies if pain fails to improve.    - Follow up visit: return to clinic in 3-4 weeks or as needed.    The above plan and management options were discussed at length with patient. Patient is in agreement with the above and verbalized understanding.    - I discussed the goals of interventional chronic pain management with the patient on today's visit. We discussed a multimodal and systematic approach to pain.  This includes diagnostic and therapeutic injections, adjuvant pharmacologic treatment, physical therapy, and at times psychiatry.  I emphasized the importance of regular exercise, core strengthening and stretching, diet and weight loss as a cornerstone of long-term pain management.    - This condition does not require this patient to take time off of work, and the primary goal of our Pain Management services is to improve the patient's functional capacity.  - Patient Questions: Answered all of the patient's questions regarding diagnoses, therapy,  treatment and next steps      Berna Stevens PA-C  Interventional Pain Management  [facility]

## 2025-02-08 LAB
ALBUMIN SERPL BCP-MCNC: 3.6 G/DL (ref 3.5–5.2)
ALP SERPL-CCNC: 73 U/L (ref 40–150)
ALT SERPL W/O P-5'-P-CCNC: 26 U/L (ref 10–44)
ANION GAP SERPL CALC-SCNC: 11 MMOL/L (ref 8–16)
AST SERPL-CCNC: 25 U/L (ref 10–40)
BASOPHILS # BLD AUTO: 0.09 K/UL (ref 0–0.2)
BASOPHILS NFR BLD: 1.3 % (ref 0–1.9)
BILIRUB SERPL-MCNC: 0.7 MG/DL (ref 0.1–1)
BUN SERPL-MCNC: 10 MG/DL (ref 6–20)
CALCIUM SERPL-MCNC: 8.8 MG/DL (ref 8.7–10.5)
CHLORIDE SERPL-SCNC: 106 MMOL/L (ref 95–110)
CO2 SERPL-SCNC: 22 MMOL/L (ref 23–29)
CREAT SERPL-MCNC: 0.9 MG/DL (ref 0.5–1.4)
DIFFERENTIAL METHOD BLD: ABNORMAL
EOSINOPHIL # BLD AUTO: 0.2 K/UL (ref 0–0.5)
EOSINOPHIL NFR BLD: 2.4 % (ref 0–8)
ERYTHROCYTE [DISTWIDTH] IN BLOOD BY AUTOMATED COUNT: 12.3 % (ref 11.5–14.5)
EST. GFR  (NO RACE VARIABLE): >60 ML/MIN/1.73 M^2
ESTIMATED AVG GLUCOSE: 97 MG/DL (ref 68–131)
GLUCOSE SERPL-MCNC: 85 MG/DL (ref 70–110)
HBA1C MFR BLD: 5 % (ref 4–5.6)
HCT VFR BLD AUTO: 44.4 % (ref 37–48.5)
HGB BLD-MCNC: 14.6 G/DL (ref 12–16)
IMM GRANULOCYTES # BLD AUTO: 0.01 K/UL (ref 0–0.04)
IMM GRANULOCYTES NFR BLD AUTO: 0.1 % (ref 0–0.5)
LYMPHOCYTES # BLD AUTO: 2.2 K/UL (ref 1–4.8)
LYMPHOCYTES NFR BLD: 31.7 % (ref 18–48)
MCH RBC QN AUTO: 31.6 PG (ref 27–31)
MCHC RBC AUTO-ENTMCNC: 32.9 G/DL (ref 32–36)
MCV RBC AUTO: 96 FL (ref 82–98)
MONOCYTES # BLD AUTO: 0.5 K/UL (ref 0.3–1)
MONOCYTES NFR BLD: 6.6 % (ref 4–15)
NEUTROPHILS # BLD AUTO: 3.9 K/UL (ref 1.8–7.7)
NEUTROPHILS NFR BLD: 57.9 % (ref 38–73)
NRBC BLD-RTO: 0 /100 WBC
PLATELET # BLD AUTO: 356 K/UL (ref 150–450)
PMV BLD AUTO: 11.4 FL (ref 9.2–12.9)
POTASSIUM SERPL-SCNC: 4.4 MMOL/L (ref 3.5–5.1)
PROT SERPL-MCNC: 6.9 G/DL (ref 6–8.4)
RBC # BLD AUTO: 4.62 M/UL (ref 4–5.4)
SODIUM SERPL-SCNC: 139 MMOL/L (ref 136–145)
T4 FREE SERPL-MCNC: 0.74 NG/DL (ref 0.71–1.51)
TSH SERPL DL<=0.005 MIU/L-ACNC: 14.84 UIU/ML (ref 0.4–4)
WBC # BLD AUTO: 6.79 K/UL (ref 3.9–12.7)

## 2025-02-11 ENCOUNTER — LAB VISIT (OUTPATIENT)
Dept: LAB | Facility: HOSPITAL | Age: 40
End: 2025-02-11
Attending: STUDENT IN AN ORGANIZED HEALTH CARE EDUCATION/TRAINING PROGRAM
Payer: COMMERCIAL

## 2025-02-11 ENCOUNTER — OFFICE VISIT (OUTPATIENT)
Dept: DERMATOLOGY | Facility: CLINIC | Age: 40
End: 2025-02-11
Payer: COMMERCIAL

## 2025-02-11 DIAGNOSIS — Z51.81 MEDICATION MONITORING ENCOUNTER: ICD-10-CM

## 2025-02-11 DIAGNOSIS — L40.9 PSORIASIS: Primary | ICD-10-CM

## 2025-02-11 PROCEDURE — 99214 OFFICE O/P EST MOD 30 MIN: CPT | Mod: S$GLB,,, | Performed by: STUDENT IN AN ORGANIZED HEALTH CARE EDUCATION/TRAINING PROGRAM

## 2025-02-11 PROCEDURE — 86480 TB TEST CELL IMMUN MEASURE: CPT | Performed by: STUDENT IN AN ORGANIZED HEALTH CARE EDUCATION/TRAINING PROGRAM

## 2025-02-11 PROCEDURE — 1160F RVW MEDS BY RX/DR IN RCRD: CPT | Mod: CPTII,S$GLB,, | Performed by: STUDENT IN AN ORGANIZED HEALTH CARE EDUCATION/TRAINING PROGRAM

## 2025-02-11 PROCEDURE — 86704 HEP B CORE ANTIBODY TOTAL: CPT | Performed by: STUDENT IN AN ORGANIZED HEALTH CARE EDUCATION/TRAINING PROGRAM

## 2025-02-11 PROCEDURE — 3044F HG A1C LEVEL LT 7.0%: CPT | Mod: CPTII,S$GLB,, | Performed by: STUDENT IN AN ORGANIZED HEALTH CARE EDUCATION/TRAINING PROGRAM

## 2025-02-11 PROCEDURE — 99999 PR PBB SHADOW E&M-EST. PATIENT-LVL III: CPT | Mod: PBBFAC,,, | Performed by: STUDENT IN AN ORGANIZED HEALTH CARE EDUCATION/TRAINING PROGRAM

## 2025-02-11 PROCEDURE — 86803 HEPATITIS C AB TEST: CPT | Performed by: STUDENT IN AN ORGANIZED HEALTH CARE EDUCATION/TRAINING PROGRAM

## 2025-02-11 PROCEDURE — 1159F MED LIST DOCD IN RCRD: CPT | Mod: CPTII,S$GLB,, | Performed by: STUDENT IN AN ORGANIZED HEALTH CARE EDUCATION/TRAINING PROGRAM

## 2025-02-11 PROCEDURE — 87340 HEPATITIS B SURFACE AG IA: CPT | Performed by: STUDENT IN AN ORGANIZED HEALTH CARE EDUCATION/TRAINING PROGRAM

## 2025-02-11 PROCEDURE — G2211 COMPLEX E/M VISIT ADD ON: HCPCS | Mod: S$GLB,,, | Performed by: STUDENT IN AN ORGANIZED HEALTH CARE EDUCATION/TRAINING PROGRAM

## 2025-02-11 PROCEDURE — 36415 COLL VENOUS BLD VENIPUNCTURE: CPT | Performed by: STUDENT IN AN ORGANIZED HEALTH CARE EDUCATION/TRAINING PROGRAM

## 2025-02-11 NOTE — PROGRESS NOTES
Patient Information  Name: Haydee Pennington  : 1985  MRN: [id number]     Referring Physician:  Dr. Ribeiro ref. provider found   Primary Care Physician:  Gumaro Colon MD   Date of Visit: 2025      Subjective:       Haydee Pennington is a 39 y.o. female who presents for   Chief Complaint   Patient presents with    Follow-up     Slight improvement      HPI  Patient with a history of scalp psoriasis that has not shown improvement with the use of topical steroids.  The patient reports persistent itching, flaking, and discomfort, impacting daily life and quality of sleep.  The patient has adhered to prescribed topical treatment regimen without significant relief.  Patient was last seen:2025     Prior notes by myself reviewed.   Clinical documentation obtained by nursing staff reviewed.    Review of Systems   Skin:  Positive for itching and rash.        Objective:    Physical Exam   Constitutional: She appears well-developed and well-nourished. No distress.   Neurological: She is alert and oriented to person, place, and time. She is not disoriented.   Psychiatric: She has a normal mood and affect.   Skin:   Areas Examined (abnormalities noted in diagram):   Scalp / Hair Palpated and Inspected  Head / Face Inspection Performed              Diagram Legend     Erythematous scaling macule/papule c/w actinic keratosis       Vascular papule c/w angioma      Pigmented verrucoid papule/plaque c/w seborrheic keratosis      Yellow umbilicated papule c/w sebaceous hyperplasia      Irregularly shaped tan macule c/w lentigo     1-2 mm smooth white papules consistent with Milia      Movable subcutaneous cyst with punctum c/w epidermal inclusion cyst      Subcutaneous movable cyst c/w pilar cyst      Firm pink to brown papule c/w dermatofibroma      Pedunculated fleshy papule(s) c/w skin tag(s)      Evenly pigmented macule c/w junctional nevus     Mildly variegated pigmented, slightly irregular-bordered macule  c/w mildly atypical nevus      Flesh colored to evenly pigmented papule c/w intradermal nevus       Pink pearly papule/plaque c/w basal cell carcinoma      Erythematous hyperkeratotic cursted plaque c/w SCC      Surgical scar with no sign of skin cancer recurrence      Open and closed comedones      Inflammatory papules and pustules      Verrucoid papule consistent consistent with wart     Erythematous eczematous patches and plaques     Dystrophic onycholytic nail with subungual debris c/w onychomycosis     Umbilicated papule    Erythematous-base heme-crusted tan verrucoid plaque consistent with inflamed seborrheic keratosis     Erythematous Silvery Scaling Plaque c/w Psoriasis     See annotation      No images are attached to the encounter or orders placed in the encounter.    [] Data reviewed  [] Independent review of test  [] Management discussed with another provider    Assessment / Plan:        Medication monitoring encounter  -     Quantiferon Gold TB; Future; Expected date: 02/11/2025  -     Hepatitis B Surface Antigen; Future; Expected date: 02/11/2025  -     Hepatitis B Core Antibody, Total; Future; Expected date: 02/11/2025  -     HEPATITIS C ANTIBODY; Future; Expected date: 02/11/2025    Psoriasis  - discussed the potential side effects of Sotyktu including risk of infections, liver enzyme elevations, and potential interactions with other medications.  - Explained the need for regular monitoring of blood tests to assess liver function and overall health while on medication  - emphasized the importance of adherence to the medication and follow-up appointments to monitor progress and any adverse effects  -the patient expressed understanding of the benefits and risks associated with the medication and agreed to proceed with the treatment plan  - reviewed most recent CBC and CMP-- within normal limits           LOS NUMBER AND COMPLEXITY OF PROBLEMS    COMPLEXITY OF DATA RISK TOTAL TIME (m)   82020  08537 [] 1  self-limited or minor problem [] Minimal to none [] No treatment recommended or patient to monitor 15-29  10-19   78546  16267 Low  [] 2 or > self limited or minor problems  [] 1 stable chronic illness  [] 1 acute, uncomplicated illness or injury Limited (2)  [] Prior external notes from each unique source  [] Review result of each unique test  [] Order each unique test []  Low  OTC medications, minor skin biopsy 30-44  20-29   65584  32052 Moderate  [x]  1 or > chronic illness with progression, exacerbation or SE of treatment  []  2 or more stable chronic illnesses  []  1 acute illness with systemic symptoms  []  1 acute complicated injury  []  1 undiagnosed new problem with uncertain prognosis Moderate (1/3 below)  [x]  3 or more data items        *Now includes assessment requiring independent historian  []  Independent interpretation of a test  []  Discuss management/test with another provider Moderate  [x]  Prescription drug mgmt  []  Minor surgery with risk discussed  []  Mgmt limited by social determinates 45-59  30-39   63383  88354 High  []  1 or more chronic illness with severe exacerbation, progression or SE of treatment  []  1 acute or chronic illness/injury that poses a threat to life or bodily function Extensive (2/3 below)  []  3 or more data items        *Now includes assessment requiring independent historian.  []  Independent interpretation of a test  []  Discuss management/test with another provider High  []  Major surgery with risk discussed  []  Drug therapy requiring intensive monitoring for toxicity  []  Hospitalization  []  Decision for DNR 60-74  40-54      No follow-ups on file.    Karma Chavez MD, FAAD  [facility]

## 2025-02-12 LAB
HBV CORE AB SERPL QL IA: NORMAL
HBV SURFACE AG SERPL QL IA: NORMAL
HCV AB SERPL QL IA: NORMAL

## 2025-02-13 DIAGNOSIS — L40.9 PSORIASIS: Primary | ICD-10-CM

## 2025-02-13 LAB
GAMMA INTERFERON BACKGROUND BLD IA-ACNC: 0 IU/ML
M TB IFN-G CD4+ BCKGRND COR BLD-ACNC: 0.04 IU/ML
M TB IFN-G CD4+ BCKGRND COR BLD-ACNC: 0.09 IU/ML
MITOGEN IGNF BCKGRD COR BLD-ACNC: 10 IU/ML
TB GOLD PLUS: NEGATIVE

## 2025-02-13 RX ORDER — DEUCRAVACITINIB 6 MG/1
6 TABLET, FILM COATED ORAL DAILY
Qty: 30 TABLET | Refills: 5 | Status: SHIPPED | OUTPATIENT
Start: 2025-02-13

## 2025-02-19 ENCOUNTER — RESULTS FOLLOW-UP (OUTPATIENT)
Dept: INTERNAL MEDICINE | Facility: CLINIC | Age: 40
End: 2025-02-19
Payer: COMMERCIAL

## 2025-02-19 DIAGNOSIS — E03.9 HYPOTHYROIDISM, UNSPECIFIED TYPE: Primary | ICD-10-CM

## 2025-02-19 RX ORDER — LEVOTHYROXINE SODIUM 150 UG/1
150 TABLET ORAL
Qty: 90 TABLET | Refills: 1 | Status: SHIPPED | OUTPATIENT
Start: 2025-02-19 | End: 2026-02-19

## 2025-02-20 RX ORDER — LEVOTHYROXINE SODIUM 137 UG/1
137 TABLET ORAL
Qty: 90 TABLET | Refills: 1 | OUTPATIENT
Start: 2025-02-20

## 2025-02-20 NOTE — TELEPHONE ENCOUNTER
No care due was identified.  [facility] Embedded Care Due Messages. Reference number: [id number].   2/20/2025 9:53:28 AM CST

## 2025-02-20 NOTE — TELEPHONE ENCOUNTER
Refill Decision Note   Haydee Pennington  is requesting a refill authorization.    Brief Assessment and Rationale for Refill:   Quick Discontinue       Medication Therapy Plan:   Discontinued by: Gumaro Del Real MD on 2/19/2025, current dose is 150 mcg      Comments:     Note composed:11:34 AM 02/20/2025

## 2025-03-03 ENCOUNTER — OFFICE VISIT (OUTPATIENT)
Dept: PAIN MEDICINE | Facility: CLINIC | Age: 40
End: 2025-03-03
Payer: COMMERCIAL

## 2025-03-03 DIAGNOSIS — G57.01 PIRIFORMIS SYNDROME OF RIGHT SIDE: ICD-10-CM

## 2025-03-03 DIAGNOSIS — M70.61 GREATER TROCHANTERIC BURSITIS OF RIGHT HIP: ICD-10-CM

## 2025-03-03 DIAGNOSIS — M46.1 SACROILIITIS: Primary | ICD-10-CM

## 2025-03-03 PROCEDURE — 3044F HG A1C LEVEL LT 7.0%: CPT | Mod: CPTII,95,, | Performed by: PHYSICIAN ASSISTANT

## 2025-03-03 PROCEDURE — 98005 SYNCH AUDIO-VIDEO EST LOW 20: CPT | Mod: 95,,, | Performed by: PHYSICIAN ASSISTANT

## 2025-03-03 RX ORDER — GABAPENTIN 300 MG/1
300 CAPSULE ORAL 3 TIMES DAILY
Qty: 270 CAPSULE | Refills: 3 | Status: SHIPPED | OUTPATIENT
Start: 2025-03-03

## 2025-03-03 NOTE — PROGRESS NOTES
"Established Patient Chronic Pain Note   PCP: Gumaro Del Real MD    The patient location is: home  The chief complaint leading to consultation is: 3-4 week follow up     Visit type: audiovisual  Encounter which includes face to face time and non-face to face time preparing to see the patient (eg, review of tests), Obtaining and/or reviewing separately obtained history, Documenting clinical information in the electronic or other health record, Independently interpreting results (not separately reported) and communicating results to the patient/family/caregiver, or Care coordination (not separately reported).      Each patient to whom he or she provides medical services by telemedicine is:  (1) informed of the relationship between the physician and patient and the respective role of any other health care provider with respect to management of the patient; and (2) notified that he or she may decline to receive medical services by telemedicine and may withdraw from such care at any time.      Notes:    SUBJECTIVE:    Interval History (3/3/2025):   Haydee Pennington presents today for follow-up visit.  Patient was last seen on 2/7/2025. At that visit, the plan was to start Robaxin and MDP. Patient reports she is feeling better since Toradol IM injection. She is attempting to do her home exercises/stretches but some days are challenging due to tyree tightness. She does feel that her pain is overall more manageable.  Patient reports pain as 3/10 today.    Interval History (2/7/2025):  Haydee Pennington presents today for follow-up visit.  Patient was last seen on 11/21/23. Patient reports pain as 7/10 today. Localizes her pain to right buttock and it will radiate down the side of her thigh. "When my IT band gets really really tight, my ankle will hurt." For the past 3-4 weeks, she's had worsening pain.  Since picking up on her elderly mother often, she has irritated her back.  Patient reports that her mother requires " "constant care- including changing adult diapers and lifting. She was able to get a belt to help lift on her mother this week from physical therapy.  She admits to taking lots of NSAIDS and does not want to harm her kidneys/liver.  Patient states her child has been trying to help her off of the couch due to the pain.    Interval History (11/21/2023):   Haydee Pennington presents today for follow-up visit.  Patient was last seen on 10/10/2023. At that visit, the plan was to start Nsaid, Agustín relaxer and follow up after PF injections. Patient states the piriformis injection helped for a few days "There was relief in the muscular pain." She still complains of pain in Right buttock and piriformis, but she has also been out of Gabapentin for over a week now. States there is a  shortage. Patient reports she has increased pain if she goes from sitting to standing. As soon as she gets up the pain is "excruciating." She denies pain in lateral or anterior hip at this time. Patient reports pain as 6/10 today.      Interval History (10/10/2023):  Haydee Pennington presents today for follow-up visit.  Patient was last seen on 5/10/2023. At that visit, the plan was to complete EMG and determine more treatment options after this procedure. Patient reports pain as 6/10 today.  The patient c/o pain in R piriformis for the past 3-4 weeks. Denies pain radiating past her mid thigh.   Sitting has been painful but whenever she stands, she experiences a "catching" sensation. Admits to using previous Rx for Flexeril which has helped.  The patient has been taking Gabapentin 300 mg TID, Ibuprofen or Naproxen 2-3 times daily as needed.  Denies numbness,tingling and weakness.  The patient has not completed EMG.       Interval History (5/9/2023):  Haydee Pennington presents today for follow-up visit.  Patient was last seen on 3/29/2023. At that visit, the plan was to trial compound cream, she reports this helps somewhat - moreso " than other creams. She continues to perform modified yoga. Last follow up with Dr. Mock on 05/02/2023 4 weeks post op. Referral sent to [facility] for EMG, called but not scheduled on 3/29, we will reschedule. Recovering well from hysterectomy. She reports new pain in her left trap that causes intermittent tingling in her left hand into her pinky finger. She is using her theracane and stretching with some relief.    Interval History (3/28/2023):  Haydee Pennington presents today for follow-up visit.  Patient was last seen on 1/26/2023. Recently underwent hysterectomy last week on 03/23/2023 with Dr. Mock. Patient reports pain as 2/10 today. Previous plan was to discontinue Topamax due to side effects, restart Gabapentin and move forward with hysterectomy per OBGYN.  She reports 2 days after the hysterectomy she felt fine, she thinks this was due in part to the anesthesia.  She reports she continues to have pain in her low right lumbosacral region with radiation into her lateral hip.  Pain is exacerbated by moving from sitting to standing and this movement also causes radiation into her right lower extremity L5 dermatomal distribution down to her right shin in the bottom of her foot.  She endorses tingling in the bottom of her foot and restless sensation in her right leg while sleeping.  A few weeks ago she saw her PCP who prescribed her Flexeril, steroids, Norco, and advised her to increase her gabapentin.  She reports this had no effect on her symptoms and they remain constant and persistent.  She also reports that the last couple of days she is had two near falls due to the right ankle giving out on her.      Interval History (1/26/2023):  Haydee Pennington presents today via telemed for follow-up visit.  Patient was last seen on 12/15/2022. At that visit, the plan was to d/c Gabapentin and begin Topamax.  Patient reports pain as 7/10 today. Patient previously cited that her pain is worse around the  "time of her cycle and has significant menorrhagia. Saw [facility] yesterday, planning for robotic hysterectomy with salpingectomy per Dr. Mock. Possible surgery date of March 10th. She reports that since discontinuing the Gabapentin and starting the Topamax, her emotions have been all over the place. She denies any suicidal or homicidal ideation, but she reports that she believes things were more stable with the Gabapentin.       Interval History (12/15/2022):  Haydee Pennington presents today via telemed for follow-up visit.  Patient was last seen on 9/20/2022. Last injection right SIJ + right GT bursa injection on 06/03/2022. She is not interested in any repeat injections as she states the last injection worsened her pain. Patient reports pain as 6/10 today. Pain in her right shoulder with tingling in  her right hand and pain along her lumbar spine  rosa  band like distribution with radiation into her left leg described as a tightness and burning. She has been completing Kirk exercises for core strengthening and lumbar stabilization and additional core strengthening exercises and  hip flexor and IT band stretches without improvement in symptoms. She does notice that her symptoms are worse around her cycle and she has an appointment with her gynecologist next week.       Interval History (9/20/2022):  Haydee Pennington presents today for follow-up visit.  Patient was last seen on 7/12/2022. Patient reports pain as "0/10 today. Last injection right SIJ + right GT bursa injection on 06/03/2022. Patient reports pain flare that began a couple of weeks ago. She reports continued pain over bilateral SIJ and GT bursa. Patient was prescribed a medrol dose pack on 09/09/2022 to help with pain. She reports medrol dose pack helped somewhat with the pain. She is not interested in any repeat injections as she states the last injection worsened her pain. Interested in continuing physician directed exercises and stretches. " She also reports significant weight gain with Seroquel, has appointment scheduled with Dr. Kaplan to discuss medication side effects.    Interval history 07/12/2022  Patient presents status post right-sided sacroiliac joint and greater trochanteric bursa injection 06/03/2022.  Patient reports at least 45-50% pain relief overlying right-sided sacroiliac joint and greater trochanteric bursa territories.  Patient continues to report pain down the lateral aspect of the right lower extremity in L4 distribution to the foot.  Patient reports improvement in symptoms with continuation of physical therapy exercises.  She does report financial difficulty with physical therapy and has requesting physician directed exercises at home.  Patient has continued gabapentin and has reached a titration of 300 mg 3 times daily.  She does report significant improvement in her neuropathic pain with this medication.  She denies any side effects from this medication.    HPI 05/17/2022  Haydee Pennington is a 39 y.o. female with past medical history significant for anxiety, depression, hypertension, obesity, hypothyroidism, nicotine who presents to the clinic for the evaluation of lower back and right leg pain.  Patient reports pain began several years prior without inciting accident injury or trauma.  Today patient reports pain is constant which is rated a 6/10.  Pain is described as grinding in nature.  Patient reports pain begins in a bandlike distribution in her lower back and radiates down the lateral aspect of the right lower extremity to the plantar aspect of the foot in L4-S1 distribution.  Pain is exacerbated with prolonged standing, walking, bending, lifting and even with prolonged sitting.  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  Pain is also exacerbated associated with her menstrual cycle.  Pain has been improved in the past with  gabapentin, which patient reports she was prescribed for anxiety.   Patient denies night fever/night sweats, urinary incontinence, bowel incontinence, significant weight loss, significant motor weakness and loss of sensations.    Pain Disability Index Review:         2/7/2025    10:43 AM 10/10/2023     9:27 AM 5/17/2022     8:14 AM   Last 3 PDI Scores   Pain Disability Index (PDI) [phone]       Non-Pharmacologic Treatments:  Physical Therapy/Home Exercise: no  Ice/Heat:no  TENS: no  Acupuncture: no  Massage: no  Chiropractic: no    Other: no      Pain Medications:  - Adjuvant Medications: Clonazepam (Klonopin) and Lexapro (Escitalopram)    Pain Procedures:   -06/03/2022:  Right-sided greater trochanteric bursa and sacroiliac joint injection    Past Medical History:   Diagnosis Date    Hypertension     Hypertriglyceridemia 05/30/2024    Thyroid disease      Past Surgical History:   Procedure Laterality Date    CERVICAL BIOPSY  W/ LOOP ELECTRODE EXCISION  2005    COLONOSCOPY, SCREENING, HIGH RISK PATIENT N/A 10/23/2024    Procedure: COLONOSCOPY, SCREENING, HIGH RISK PATIENT;  Surgeon: Horacio Schaffer MD;  [facility];  Service: Endoscopy;  Laterality: N/A;    HYSTERECTOMY  [phone]    INJECTION OF ANESTHETIC AGENT INTO SACROILIAC JOINT Right 06/03/2022    Procedure: right Sacroiliac Joint Injection with RN IV sedation;  Surgeon: Rey Monsalve MD;  Location: [facility];  Service: Pain Management;  Laterality: Right;    INJECTION OF JOINT Right 06/03/2022    Procedure: right GT bursa injection with RN IV sedation;  Surgeon: Rey Monsalve MD;  Location: [facility];  Service: Pain Management;  Laterality: Right;    REPAIR, PERFORATION, NASAL SEPTUM      sedated for nasal septal manipulation    XI ROBOTIC HYSTERECTOMY, WITH SALPINGECTOMY N/A 03/23/2023    Procedure: XI ROBOTIC HYSTERECTOMY,WITH SALPINGECTOMY;  Surgeon: GRICEL Mock MD;  Location: [facility] OR;  Service: OB/GYN;  Laterality: N/A;      Review of patient's allergies indicates:   Allergen Reactions    Aripiprazole Rash and Hives    Sulfa (sulfonamide antibiotics) Hives       Current Outpatient Medications   Medication Sig    clobetasol 0.05% (TEMOVATE) 0.05 % Oint Apply topically 2 (two) times daily. Use on scalp    deucravacitinib (SOTYKTU) 6 mg Tab Take 6 mg by mouth once daily.    gabapentin (NEURONTIN) 300 MG capsule Take 1 capsule (300 mg total) by mouth 3 (three) times daily.    hydrocortisone 2.5 % cream Apply topically 2 (two) times daily. Mix with ketoconazole cream and AAA on breast BID for up to 2 weeks at a time    ketoconazole (NIZORAL) 2 % cream Apply topically 2 (two) times daily. Mix with hydrocortisone cream and AAA on breast BID for up to 2 weeks at a time    levothyroxine (SYNTHROID) 150 MCG tablet Take 1 tablet (150 mcg total) by mouth before breakfast.    methocarbamoL (ROBAXIN) 500 MG Tab Take 1 tablet (500 mg total) by mouth 3 (three) times daily as needed (muscle spasms). May cause drowsiness.     No current facility-administered medications for this visit.       Review of Systems     GENERAL:  No weight loss, malaise or fevers.  HEENT:   No recent changes in vision or hearing  NECK:  Negative for lumps, no difficulty with swallowing.  RESPIRATORY:  Negative for cough, wheezing or shortness of breath, patient denies any recent URI.  CARDIOVASCULAR:  Negative for chest pain, leg swelling or palpitations.  GI:  Negative for abdominal discomfort, blood in stools or black stools or change in bowel habits.  MUSCULOSKELETAL:  See HPI.  SKIN:  Negative for lesions, rash, and itching.  PSYCH:  No mood disorder or recent psychosocial stressors.   HEMATOLOGY/LYMPHOLOGY:  Negative for prolonged bleeding, bruising easily or swollen nodes.    NEURO:   No history of headaches, syncope, paralysis, seizures or tremors.  All other reviewed and negative other than HPI.    OBJECTIVE:    Telemedicine Physical Exam:   There were no vitals  filed for this visit.  There is no height or weight on file to calculate BMI.   (reviewed on 3/3/2025)     (Physical exam performed virtually with patient guided on specific actions and diagnostic maneuvers)  GENERAL: Well appearing, in no acute distress, alert and oriented x3.  Cooperative.  PSYCH:  Mood and affect appropriate.  SKIN: Skin color & texture with no obvious abnormalities.    HEAD/FACE:  Normocephalic, atraumatic.    PULM:  No difficulty breathing. No nasal flaring. No obvious wheezing.  EXTREMITIES: No obvious deformities. Moving all extremities well, appears to have symmetric strength throughout.  MUSCULOSKELETAL: No obvious atrophy abnormalities are noted.   NEURO: No obvious neurologic deficit.   GAIT: sitting.     Physical Exam: last in clinic visit:    Physical Exam  GENERAL: Well appearing, in no acute distress, alert and oriented x3.  PSYCH:  Mood and affect appropriate.  SKIN: Skin color, texture, turgor normal, no rashes or lesions.  HEAD/FACE:  Normocephalic, atraumatic. Cranial nerves grossly intact.  PULM: No evidence of respiratory difficulty, symmetric chest rise.  GI:  Soft and non-tender.    BACK: Straight leg raising in the sitting and supine positions is positive to radicular pain on the right. No pain to palpation over the facet joints of the lumbar spine or spinous processes. Normal range of motion without pain reproduction.  EXTREMITIES: Peripheral joint ROM is full and pain free without obvious instability or larxity in all four extremities. No deformities, edema, or skin discoloration. Good capillary refill.  MUSCULOSKELETAL: Able to stand on heels & toes.    hip, and knee provocative maneuvers are negative. She has pain with palpation over the sacroiliac joints on R>L.  FABERs test is positive.  SLR test is positive on the Right at 20 degrees- she is unable to lift RLE any higher due to the pain. She has pain with IR/ER. Facet loading test is negative bilaterally.   Bilateral  upper and lower extremity strength is normal and symmetric.  No atrophy or tone abnormalities are noted..  RIGHT Lower extremity: Hip flexion 4+/5, Hip Abduction 4+/5, Hip Adduction 4+/5, Knee extension 5/5, Knee flexion 5/5, Ankle dorsiflexion5/5, Extensor hallucis longus 5/5, Ankle plantarflexion 5/5  LEFT Lower extremity:  Hip flexion 5/5, Hip Abduction 5/5,Hip Adduction 5/5, Knee extension 5/5, Knee flexion 5/5, Ankle dorsiflexion 5/5, Extensor hallucis longus 5/5, Ankle plantarflexion 5/5  -Normal testing knee (patellar) jerk and ankle (achilles) jerk    NEURO: Bilateral upper and lower extremity coordination and muscle stretch reflexes are physiologic and symmetric. No loss of sensation is noted.  GAIT: normal.    Imaging:   MRI lumbar spine 05/17/2022  FINDINGS:  Images are mildly degraded by patient motion artifact.     Alignment: Within normal limits.     Vertebrae: Tiny inferior T11 Schmorl's node.  Lumbar vertebral body heights are maintained.  No evidence of acute fracture.  Marrow signal is within normal limits.     Discs: L4-L5 and L5-S1 disc desiccation.  Disc heights are maintained.     Cord: Within normal limits.  Conus terminates at L1-L2.     Degenerative findings:     T12-L1: No significant disc bulge, spinal canal stenosis or neural foraminal stenosis.     L1-L2: No significant disc bulge, spinal canal stenosis or neural foraminal stenosis.     L2-L3: No significant disc bulge, spinal canal stenosis or neural foraminal stenosis.     L3-L4: No significant disc bulge.  Mild facet arthropathy.  No significant spinal canal stenosis or neural foraminal stenosis.     L4-L5: Disc desiccation.  Minor broad-based disc bulge.  Mild bilateral facet arthropathy.  Facet arthropathy on the right closely approximates the exiting right L4 nerve root.  No significant spinal canal or neural foraminal stenosis.     L5-S1: Disc desiccation.  Asymmetric right mild broad-based disc bulge.  Minor bilateral facet  arthropathy.  No significant spinal canal or neural foraminal stenosis.    08/22/18    X-Ray Lumbar Spine Complete 5 View    FINDINGS:  Vertebral body heights and alignment are within normal limits.  Intervertebral disk spaces are well preserved. Posterior elements appear grossly intact.  No pars defects visualized.  No acute fractures or subluxations are demonstrated.  The remaining visualized osseous and soft tissue structures demonstrate no appreciable abnormality.      ASSESSMENT: 39 y.o. year old female with lower back and leg pain, consistent with     1. Sacroiliitis  gabapentin (NEURONTIN) 300 MG capsule      2. Piriformis syndrome of right side  gabapentin (NEURONTIN) 300 MG capsule      3. Greater trochanteric bursitis of right hip  gabapentin (NEURONTIN) 300 MG capsule        PLAN:   - Interventions: None at this time.      Consider intervention for SI joint ,  piriformis and hips in the future if pain does not improve.      - Anticoagulation use: no anticoagulation       report:  Reviewed and consistent with medication use as prescribed.      - Medications:  -- Continue Gabapentin 300 mg TID. Refill provided today.    - - Discontinue Flexeril (cyclobenzaprine) 5 mg BID PRN . Patient states it was not effective.   - Continue Robaxin (methocarbamol) 500mg TID PRN muscle spasms (or can take 1/2 tablet).  she understands this could cause drowsiness. Other common potential deleterious side effects associated with this medication including dizziness, drowsiness, dry mouth, or tingling sensation in the upper or lower extremities.     -- Continue Diclofenac 75 mg BID PRN pain. she denies any kidney or cardiac issues. Take with food.  Avoid other OTC NSAIDs (ex. Ibuprofen, naproxen) while taking this medication.    - Patient instructed to utilize heat/heating pad as needed but to avoid leaving on longer than 20 min or sleeping with heat.     - Therapy:   We discussed continuing home exercises (provider  directed). Patient was previously provided with exercises for hip, lower back . Consider outpatient referral to physical therapy. Patient states she will check with insurance and update me with referral details.    - Imaging: Reviewed available imaging with patient and answered any questions they had regarding study.  Consider updated studies if pain fails to improve.    - Follow up visit: return to clinic as needed.    The above plan and management options were discussed at length with patient. Patient is in agreement with the above and verbalized understanding.    - I discussed the goals of interventional chronic pain management with the patient on today's visit. We discussed a multimodal and systematic approach to pain.  This includes diagnostic and therapeutic injections, adjuvant pharmacologic treatment, physical therapy, and at times psychiatry.  I emphasized the importance of regular exercise, core strengthening and stretching, diet and weight loss as a cornerstone of long-term pain management.    - This condition does not require this patient to take time off of work, and the primary goal of our Pain Management services is to improve the patient's functional capacity.  - Patient Questions: Answered all of the patient's questions regarding diagnoses, therapy, treatment and next steps      Berna Stevens PA-C  Interventional Pain Management  [facility]

## 2025-03-25 NOTE — PROGRESS NOTES
The patient location is: Patient's home . Patient reported  that his/her location at the time of this visit was in the state [facility].    Visit type: Virtual visit with synchronous audio and video     Each patient to whom he or she provides medical services by telemedicine is: (1) informed of the relationship between the physician and patient and the respective role of any other health care provider with respect to management of the patient; and (2) notified that he or she may decline to receive medical services by telemedicine and may withdraw from such care at any time.    Patient was informed that I am a physician who is licensed in the [facility]:  Huy Kaplan MD:  Employed by [facility]     Patient was instructed that If technology issues arise, he/she may  call our office phone at: [phone].    Pt informed that if he/she is ever in crisis (or has acute concerns), dial 911 or go to nearest Emergency Room (ER).    Pt informed that if questions related to privacy practices arise, contact [facility] Department: [phone].    Understanding Expressed. No questions.      The patient location is: Patient's home . Patient reported  that his/her location at the time of this visit was in the [facility].    Visit type: Virtual visit with synchronous audio and video     Each patient to whom he or she provides medical services by telemedicine is: (1) informed of the relationship between the physician and patient and the respective role of any other health care provider with respect to management of the patient; and (2) notified that he or she may decline to receive medical services by telemedicine and may withdraw from such care at any time.    Patient was informed that I am a physician who is licensed in the [facility]:  Huy Kaplan MD:  Employed by [facility] [employer]     Patient was instructed that If technology issues arise, he/she may  call our office  phone at: [phone].    Pt informed that if he/she is ever in crisis (or has acute concerns), dial 911 or go to nearest Emergency Room (ER).    Pt informed that if questions related to privacy practices arise, contact [facility] Department: [phone].    Understanding Expressed. No questions.        Haydee Pennington   1985   03/27/2025        CURRENT PRESENTATION  (psychiatric biopsychosocial evaluation; plan for treatment):   Last visit 02/04/2025 documentation includes:  ...the patient reports that she was euthymic prior to her mother becoming suddenly ill about 4 weeks ago and is currently successfully coping with the stressful situation.  Euthymic with no depression, excessively elevated moods, irritable moods, or manic signs or symptoms.  Anxiety is well controlled.  No psychosis.  No thoughts of harm to self or others or feelings of aggression.  Sleeping as much as she is able to in light of the situation.  The patient wishes to continue off medications for mood at this point and describes coping techniques she has used with benefit.      The patient reports that her mother was receiving chemo for colon cancer in suddenly 4 weeks ago developed metabolic encephalopathy, for which she was hospitalized for 3 weeks.  The mother refused skilled rehab and so is staying with the patient.  The mother is physically very weak and prone to confusion and agitation night.  She has home health, palliative care, PT, OT, and speech therapy, and all are trying to encourage the mother to enter a skilled rehab.  The patient maintains hope that the mother can recover, and the clinicians indicate the possibility.  In the meanwhile, she describes the stress of caring for her mother.  The patient's daughter is staying at the house and giving great assistance, and her son and his friend also help; she appreciates all this.  11/04/2024 documentation includes:   ...the patient reports that she continues off  all psychotropic medications.  With extensive and specific questioning, no depression, elevated moods, ongoing irritable moods, manic signs or symptoms, problematic anxiety, trauma symptoms, or sleep difficulties.  Never with psychosis, suicidal ideation, thoughts of self-harm, homicidal ideation, thoughts violence, or feelings of aggression.      The patient describes some affective shifts and some impulsive expressions of anger, verbally.  She describes appropriate strategies to avoid these or manage them if they are occurring, and she has done so successfully.  She identifies with symptoms borderline personality disorder in her own research.  She wishes to hold off medications but says that she will consider Lamictal or a returned to Trileptal after discussion of indications rationale, risks, and side effects of both medications if symptoms become problematic or if mood problems reemerge.       Encounter Diagnoses   Name Primary?    Mood disorder Yes    History of bipolar disorder      RAOUL (generalized anxiety disorder)      PTSD (post-traumatic stress disorder)     PLAN:   Follow up in 6 weeks.    Psychiatry Medication:  None     indicates the patient continues on gabapentin.    In the current session, the patient reports that she is doing well apart from very fleeting moments of excessive anger and verbal outbursts followed by regret and often an apology.  She reports that mitigating circumstances are fatigue from caring for her mother, not having her own space as her mother is in the patient's room, and frustration that her son is not working.  Positively her mother is notably improved and will be returning to her own home soon and her son is making efforts with regards to applications and interviews.  Also positively, the friend son be moving out soon to his own place.    Euthymic with no depression, excessively elevated moods, ongoing irritable moods, or manic signs or symptoms.  Anxiety is well  controlled.  No psychosis.  No thoughts of harm to self or others or feelings of physical aggression; she indicates that she has maintained consistent confidence in her safety and the safety of others and indicates consistent awareness of protective factors.    Psychotherapy:  Target symptoms:  Stress, frustration, anxiety, effective communication  Why chosen therapy is appropriate versus another modality: relevant to diagnosis  Outcome monitoring methods: self-report, observation  Therapeutic intervention type: supportive psychotherapy supporting the multiple adaptive statements made by the patient, as well as adaptive actions and planning; mindfulness, cognitive, and behavioral techniques; psychoeducation regarding passive/aggressive/assertive communication and ways to engage in assertive communication.  Topics discussed/themes: symptom recognition and management  The patient's response to the intervention is accepting. The patient's progress toward treatment goals is progressing.   Duration of intervention:  23 minutes.      Interim history:  Living situation/supports:  As above...very fleeting moments of excessive anger and verbal outbursts followed by regret and often an apology.  She reports that mitigating circumstances are fatigue from caring for her mother, not having her own space as her mother is in the patient's room, and frustration that her son is not working.  Positively her mother is notably improved and will be returning to her own home soon and her son is making efforts with regards to applications and interviews.  Also positively, the friend son be moving out soon to his own place.     Her 23-year-old daughter is doing well but is very busy; she continues to live in her mother's home and will still be living there after the mother returns home.  Work is positive.  Last visit-  As above.  Work has been going well, but March begins a stretch of busy months.  She indicates that she now has limited  "contact with her boyfriend greater than a year, though they have not officially broken up.  She indicates that he is not mental health aware in his not supportive when she is stressed.  Previously-  The patient reports that her mother continues to work from home but is very ill from chemotherapy; she describes the mother being weak and having difficulty getting around her home because the mother is a hoarder.  She says that her daughter tries to clean but her mother stopped her.  The plan is for the mother to move in with the patient for awhile so that the patient can care for the mother and the daughter can clean the mother's house.  She reports a positive relationship with her boyfriend of 7 or 8 months.  He is good, sweet," describing his being thoughtful and a good .  Darrell was fired from [employer] when he refused to work around [employer], as he wanted to go out of state to visit family, and is unemployed; he and his friend continue to live in the home.  His friend continues to work.  Work is going well.  ...The patient evicted her ex, and he and his oldest son were subsequently arrested for possession, distribution, and manufacturing of illegal substances.  Her mother is in chemotherapy for colon cancer, and the prognosis is good.  Her 23-year-old daughter continues to live with her mother.  Her 19-year-old son Arnaud and his friend are working at [employer] and paying her rent.  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  Medical issues:  Follow-ups for psoriasis, sacroiliitis, right piriformis syndrome, right hip bursitis.  The patient is status post hysterectomy  Nonpsychotropic Medication:  Includes, per Epic, Sotyktu, gabapentin, levothyroxine, methocarbamol, topicals   Allergies:        Review of patient's allergies indicates:   Allergen Reactions    Aripiprazole Rash and Hives    Sulfa (sulfonamide antibiotics) Hives      Alcohol use:  None  Other substance " use:  Rare very small amount of unregulated marijuana, perhaps once or twice since the last visit    Mental Status Exam:   Appearance:  Appropriately groomed  Orientation:  X4  Attitude:  Cooperative, engaged   Eye Contact:  Appropriate  Behavior:  Calm, appropriate  Speech:     Rate - WNL    Volume - WNL    Quantity - WNL    Tone - relaxed, appropriate  Pressure - no  Thought Processes:  Goal-directed  Mood:  Euthymic, anxiety controlled; very brief moments of excessive feelings of anger, not accompanied by any problematic behavior of note   Affect:  Without distress, euthymic, including ability to brighten at appropriate times  SI:  No, and no thoughts of self-harm  HI:  No, and no thoughts of harm towards others  Paranoia:  No  Delusions:  No  Hallucinations:  No  Attention:  Intact over the course of the session  Cognition:  No deficits noted over the course of the session  Insight:  Intact   Judgment:  Intact  Impulse Control:  Intact       ASSESSMENT:   Encounter Diagnoses   Name Primary?    Mood disorder Yes    History of bipolar disorder     RAOUL (generalized anxiety disorder)     PTSD (post-traumatic stress disorder)    With discussion, the patient declines reinitiating any pharmacotherapy at this time but volunteers that she will send a message at any point that she reconsiders.  Next appointment scheduled for 2 months.      Reviewed with patient:  Report side effects, other problems, or questions to the psychiatrist by way of the [facility] portal, [facility], or by calling [facility] at [phone].  Messages are checked during clinic hours only.  For urgent issues outside of clinic hours, call [phone] or go to an emergency department.  Follow up with primary care/MD specialist for continued monitoring of general health and wellness and any medical conditions.  Call [facility] at [phone] or use the [facility] portal if necessary for scheduling or rescheduling.  It is the  responsibility of the patient to reschedule an appointment if an appointment has been canceled or missed.  Understanding was expressed; and no further concerns or questions were raised at this time.     [id number]  Psychotherapy as noted above, 23 minutes      Large portions of this note were completed by way of voice recognition dictation software, and transcription errors are possible, such that specific information in the note should be considered in the context of the entire report.

## 2025-03-27 ENCOUNTER — OFFICE VISIT (OUTPATIENT)
Dept: PSYCHIATRY | Facility: CLINIC | Age: 40
End: 2025-03-27
Payer: COMMERCIAL

## 2025-03-27 DIAGNOSIS — F39 MOOD DISORDER: Primary | ICD-10-CM

## 2025-03-27 DIAGNOSIS — Z86.59 HISTORY OF BIPOLAR DISORDER: ICD-10-CM

## 2025-03-27 DIAGNOSIS — F43.10 PTSD (POST-TRAUMATIC STRESS DISORDER): ICD-10-CM

## 2025-03-27 DIAGNOSIS — F41.1 GAD (GENERALIZED ANXIETY DISORDER): ICD-10-CM

## 2025-04-02 DIAGNOSIS — E03.9 HYPOTHYROIDISM, UNSPECIFIED TYPE: ICD-10-CM

## 2025-04-02 RX ORDER — LEVOTHYROXINE SODIUM 137 UG/1
137 TABLET ORAL
Qty: 90 TABLET | Refills: 1 | OUTPATIENT
Start: 2025-04-02

## 2025-04-02 NOTE — TELEPHONE ENCOUNTER
No care due was identified.  Health [facility] Embedded Care Due Messages. Reference number: [id number].   4/02/2025 6:08:01 PM CDT

## 2025-04-03 NOTE — TELEPHONE ENCOUNTER
Refill Decision Note   Haydee Desouzaris  is requesting a refill authorization.  Brief Assessment and Rationale for Refill:  Quick Discontinue     Medication Therapy Plan: Levothyroxine increased to 150 mcg on 2/19/25      Comments:     Note composed:8:33 PM 04/02/2025

## 2025-06-02 ENCOUNTER — TELEPHONE (OUTPATIENT)
Dept: INTERNAL MEDICINE | Facility: CLINIC | Age: 40
End: 2025-06-02
Payer: COMMERCIAL

## 2025-06-02 ENCOUNTER — PATIENT MESSAGE (OUTPATIENT)
Dept: INTERNAL MEDICINE | Facility: CLINIC | Age: 40
End: 2025-06-02
Payer: COMMERCIAL

## 2025-06-02 DIAGNOSIS — E03.9 HYPOTHYROIDISM, UNSPECIFIED TYPE: Primary | ICD-10-CM

## 2025-06-03 ENCOUNTER — LAB VISIT (OUTPATIENT)
Dept: LAB | Facility: HOSPITAL | Age: 40
End: 2025-06-03
Attending: FAMILY MEDICINE
Payer: COMMERCIAL

## 2025-06-03 DIAGNOSIS — E03.9 HYPOTHYROIDISM, UNSPECIFIED TYPE: ICD-10-CM

## 2025-06-03 LAB
T3FREE SERPL-MCNC: 73 NG/DL (ref 60–180)
T4 FREE SERPL-MCNC: 0.48 NG/DL (ref 0.71–1.51)
TSH SERPL-ACNC: 64.3 UIU/ML (ref 0.4–4)

## 2025-06-03 PROCEDURE — 84443 ASSAY THYROID STIM HORMONE: CPT

## 2025-06-03 PROCEDURE — 84439 ASSAY OF FREE THYROXINE: CPT

## 2025-06-03 PROCEDURE — 36415 COLL VENOUS BLD VENIPUNCTURE: CPT

## 2025-06-03 PROCEDURE — 84480 ASSAY TRIIODOTHYRONINE (T3): CPT

## 2025-06-04 ENCOUNTER — OFFICE VISIT (OUTPATIENT)
Dept: PSYCHIATRY | Facility: CLINIC | Age: 40
End: 2025-06-04
Payer: COMMERCIAL

## 2025-06-04 DIAGNOSIS — F43.10 PTSD (POST-TRAUMATIC STRESS DISORDER): ICD-10-CM

## 2025-06-04 DIAGNOSIS — Z86.59 HISTORY OF BIPOLAR DISORDER: ICD-10-CM

## 2025-06-04 DIAGNOSIS — F39 MOOD DISORDER: Primary | ICD-10-CM

## 2025-06-04 DIAGNOSIS — F41.1 GAD (GENERALIZED ANXIETY DISORDER): ICD-10-CM

## 2025-06-06 DIAGNOSIS — R22.1 NECK FULLNESS: Primary | ICD-10-CM

## 2025-06-06 RX ORDER — LEVOTHYROXINE SODIUM 175 UG/1
175 TABLET ORAL
Qty: 30 TABLET | Refills: 1 | Status: SHIPPED | OUTPATIENT
Start: 2025-06-06 | End: 2026-06-06

## 2025-06-09 ENCOUNTER — HOSPITAL ENCOUNTER (OUTPATIENT)
Dept: RADIOLOGY | Facility: HOSPITAL | Age: 40
Discharge: HOME | End: 2025-06-09
Attending: FAMILY MEDICINE
Payer: COMMERCIAL

## 2025-06-09 DIAGNOSIS — R22.1 NECK FULLNESS: ICD-10-CM

## 2025-06-09 PROCEDURE — 76536 US EXAM OF HEAD AND NECK: CPT | Mod: TC

## 2025-06-09 PROCEDURE — 76536 US EXAM OF HEAD AND NECK: CPT | Mod: 26,,, | Performed by: RADIOLOGY

## 2025-06-11 ENCOUNTER — OFFICE VISIT (OUTPATIENT)
Dept: INTERNAL MEDICINE | Facility: CLINIC | Age: 40
End: 2025-06-11
Payer: COMMERCIAL

## 2025-06-11 VITALS
BODY MASS INDEX: 45.52 KG/M2 | HEART RATE: 102 BPM | OXYGEN SATURATION: 98 % | RESPIRATION RATE: 18 BRPM | SYSTOLIC BLOOD PRESSURE: 116 MMHG | TEMPERATURE: 99 F | WEIGHT: 282 LBS | DIASTOLIC BLOOD PRESSURE: 86 MMHG

## 2025-06-11 DIAGNOSIS — F17.210 TOBACCO DEPENDENCE DUE TO CIGARETTES: ICD-10-CM

## 2025-06-11 DIAGNOSIS — F41.9 ANXIETY: ICD-10-CM

## 2025-06-11 DIAGNOSIS — M46.1 SACROILIITIS: ICD-10-CM

## 2025-06-11 DIAGNOSIS — E03.9 HYPOTHYROIDISM, UNSPECIFIED TYPE: Primary | ICD-10-CM

## 2025-06-11 PROCEDURE — 3044F HG A1C LEVEL LT 7.0%: CPT | Mod: CPTII,S$GLB,, | Performed by: FAMILY MEDICINE

## 2025-06-11 PROCEDURE — 3008F BODY MASS INDEX DOCD: CPT | Mod: CPTII,S$GLB,, | Performed by: FAMILY MEDICINE

## 2025-06-11 PROCEDURE — 99999 PR PBB SHADOW E&M-EST. PATIENT-LVL III: CPT | Mod: PBBFAC,,, | Performed by: FAMILY MEDICINE

## 2025-06-11 PROCEDURE — G2211 COMPLEX E/M VISIT ADD ON: HCPCS | Mod: S$GLB,,, | Performed by: FAMILY MEDICINE

## 2025-06-11 PROCEDURE — 1159F MED LIST DOCD IN RCRD: CPT | Mod: CPTII,S$GLB,, | Performed by: FAMILY MEDICINE

## 2025-06-11 PROCEDURE — 3074F SYST BP LT 130 MM HG: CPT | Mod: CPTII,S$GLB,, | Performed by: FAMILY MEDICINE

## 2025-06-11 PROCEDURE — 3079F DIAST BP 80-89 MM HG: CPT | Mod: CPTII,S$GLB,, | Performed by: FAMILY MEDICINE

## 2025-06-11 PROCEDURE — 99214 OFFICE O/P EST MOD 30 MIN: CPT | Mod: S$GLB,,, | Performed by: FAMILY MEDICINE

## 2025-06-11 NOTE — PROGRESS NOTES
Subjective:       Patient ID: Haydee Pennington is a 40 y.o. female.    Chief Complaint follow up     HPI    Problem List[1]    Past Medical History:   Diagnosis Date    Facial trauma 2002    Broken nose and orbital bone    Hypertension     Hypertriglyceridemia 05/30/2024    Hypothyroidism Unsure    Mental disorder Unsure    Anxiety    Migraine headache 2014    Hormonal    Obesity Awhile    Seasonal allergies Always    Fall and spring.    Thyroid disease     Ulcer 2012 gui    H pylori       Past Surgical History:   Procedure Laterality Date    CERVICAL BIOPSY  W/ LOOP ELECTRODE EXCISION  2005    COLONOSCOPY, SCREENING, HIGH RISK PATIENT N/A 10/23/2024    Procedure: COLONOSCOPY, SCREENING, HIGH RISK PATIENT;  Surgeon: Horacio Schaffer MD;  Location: [facility] ENDO;  Service: Endoscopy;  Laterality: N/A;    HYSTERECTOMY  [phone]    INJECTION OF ANESTHETIC AGENT INTO SACROILIAC JOINT Right 06/03/2022    Procedure: right Sacroiliac Joint Injection with RN IV sedation;  Surgeon: Rey Monsalve MD;  Location: [facility] PAIN MGT;  Service: Pain Management;  Laterality: Right;    INJECTION OF JOINT Right 06/03/2022    Procedure: right GT bursa injection with RN IV sedation;  Surgeon: Rey Monsalve MD;  Location: [facility] PAIN MGT;  Service: Pain Management;  Laterality: Right;    REPAIR, PERFORATION, NASAL SEPTUM      sedated for nasal septal manipulation    TYMPANOSTOMY TUBE PLACEMENT  90s    Adolescent    XI ROBOTIC HYSTERECTOMY, WITH SALPINGECTOMY N/A 03/23/2023    Procedure: XI ROBOTIC HYSTERECTOMY,WITH SALPINGECTOMY;  Surgeon: GRICEL Mock MD;  Location: [facility] OR;  Service: OB/GYN;  Laterality: N/A;       Family History   Problem Relation Name Age of Onset    Diabetes Mother Tati Pennington     Cancer Mother Tati Pennington         Lung and Breast    Hypertension Father Uziel Pennington     Dementia Father Uziel Pennington     Transient ischemic attack Father Uziel Pennington     Heart failure Father Uziel Pennington     Stroke Father Uziel  Pennington        Tobacco Use History[2]      Wt Readings from Last 120 Encounters:   06/11/25 127.9 kg (282 lb)   02/07/25 124.3 kg (274 lb 2.3 oz)   12/26/24 122.6 kg (270 lb 4.5 oz)   10/23/24 117.9 kg (260 lb)   10/03/24 120.5 kg (265 lb 8.7 oz)   04/09/24 122.9 kg (270 lb 15.1 oz)   04/08/24 119.7 kg (263 lb 14.3 oz)   04/08/24 119.7 kg (264 lb)   03/14/24 117.6 kg (259 lb 4.2 oz)   01/30/24 114.7 kg (252 lb 12.1 oz)   01/29/24 117.3 kg (258 lb 7.8 oz)   11/09/23 111.5 kg (245 lb 13 oz)   10/10/23 110.7 kg (244 lb 2.6 oz)   10/05/23 110.6 kg (243 lb 13.3 oz)   09/14/23 115.2 kg (253 lb 15.5 oz)   05/02/23 117.9 kg (259 lb 14.8 oz)   03/29/23 114.5 kg (252 lb 6.8 oz)   03/24/23 119.3 kg (263 lb 0.1 oz)   03/23/23 117.3 kg (258 lb 7.8 oz)   03/14/23 114.8 kg (253 lb 1.4 oz)   01/25/23 116.9 kg (257 lb 11.5 oz)   12/28/22 120.3 kg (265 lb 3.4 oz)   12/22/22 118.2 kg (260 lb 9.3 oz)   10/13/22 110.5 kg (243 lb 9.7 oz)   09/20/22 110.5 kg (243 lb 9.7 oz)   09/20/22 111.1 kg (245 lb 0.7 oz)   07/18/22 106.3 kg (234 lb 5.6 oz)   06/28/22 103.6 kg (228 lb 6.3 oz)   06/21/22 107.1 kg (236 lb 1.8 oz)   06/03/22 108.4 kg (238 lb 15.7 oz)   05/17/22 107 kg (235 lb 14.3 oz)   03/15/22 110.3 kg (243 lb 2.7 oz)   10/14/21 111.2 kg (245 lb 2.4 oz)   07/27/21 111.2 kg (245 lb 2.4 oz)   05/28/21 111 kg (244 lb 11.4 oz)   04/26/21 111 kg (244 lb 11.4 oz)   04/07/21 109.3 kg (241 lb)   03/20/21 109.4 kg (241 lb 2.9 oz)   01/21/21 109.7 kg (241 lb 13.5 oz)   01/04/21 109.4 kg (241 lb 1.2 oz)   01/04/21 109.1 kg (240 lb 8.4 oz)   12/17/20 112 kg (246 lb 14.6 oz)   12/10/20 110.7 kg (244 lb 2.6 oz)   10/26/20 111.8 kg (246 lb 7.6 oz)   07/02/20 109.7 kg (241 lb 13.5 oz)   05/22/20 108.1 kg (238 lb 5.1 oz)   05/13/20 108.3 kg (238 lb 10.4 oz)   01/12/20 109 kg (240 lb 4.8 oz)   11/08/19 106.6 kg (235 lb 0.2 oz)   05/23/19 107.1 kg (236 lb 1.8 oz)   10/09/18 107 kg (235 lb 14.3 oz)   09/29/18 107 kg (235 lb 14.3 oz)   09/25/18 107.6 kg  (237 lb 3.4 oz)   09/17/18 109.8 kg (242 lb 1 oz)   08/21/18 108.1 kg (238 lb 5.1 oz)   05/14/18 107.7 kg (237 lb 7 oz)   04/30/18 108.5 kg (239 lb 3.2 oz)   04/16/18 106.4 kg (234 lb 9.1 oz)   04/02/18 107.9 kg (237 lb 14 oz)   03/27/18 107.2 kg (236 lb 5.3 oz)   10/11/15 100.2 kg (221 lb)           For further HPI details, see assessment and plan.    Review of Systems  fatigue.  Frustrated with weight gain.  Objective:      Vitals:    06/11/25 1424   BP: 116/86   Pulse: 102   Resp: 18   Temp: 98.9 °F (37.2 °C)       Physical Exam  Constitutional:       General: She is not in acute distress.     Appearance: She is not ill-appearing.   Pulmonary:      Effort: Pulmonary effort is normal. No respiratory distress.   Neurological:      General: No focal deficit present.      Mental Status: She is alert.   Psychiatric:         Mood and Affect: Mood normal.         Behavior: Behavior normal.         Assessment:       1. Hypothyroidism, unspecified type    2. Tobacco dependence due to cigarettes    3. Anxiety    4. Sacroiliitis        Plan:   Hypothyroidism, unspecified type  -     TSH; Future; Expected date: 06/11/2025    Tobacco dependence due to cigarettes    Anxiety    Sacroiliitis      Hypothyroidism  TSH was markedly elevated   Increased dose of Synthroid recently.  She is fairly well compliant but does miss occasionally.  Continue daily higher dose 175.  TSH in 6 weeks.  Address accordingly.  If failing to make substantial improvements we will consult endocrinologist    History of smoking   She is working hard and I appreciated.  Using nicotine replacement therapy and I am okay with it     Anxiety   Patient does have other mental health diagnosis  Presently stable.  She is stressed but she does not want to use any medications.  If anything changes she is welcome to let me know and we will address accordingly    Sacroiliitis   Patient has a muscle relaxant gabapentin listed but fortunately has not needed them lately.   I am okay to keep him on the drug list for as needed use.    Recent ultrasound her swelling.  Reports subsided    TSH in 6 weeks   Follow up in 6 months if all is stable.  Sooner if need be    This note was verbally dictated, please excuse any type errors.         [1]   Patient Active Problem List  Diagnosis    Anxiety    Depression    Hypothyroidism    Stress headaches    Hypertension    Severe obesity (BMI 35.0-39.9) with comorbidity    Vitamin D deficiency    Tobacco dependence due to cigarettes    Sacroiliitis    Greater trochanteric bursitis of right hip    Unspecified lump in the left breast, upper outer quadrant    Borderline personality disorder    Cannabis use disorder, mild, abuse   [2]   Social History  Tobacco Use   Smoking Status Every Day    Current packs/day: 0.00    Average packs/day: 0.5 packs/day for 29.0 years (14.5 ttl pk-yrs)    Types: Vaping with nicotine, Cigarettes    Start date: 1/1/1996    Last attempt to quit: 2022    Years since quitting: 3.4    Passive exposure: Past   Smokeless Tobacco Never   Tobacco Comments    3weeks strong only vaping low level nicotine

## 2025-07-04 NOTE — TELEPHONE ENCOUNTER
No care due was identified.  [facility] Embedded Care Due Messages. Reference number: [id number].   7/04/2025 3:37:25 AM CDT

## 2025-07-06 NOTE — TELEPHONE ENCOUNTER
Refill Routing Note   Medication(s) are not appropriate for processing by [facility] for the following reason(s):        New or recently adjusted medication  Required labs abnormal    [facility] action(s):  Defer             Appointments  past 12m or future 3m with PCP    Date Provider   Last Visit   6/11/2025 Gumaro Del Real MD   Next Visit   12/15/2025 Gumaro Del Real MD   ED visits in past 90 days: 0        Note composed:12:04 PM 07/06/2025

## 2025-07-07 RX ORDER — LEVOTHYROXINE SODIUM 175 UG/1
175 TABLET ORAL
Qty: 90 TABLET | Refills: 3 | Status: SHIPPED | OUTPATIENT
Start: 2025-07-07

## 2025-08-01 ENCOUNTER — LAB VISIT (OUTPATIENT)
Dept: LAB | Facility: HOSPITAL | Age: 40
End: 2025-08-01
Attending: FAMILY MEDICINE
Payer: COMMERCIAL

## 2025-08-01 DIAGNOSIS — E03.9 HYPOTHYROIDISM, UNSPECIFIED TYPE: ICD-10-CM

## 2025-08-01 LAB
T4 FREE SERPL-MCNC: 1.22 NG/DL (ref 0.71–1.51)
TSH SERPL-ACNC: 0.13 UIU/ML (ref 0.4–4)

## 2025-08-01 PROCEDURE — 84439 ASSAY OF FREE THYROXINE: CPT

## 2025-08-01 PROCEDURE — 36415 COLL VENOUS BLD VENIPUNCTURE: CPT

## 2025-08-01 PROCEDURE — 84443 ASSAY THYROID STIM HORMONE: CPT

## 2025-08-02 NOTE — TELEPHONE ENCOUNTER
No care due was identified.  Health [facility] Embedded Care Due Messages. Reference number: [id number].   8/02/2025 10:59:39 AM CDT

## 2025-08-04 ENCOUNTER — OFFICE VISIT (OUTPATIENT)
Dept: INTERNAL MEDICINE | Facility: CLINIC | Age: 40
End: 2025-08-04
Payer: COMMERCIAL

## 2025-08-04 DIAGNOSIS — K59.00 CONSTIPATION, UNSPECIFIED CONSTIPATION TYPE: ICD-10-CM

## 2025-08-04 DIAGNOSIS — E03.9 HYPOTHYROIDISM, UNSPECIFIED TYPE: Primary | ICD-10-CM

## 2025-08-04 PROCEDURE — 98005 SYNCH AUDIO-VIDEO EST LOW 20: CPT | Mod: 95,,, | Performed by: FAMILY MEDICINE

## 2025-08-04 PROCEDURE — 3044F HG A1C LEVEL LT 7.0%: CPT | Mod: CPTII,95,, | Performed by: FAMILY MEDICINE

## 2025-08-04 PROCEDURE — G2211 COMPLEX E/M VISIT ADD ON: HCPCS | Mod: 95,,, | Performed by: FAMILY MEDICINE

## 2025-08-04 RX ORDER — LEVOTHYROXINE SODIUM 175 UG/1
175 TABLET ORAL
Qty: 90 TABLET | Refills: 3 | OUTPATIENT
Start: 2025-08-04

## 2025-08-04 RX ORDER — LEVOTHYROXINE SODIUM 175 UG/1
175 TABLET ORAL
Qty: 90 TABLET | Refills: 0 | Status: SHIPPED | OUTPATIENT
Start: 2025-08-04

## 2025-08-04 NOTE — TELEPHONE ENCOUNTER
Refill Routing Note   Medication(s) are not appropriate for processing by [facility] for the following reason(s):        New or recently adjusted medication  Required labs abnormal    [facility] action(s):  Defer               Appointments  past 12m or future 3m with PCP    Date Provider   Last Visit   6/11/2025 Gumaro Del Real MD   Next Visit   12/15/2025 Gumaro Del Real MD   ED visits in past 90 days: 0        Note composed:10:18 PM 08/03/2025

## 2025-08-04 NOTE — PROGRESS NOTES
Subjective:       Patient ID: Haydee Pennington is a 40 y.o. female.    Chief Complaint: thyroid    The patient location is: home  The chief complaint leading to consultation is: thyroid    Visit type: audiovisual    Face to Face time with patient: 10-15   minutes of total time spent on the encounter, which includes face to face time and non-face to face time preparing to see the patient (eg, review of tests), Obtaining and/or reviewing separately obtained history, Documenting clinical information in the electronic or other health record, Independently interpreting results (not separately reported) and communicating results to the patient/family/caregiver, or Care coordination (not separately reported).         Each patient to whom he or she provides medical services by telemedicine is:  (1) informed of the relationship between the physician and patient and the respective role of any other health care provider with respect to management of the patient; and (2) notified that he or she may decline to receive medical services by telemedicine and may withdraw from such care at any time.    Notes:       HPI    Problem List[1]    Past Medical History:   Diagnosis Date    Facial trauma 2002    Broken nose and orbital bone    Hypertension     Hypertriglyceridemia 05/30/2024    Hypothyroidism Unsure    Mental disorder Unsure    Anxiety    Migraine headache 2014    Hormonal    Obesity Awhile    Seasonal allergies Always    Fall and spring.    Thyroid disease     Ulcer 2012 gui    H pylori       Past Surgical History:   Procedure Laterality Date    CERVICAL BIOPSY  W/ LOOP ELECTRODE EXCISION  2005    COLONOSCOPY, SCREENING, HIGH RISK PATIENT N/A 10/23/2024    Procedure: COLONOSCOPY, SCREENING, HIGH RISK PATIENT;  Surgeon: Horacio Schaffer MD;  Location: [facility];  Service: Endoscopy;  Laterality: N/A;    HYSTERECTOMY  [phone]    INJECTION OF ANESTHETIC AGENT INTO SACROILIAC JOINT Right 06/03/2022    Procedure: right  Sacroiliac Joint Injection with RN IV sedation;  Surgeon: Rey Monsalve MD;  Location: [facility];  Service: Pain Management;  Laterality: Right;    INJECTION OF JOINT Right 06/03/2022    Procedure: right GT bursa injection with RN IV sedation;  Surgeon: Rey Monsalve MD;  Location: [facility];  Service: Pain Management;  Laterality: Right;    REPAIR, PERFORATION, NASAL SEPTUM      sedated for nasal septal manipulation    TYMPANOSTOMY TUBE PLACEMENT  90s    Adolescent    XI ROBOTIC HYSTERECTOMY, WITH SALPINGECTOMY N/A 03/23/2023    Procedure: XI ROBOTIC HYSTERECTOMY,WITH SALPINGECTOMY;  Surgeon: GRICEL Mock MD;  Location: [facility] OR;  Service: OB/GYN;  Laterality: N/A;       Family History   Problem Relation Name Age of Onset    Diabetes Mother Tati Pennington     Cancer Mother Tati Pennington         Lung and Breast    Hypertension Father Uziel Pennington     Dementia Father Uziel Pennington     Transient ischemic attack Father Uziel Pennington     Heart failure Father Uziel Pennington     Stroke Father Uziel Pennington        Tobacco Use History[2]    Wt Readings from Last 5 Encounters:   06/11/25 127.9 kg (282 lb)   02/07/25 124.3 kg (274 lb 2.3 oz)   12/26/24 122.6 kg (270 lb 4.5 oz)   10/23/24 117.9 kg (260 lb)   10/03/24 120.5 kg (265 lb 8.7 oz)       For further HPI details, see assessment and plan.    Review of Systems   Constitutional:  Negative for activity change and unexpected weight change.   HENT:  Negative for hearing loss, rhinorrhea and trouble swallowing.    Eyes:  Negative for discharge and visual disturbance.   Respiratory:  Negative for chest tightness and wheezing.    Cardiovascular:  Negative for chest pain and palpitations.   Gastrointestinal:  Negative for blood in stool, constipation, diarrhea and vomiting.   Endocrine: Negative for polydipsia and polyuria.   Genitourinary:  Negative for difficulty urinating, dysuria, hematuria and menstrual problem.   Musculoskeletal:  Negative for arthralgias, joint  swelling and neck pain.   Neurological:  Negative for weakness and headaches.   Psychiatric/Behavioral:  Negative for confusion and dysphoric mood.        Objective:      There were no vitals filed for this visit.    Physical Exam  Constitutional:       General: She is not in acute distress.     Appearance: She is not ill-appearing.   Pulmonary:      Effort: Pulmonary effort is normal. No respiratory distress.   Neurological:      General: No focal deficit present.      Mental Status: She is alert.   Psychiatric:         Mood and Affect: Mood normal.         Behavior: Behavior normal.         Assessment:       1. Hypothyroidism, unspecified type    2. Constipation, unspecified constipation type        Plan:   Hypothyroidism, unspecified type  -     TSH; Future; Expected date: 08/04/2025  -     T4, Free; Future; Expected date: 08/04/2025  -     T3, Free; Future; Expected date: 08/04/2025    Constipation, unspecified constipation type    Other orders  -     levothyroxine (SYNTHROID, LEVOTHROID) 175 MCG tablet; Take 1 tablet (175 mcg total) by mouth before breakfast.  Dispense: 90 tablet; Refill: 0    Hypothyroidism   Has been difficult to control   Wide variability     Two months ago in early June she is on 150 mcg.  TSH was very high.  We adjusted to 175.  Now her TSH is low.    T4 normal   At present she is feeling fine.  Discussed potential options.  Including alternating between 150 and 175.    At present time she would like to continue in the 175 and recheck TSH again in about a month.  Given clinically stable I feel this is a reasonable course of action and if need be we will reduce accordingly based off of next TSH, T3, T4.    Constipation is intermittently a problem.  Has been using laxatives.  I am okay with that.  Discussed conservative management if that fails can consider Linzess.  She is not interested in pharmaceutical intervention with the prescription drugs at present time    Lab 4 week.  Follow up  pending result    This note was verbally dictated, please excuse any type errors.         [1]   Patient Active Problem List  Diagnosis    Anxiety    Depression    Hypothyroidism    Stress headaches    Hypertension    Severe obesity (BMI 35.0-39.9) with comorbidity    Vitamin D deficiency    Tobacco dependence due to cigarettes    Sacroiliitis    Greater trochanteric bursitis of right hip    Unspecified lump in the left breast, upper outer quadrant    Borderline personality disorder    Cannabis use disorder, mild, abuse   [2]   Social History  Tobacco Use   Smoking Status Every Day    Current packs/day: 0.00    Average packs/day: 0.5 packs/day for 29.0 years (14.5 ttl pk-yrs)    Types: Vaping with nicotine, Cigarettes    Start date: 1/1/1996    Last attempt to quit: 2022    Years since quitting: 3.5    Passive exposure: Past   Smokeless Tobacco Never   Tobacco Comments    3weeks strong only vaping low level nicotine

## 2025-08-29 ENCOUNTER — LAB VISIT (OUTPATIENT)
Dept: LAB | Facility: HOSPITAL | Age: 40
End: 2025-08-29
Attending: FAMILY MEDICINE
Payer: COMMERCIAL

## 2025-08-29 DIAGNOSIS — E03.9 HYPOTHYROIDISM, UNSPECIFIED TYPE: ICD-10-CM

## 2025-08-29 LAB
T3FREE SERPL-MCNC: 3.3 PG/ML (ref 2.3–4.2)
T4 FREE SERPL-MCNC: 1.1 NG/DL (ref 0.71–1.51)
TSH SERPL-ACNC: 0.09 UIU/ML (ref 0.4–4)

## 2025-08-29 PROCEDURE — 84443 ASSAY THYROID STIM HORMONE: CPT

## 2025-08-29 PROCEDURE — 84439 ASSAY OF FREE THYROXINE: CPT

## 2025-08-29 PROCEDURE — 84481 FREE ASSAY (FT-3): CPT

## 2025-08-29 PROCEDURE — 36415 COLL VENOUS BLD VENIPUNCTURE: CPT

## (undated) DEVICE — DRAPE ARM DAVINCI XI

## (undated) DEVICE — Device

## (undated) DEVICE — SEAL UNIVERSAL 5MM-8MM XI

## (undated) DEVICE — SYR 3CC LUER LOC

## (undated) DEVICE — SOL 9P NACL IRR PIC IL

## (undated) DEVICE — DRAPE STERI LONG

## (undated) DEVICE — POSITIONER HEAD DONUT 9IN FOAM

## (undated) DEVICE — COVER PROXIMA MAYO STAND

## (undated) DEVICE — GOWN POLY REINF BRTH SLV XL

## (undated) DEVICE — SOL ELECTROLUBE ANTI-STIC

## (undated) DEVICE — COVER LIGHT HANDLE 80/CA

## (undated) DEVICE — TROCAR ENDOPATH XCEL 5X100MM

## (undated) DEVICE — KIT ANTIFOG

## (undated) DEVICE — TOWEL OR DISP STRL BLUE 4/PK

## (undated) DEVICE — SUT VICRYL PLUS 0 CT1 36IN

## (undated) DEVICE — DRAPE LAVH SURG 109X109X100IN

## (undated) DEVICE — TRAY SKIN SCRUB WET PREMIUM

## (undated) DEVICE — PACK BASIC SETUP SC BR

## (undated) DEVICE — SUT MONOCRYL 4-0 PS-1 UND

## (undated) DEVICE — SET PNEUMOCLEAR HEAT HUM SE HF

## (undated) DEVICE — GLOVE SURG BIOGEL LATEX SZ 7.5

## (undated) DEVICE — DRAPE COLUMN DAVINCI XI

## (undated) DEVICE — DRAPE UINDERBUT GRAD PCH

## (undated) DEVICE — SUPPORT ULNA NERVE PROTECTOR

## (undated) DEVICE — SUT V-LOC 180 ABD 2/0 GS-21

## (undated) DEVICE — TIP RUMI GREEN DISPOSABLE6.7MM

## (undated) DEVICE — IRRIGATOR ENDOSCOPY DISP.

## (undated) DEVICE — SYR 10CC LUER LOCK

## (undated) DEVICE — CLOSURE SKIN STERI STRIP 1/2X4

## (undated) DEVICE — OCCLUDER COLPO-PNEUMO STERILE

## (undated) DEVICE — COVER TIP CURVED SCISSORS XI

## (undated) DEVICE — APPLICATOR CHLORAPREP ORN 26ML

## (undated) DEVICE — ELECTRODE REM PLYHSV RETURN 9

## (undated) DEVICE — NDL PNEUMO INSUFFLATI 120MM

## (undated) DEVICE — ADHESIVE MASTISOL VIAL 48/BX

## (undated) DEVICE — OBTURATOR BLADELESS 8MM XI CLR

## (undated) DEVICE — SOL NS 1000CC

## (undated) DEVICE — TUBING SUCTION STRAIGHT .25X20

## (undated) DEVICE — GLOVE SURGICAL LATEX SZ 7

## (undated) DEVICE — SYR 50CC LL